# Patient Record
Sex: MALE | Race: WHITE | Employment: FULL TIME | URBAN - METROPOLITAN AREA
[De-identification: names, ages, dates, MRNs, and addresses within clinical notes are randomized per-mention and may not be internally consistent; named-entity substitution may affect disease eponyms.]

---

## 2021-10-18 ENCOUNTER — APPOINTMENT (OUTPATIENT)
Dept: GENERAL RADIOLOGY | Age: 51
DRG: 368 | End: 2021-10-18
Attending: EMERGENCY MEDICINE
Payer: COMMERCIAL

## 2021-10-18 ENCOUNTER — HOSPITAL ENCOUNTER (INPATIENT)
Age: 51
LOS: 31 days | Discharge: NURSING, REHAB OR LONG-TERM CARE | DRG: 368 | End: 2021-11-18
Attending: EMERGENCY MEDICINE | Admitting: HEALTH CARE PROVIDER
Payer: COMMERCIAL

## 2021-10-18 DIAGNOSIS — R53.81 DEBILITY: ICD-10-CM

## 2021-10-18 DIAGNOSIS — F41.8 ANXIETY ABOUT HEALTH: ICD-10-CM

## 2021-10-18 DIAGNOSIS — Z51.5 PALLIATIVE CARE BY SPECIALIST: ICD-10-CM

## 2021-10-18 DIAGNOSIS — A41.9 SEPTIC SHOCK (HCC): Primary | ICD-10-CM

## 2021-10-18 DIAGNOSIS — R65.21 SEPTIC SHOCK (HCC): Primary | ICD-10-CM

## 2021-10-18 DIAGNOSIS — K92.2 GASTROINTESTINAL HEMORRHAGE, UNSPECIFIED GASTROINTESTINAL HEMORRHAGE TYPE: ICD-10-CM

## 2021-10-18 DIAGNOSIS — R18.8 ABDOMINAL WALL FLUID COLLECTIONS: ICD-10-CM

## 2021-10-18 LAB
ALBUMIN SERPL-MCNC: 1.9 G/DL (ref 3.5–5)
ALBUMIN/GLOB SERPL: 0.3 {RATIO} (ref 1.1–2.2)
ALP SERPL-CCNC: 175 U/L (ref 45–117)
ALT SERPL-CCNC: 29 U/L (ref 12–78)
ANION GAP SERPL CALC-SCNC: 12 MMOL/L (ref 5–15)
ARTERIAL PATENCY WRIST A: POSITIVE
AST SERPL-CCNC: 33 U/L (ref 15–37)
ATRIAL RATE: 125 BPM
BASE EXCESS BLD CALC-SCNC: 0.4 MMOL/L
BASOPHILS # BLD: 0 K/UL (ref 0–0.1)
BASOPHILS NFR BLD: 0 % (ref 0–1)
BDY SITE: ABNORMAL
BILIRUB SERPL-MCNC: 0.8 MG/DL (ref 0.2–1)
BUN SERPL-MCNC: 45 MG/DL (ref 6–20)
BUN/CREAT SERPL: 16 (ref 12–20)
CALCIUM SERPL-MCNC: 9.5 MG/DL (ref 8.5–10.1)
CALCULATED P AXIS, ECG09: 44 DEGREES
CALCULATED R AXIS, ECG10: 6 DEGREES
CALCULATED T AXIS, ECG11: 41 DEGREES
CHLORIDE SERPL-SCNC: 96 MMOL/L (ref 97–108)
CO2 SERPL-SCNC: 26 MMOL/L (ref 21–32)
COMMENT, HOLDF: NORMAL
COMMENT, HOLDF: NORMAL
COVID-19 RAPID TEST, COVR: NOT DETECTED
CREAT SERPL-MCNC: 2.87 MG/DL (ref 0.7–1.3)
DIAGNOSIS, 93000: NORMAL
DIFFERENTIAL METHOD BLD: ABNORMAL
EOSINOPHIL # BLD: 0 K/UL (ref 0–0.4)
EOSINOPHIL NFR BLD: 0 % (ref 0–7)
ERYTHROCYTE [DISTWIDTH] IN BLOOD BY AUTOMATED COUNT: 16.1 % (ref 11.5–14.5)
ERYTHROCYTE [DISTWIDTH] IN BLOOD BY AUTOMATED COUNT: 17.2 % (ref 11.5–14.5)
GAS FLOW.O2 O2 DELIVERY SYS: ABNORMAL L/MIN
GAS FLOW.O2 SETTING OXYMISER: 20 BPM
GLOBULIN SER CALC-MCNC: 5.6 G/DL (ref 2–4)
GLUCOSE SERPL-MCNC: 161 MG/DL (ref 65–100)
HCO3 BLD-SCNC: 22.8 MMOL/L (ref 22–26)
HCT VFR BLD AUTO: 26.4 % (ref 36.6–50.3)
HCT VFR BLD AUTO: 31.7 % (ref 36.6–50.3)
HGB BLD-MCNC: 8.3 G/DL (ref 12.1–17)
HGB BLD-MCNC: 9.6 G/DL (ref 12.1–17)
IMM GRANULOCYTES # BLD AUTO: 0 K/UL
IMM GRANULOCYTES NFR BLD AUTO: 0 %
INR PPP: 1.2 (ref 0.9–1.1)
INR PPP: 5.2 (ref 0.9–1.1)
LACTATE SERPL-SCNC: 2.4 MMOL/L (ref 0.4–2)
LACTATE SERPL-SCNC: 4 MMOL/L (ref 0.4–2)
LIPASE SERPL-CCNC: 153 U/L (ref 73–393)
LYMPHOCYTES # BLD: 1.6 K/UL (ref 0.8–3.5)
LYMPHOCYTES NFR BLD: 8 % (ref 12–49)
MAGNESIUM SERPL-MCNC: 2.5 MG/DL (ref 1.6–2.4)
MCH RBC QN AUTO: 29.4 PG (ref 26–34)
MCH RBC QN AUTO: 29.7 PG (ref 26–34)
MCHC RBC AUTO-ENTMCNC: 30.3 G/DL (ref 30–36.5)
MCHC RBC AUTO-ENTMCNC: 31.4 G/DL (ref 30–36.5)
MCV RBC AUTO: 94.6 FL (ref 80–99)
MCV RBC AUTO: 96.9 FL (ref 80–99)
METAMYELOCYTES NFR BLD MANUAL: 3 %
MONOCYTES # BLD: 1.4 K/UL (ref 0–1)
MONOCYTES NFR BLD: 7 % (ref 5–13)
NEUTS BAND NFR BLD MANUAL: 1 % (ref 0–6)
NEUTS SEG # BLD: 16.9 K/UL (ref 1.8–8)
NEUTS SEG NFR BLD: 81 % (ref 32–75)
NRBC # BLD: 0.23 K/UL (ref 0–0.01)
NRBC # BLD: 0.46 K/UL (ref 0–0.01)
NRBC BLD-RTO: 1.6 PER 100 WBC
NRBC BLD-RTO: 2.2 PER 100 WBC
O2/TOTAL GAS SETTING VFR VENT: 60 %
P-R INTERVAL, ECG05: 146 MS
PCO2 BLD: 28.6 MMHG (ref 35–45)
PEEP RESPIRATORY: 8 CMH2O
PH BLD: 7.51 [PH] (ref 7.35–7.45)
PLATELET # BLD AUTO: 371 K/UL (ref 150–400)
PLATELET # BLD AUTO: 623 K/UL (ref 150–400)
PLATELET COMMENTS,PCOM: ABNORMAL
PMV BLD AUTO: 9.7 FL (ref 8.9–12.9)
PMV BLD AUTO: 9.9 FL (ref 8.9–12.9)
PO2 BLD: 211 MMHG (ref 80–100)
POTASSIUM SERPL-SCNC: 3.9 MMOL/L (ref 3.5–5.1)
PROT SERPL-MCNC: 7.5 G/DL (ref 6.4–8.2)
PROTHROMBIN TIME: 12 SEC (ref 9–11.1)
PROTHROMBIN TIME: 50.8 SEC (ref 9–11.1)
Q-T INTERVAL, ECG07: 308 MS
QRS DURATION, ECG06: 74 MS
QTC CALCULATION (BEZET), ECG08: 444 MS
RBC # BLD AUTO: 2.79 M/UL (ref 4.1–5.7)
RBC # BLD AUTO: 3.27 M/UL (ref 4.1–5.7)
RBC MORPH BLD: ABNORMAL
RBC MORPH BLD: ABNORMAL
SAMPLES BEING HELD,HOLD: NORMAL
SAMPLES BEING HELD,HOLD: NORMAL
SAO2 % BLD: 99.8 % (ref 92–97)
SODIUM SERPL-SCNC: 134 MMOL/L (ref 136–145)
SOURCE, COVRS: NORMAL
SPECIMEN TYPE: ABNORMAL
VENTILATION MODE VENT: ABNORMAL
VENTRICULAR RATE, ECG03: 125 BPM
WBC # BLD AUTO: 14.1 K/UL (ref 4.1–11.1)
WBC # BLD AUTO: 20.6 K/UL (ref 4.1–11.1)
WBC MORPH BLD: ABNORMAL

## 2021-10-18 PROCEDURE — 65610000006 HC RM INTENSIVE CARE

## 2021-10-18 PROCEDURE — 74011000636 HC RX REV CODE- 636: Performed by: EMERGENCY MEDICINE

## 2021-10-18 PROCEDURE — 83690 ASSAY OF LIPASE: CPT

## 2021-10-18 PROCEDURE — 83605 ASSAY OF LACTIC ACID: CPT

## 2021-10-18 PROCEDURE — C9113 INJ PANTOPRAZOLE SODIUM, VIA: HCPCS | Performed by: HEALTH CARE PROVIDER

## 2021-10-18 PROCEDURE — 87040 BLOOD CULTURE FOR BACTERIA: CPT

## 2021-10-18 PROCEDURE — 74011250636 HC RX REV CODE- 250/636: Performed by: HEALTH CARE PROVIDER

## 2021-10-18 PROCEDURE — 80053 COMPREHEN METABOLIC PANEL: CPT

## 2021-10-18 PROCEDURE — 74011250636 HC RX REV CODE- 250/636: Performed by: EMERGENCY MEDICINE

## 2021-10-18 PROCEDURE — 87635 SARS-COV-2 COVID-19 AMP PRB: CPT

## 2021-10-18 PROCEDURE — 5A1955Z RESPIRATORY VENTILATION, GREATER THAN 96 CONSECUTIVE HOURS: ICD-10-PCS | Performed by: HEALTH CARE PROVIDER

## 2021-10-18 PROCEDURE — 85025 COMPLETE CBC W/AUTO DIFF WBC: CPT

## 2021-10-18 PROCEDURE — C9113 INJ PANTOPRAZOLE SODIUM, VIA: HCPCS | Performed by: EMERGENCY MEDICINE

## 2021-10-18 PROCEDURE — 36430 TRANSFUSION BLD/BLD COMPNT: CPT

## 2021-10-18 PROCEDURE — 82803 BLOOD GASES ANY COMBINATION: CPT

## 2021-10-18 PROCEDURE — 30233N1 TRANSFUSION OF NONAUTOLOGOUS RED BLOOD CELLS INTO PERIPHERAL VEIN, PERCUTANEOUS APPROACH: ICD-10-PCS | Performed by: HEALTH CARE PROVIDER

## 2021-10-18 PROCEDURE — 96374 THER/PROPH/DIAG INJ IV PUSH: CPT

## 2021-10-18 PROCEDURE — 0BH17EZ INSERTION OF ENDOTRACHEAL AIRWAY INTO TRACHEA, VIA NATURAL OR ARTIFICIAL OPENING: ICD-10-PCS | Performed by: HEALTH CARE PROVIDER

## 2021-10-18 PROCEDURE — 77010033711 HC HIGH FLOW OXYGEN

## 2021-10-18 PROCEDURE — 74011000258 HC RX REV CODE- 258: Performed by: EMERGENCY MEDICINE

## 2021-10-18 PROCEDURE — 30233L1 TRANSFUSION OF NONAUTOLOGOUS FRESH PLASMA INTO PERIPHERAL VEIN, PERCUTANEOUS APPROACH: ICD-10-PCS | Performed by: HEALTH CARE PROVIDER

## 2021-10-18 PROCEDURE — 83735 ASSAY OF MAGNESIUM: CPT

## 2021-10-18 PROCEDURE — 51702 INSERT TEMP BLADDER CATH: CPT

## 2021-10-18 PROCEDURE — 86901 BLOOD TYPING SEROLOGIC RH(D): CPT

## 2021-10-18 PROCEDURE — 74011000250 HC RX REV CODE- 250: Performed by: HEALTH CARE PROVIDER

## 2021-10-18 PROCEDURE — 36600 WITHDRAWAL OF ARTERIAL BLOOD: CPT

## 2021-10-18 PROCEDURE — 85027 COMPLETE CBC AUTOMATED: CPT

## 2021-10-18 PROCEDURE — 71045 X-RAY EXAM CHEST 1 VIEW: CPT

## 2021-10-18 PROCEDURE — 36415 COLL VENOUS BLD VENIPUNCTURE: CPT

## 2021-10-18 PROCEDURE — 99285 EMERGENCY DEPT VISIT HI MDM: CPT

## 2021-10-18 PROCEDURE — 93005 ELECTROCARDIOGRAM TRACING: CPT

## 2021-10-18 PROCEDURE — 74011000250 HC RX REV CODE- 250: Performed by: EMERGENCY MEDICINE

## 2021-10-18 PROCEDURE — 30233K1 TRANSFUSION OF NONAUTOLOGOUS FROZEN PLASMA INTO PERIPHERAL VEIN, PERCUTANEOUS APPROACH: ICD-10-PCS | Performed by: HEALTH CARE PROVIDER

## 2021-10-18 PROCEDURE — 86920 COMPATIBILITY TEST SPIN: CPT

## 2021-10-18 PROCEDURE — 86923 COMPATIBILITY TEST ELECTRIC: CPT

## 2021-10-18 PROCEDURE — P9059 PLASMA, FRZ BETWEEN 8-24HOUR: HCPCS

## 2021-10-18 PROCEDURE — 94002 VENT MGMT INPAT INIT DAY: CPT

## 2021-10-18 PROCEDURE — 85610 PROTHROMBIN TIME: CPT

## 2021-10-18 PROCEDURE — P9016 RBC LEUKOCYTES REDUCED: HCPCS

## 2021-10-18 RX ORDER — ACETAMINOPHEN 160 MG/5ML
650 SOLUTION ORAL
COMMUNITY

## 2021-10-18 RX ORDER — HYDROMORPHONE HYDROCHLORIDE 1 MG/ML
0.5 INJECTION, SOLUTION INTRAMUSCULAR; INTRAVENOUS; SUBCUTANEOUS
COMMUNITY
End: 2021-11-18

## 2021-10-18 RX ORDER — MELATONIN
1000 DAILY
COMMUNITY

## 2021-10-18 RX ORDER — SODIUM CHLORIDE 9 MG/ML
75 INJECTION, SOLUTION INTRAVENOUS CONTINUOUS
Status: DISCONTINUED | OUTPATIENT
Start: 2021-10-18 | End: 2021-10-21

## 2021-10-18 RX ORDER — LINEZOLID 2 MG/ML
600 INJECTION, SOLUTION INTRAVENOUS EVERY 12 HOURS
Status: DISCONTINUED | OUTPATIENT
Start: 2021-10-18 | End: 2021-10-21

## 2021-10-18 RX ORDER — LORAZEPAM 2 MG/ML
1 INJECTION INTRAMUSCULAR
COMMUNITY
End: 2021-11-18

## 2021-10-18 RX ORDER — ONDANSETRON 2 MG/ML
4 INJECTION INTRAMUSCULAR; INTRAVENOUS
Status: DISCONTINUED | OUTPATIENT
Start: 2021-10-18 | End: 2021-11-18 | Stop reason: HOSPADM

## 2021-10-18 RX ORDER — SENNA LEAF EXTRACT 176MG/5ML
528 SYRUP ORAL
COMMUNITY
End: 2021-11-18

## 2021-10-18 RX ORDER — ONDANSETRON 4 MG/1
4 TABLET, ORALLY DISINTEGRATING ORAL
Status: DISCONTINUED | OUTPATIENT
Start: 2021-10-18 | End: 2021-11-18 | Stop reason: HOSPADM

## 2021-10-18 RX ORDER — ALBUTEROL SULFATE 0.83 MG/ML
2.5 SOLUTION RESPIRATORY (INHALATION)
Status: DISCONTINUED | OUTPATIENT
Start: 2021-10-18 | End: 2021-11-18 | Stop reason: HOSPADM

## 2021-10-18 RX ORDER — NALOXONE HYDROCHLORIDE 1 MG/ML
0.4 INJECTION INTRAMUSCULAR; INTRAVENOUS; SUBCUTANEOUS
COMMUNITY
End: 2021-11-18

## 2021-10-18 RX ORDER — ALBUTEROL SULFATE 0.83 MG/ML
2.5 SOLUTION RESPIRATORY (INHALATION)
COMMUNITY

## 2021-10-18 RX ORDER — VANCOMYCIN 2 GRAM/500 ML IN 0.9 % SODIUM CHLORIDE INTRAVENOUS
2000 ONCE
Status: DISCONTINUED | OUTPATIENT
Start: 2021-10-18 | End: 2021-10-18

## 2021-10-18 RX ORDER — LORAZEPAM 2 MG/ML
0.5 INJECTION INTRAMUSCULAR EVERY 12 HOURS
COMMUNITY
End: 2021-11-18

## 2021-10-18 RX ORDER — POLYETHYLENE GLYCOL 3350 17 G/17G
17 POWDER, FOR SOLUTION ORAL DAILY PRN
Status: DISCONTINUED | OUTPATIENT
Start: 2021-10-18 | End: 2021-11-18 | Stop reason: HOSPADM

## 2021-10-18 RX ORDER — LANOLIN ALCOHOL/MO/W.PET/CERES
9 CREAM (GRAM) TOPICAL
COMMUNITY

## 2021-10-18 RX ORDER — SODIUM CHLORIDE 0.9 % (FLUSH) 0.9 %
5-40 SYRINGE (ML) INJECTION EVERY 8 HOURS
Status: DISCONTINUED | OUTPATIENT
Start: 2021-10-18 | End: 2021-11-18 | Stop reason: HOSPADM

## 2021-10-18 RX ORDER — OLANZAPINE 10 MG/1
10 TABLET ORAL 2 TIMES DAILY
COMMUNITY
End: 2021-11-18

## 2021-10-18 RX ORDER — NOREPINEPHRINE BITARTRATE/D5W 8 MG/250ML
.5-2 PLASTIC BAG, INJECTION (ML) INTRAVENOUS
Status: DISCONTINUED | OUTPATIENT
Start: 2021-10-18 | End: 2021-10-19

## 2021-10-18 RX ORDER — LEVOFLOXACIN 5 MG/ML
750 INJECTION, SOLUTION INTRAVENOUS
Status: COMPLETED | OUTPATIENT
Start: 2021-10-18 | End: 2021-10-18

## 2021-10-18 RX ORDER — IPRATROPIUM BROMIDE AND ALBUTEROL SULFATE 2.5; .5 MG/3ML; MG/3ML
3 SOLUTION RESPIRATORY (INHALATION)
Status: DISCONTINUED | OUTPATIENT
Start: 2021-10-19 | End: 2021-10-25

## 2021-10-18 RX ORDER — SODIUM CHLORIDE 0.9 % (FLUSH) 0.9 %
5-40 SYRINGE (ML) INJECTION AS NEEDED
Status: DISCONTINUED | OUTPATIENT
Start: 2021-10-18 | End: 2021-11-18 | Stop reason: HOSPADM

## 2021-10-18 RX ORDER — SODIUM CHLORIDE 0.9 % (FLUSH) 0.9 %
5-10 SYRINGE (ML) INJECTION AS NEEDED
Status: DISCONTINUED | OUTPATIENT
Start: 2021-10-18 | End: 2021-11-18 | Stop reason: HOSPADM

## 2021-10-18 RX ORDER — OXYCODONE HYDROCHLORIDE 5 MG/1
5 CAPSULE ORAL
Status: ON HOLD | COMMUNITY
End: 2021-11-15 | Stop reason: SDUPTHER

## 2021-10-18 RX ORDER — SODIUM CHLORIDE 9 MG/ML
250 INJECTION, SOLUTION INTRAVENOUS AS NEEDED
Status: DISCONTINUED | OUTPATIENT
Start: 2021-10-18 | End: 2021-10-23

## 2021-10-18 RX ORDER — CHLORHEXIDINE GLUCONATE 1.2 MG/ML
15 RINSE ORAL
COMMUNITY
End: 2021-11-18

## 2021-10-18 RX ORDER — METOPROLOL TARTRATE 25 MG/1
12.5 TABLET, FILM COATED ORAL 2 TIMES DAILY
COMMUNITY
End: 2021-11-18

## 2021-10-18 RX ORDER — ACETAMINOPHEN 650 MG/1
650 SUPPOSITORY RECTAL
Status: DISCONTINUED | OUTPATIENT
Start: 2021-10-18 | End: 2021-11-18 | Stop reason: HOSPADM

## 2021-10-18 RX ORDER — FENTANYL 25 UG/1
1 PATCH TRANSDERMAL
Status: ON HOLD | COMMUNITY
End: 2021-11-17 | Stop reason: SDUPTHER

## 2021-10-18 RX ORDER — IPRATROPIUM BROMIDE AND ALBUTEROL SULFATE 2.5; .5 MG/3ML; MG/3ML
3 SOLUTION RESPIRATORY (INHALATION) 2 TIMES DAILY
COMMUNITY

## 2021-10-18 RX ORDER — SEVELAMER CARBONATE 800 MG/1
800 TABLET, FILM COATED ORAL
COMMUNITY

## 2021-10-18 RX ORDER — NYSTATIN 100000 [USP'U]/G
POWDER TOPICAL 2 TIMES DAILY
COMMUNITY
End: 2021-11-18

## 2021-10-18 RX ORDER — ACETAMINOPHEN 325 MG/1
650 TABLET ORAL
Status: DISCONTINUED | OUTPATIENT
Start: 2021-10-18 | End: 2021-11-18 | Stop reason: HOSPADM

## 2021-10-18 RX ORDER — LINEZOLID 2 MG/ML
600 INJECTION, SOLUTION INTRAVENOUS
Status: COMPLETED | OUTPATIENT
Start: 2021-10-18 | End: 2021-10-18

## 2021-10-18 RX ADMIN — SODIUM CHLORIDE 80 MG: 9 INJECTION INTRAMUSCULAR; INTRAVENOUS; SUBCUTANEOUS at 13:58

## 2021-10-18 RX ADMIN — Medication 10 ML: at 22:00

## 2021-10-18 RX ADMIN — PROTHROMBIN, COAGULATION FACTOR VII HUMAN, COAGULATION FACTOR IX HUMAN, COAGULATION FACTOR X HUMAN, PROTEIN C, PROTEIN S HUMAN, AND WATER 3171 INT'L UNITS: KIT at 13:19

## 2021-10-18 RX ADMIN — LINEZOLID 600 MG: 600 INJECTION, SOLUTION INTRAVENOUS at 14:38

## 2021-10-18 RX ADMIN — SODIUM CHLORIDE 125 ML/HR: 900 INJECTION, SOLUTION INTRAVENOUS at 17:04

## 2021-10-18 RX ADMIN — PIPERACILLIN AND TAZOBACTAM 3.38 G: 3; .375 INJECTION, POWDER, LYOPHILIZED, FOR SOLUTION INTRAVENOUS at 14:12

## 2021-10-18 RX ADMIN — NOREPINEPHRINE BITARTRATE 2 MCG/MIN: 1 SOLUTION INTRAVENOUS at 15:28

## 2021-10-18 RX ADMIN — NOREPINEPHRINE BITARTRATE 3 MCG/MIN: 1 SOLUTION INTRAVENOUS at 15:55

## 2021-10-18 RX ADMIN — PHYTONADIONE 10 MG: 10 INJECTION, EMULSION INTRAMUSCULAR; INTRAVENOUS; SUBCUTANEOUS at 13:38

## 2021-10-18 RX ADMIN — SODIUM CHLORIDE 80 MG: 9 INJECTION INTRAMUSCULAR; INTRAVENOUS; SUBCUTANEOUS at 22:34

## 2021-10-18 RX ADMIN — SODIUM CHLORIDE 1000 ML: 900 INJECTION, SOLUTION INTRAVENOUS at 14:29

## 2021-10-18 RX ADMIN — SODIUM CHLORIDE 1000 ML: 9 INJECTION, SOLUTION INTRAVENOUS at 12:53

## 2021-10-18 RX ADMIN — NOREPINEPHRINE BITARTRATE 3 MCG/MIN: 1 SOLUTION INTRAVENOUS at 14:34

## 2021-10-18 RX ADMIN — SODIUM CHLORIDE 1000 ML: 9 INJECTION, SOLUTION INTRAVENOUS at 14:20

## 2021-10-18 RX ADMIN — LEVOFLOXACIN 750 MG: 5 INJECTION, SOLUTION INTRAVENOUS at 14:28

## 2021-10-18 RX ADMIN — Medication 10 ML: at 14:27

## 2021-10-18 RX ADMIN — NOREPINEPHRINE BITARTRATE 4 MCG/MIN: 1 SOLUTION INTRAVENOUS at 12:35

## 2021-10-18 RX ADMIN — SODIUM CHLORIDE 1000 ML: 900 INJECTION, SOLUTION INTRAVENOUS at 15:29

## 2021-10-18 NOTE — PROGRESS NOTES
Admission Medication Reconciliation:      PTA med list compiled from [address] [facility],[address] transfer documents which were located in patient's room ED 24- documents were then given to unit secretary to be scanned into eMR. Notes:  1. 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 Acute DVT of axillary vein of right upper extremity   Was on warfarin (dose unknown) and heparin gtt but not currently in ED    3. SERGEY- on HD    4. Tube feeding:   Nephronex    Medication changes (never reviewed): Added all agents    Thank you for allowing me to participate in the care of your patient. Kristal Romeo PharmD, RN #[phone] [facility] benefit data reflects medications filled and processed through the patient's insurance, however   this data does NOT capture whether the medication was picked up or is currently being taken by the patient. Allergies:  Patient has no known allergies. Significant PMH/Disease States: No past medical history on file. Chief Complaint for this Admission:    Chief Complaint   Patient presents with    Hypotension    Blood in Vomit     Prior to Admission Medications:   Prior to Admission Medications   Prescriptions Last Dose Informant Taking? HYDROmorphone (DILAUDID) 1 mg/mL injection   Yes   Si.5 mg by IntraVENous route every six (6) hours as needed. LORazepam (ATIVAN) 2 mg/mL injection   Yes   Si.5 mg by IntraVENous route every twelve (12) hours. LORazepam (Ativan) 2 mg/mL injection   Yes   Si mg by IntraVENous route every twelve (12) hours as needed. OLANZapine (ZyPREXA) 10 mg tablet   Yes   Sig: Take 10 mg by mouth two (2) times a day. OTHER   Yes   Sig: 3 mL DIALYSIS PRN.  Sodium citrate 3 mL (for HD cath) 2 each PRN HD   acetaminophen (TYLENOL) 160 mg/5 mL (5 mL) solution   Yes   Sig: Take 650 mg by mouth every six (6) hours as needed for Fever. albuterol (PROVENTIL VENTOLIN) 2.5 mg /3 mL (0.083 %) nebu   Yes   Si.5 mg by Nebulization route every eight (8) hours as needed for Wheezing. albuterol-ipratropium (DUO-NEB) 2.5 mg-0.5 mg/3 ml nebu   Yes   Sig: 3 mL by Nebulization route two (2) times a day. chlorhexidine (PERIDEX) 0.12 % solution   Yes   Sig: 15 mL by Swish and Spit route every twelve (12) hours as needed. cholecalciferol (VITAMIN D3) (1000 Units /25 mcg) tablet   Yes   Sig: Take 1,000 Units by mouth daily. fentaNYL (Duragesic) 25 mcg/hr PATCH   Yes   Si Patch by TransDERmal route every seventy-two (72) hours. insulin regular (NOVOLIN R, HUMULIN R) 100 unit/mL injection   Yes   Si Units by SubCUTAneous route Before breakfast, lunch, dinner and at bedtime. melatonin 3 mg tablet   Yes   Sig: Take 9 mg by mouth nightly. metoprolol tartrate (LOPRESSOR) 25 mg tablet   Yes   Sig: Take 12.5 mg by mouth two (2) times a day.   naloxone (NARCAN) 1 mg/mL injection   Yes   Si.4 mg by SubCUTAneous route once as needed. nystatin (MYCOSTATIN) powder   Yes   Sig: Apply  to affected area two (2) times a day. oxyCODONE (OXYIR) 5 mg capsule   Yes   Sig: Take 5 mg by mouth every four (4) hours as needed for Pain.   pantoprazole 4 mg/mL in 0.9% sodium chloride injection   Yes   Si mg by IntraVENous route every twelve (12) hours. piperacillin-tazobactam 2.25 g IVPB   Yes   Si.25 g by IntraVENous route every six (6) hours. pramoxine-calamine (Calamine Medicated) 1-8 % lotion   Yes   Sig: Apply  to affected area three (3) times daily. senna leaf extract (Senna) 176 mg/5 mL syrp syrup   Yes   Sig: Take 528 mg by mouth two (2) times daily as needed. sevelamer carbonate (RENVELA) 800 mg tab tab   Yes   Sig: Take 800 mg by mouth three (3) times daily (with meals).       Facility-Administered Medications: None     Please contact the main inpatient pharmacy with any questions or concerns at [phone] and we will direct you to the clinical pharmacist covering this patient's care while in-house.    SONG Justin

## 2021-10-18 NOTE — H&P
GENERAL GENERIC H&P/CONSULT    Subjective:    Patient is a 54 y/o M that had a prolonged hospitaliztion with COVID in Aug 2021 at an OSH. He is trached and PEG already and resides at [facility]. Patient started having coffee ground emesis today. He was tachycardic and hypotensive with these episodes and was sent to the ED for evaluation. To note, the patient is also IHD dependent. No past medical history on file. No past surgical history on file. Prior to Admission medications    Medication Sig Start Date End Date Taking? Authorizing Provider   oxyCODONE (OXYIR) 5 mg capsule Take 5 mg by mouth every four (4) hours as needed for Pain. Yes Provider, Historical   HYDROmorphone (DILAUDID) 1 mg/mL injection 0.5 mg by IntraVENous route every six (6) hours as needed. Yes Provider, Historical   LORazepam (ATIVAN) 2 mg/mL injection 0.5 mg by IntraVENous route every twelve (12) hours. Yes Provider, Historical   albuterol-ipratropium (DUO-NEB) 2.5 mg-0.5 mg/3 ml nebu 3 mL by Nebulization route two (2) times a day. Yes Provider, Historical   insulin regular (NOVOLIN R, HUMULIN R) 100 unit/mL injection 1 Units by SubCUTAneous route Before breakfast, lunch, dinner and at bedtime. Yes Provider, Historical   fentaNYL (Duragesic) 25 mcg/hr PATCH 1 Patch by TransDERmal route every seventy-two (72) hours. Yes Provider, Historical   nystatin (MYCOSTATIN) powder Apply  to affected area two (2) times a day. Yes Provider, Historical   piperacillin-tazobactam 2.25 g IVPB 2.25 g by IntraVENous route every six (6) hours. Yes Provider, Historical   acetaminophen (TYLENOL) 160 mg/5 mL (5 mL) solution Take 650 mg by mouth every six (6) hours as needed for Fever. Yes Provider, Historical   albuterol (PROVENTIL VENTOLIN) 2.5 mg /3 mL (0.083 %) nebu 2.5 mg by Nebulization route every eight (8) hours as needed for Wheezing.    Yes Provider, Historical   LORazepam (Ativan) 2 mg/mL injection 1 mg by IntraVENous route every twelve (12) hours as needed. Yes Provider, Historical   melatonin 3 mg tablet Take 9 mg by mouth nightly. Yes Provider, Historical   pantoprazole 4 mg/mL in 0.9% sodium chloride injection 40 mg by IntraVENous route every twelve (12) hours. Yes Provider, Historical   cholecalciferol (VITAMIN D3) (1000 Units /25 mcg) tablet Take 1,000 Units by mouth daily. Yes Provider, Historical   sevelamer carbonate (RENVELA) 800 mg tab tab Take 800 mg by mouth three (3) times daily (with meals). Yes Provider, Historical   chlorhexidine (PERIDEX) 0.12 % solution 15 mL by Swish and Spit route every twelve (12) hours as needed. Yes Provider, Historical   senna leaf extract (Senna) 176 mg/5 mL syrp syrup Take 528 mg by mouth two (2) times daily as needed. Yes Provider, Historical   OLANZapine (ZyPREXA) 10 mg tablet Take 10 mg by mouth two (2) times a day. Yes Provider, Historical   metoprolol tartrate (LOPRESSOR) 25 mg tablet Take 12.5 mg by mouth two (2) times a day. Yes Provider, Historical   pramoxine-calamine (Calamine Medicated) 1-8 % lotion Apply  to affected area three (3) times daily. Yes Provider, Historical   OTHER 3 mL DIALYSIS PRN. Sodium citrate 3 mL (for HD cath) 2 each PRN HD   Yes Provider, Historical   naloxone (NARCAN) 1 mg/mL injection 0.4 mg by SubCUTAneous route once as needed. Yes Provider, Historical     No Known Allergies   Social History     Tobacco Use    Smoking status: Not on file   Substance Use Topics    Alcohol use: Not on file      No family history on file. Review of Systems   Unable to perform ROS: Acuity of condition       Objective:    No intake/output data recorded. No intake/output data recorded.   Patient Vitals for the past 8 hrs:   BP Temp Pulse Resp SpO2 Weight   10/18/21 1306 -- -- (!) 121 (!) 36 97 % --   10/18/21 1302 (!) 95/58 98.1 °F (36.7 °C) (!) 122 29 97 % --   10/18/21 1247 92/81 -- (!) 119 29 100 % --   10/18/21 1226 (!) 89/71 (!) 96.5 °F (35.8 °C) (!) 125 (!) 34 97 % --   10/18/21 1220 (!) 79/68 -- (!) 124 (!) 37 100 % --   10/18/21 1214 -- -- (!) 129 29 100 % --   10/18/21 1202 (!) 86/40 -- (!) 129 (!) 31 100 % 136.6 kg (301 lb 2.4 oz)     Physical Exam  Constitutional:       Appearance: He is obese. HENT:      Head: Normocephalic and atraumatic. Mouth/Throat:      Comments: Trach in place  Cardiovascular:      Rate and Rhythm: Regular rhythm. Tachycardia present. Pulses: Normal pulses. Heart sounds: No murmur heard. No friction rub. No gallop. Pulmonary:      Breath sounds: Normal breath sounds. No stridor. No wheezing, rhonchi or rales. Abdominal:      General: Bowel sounds are normal.      Palpations: Abdomen is soft. Tenderness: There is abdominal tenderness. There is no guarding or rebound. Comments: Blood in NGT   Skin:     Coloration: Skin is pale. Neurological:      Mental Status: He is alert. Labs:    Recent Results (from the past 24 hour(s))   CBC WITH AUTOMATED DIFF    Collection Time: 10/18/21 12:06 PM   Result Value Ref Range    WBC 20.6 (H) 4.1 - 11.1 K/uL    RBC 3.27 (L) 4.10 - 5.70 M/uL    HGB 9.6 (L) 12.1 - 17.0 g/dL    HCT 31.7 (L) 36.6 - 50.3 %    MCV 96.9 80.0 - 99.0 FL    MCH 29.4 26.0 - 34.0 PG    MCHC 30.3 30.0 - 36.5 g/dL    RDW 16.1 (H) 11.5 - 14.5 %    PLATELET 349 (H) 831 - 400 K/uL    MPV 9.7 8.9 - 12.9 FL    NRBC 2.2 (H) 0  WBC    ABSOLUTE NRBC 0.46 (H) 0.00 - 0.01 K/uL    NEUTROPHILS 81 (H) 32 - 75 %    BAND NEUTROPHILS 1 0 - 6 %    LYMPHOCYTES 8 (L) 12 - 49 %    MONOCYTES 7 5 - 13 %    EOSINOPHILS 0 0 - 7 %    BASOPHILS 0 0 - 1 %    METAMYELOCYTES 3 (H) 0 %    IMMATURE GRANULOCYTES 0 %    ABS. NEUTROPHILS 16.9 (H) 1.8 - 8.0 K/UL    ABS. LYMPHOCYTES 1.6 0.8 - 3.5 K/UL    ABS. MONOCYTES 1.4 (H) 0.0 - 1.0 K/UL    ABS. EOSINOPHILS 0.0 0.0 - 0.4 K/UL    ABS. BASOPHILS 0.0 0.0 - 0.1 K/UL    ABS. IMM.  GRANS. 0.0 K/UL    DF MANUAL      PLATELET COMMENTS Large Platelets      RBC COMMENTS ANISOCYTOSIS  PRESENT        RBC COMMENTS POLYCHROMASIA  PRESENT        WBC COMMENTS ATYPICAL LYMPHOCYTES PRESENT     TYPE & SCREEN    Collection Time: 10/18/21 12:06 PM   Result Value Ref Range    Crossmatch Expiration 10/21/2021,2359     ABO/Rh(D) A POSITIVE     Antibody screen PENDING     Unit number [id number]     Blood component type RC LR,2     Unit division 00     Status of unit ISSUED     Crossmatch result Emergency Release     Unit number [id number]     Blood component type RC LR,2     Unit division 00     Status of unit ISSUED     Crossmatch result Emergency Release    PROTHROMBIN TIME + INR    Collection Time: 10/18/21 12:06 PM   Result Value Ref Range    INR 5.2 (HH) 0.9 - 1.1      Prothrombin time 50.8 (H) 9.0 - 25.0 sec   METABOLIC PANEL, COMPREHENSIVE    Collection Time: 10/18/21 12:06 PM   Result Value Ref Range    Sodium 134 (L) 136 - 145 mmol/L    Potassium 3.9 3.5 - 5.1 mmol/L    Chloride 96 (L) 97 - 108 mmol/L    CO2 26 21 - 32 mmol/L    Anion gap 12 5 - 15 mmol/L    Glucose 161 (H) 65 - 100 mg/dL    BUN 45 (H) 6 - 20 MG/DL    Creatinine 2.87 (H) 0.70 - 1.30 MG/DL    BUN/Creatinine ratio 16 12 - 20      GFR est AA 29 (L) >60 ml/min/1.73m2    GFR est non-AA 24 (L) >60 ml/min/1.73m2    Calcium 9.5 8.5 - 10.1 MG/DL    Bilirubin, total 0.8 0.2 - 1.0 MG/DL    ALT (SGPT) 29 12 - 78 U/L    AST (SGOT) 33 15 - 37 U/L    Alk.  phosphatase 175 (H) 45 - 117 U/L    Protein, total 7.5 6.4 - 8.2 g/dL    Albumin 1.9 (L) 3.5 - 5.0 g/dL    Globulin 5.6 (H) 2.0 - 4.0 g/dL    A-G Ratio 0.3 (L) 1.1 - 2.2     LIPASE    Collection Time: 10/18/21 12:06 PM   Result Value Ref Range    Lipase 153 73 - 393 U/L   MAGNESIUM    Collection Time: 10/18/21 12:06 PM   Result Value Ref Range    Magnesium 2.5 (H) 1.6 - 2.4 mg/dL   SAMPLES BEING HELD    Collection Time: 10/18/21 12:06 PM   Result Value Ref Range    SAMPLES BEING HELD 1RED     COMMENT        Add-on orders for these samples will be processed based on acceptable specimen integrity and analyte stability, which may vary by analyte. LACTIC ACID    Collection Time: 10/18/21 12:07 PM   Result Value Ref Range    Lactic acid 4.0 (HH) 0.4 - 2.0 MMOL/L   EKG, 12 LEAD, INITIAL    Collection Time: 10/18/21 12:13 PM   Result Value Ref Range    Ventricular Rate 125 BPM    Atrial Rate 125 BPM    P-R Interval 146 ms    QRS Duration 74 ms    Q-T Interval 308 ms    QTC Calculation (Bezet) 444 ms    Calculated P Axis 44 degrees    Calculated R Axis 6 degrees    Calculated T Axis 41 degrees    Diagnosis       Sinus tachycardia  Nonspecific ST abnormality  No previous ECGs available  Confirmed by Madonna Upton M.D., Harmony Mayorga ([id number]) on 10/18/2021 12:59:45 PM     POC G3 - PUL    Collection Time: 10/18/21 12:54 PM   Result Value Ref Range    FIO2 (POC) 60 %    pH (POC) 7.51 (H) 7.35 - 7.45      pCO2 (POC) 28.6 (L) 35.0 - 45.0 MMHG    pO2 (POC) 211 (H) 80 - 100 MMHG    HCO3 (POC) 22.8 22 - 26 MMOL/L    sO2 (POC) 99.8 (H) 92 - 97 %    Base excess (POC) 0.4 mmol/L    Site LEFT RADIAL      Device: ADULT VENT      Mode ASSIST CONTROL      Set Rate 20 bpm    PEEP/CPAP (POC) 8 cmH2O    Allens test (POC) Positive      Specimen type (POC) ARTERIAL         ECG:    Chest X-Ray: pending    Assessment:  Active Problems:    GIB (gastrointestinal bleeding) (10/18/2021)    2. Hemorrhagic shock  3. Elevated INR  4. Leukocytosis  5. CRF  6. Ventilatory dependant    Plan:  1. 4 units pRBCs and FFP  2. K-centra  3. STAT GI consult  4. Admit to ICU  5. Nephrology consult  6. Broad spectrum abx until cultures return  7.  Protonix 80 mg BID    CC time: 30 mins    Signed:  Milton Alvarado DO 10/18/2021

## 2021-10-18 NOTE — ED PROVIDER NOTES
41-year-old male presents with a chief complaint of hematemesis. Patient reportedly suffered from Covid pneumonia recently and ended up with a trach and PEG. He was transferred from an [facility] hospital to [address] [place] [address] for vent management and rehabilitation. Patient also suffered from a DVT in his upper extremity and has been on Coumadin. Today the patient experienced several episodes of coffee-ground emesis. His INR was noted to be elevated at 4.5 and his Coumadin has been held since Friday. He was hypotensive after dialysis this morning with blood pressures of 85 systolic. He had 1 L fluid removed according to the [facility]. He did have an NG tube placed prior to arrival.           No past medical history on file. No past surgical history on file. No family history on file. Social History     Socioeconomic History    Marital status: Not on file     Spouse name: Not on file    Number of children: Not on file    Years of education: Not on file    Highest education level: Not on file   Occupational History    Not on file   Tobacco Use    Smoking status: Not on file   Substance and Sexual Activity    Alcohol use: Not on file    Drug use: Not on file    Sexual activity: Not on file   Other Topics Concern    Not on file   Social History Narrative    Not on file     Social Determinants of Health     Financial Resource Strain:     Difficulty of Paying Living Expenses:    Food Insecurity:     Worried About Running Out of Food in the Last Year:     [address] in the Last Year:    Transportation Needs:     Lack of Transportation (Medical):      Lack of Transportation (Non-Medical):    Physical Activity:     Days of Exercise per Week:     Minutes of Exercise per Session:    Stress:     Feeling of Stress :    Social Connections:     Frequency of Communication with Friends and Family:     Frequency of Social Gatherings with Friends and Family:     Attends [employer] Services:     Active Member of Clubs or Organizations:     Attends Club or Organization Meetings:     Marital Status:    Intimate Partner Violence:     Fear of Current or Ex-Partner:     Emotionally Abused:     Physically Abused:     Sexually Abused: ALLERGIES: Patient has no allergy information on record. Review of Systems   Unable to perform ROS: Intubated   Tracheostomy    There were no vitals filed for this visit. Physical Exam  Vitals and nursing note reviewed. Constitutional:       General: He is in acute distress. Appearance: He is toxic-appearing and diaphoretic. HENT:      Head: Normocephalic. Mouth/Throat:      Mouth: Mucous membranes are moist.   Eyes:      Extraocular Movements: Extraocular movements intact. Cardiovascular:      Rate and Rhythm: Tachycardia present. Pulses: Normal pulses. Heart sounds: Normal heart sounds. Pulmonary:      Breath sounds: Rhonchi present. Comments: Trached and vented  Abdominal:      General: Bowel sounds are normal. There is distension. Palpations: Abdomen is soft. Tenderness: There is no abdominal tenderness. Musculoskeletal:      Cervical back: Normal range of motion. Right lower leg: Edema present. Left lower leg: Edema present. Skin:     Capillary Refill: Capillary refill takes less than 2 seconds. Coloration: Skin is pale. Neurological:      General: No focal deficit present. Mental Status: He is alert. MDM  Number of Diagnoses or Management Options  Gastrointestinal hemorrhage, unspecified gastrointestinal hemorrhage type  Septic shock [facility])  Diagnosis management comments: 76-year-old male presents with concern for GI bleed. He recently experienced Covid pneumonia and has a trach and PEG. Upon arrival he is noted to be hypotensive with pressures in the 70s. He was given IV fluids and transfused uncrossed blood with concern for hemorrhagic shock.   He was also covered with IV antibiotics as there is concern for sepsis as well. He was given Kcentra and vitamin K. GI was consulted for possible emergent endoscopy. Patient will be admitted to ICU for further management. EKG shows sinus tachycardia at a rate of 125, normal intervals, normal axis, no ischemic changes.     IMPRESSION:  Septic shock ([address]  (primary encounter diagnosis)  Gastrointestinal hemorrhage, unspecified gastrointestinal hemorrhage type    - Broad Spectrum Antibiotics ordered: See orders  - Repeat lactic acid ordered for time see orders  - Re-assessment performed at time 1100 and clinical condition worsening.    - Hypotension or Lactic Acidosis present (SBP<90, MAP<65, Lactate >4): YES IVF:  30cc/kg actual Body Weight  - Persistent Hypotension despite IVF resuscitation: YES  Vasopressors: Norepinephrine    Plan:  Admit to ICU    Total critical care time spent exclusive of procedures: 78 minutes    Inez Joel MD         Amount and/or Complexity of Data Reviewed  Clinical lab tests: ordered and reviewed  Tests in the radiology section of CPT®: ordered and reviewed           Procedures

## 2021-10-18 NOTE — ED NOTES
Verbal shift change report given to Samuel Brower RN (oncoming nurse) by Roseann Bah RN (offgoing nurse). Report included the following information SBAR and Kardex.

## 2021-10-18 NOTE — PROGRESS NOTES
Renal Monitoring of pip-tazo  Indication:  sepsis of unknown etiology  Current regimen:  pip-tazo 3.375g q6h    Recent Labs     10/18/21  1206   WBC 20.6*   CREA 2.87*   BUN 45*     Est CrCl: HD (PTA) ml/min; UO: -- ml/kg/hr  Temp (24hrs), Av.3 °F (36.3 °C), Min:96.5 °F (35.8 °C), Max:98.1 °F (36.7 °C)      Plan: Change to pip-tazo 3.375g q12h for HD dosing.

## 2021-10-18 NOTE — CONSULTS
Palliative medicine~    Consult received, pt admitted today, to touch base w/ family as medical work up takes place.

## 2021-10-18 NOTE — CONSULTS
[address] [facility] Manjula Crystal NOTE  [facility] office  [phone] NP in-hospital cell phone M-F until 4:30  After 5pm or on weekends, please call  for physician on call        NAME:  Gabriella Sanches   :   1970   MRN:   [id number]       Referring Physician: Patricia Duval Date: 10/18/2021 1:37 PM    Chief Complaint: GI bleed     History of Present Illness:  Patient is a 53 y.o. who is seen in consultation at the request of Dr. Jason Rene for GI bleed. Medical history as listed below includes COVID in August necessitating trach/PEG and dialysis. He presented for coffee ground emesis and hypotension, noted to have INR 5.2 and leukocytosis. Discussed with RN had ~ 1L of bloody NG output, NG was dislodged. He has a PEG oozing blood. He reports some non-specific abdominal pain. I have reviewed the emergency room note, hospital admission note, notes by all other clinicians who have seen the patient during this hospitalization to date. I have reviewed the problem list and the reason for this hospitalization. I have reviewed the allergies and the medications the patient was taking at home prior to this hospitalization. PMH:  No past medical history on file. PSH:  No past surgical history on file. Allergies:  No Known Allergies    Home Medications:  Prior to Admission Medications   Prescriptions Last Dose Informant Patient Reported? Taking? HYDROmorphone (DILAUDID) 1 mg/mL injection   Yes Yes   Si.5 mg by IntraVENous route every six (6) hours as needed. LORazepam (ATIVAN) 2 mg/mL injection   Yes Yes   Si.5 mg by IntraVENous route every twelve (12) hours. LORazepam (Ativan) 2 mg/mL injection   Yes Yes   Si mg by IntraVENous route every twelve (12) hours as needed. OLANZapine (ZyPREXA) 10 mg tablet   Yes Yes   Sig: Take 10 mg by mouth two (2) times a day.    OTHER   Yes Yes   Sig: 3 mL DIALYSIS PRN. Sodium citrate 3 mL (for HD cath) 2 each PRN HD   acetaminophen (TYLENOL) 160 mg/5 mL (5 mL) solution   Yes Yes   Sig: Take 650 mg by mouth every six (6) hours as needed for Fever. albuterol (PROVENTIL VENTOLIN) 2.5 mg /3 mL (0.083 %) nebu   Yes Yes   Si.5 mg by Nebulization route every eight (8) hours as needed for Wheezing. albuterol-ipratropium (DUO-NEB) 2.5 mg-0.5 mg/3 ml nebu   Yes Yes   Sig: 3 mL by Nebulization route two (2) times a day. chlorhexidine (PERIDEX) 0.12 % solution   Yes Yes   Sig: 15 mL by Swish and Spit route every twelve (12) hours as needed. cholecalciferol (VITAMIN D3) (1000 Units /25 mcg) tablet   Yes Yes   Sig: Take 1,000 Units by mouth daily. fentaNYL (Duragesic) 25 mcg/hr PATCH   Yes Yes   Si Patch by TransDERmal route every seventy-two (72) hours. insulin regular (NOVOLIN R, HUMULIN R) 100 unit/mL injection   Yes Yes   Si Units by SubCUTAneous route Before breakfast, lunch, dinner and at bedtime. melatonin 3 mg tablet   Yes Yes   Sig: Take 9 mg by mouth nightly. metoprolol tartrate (LOPRESSOR) 25 mg tablet   Yes Yes   Sig: Take 12.5 mg by mouth two (2) times a day.   naloxone (NARCAN) 1 mg/mL injection   Yes Yes   Si.4 mg by SubCUTAneous route once as needed. nystatin (MYCOSTATIN) powder   Yes Yes   Sig: Apply  to affected area two (2) times a day. oxyCODONE (OXYIR) 5 mg capsule   Yes Yes   Sig: Take 5 mg by mouth every four (4) hours as needed for Pain.   pantoprazole 4 mg/mL in 0.9% sodium chloride injection   Yes Yes   Si mg by IntraVENous route every twelve (12) hours. piperacillin-tazobactam 2.25 g IVPB   Yes Yes   Si.25 g by IntraVENous route every six (6) hours. pramoxine-calamine (Calamine Medicated) 1-8 % lotion   Yes Yes   Sig: Apply  to affected area three (3) times daily. senna leaf extract (Senna) 176 mg/5 mL syrp syrup   Yes Yes   Sig: Take 528 mg by mouth two (2) times daily as needed.    sevelamer carbonate (RENVELA) 800 mg tab tab   Yes Yes   Sig: Take 800 mg by mouth three (3) times daily (with meals).       Facility-Administered Medications: None       Hospital Medications:  Current Facility-Administered Medications   Medication Dose Route Frequency    sodium chloride (NS) flush 5-10 mL  5-10 mL IntraVENous PRN    0.9% sodium chloride infusion 250 mL  250 mL IntraVENous PRN    phytonadione (vitamin K1) (AQUA-MEPHYTON) 10 mg in 0.9% sodium chloride 50 mL IVPB  10 mg IntraVENous ONCE    piperacillin-tazobactam (ZOSYN) 3.375 g in 0.9% sodium chloride (MBP/ADV) 100 mL MBP  3.375 g IntraVENous NOW    levoFLOXacin (LEVAQUIN) 750 mg in D5W IVPB  750 mg IntraVENous NOW    pantoprazole (PROTONIX) 80 mg in 0.9% sodium chloride 20 mL injection  80 mg IntraVENous ONCE    linezolid in dextrose 5% (ZYVOX) IVPB premix in D5W 600 mg  600 mg IntraVENous NOW    sodium chloride 0.9 % bolus infusion 1,000 mL  1,000 mL IntraVENous ONCE    Followed by    sodium chloride 0.9 % bolus infusion 1,000 mL  1,000 mL IntraVENous ONCE    Followed by    sodium chloride 0.9 % bolus infusion 1,000 mL  1,000 mL IntraVENous ONCE    NOREPINephrine (LEVOPHED) 8 mg in 5% dextrose 250mL (32 mcg/mL) infusion  0.5-20 mcg/min IntraVENous TITRATE    0.9% sodium chloride infusion 250 mL  250 mL IntraVENous PRN    sodium chloride (NS) flush 5-40 mL  5-40 mL IntraVENous Q8H    sodium chloride (NS) flush 5-40 mL  5-40 mL IntraVENous PRN    acetaminophen (TYLENOL) tablet 650 mg  650 mg Oral Q6H PRN    Or    acetaminophen (TYLENOL) suppository 650 mg  650 mg Rectal Q6H PRN    polyethylene glycol (MIRALAX) packet 17 g  17 g Oral DAILY PRN    ondansetron (ZOFRAN ODT) tablet 4 mg  4 mg Oral Q8H PRN    Or    ondansetron (ZOFRAN) injection 4 mg  4 mg IntraVENous Q6H PRN    0.9% sodium chloride infusion  125 mL/hr IntraVENous CONTINUOUS    pantoprazole (PROTONIX) 80 mg in 0.9% sodium chloride 20 mL injection  80 mg IntraVENous Q12H    piperacillin-tazobactam (ZOSYN) 3.375 g in 0.9% sodium chloride (MBP/ADV) 100 mL MBP  3.375 g IntraVENous Q6H    linezolid in dextrose 5% (ZYVOX) IVPB premix in D5W 600 mg  600 mg IntraVENous Q12H     Current Outpatient Medications   Medication Sig    oxyCODONE (OXYIR) 5 mg capsule Take 5 mg by mouth every four (4) hours as needed for Pain.  HYDROmorphone (DILAUDID) 1 mg/mL injection 0.5 mg by IntraVENous route every six (6) hours as needed.  LORazepam (ATIVAN) 2 mg/mL injection 0.5 mg by IntraVENous route every twelve (12) hours.  albuterol-ipratropium (DUO-NEB) 2.5 mg-0.5 mg/3 ml nebu 3 mL by Nebulization route two (2) times a day.  insulin regular (NOVOLIN R, HUMULIN R) 100 unit/mL injection 1 Units by SubCUTAneous route Before breakfast, lunch, dinner and at bedtime.  fentaNYL (Duragesic) 25 mcg/hr PATCH 1 Patch by TransDERmal route every seventy-two (72) hours.  nystatin (MYCOSTATIN) powder Apply  to affected area two (2) times a day.  piperacillin-tazobactam 2.25 g IVPB 2.25 g by IntraVENous route every six (6) hours.  acetaminophen (TYLENOL) 160 mg/5 mL (5 mL) solution Take 650 mg by mouth every six (6) hours as needed for Fever.  albuterol (PROVENTIL VENTOLIN) 2.5 mg /3 mL (0.083 %) nebu 2.5 mg by Nebulization route every eight (8) hours as needed for Wheezing.  LORazepam (Ativan) 2 mg/mL injection 1 mg by IntraVENous route every twelve (12) hours as needed.  melatonin 3 mg tablet Take 9 mg by mouth nightly.  pantoprazole 4 mg/mL in 0.9% sodium chloride injection 40 mg by IntraVENous route every twelve (12) hours.  cholecalciferol (VITAMIN D3) (1000 Units /25 mcg) tablet Take 1,000 Units by mouth daily.  sevelamer carbonate (RENVELA) 800 mg tab tab Take 800 mg by mouth three (3) times daily (with meals).  chlorhexidine (PERIDEX) 0.12 % solution 15 mL by Swish and Spit route every twelve (12) hours as needed.     senna leaf extract (Senna) 176 mg/5 mL syrp syrup Take 528 mg by mouth two (2) times daily as needed.  OLANZapine (ZyPREXA) 10 mg tablet Take 10 mg by mouth two (2) times a day.  metoprolol tartrate (LOPRESSOR) 25 mg tablet Take 12.5 mg by mouth two (2) times a day.  pramoxine-calamine (Calamine Medicated) 1-8 % lotion Apply  to affected area three (3) times daily.  OTHER 3 mL DIALYSIS PRN. Sodium citrate 3 mL (for HD cath) 2 each PRN HD    naloxone (NARCAN) 1 mg/mL injection 0.4 mg by SubCUTAneous route once as needed. Social History:  Social History     Tobacco Use    Smoking status: Not on file   Substance Use Topics    Alcohol use: Not on file       Family History:  No family history on file. Review of Systems:  Constitutional: negative fever, negative chills, negative weight loss  Eyes:   negative visual changes  ENT:   negative sore throat, tongue or lip swelling  Respiratory:  negative cough, negative dyspnea  Cards:  negative for chest pain, palpitations, lower extremity edema  GI:   See HPI  :  negative for frequency, dysuria  Integument:  negative for rash and pruritus  Heme:  negative for easy bruising and gum/nose bleeding  Musculoskeletal:negative for myalgias, back pain and muscle weakness  Neuro:  negative for headaches, dizziness, vertigo  Psych: negative for feelings of anxiety, depression     Objective:     Patient Vitals for the past 8 hrs:   BP Temp Pulse Resp SpO2 Weight   10/18/21 1325 120/79 -- (!) 113 25 (!) 84 % --   10/18/21 1306 -- -- (!) 121 (!) 36 97 % --   10/18/21 1302 (!) 95/58 98.1 °F (36.7 °C) (!) 122 29 97 % --   10/18/21 1247 92/81 -- (!) 119 29 100 % --   10/18/21 1226 (!) 89/71 (!) 96.5 °F (35.8 °C) (!) 125 (!) 34 97 % --   10/18/21 1220 (!) 79/68 -- (!) 124 (!) 37 100 % --   10/18/21 1214 -- -- (!) 129 29 100 % --   10/18/21 1202 (!) 86/40 -- (!) 129 (!) 31 100 % 136.6 kg (301 lb 2.4 oz)     10/18 0701 - 10/18 1900  In: 72 [I.V.:72]  Out: -   No intake/output data recorded.     EXAM:     CONST: Pleasant male lying in bed, no acute distress   NEURO:  Drowsy and awakens, mouths words but difficult to understand   HEENT: EOMI, no scleral icterus   LUNGS: vent   CARD:  Mild tachycardia   ABD:  soft, obese, mild generalized tenderness, no rebound, no masses, non distended; PEG with bloody output and oozing around site   EXT:  warm   PSYCH: not anxious     Data Review     Recent Labs     10/18/21  1206   WBC 20.6*   HGB 9.6*   HCT 31.7*   *     Recent Labs     10/18/21  1206   *   K 3.9   CL 96*   CO2 26   BUN 45*   CREA 2.87*   *   CA 9.5     Recent Labs     10/18/21  1206   *   TP 7.5   ALB 1.9*   GLOB 5.6*   LPSE 153     Recent Labs     10/18/21  1206   INR 5.2*   PTP 50.8*          Assessment:     · GI bleed: hematemesis in the setting of supratheraputic INR status post PRBC's, FFP and K-centra; hgb 9.6   · Leukocytosis  · Elevated lactate   · Recent COVID pneumonia trach/PEG  · ESRD on HD  · DVT on Coumadin (last dose Friday) INR 5.2     Patient Active Problem List   Diagnosis Code    GIB (gastrointestinal bleeding) K92.2     Plan:       · Agree with BID PPI  · Trend CBC and INR, transfuse as necessary  · Medically optimize, if stabilized plan for EGD tomorrow, please call if bleeding worsens  · Patient discussed with Dr. Maria Isabel Hayden  · Thank you for allowing me to participate in care of [address] [place] [address],[address]     Signed By: Jack Murphy NP     10/18/2021  1:37 PM       This patient was seen and examined by me in a face-to-face visit today. I reviewed the medical record including lab work, imaging and other provider notes. I confirmed the history as described above. I spoke to the patient. I reviewed the medical record including lab work, imaging and other provider notes. I confirmed the history as described above. The patient was incapable of giving a meaningful history. I discussed this case in detail with Darrick SAINZ.  I formulated an  assessment of this patient and developed a treatment plan.      I agree with the above consultation note. I agree with the history, exam and assessment and plan as outlined in the note. I would like to add the following:     Abd: normoactive BS, obese, nd, no rebound/guarding. Blood coming from around the PEG insertion site in the setting of supratherapeutic INR. Hgb 9.6 on arrival. ESRD on HD. Elevated WBC-concern for sepsis as cause of hypotension. PRBC, K-centra as above. PPI BID. Broad spectrum Abx. Medical optimization as above for planned EGD tomorrow to evaluate for ulcers, erosions, and AVMs. Please call if clinical deterioration from the GI perspective which may warrant sooner endoscopy. Prognosis guarded.     Dr. Nancy Rogel

## 2021-10-18 NOTE — ED NOTES
1:16 PM  20G IV catheter placed in R deep brachial vein using ultrasound guidance in real time. Site cleaned prior with chloraprep. Obtained on first attempt.

## 2021-10-19 LAB
ANION GAP SERPL CALC-SCNC: 16 MMOL/L (ref 5–15)
BLD PROD TYP BPU: NORMAL
BLD PROD TYP BPU: NORMAL
BPU ID: NORMAL
BPU ID: NORMAL
BUN SERPL-MCNC: 55 MG/DL (ref 6–20)
BUN/CREAT SERPL: 18 (ref 12–20)
CALCIUM SERPL-MCNC: 9.2 MG/DL (ref 8.5–10.1)
CHLORIDE SERPL-SCNC: 104 MMOL/L (ref 97–108)
CO2 SERPL-SCNC: 17 MMOL/L (ref 21–32)
CREAT SERPL-MCNC: 3.06 MG/DL (ref 0.7–1.3)
ERYTHROCYTE [DISTWIDTH] IN BLOOD BY AUTOMATED COUNT: 17.4 % (ref 11.5–14.5)
GLUCOSE SERPL-MCNC: 111 MG/DL (ref 65–100)
HCT VFR BLD AUTO: 28.3 % (ref 36.6–50.3)
HGB BLD-MCNC: 9.1 G/DL (ref 12.1–17)
INR PPP: 1.1 (ref 0.9–1.1)
LACTATE SERPL-SCNC: 1.3 MMOL/L (ref 0.4–2)
MAGNESIUM SERPL-MCNC: 2.2 MG/DL (ref 1.6–2.4)
MCH RBC QN AUTO: 29.9 PG (ref 26–34)
MCHC RBC AUTO-ENTMCNC: 32.2 G/DL (ref 30–36.5)
MCV RBC AUTO: 93.1 FL (ref 80–99)
NRBC # BLD: 0.09 K/UL (ref 0–0.01)
NRBC BLD-RTO: 0.7 PER 100 WBC
PHOSPHATE SERPL-MCNC: 4.1 MG/DL (ref 2.6–4.7)
PLATELET # BLD AUTO: 356 K/UL (ref 150–400)
PMV BLD AUTO: 9.4 FL (ref 8.9–12.9)
POTASSIUM SERPL-SCNC: 3.2 MMOL/L (ref 3.5–5.1)
PROTHROMBIN TIME: 11.6 SEC (ref 9–11.1)
RBC # BLD AUTO: 3.04 M/UL (ref 4.1–5.7)
SODIUM SERPL-SCNC: 137 MMOL/L (ref 136–145)
STATUS OF UNIT,%ST: NORMAL
STATUS OF UNIT,%ST: NORMAL
UNIT DIVISION, %UDIV: 0
UNIT DIVISION, %UDIV: 0
WBC # BLD AUTO: 12.7 K/UL (ref 4.1–11.1)

## 2021-10-19 PROCEDURE — 65610000006 HC RM INTENSIVE CARE

## 2021-10-19 PROCEDURE — 74011000258 HC RX REV CODE- 258: Performed by: NURSE PRACTITIONER

## 2021-10-19 PROCEDURE — 74011000250 HC RX REV CODE- 250: Performed by: NURSE PRACTITIONER

## 2021-10-19 PROCEDURE — 88305 TISSUE EXAM BY PATHOLOGIST: CPT

## 2021-10-19 PROCEDURE — 36415 COLL VENOUS BLD VENIPUNCTURE: CPT

## 2021-10-19 PROCEDURE — 74011250637 HC RX REV CODE- 250/637: Performed by: HEALTH CARE PROVIDER

## 2021-10-19 PROCEDURE — 0DB68ZX EXCISION OF STOMACH, VIA NATURAL OR ARTIFICIAL OPENING ENDOSCOPIC, DIAGNOSTIC: ICD-10-PCS | Performed by: INTERNAL MEDICINE

## 2021-10-19 PROCEDURE — 87205 SMEAR GRAM STAIN: CPT

## 2021-10-19 PROCEDURE — 80048 BASIC METABOLIC PNL TOTAL CA: CPT

## 2021-10-19 PROCEDURE — 85027 COMPLETE CBC AUTOMATED: CPT

## 2021-10-19 PROCEDURE — 74011250636 HC RX REV CODE- 250/636: Performed by: HEALTH CARE PROVIDER

## 2021-10-19 PROCEDURE — 83605 ASSAY OF LACTIC ACID: CPT

## 2021-10-19 PROCEDURE — 84100 ASSAY OF PHOSPHORUS: CPT

## 2021-10-19 PROCEDURE — 88312 SPECIAL STAINS GROUP 1: CPT

## 2021-10-19 PROCEDURE — 74011250636 HC RX REV CODE- 250/636: Performed by: NURSE PRACTITIONER

## 2021-10-19 PROCEDURE — 94640 AIRWAY INHALATION TREATMENT: CPT

## 2021-10-19 PROCEDURE — 74011250636 HC RX REV CODE- 250/636: Performed by: INTERNAL MEDICINE

## 2021-10-19 PROCEDURE — C9113 INJ PANTOPRAZOLE SODIUM, VIA: HCPCS | Performed by: NURSE PRACTITIONER

## 2021-10-19 PROCEDURE — 87070 CULTURE OTHR SPECIMN AEROBIC: CPT

## 2021-10-19 PROCEDURE — 0DP68UZ REMOVAL OF FEEDING DEVICE FROM STOMACH, VIA NATURAL OR ARTIFICIAL OPENING ENDOSCOPIC: ICD-10-PCS | Performed by: INTERNAL MEDICINE

## 2021-10-19 PROCEDURE — C9113 INJ PANTOPRAZOLE SODIUM, VIA: HCPCS | Performed by: HEALTH CARE PROVIDER

## 2021-10-19 PROCEDURE — 88342 IMHCHEM/IMCYTCHM 1ST ANTB: CPT

## 2021-10-19 PROCEDURE — 87106 FUNGI IDENTIFICATION YEAST: CPT

## 2021-10-19 PROCEDURE — 85610 PROTHROMBIN TIME: CPT

## 2021-10-19 PROCEDURE — 2709999900 HC NON-CHARGEABLE SUPPLY: Performed by: INTERNAL MEDICINE

## 2021-10-19 PROCEDURE — 88313 SPECIAL STAINS GROUP 2: CPT

## 2021-10-19 PROCEDURE — 74011000258 HC RX REV CODE- 258: Performed by: HEALTH CARE PROVIDER

## 2021-10-19 PROCEDURE — 74011000250 HC RX REV CODE- 250: Performed by: HEALTH CARE PROVIDER

## 2021-10-19 PROCEDURE — 0DB58ZX EXCISION OF ESOPHAGUS, VIA NATURAL OR ARTIFICIAL OPENING ENDOSCOPIC, DIAGNOSTIC: ICD-10-PCS | Performed by: INTERNAL MEDICINE

## 2021-10-19 PROCEDURE — 77030021593 HC FCPS BIOP ENDOSC BSC -A: Performed by: INTERNAL MEDICINE

## 2021-10-19 PROCEDURE — 76040000019: Performed by: INTERNAL MEDICINE

## 2021-10-19 PROCEDURE — 83735 ASSAY OF MAGNESIUM: CPT

## 2021-10-19 PROCEDURE — 94003 VENT MGMT INPAT SUBQ DAY: CPT

## 2021-10-19 RX ORDER — SODIUM CHLORIDE 0.9 % (FLUSH) 0.9 %
5-40 SYRINGE (ML) INJECTION EVERY 8 HOURS
Status: DISCONTINUED | OUTPATIENT
Start: 2021-10-19 | End: 2021-11-18 | Stop reason: HOSPADM

## 2021-10-19 RX ORDER — NOREPINEPHRINE BITARTRATE/D5W 8 MG/250ML
.5-3 PLASTIC BAG, INJECTION (ML) INTRAVENOUS
Status: DISCONTINUED | OUTPATIENT
Start: 2021-10-19 | End: 2021-10-19

## 2021-10-19 RX ORDER — EPINEPHRINE 0.1 MG/ML
1 INJECTION INTRACARDIAC; INTRAVENOUS
Status: DISCONTINUED | OUTPATIENT
Start: 2021-10-19 | End: 2021-10-19 | Stop reason: HOSPADM

## 2021-10-19 RX ORDER — LIDOCAINE HYDROCHLORIDE 10 MG/ML
10 INJECTION, SOLUTION EPIDURAL; INFILTRATION; INTRACAUDAL; PERINEURAL ONCE
Status: DISPENSED | OUTPATIENT
Start: 2021-10-19 | End: 2021-10-20

## 2021-10-19 RX ORDER — FLUMAZENIL 0.1 MG/ML
0.2 INJECTION INTRAVENOUS
Status: DISCONTINUED | OUTPATIENT
Start: 2021-10-19 | End: 2021-10-19 | Stop reason: HOSPADM

## 2021-10-19 RX ORDER — DIPHENHYDRAMINE HYDROCHLORIDE 50 MG/ML
50 INJECTION, SOLUTION INTRAMUSCULAR; INTRAVENOUS ONCE
Status: COMPLETED | OUTPATIENT
Start: 2021-10-19 | End: 2021-10-19

## 2021-10-19 RX ORDER — NOREPINEPHRINE BITARTRATE/D5W 8 MG/250ML
.5-2 PLASTIC BAG, INJECTION (ML) INTRAVENOUS
Status: DISPENSED | OUTPATIENT
Start: 2021-10-19 | End: 2021-10-20

## 2021-10-19 RX ORDER — SODIUM CHLORIDE 9 MG/ML
50 INJECTION, SOLUTION INTRAVENOUS CONTINUOUS
Status: DISPENSED | OUTPATIENT
Start: 2021-10-19 | End: 2021-10-19

## 2021-10-19 RX ORDER — MIDAZOLAM HYDROCHLORIDE 1 MG/ML
.25-1 INJECTION, SOLUTION INTRAMUSCULAR; INTRAVENOUS
Status: DISCONTINUED | OUTPATIENT
Start: 2021-10-19 | End: 2021-10-19 | Stop reason: HOSPADM

## 2021-10-19 RX ORDER — FENTANYL CITRATE 50 UG/ML
50 INJECTION, SOLUTION INTRAMUSCULAR; INTRAVENOUS
Status: DISCONTINUED | OUTPATIENT
Start: 2021-10-19 | End: 2021-10-25 | Stop reason: SDUPTHER

## 2021-10-19 RX ORDER — DEXTROMETHORPHAN/PSEUDOEPHED 2.5-7.5/.8
1.2 DROPS ORAL
Status: DISCONTINUED | OUTPATIENT
Start: 2021-10-19 | End: 2021-10-19 | Stop reason: HOSPADM

## 2021-10-19 RX ORDER — POTASSIUM CHLORIDE 29.8 MG/ML
20 INJECTION INTRAVENOUS ONCE
Status: DISCONTINUED | OUTPATIENT
Start: 2021-10-19 | End: 2021-10-19

## 2021-10-19 RX ORDER — POTASSIUM CHLORIDE 7.45 MG/ML
10 INJECTION INTRAVENOUS
Status: COMPLETED | OUTPATIENT
Start: 2021-10-19 | End: 2021-10-19

## 2021-10-19 RX ORDER — NOREPINEPHRINE BITARTRATE/D5W 8 MG/250ML
.5-16 PLASTIC BAG, INJECTION (ML) INTRAVENOUS
Status: DISCONTINUED | OUTPATIENT
Start: 2021-10-19 | End: 2021-10-19

## 2021-10-19 RX ORDER — CHLORHEXIDINE GLUCONATE 0.12 MG/ML
15 RINSE ORAL EVERY 12 HOURS
Status: DISCONTINUED | OUTPATIENT
Start: 2021-10-19 | End: 2021-11-18 | Stop reason: HOSPADM

## 2021-10-19 RX ORDER — SODIUM CHLORIDE 0.9 % (FLUSH) 0.9 %
5-40 SYRINGE (ML) INJECTION AS NEEDED
Status: DISCONTINUED | OUTPATIENT
Start: 2021-10-19 | End: 2021-11-18 | Stop reason: HOSPADM

## 2021-10-19 RX ORDER — ATROPINE SULFATE 0.1 MG/ML
0.5 INJECTION INTRAVENOUS
Status: DISCONTINUED | OUTPATIENT
Start: 2021-10-19 | End: 2021-10-19 | Stop reason: HOSPADM

## 2021-10-19 RX ORDER — BALSAM PERU/CASTOR OIL
OINTMENT (GRAM) TOPICAL 2 TIMES DAILY
Status: DISCONTINUED | OUTPATIENT
Start: 2021-10-19 | End: 2021-11-18 | Stop reason: HOSPADM

## 2021-10-19 RX ORDER — PROPOFOL 10 MG/ML
0-50 VIAL (ML) INTRAVENOUS
Status: DISCONTINUED | OUTPATIENT
Start: 2021-10-19 | End: 2021-10-21

## 2021-10-19 RX ORDER — FENTANYL CITRATE 50 UG/ML
25-200 INJECTION, SOLUTION INTRAMUSCULAR; INTRAVENOUS
Status: DISCONTINUED | OUTPATIENT
Start: 2021-10-19 | End: 2021-10-19 | Stop reason: HOSPADM

## 2021-10-19 RX ORDER — NALOXONE HYDROCHLORIDE 0.4 MG/ML
0.4 INJECTION, SOLUTION INTRAMUSCULAR; INTRAVENOUS; SUBCUTANEOUS
Status: DISCONTINUED | OUTPATIENT
Start: 2021-10-19 | End: 2021-10-19 | Stop reason: HOSPADM

## 2021-10-19 RX ADMIN — Medication 10 ML: at 06:00

## 2021-10-19 RX ADMIN — SODIUM CHLORIDE 80 MG: 9 INJECTION INTRAMUSCULAR; INTRAVENOUS; SUBCUTANEOUS at 09:39

## 2021-10-19 RX ADMIN — Medication 10 ML: at 18:29

## 2021-10-19 RX ADMIN — PROPOFOL 30 MCG/KG/MIN: 10 INJECTION, EMULSION INTRAVENOUS at 13:48

## 2021-10-19 RX ADMIN — CHLORHEXIDINE GLUCONATE 15 ML: 0.12 RINSE ORAL at 20:16

## 2021-10-19 RX ADMIN — IPRATROPIUM BROMIDE AND ALBUTEROL SULFATE 3 ML: .5; 3 SOLUTION RESPIRATORY (INHALATION) at 08:15

## 2021-10-19 RX ADMIN — PIPERACILLIN AND TAZOBACTAM 3.38 G: 3; .375 INJECTION, POWDER, LYOPHILIZED, FOR SOLUTION INTRAVENOUS at 04:16

## 2021-10-19 RX ADMIN — SODIUM CHLORIDE 125 ML/HR: 900 INJECTION, SOLUTION INTRAVENOUS at 10:39

## 2021-10-19 RX ADMIN — POTASSIUM CHLORIDE 10 MEQ: 7.46 INJECTION, SOLUTION INTRAVENOUS at 12:02

## 2021-10-19 RX ADMIN — SODIUM CHLORIDE 50 ML/HR: 9 INJECTION, SOLUTION INTRAVENOUS at 13:46

## 2021-10-19 RX ADMIN — CHLORHEXIDINE GLUCONATE 15 ML: 0.12 RINSE ORAL at 11:54

## 2021-10-19 RX ADMIN — SODIUM CHLORIDE 125 ML/HR: 900 INJECTION, SOLUTION INTRAVENOUS at 04:00

## 2021-10-19 RX ADMIN — Medication: at 18:27

## 2021-10-19 RX ADMIN — IPRATROPIUM BROMIDE AND ALBUTEROL SULFATE 3 ML: .5; 3 SOLUTION RESPIRATORY (INHALATION) at 21:11

## 2021-10-19 RX ADMIN — SODIUM CHLORIDE 40 MG: 9 INJECTION INTRAMUSCULAR; INTRAVENOUS; SUBCUTANEOUS at 20:09

## 2021-10-19 RX ADMIN — LINEZOLID 600 MG: 600 INJECTION, SOLUTION INTRAVENOUS at 13:00

## 2021-10-19 RX ADMIN — POTASSIUM CHLORIDE 10 MEQ: 7.46 INJECTION, SOLUTION INTRAVENOUS at 13:50

## 2021-10-19 RX ADMIN — PIPERACILLIN AND TAZOBACTAM 3.38 G: 3; .375 INJECTION, POWDER, LYOPHILIZED, FOR SOLUTION INTRAVENOUS at 17:04

## 2021-10-19 RX ADMIN — DIPHENHYDRAMINE HYDROCHLORIDE 25 MG: 50 INJECTION, SOLUTION INTRAMUSCULAR; INTRAVENOUS at 14:29

## 2021-10-19 RX ADMIN — FENTANYL CITRATE 50 MCG: 50 INJECTION INTRAMUSCULAR; INTRAVENOUS at 04:16

## 2021-10-19 RX ADMIN — LINEZOLID 600 MG: 600 INJECTION, SOLUTION INTRAVENOUS at 00:56

## 2021-10-19 RX ADMIN — NOREPINEPHRINE BITARTRATE 5 MCG/MIN: 1 SOLUTION INTRAVENOUS at 11:55

## 2021-10-19 NOTE — WOUND CARE
WOCN Note:     New consult for sacrum and lower back. Chart shows:  Admitted for GIB post Covid-19+ in August  History of HD, trach, peg    Assessment:   Communicative by mouthing words, intubated via trach. Requires full assists in repositioning; RN at bedside to help turn left & right for incont care of large liquid stool. Surface: yandy ISAEL mattress  Diet: tube feeds    1. POA left medial heel deep tissue injury  2.5 x 4.5 x 0 cm  100% non-blanching burgundy/purple under thick heel skin  No fluctuance  Offloaded with pillows     2. POA widespread MASD to bilateral buttocks with dry desquamation and lacy skin border  Erosion to left medial buttock  2 x 3 x 0.3 cm  Irregular nydia base  Tx: barrier cream in use - with widespread MASD and ongoing liquid stools, this is the best choice (no dressing would stay in place)    PI Prevention:  Venelex to heels twice daily  Turn/reposition approximately every 2 hours  Offload heels with heels hanging off end of pillow at all times while in bed. Z-guard cream to buttocks and sacrum daily and as needed with incontinence care  Air mattress: Use only flat sheet and one incontinence pad. Please call [employer] @ [phone] for bed to be picked up at discharge. Discussed with RN.     Transition of Care: Plan to follow weekly and as needed while admitted to hospital.     Stephen Baez, [email or web address], RN, [facility]  Certified Wound, Ostomy, Continence Nurse  office [phone]  Available via [facility]

## 2021-10-19 NOTE — PERIOP NOTES
1350: .TRANSFER - IN REPORT:    Verbal report received from [address]) on [facility]  being received from ICU 4(unit) for ordered procedure      Report consisted of patients Situation, Background, Assessment and   Recommendations(SBAR). Information from the following report(s) SBAR, Kardex and MAR was reviewed with the receiving nurse. Opportunity for questions and clarification was provided. Assessment completed upon patients arrival to unit and care assumed. 1400: Pt's wife, Maude Mcneil, provides telephone corporate consent for procedure    [phone]: Giselle Dewitt, RN present at bedside to assist with sedation management during procedure. 1440:  EG tube removed via abd insertion site by Dr Bhupinder Vick. No bumper noted. Wound area covered with 4 x 4 and paper tape. Abd soft. 1440: TRANSFER - OUT REPORT:    Verbal report given to Salas(name) on [facility]  being transferred to ICU(unit) for routine post - op       Report consisted of patients Situation, Background, Assessment and   Recommendations(SBAR). Information from the following report(s) Procedure Summary was reviewed with the receiving nurse. Lines:   Peripheral IV 80/93/35 Right Basilic (Active)   Site Assessment Clean, dry, & intact 10/18/21 2200   Phlebitis Assessment 0 10/18/21 2200   Infiltration Assessment 0 10/18/21 2200   Dressing Status Clean, dry, & intact 10/18/21 2200   Dressing Type Transparent 10/18/21 2200   Hub Color/Line Status Pink 10/18/21 2200   Action Taken Catheter retaped 10/18/21 1313   Alcohol Cap Used No 10/18/21 1313        Opportunity for questions and clarification was provided.

## 2021-10-19 NOTE — PROGRESS NOTES
0800-Bedside and Verbal shift change report given to Leora Rajan (oncoming nurse) by Emilie Myers, ELIAS (offgoing nurse). Report included the following information ED Summary, Recent Results and Cardiac Rhythm SR/ST.    1421-MD at bedside Endoscopy started. Pt needed increase propophol requirements. MD verbal for higher dose. Biopsies taken. PEG removed. 1439- Endoscopy ended. Md reccomends DH placed for feeding. 1600- pts PEG site draining copious amounts of purulent serosanguinous drainage. NP notified. 2000- Bedside and Verbal shift change report given to Janet Crow (oncoming nurse) by Otto Boss, ELIAS (offgoing nurse).  Report included the following information SBAR, Kardex, Procedure Summary, Intake/Output, MAR and Cardiac Rhythm SR.

## 2021-10-19 NOTE — PROGRESS NOTES
Spiritual Care Assessment/Progress Note  [facility]      NAME: Gabriella Sanches      MRN: [id number]  AGE: 53 y.o. SEX: male  Gnosticist Affiliation: No preference   Language: English     10/19/2021     Total Time (in minutes): 15     Spiritual Assessment begun in [facility] 7S1 INTENSIVE CARE through conversation with:         []Patient        [] Family    [] Friend(s)        Reason for Consult: Initial/Spiritual assessment, patient floor     Spiritual beliefs: (Please include comment if needed)     [] Identifies with a hayley tradition:         [] Supported by a hayley community:            [] Claims no spiritual orientation:           [] Seeking spiritual identity:                [] Adheres to an individual form of spirituality:           [x] Not able to assess:                           Identified resources for coping:      [] Prayer                               [] Music                  [] Guided Imagery     [] Family/friends                 [] Pet visits     [] Devotional reading                         [x] Unknown     [] Other:                                              Interventions offered during this visit: (See comments for more details)    Patient Interventions: Initial visit           Plan of Care:     [] Support spiritual and/or cultural needs    [] Support AMD and/or advance care planning process      [] Support grieving process   [] Coordinate Rites and/or Rituals    [] Coordination with community clergy   [] No spiritual needs identified at this time   [] Detailed Plan of Care below (See Comments)  [] Make referral to Music Therapy  [] Make referral to Pet Therapy     [] Make referral to Addiction services  [] Make referral to [facility]  [] Make referral to Spiritual Care Partner  [] No future visits requested        [x] Follow up upon further referrals     Attempted visit for palliative initial spiritual assessment. Unable to visit patient at this time. Pt was sleeping and did not awake.  No family present. Pt's chart was consulted.   Chaplain Sierra MDiv, MS, [facility]

## 2021-10-19 NOTE — PROGRESS NOTES
Progress Note    HPI:    Mr. Jonathan Saucedo is a 54 y/o M that had a prolonged hospitaliztion for COVID-19 in Aug 2021 at an OSH. He is trached and PEG already and resides at [facility]. Per  records, his PEG tube has been dislodged. He has a history of a RUE DVT and was placed on heparin, bridged to coumadin with his last dose Friday. Patient started having coffee ground emesis 10/18/2021. He was tachycardic and hypotensive with these episodes and was sent to the ED for evaluation. To note, the patient is also HD dependent and became hypotensive 10/18/2021 during his session. Past Medical History:   Diagnosis Date   [facility] patient [facility])     started around the end of september 2021    Polycystic kidney disease     S/P percutaneous endoscopic gastrostomy (PEG) tube placement ([facility] [address]       No past surgical history on file. Prior to Admission medications    Medication Sig Start Date End Date Taking? Authorizing Provider   oxyCODONE (OXYIR) 5 mg capsule Take 5 mg by mouth every four (4) hours as needed for Pain. Yes Provider, Historical   HYDROmorphone (DILAUDID) 1 mg/mL injection 0.5 mg by IntraVENous route every six (6) hours as needed. Yes Provider, Historical   LORazepam (ATIVAN) 2 mg/mL injection 0.5 mg by IntraVENous route every twelve (12) hours. Yes Provider, Historical   albuterol-ipratropium (DUO-NEB) 2.5 mg-0.5 mg/3 ml nebu 3 mL by Nebulization route two (2) times a day. Yes Provider, Historical   insulin regular (NOVOLIN R, HUMULIN R) 100 unit/mL injection 1 Units by SubCUTAneous route Before breakfast, lunch, dinner and at bedtime. Yes Provider, Historical   fentaNYL (Duragesic) 25 mcg/hr PATCH 1 Patch by TransDERmal route every seventy-two (72) hours. Yes Provider, Historical   nystatin (MYCOSTATIN) powder Apply  to affected area two (2) times a day. Yes Provider, Historical   piperacillin-tazobactam 2.25 g IVPB 2.25 g by IntraVENous route every six (6) hours.    Yes Provider, Historical   acetaminophen (TYLENOL) 160 mg/5 mL (5 mL) solution Take 650 mg by mouth every six (6) hours as needed for Fever. Yes Provider, Historical   albuterol (PROVENTIL VENTOLIN) 2.5 mg /3 mL (0.083 %) nebu 2.5 mg by Nebulization route every eight (8) hours as needed for Wheezing. Yes Provider, Historical   LORazepam (Ativan) 2 mg/mL injection 1 mg by IntraVENous route every twelve (12) hours as needed. Yes Provider, Historical   melatonin 3 mg tablet Take 9 mg by mouth nightly. Yes Provider, Historical   pantoprazole 4 mg/mL in 0.9% sodium chloride injection 40 mg by IntraVENous route every twelve (12) hours. Yes Provider, Historical   cholecalciferol (VITAMIN D3) (1000 Units /25 mcg) tablet Take 1,000 Units by mouth daily. Yes Provider, Historical   sevelamer carbonate (RENVELA) 800 mg tab tab Take 800 mg by mouth three (3) times daily (with meals). Yes Provider, Historical   chlorhexidine (PERIDEX) 0.12 % solution 15 mL by Swish and Spit route every twelve (12) hours as needed. Yes Provider, Historical   senna leaf extract (Senna) 176 mg/5 mL syrp syrup Take 528 mg by mouth two (2) times daily as needed. Yes Provider, Historical   OLANZapine (ZyPREXA) 10 mg tablet Take 10 mg by mouth two (2) times a day. Yes Provider, Historical   metoprolol tartrate (LOPRESSOR) 25 mg tablet Take 12.5 mg by mouth two (2) times a day. Yes Provider, Historical   pramoxine-calamine (Calamine Medicated) 1-8 % lotion Apply  to affected area three (3) times daily. Yes Provider, Historical   OTHER 3 mL DIALYSIS PRN. Sodium citrate 3 mL (for HD cath) 2 each PRN HD   Yes Provider, Historical   naloxone (NARCAN) 1 mg/mL injection 0.4 mg by SubCUTAneous route once as needed. Yes Provider, Historical     No Known Allergies   Social History     Tobacco Use    Smoking status: Not on file   Substance Use Topics    Alcohol use: Not on file      No family history on file.    Review of Systems   Unable to perform ROS: Acuity of condition       Objective:    No intake/output data recorded. 10/17 1901 - 10/19 0700  In: 7312.1 [I.V.:6959.6]  Out: 350   Patient Vitals for the past 8 hrs:   BP Temp Pulse Resp SpO2 Weight   10/19/21 0815 -- -- -- -- 97 % --   10/19/21 0800 -- 98.1 °F (36.7 °C) -- -- -- --   10/19/21 0739 -- -- 95 (!) 35 92 % --   10/19/21 0720 -- -- -- -- -- 137.2 kg (302 lb 7.5 oz)   10/19/21 0700 (!) 86/60 -- 90 27 92 % --   10/19/21 0645 114/80 -- 98 25 92 % --   10/19/21 0630 -- -- 99 20 99 % --   10/19/21 0615 106/67 -- 94 (!) 46 93 % --   10/19/21 0600 102/64 -- 94 (!) 32 94 % --   10/19/21 0545 112/68 -- 94 25 96 % --   10/19/21 0530 114/69 -- 93 27 95 % --   10/19/21 0515 105/64 -- 90 27 96 % --   10/19/21 0500 105/62 -- 92 (!) 31 96 % --   10/19/21 0445 104/64 -- 92 (!) 31 96 % --   10/19/21 0441 -- -- 94 29 96 % --   10/19/21 0430 102/65 -- 94 (!) 31 93 % --   10/19/21 0415 108/71 -- 94 (!) 36 93 % --   10/19/21 0400 110/74 99.5 °F (37.5 °C) 94 29 96 % --   10/19/21 0345 109/80 -- 97 17 97 % --   10/19/21 0330 112/72 -- 93 28 97 % --   10/19/21 0315 114/68 -- 92 (!) 34 97 % --   10/19/21 0300 114/68 -- 97 (!) 34 94 % --   10/19/21 0245 119/67 -- 97 (!) 31 96 % --   10/19/21 0230 115/74 -- 96 28 99 % --   10/19/21 0215 106/65 -- 96 29 96 % --   10/19/21 0200 110/72 -- 98 (!) 35 96 % --   10/19/21 0145 106/68 -- 97 29 98 % --   10/19/21 0130 109/68 -- 99 28 95 % --   10/19/21 0115 107/74 -- 99 29 97 % --   10/19/21 0102 -- -- 100 (!) 37 98 % --   10/19/21 0100 107/77 -- 100 30 98 % --   10/19/21 0045 109/81 -- (!) 101 30 99 % --     Physical Exam  Constitutional:       Appearance: He is obese. HENT:      Head: Normocephalic and atraumatic. Mouth/Throat:      Comments: Trach in place  Cardiovascular:      Rate and Rhythm: Regular rhythm. Tachycardia present. Pulses: Normal pulses. Heart sounds: No murmur heard. No friction rub. No gallop.     Pulmonary:      Breath sounds: Normal breath sounds. No stridor. No wheezing, rhonchi or rales. Abdominal:      General: Bowel sounds are normal.      Palpations: Abdomen is soft. Tenderness: There is abdominal tenderness. There is no guarding or rebound. Comments: Blood in NGT, PEG is dislodged   Skin:     Coloration: Skin is pale. Neurological:      Mental Status: He is alert. Labs:    Recent Results (from the past 24 hour(s))   CBC WITH AUTOMATED DIFF    Collection Time: 10/18/21 12:06 PM   Result Value Ref Range    WBC 20.6 (H) 4.1 - 11.1 K/uL    RBC 3.27 (L) 4.10 - 5.70 M/uL    HGB 9.6 (L) 12.1 - 17.0 g/dL    HCT 31.7 (L) 36.6 - 50.3 %    MCV 96.9 80.0 - 99.0 FL    MCH 29.4 26.0 - 34.0 PG    MCHC 30.3 30.0 - 36.5 g/dL    RDW 16.1 (H) 11.5 - 14.5 %    PLATELET 786 (H) 089 - 400 K/uL    MPV 9.7 8.9 - 12.9 FL    NRBC 2.2 (H) 0  WBC    ABSOLUTE NRBC 0.46 (H) 0.00 - 0.01 K/uL    NEUTROPHILS 81 (H) 32 - 75 %    BAND NEUTROPHILS 1 0 - 6 %    LYMPHOCYTES 8 (L) 12 - 49 %    MONOCYTES 7 5 - 13 %    EOSINOPHILS 0 0 - 7 %    BASOPHILS 0 0 - 1 %    METAMYELOCYTES 3 (H) 0 %    IMMATURE GRANULOCYTES 0 %    ABS. NEUTROPHILS 16.9 (H) 1.8 - 8.0 K/UL    ABS. LYMPHOCYTES 1.6 0.8 - 3.5 K/UL    ABS. MONOCYTES 1.4 (H) 0.0 - 1.0 K/UL    ABS. EOSINOPHILS 0.0 0.0 - 0.4 K/UL    ABS. BASOPHILS 0.0 0.0 - 0.1 K/UL    ABS. IMM.  GRANS. 0.0 K/UL    DF MANUAL      PLATELET COMMENTS Large Platelets      RBC COMMENTS ANISOCYTOSIS  PRESENT        RBC COMMENTS POLYCHROMASIA  PRESENT        WBC COMMENTS ATYPICAL LYMPHOCYTES PRESENT     PROTHROMBIN TIME + INR    Collection Time: 10/18/21 12:06 PM   Result Value Ref Range    INR 5.2 (HH) 0.9 - 1.1      Prothrombin time 50.8 (H) 9.0 - 91.6 sec   METABOLIC PANEL, COMPREHENSIVE    Collection Time: 10/18/21 12:06 PM   Result Value Ref Range    Sodium 134 (L) 136 - 145 mmol/L    Potassium 3.9 3.5 - 5.1 mmol/L    Chloride 96 (L) 97 - 108 mmol/L    CO2 26 21 - 32 mmol/L    Anion gap 12 5 - 15 mmol/L    Glucose 161 (H) 65 - 100 mg/dL    BUN 45 (H) 6 - 20 MG/DL    Creatinine 2.87 (H) 0.70 - 1.30 MG/DL    BUN/Creatinine ratio 16 12 - 20      GFR est AA 29 (L) >60 ml/min/1.73m2    GFR est non-AA 24 (L) >60 ml/min/1.73m2    Calcium 9.5 8.5 - 10.1 MG/DL    Bilirubin, total 0.8 0.2 - 1.0 MG/DL    ALT (SGPT) 29 12 - 78 U/L    AST (SGOT) 33 15 - 37 U/L    Alk. phosphatase 175 (H) 45 - 117 U/L    Protein, total 7.5 6.4 - 8.2 g/dL    Albumin 1.9 (L) 3.5 - 5.0 g/dL    Globulin 5.6 (H) 2.0 - 4.0 g/dL    A-G Ratio 0.3 (L) 1.1 - 2.2     LIPASE    Collection Time: 10/18/21 12:06 PM   Result Value Ref Range    Lipase 153 73 - 393 U/L   MAGNESIUM    Collection Time: 10/18/21 12:06 PM   Result Value Ref Range    Magnesium 2.5 (H) 1.6 - 2.4 mg/dL   SAMPLES BEING HELD    Collection Time: 10/18/21 12:06 PM   Result Value Ref Range    SAMPLES BEING HELD 1RED     COMMENT        Add-on orders for these samples will be processed based on acceptable specimen integrity and analyte stability, which may vary by analyte.    LACTIC ACID    Collection Time: 10/18/21 12:07 PM   Result Value Ref Range    Lactic acid 4.0 (HH) 0.4 - 2.0 MMOL/L   EKG, 12 LEAD, INITIAL    Collection Time: 10/18/21 12:13 PM   Result Value Ref Range    Ventricular Rate 125 BPM    Atrial Rate 125 BPM    P-R Interval 146 ms    QRS Duration 74 ms    Q-T Interval 308 ms    QTC Calculation (Bezet) 444 ms    Calculated P Axis 44 degrees    Calculated R Axis 6 degrees    Calculated T Axis 41 degrees    Diagnosis       Sinus tachycardia  Nonspecific ST abnormality  No previous ECGs available  Confirmed by Stormy Mcdonnell M.D., [facility] ([id number]) on 10/18/2021 12:59:45 PM     CULTURE, BLOOD    Collection Time: 10/18/21 12:20 PM    Specimen: Blood   Result Value Ref Range    Special Requests: NO SPECIAL REQUESTS      Culture result: NO GROWTH AFTER 19 HOURS     TYPE & SCREEN    Collection Time: 10/18/21 12:23 PM   Result Value Ref Range    Crossmatch Expiration 10/21/2021,2359     ABO/Rh(D) A POSITIVE Antibody screen NEG     Unit number [id number]     Blood component type RC LR,2     Unit division 00     Status of unit TRANSFUSED     Crossmatch result Compatible     Unit number [id number]     Blood component type RC LR,2     Unit division 00     Status of unit TRANSFUSED     Crossmatch result Compatible     Unit number [id number]     Blood component type RC LR     Unit division 00     Status of unit ALLOCATED     Crossmatch result Compatible     Unit number [id number]     Blood component type RC LR     Unit division 00     Status of unit ALLOCATED     Crossmatch result Compatible    POC G3 - PUL    Collection Time: 10/18/21 12:54 PM   Result Value Ref Range    FIO2 (POC) 60 %    pH (POC) 7.51 (H) 7.35 - 7.45      pCO2 (POC) 28.6 (L) 35.0 - 45.0 MMHG    pO2 (POC) 211 (H) 80 - 100 MMHG    HCO3 (POC) 22.8 22 - 26 MMOL/L    sO2 (POC) 99.8 (H) 92 - 97 %    Base excess (POC) 0.4 mmol/L    Site LEFT RADIAL      Device: ADULT VENT      Mode ASSIST CONTROL      Set Rate 20 bpm    PEEP/CPAP (POC) 8 cmH2O    Allens test (POC) Positive      Specimen type (POC) ARTERIAL     PLASMA, ALLOCATE    Collection Time: 10/18/21  1:00 PM   Result Value Ref Range    Unit number [id number]     Blood component type FP 24h,Thaw     Unit division 00     Status of unit ALLOCATED     Unit number [id number]     Blood component type FP 24h,Thaw1     Unit division 00     Status of unit TRANSFUSED    LACTIC ACID    Collection Time: 10/18/21  5:08 PM   Result Value Ref Range    Lactic acid 2.4 (HH) 0.4 - 2.0 MMOL/L   COVID-19 RAPID TEST    Collection Time: 10/18/21  5:09 PM   Result Value Ref Range    Specimen source Nasopharyngeal      COVID-19 rapid test Not detected NOTD     CBC W/O DIFF    Collection Time: 10/18/21  8:19 PM   Result Value Ref Range    WBC 14.1 (H) 4.1 - 11.1 K/uL    RBC 2.79 (L) 4.10 - 5.70 M/uL    HGB 8.3 (L) 12.1 - 17.0 g/dL    HCT 26.4 (L) 36.6 - 50.3 %    MCV 94.6 80.0 - 99.0 FL    MCH 29.7 26.0 - 34.0 PG MCHC 31.4 30.0 - 36.5 g/dL    RDW 17.2 (H) 11.5 - 14.5 %    PLATELET 074 880 - 754 K/uL    MPV 9.9 8.9 - 12.9 FL    NRBC 1.6 (H) 0  WBC    ABSOLUTE NRBC 0.23 (H) 0.00 - 0.01 K/uL   PROTHROMBIN TIME + INR    Collection Time: 10/18/21  8:19 PM   Result Value Ref Range    INR 1.2 (H) 0.9 - 1.1      Prothrombin time 12.0 (H) 9.0 - 11.1 sec   SAMPLES BEING HELD    Collection Time: 10/18/21  8:19 PM   Result Value Ref Range    SAMPLES BEING HELD 1red,1pst     COMMENT        Add-on orders for these samples will be processed based on acceptable specimen integrity and analyte stability, which may vary by analyte. CBC W/O DIFF    Collection Time: 10/19/21  6:34 AM   Result Value Ref Range    WBC 12.7 (H) 4.1 - 11.1 K/uL    RBC 3.04 (L) 4.10 - 5.70 M/uL    HGB 9.1 (L) 12.1 - 17.0 g/dL    HCT 28.3 (L) 36.6 - 50.3 %    MCV 93.1 80.0 - 99.0 FL    MCH 29.9 26.0 - 34.0 PG    MCHC 32.2 30.0 - 36.5 g/dL    RDW 17.4 (H) 11.5 - 14.5 %    PLATELET 308 810 - 719 K/uL    MPV 9.4 8.9 - 12.9 FL    NRBC 0.7 (H) 0  WBC    ABSOLUTE NRBC 0.09 (H) 0.00 - 0.01 K/uL   PROTHROMBIN TIME + INR    Collection Time: 10/19/21  6:34 AM   Result Value Ref Range    INR 1.1 0.9 - 1.1      Prothrombin time 11.6 (H) 9.0 - 11.1 sec       ECG:    Chest X-Ray:   CXR Results  (Last 48 hours)               10/18/21 1332  XR CHEST PORT Final result    Impression:  Tubes and lines in appropriate position. Diffuse bilateral increased   interstitial markings. Right midlung and right basilar mild patchy airspace   disease. Narrative:  INDICATION: Eliel Flaherty. Hypotension. Blood in vomit. Portable AP supine view of the chest.       There is no prior study for direct comparison. Trach tip is 4.7 superior to the pa. NG tube extends to stomach. Right   internal jugular central venous catheter extends to distal SVC.  There are   diffuse bilateral increased interstitial markings as well as right midlung and   right basilar mild patchy airspace disease. No pleural fluid is visualized. There is no pneumothorax. Assessment:  Active Problems:    GIB (gastrointestinal bleeding) (10/18/2021)    2. Hemorrhagic shock  3. Elevated INR  4. Leukocytosis  5. CRF  6. Ventilatory dependant    Plan:  1. 4 units pRBCs and FFP-    Hgb 9.1, platelets 045, trend Hgb  2. K-centra-    Prothrombin time 11.6, INR 1.1  3. STAT GI consult-   Plan for EGD 10/19  4. Remain in ICU-   Levophed for hypotension  5. Nephrology consult-   Continue HD   Anuria, yoon d/c  6. 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 Protonix, miralax, zofran        Signed:  Vahe Barbosa [facility] 10/19/2021     NP Katie Russell was present for the entirety of the care provided for Mr. Camila John.

## 2021-10-19 NOTE — PROGRESS NOTES
Speech pathology note  Orders received and appreciated. Note plan for EGD today, therefore SLP will hold for now and follow up later. Note patient has a trach and PEG s/p COVID in 08/2021. Per discussion with RN, patient may have been eating while on the ventilator at [address] [place] [address], however this is unclear. Left message with SLP at [address] [place] [address] for additional information. Patient currently on the ventilator, pressure control mode, PEEP 5, FiO2 30%, however RN reported patient tachypneic and note RR in low 30's when SLP talking to RN. May be appropriate for in-line PMV when RR is lower. Of note, in-line PMV parameters are as follows:    -Can tolerate cuff deflation on the ventilator  -FiO2 below 50%  -PEEP less than 10  -PIP less than 40  -RR of 18 or less    Addendum 1536: Discussed case with SLP at [address] [place] [address]. SLP had not evaluated patient as patient was busy with tests/procedures when orders came in, then patient was transferred to [facility]. Therefore, would not recommend PO intake until patient is medically appropriate for in-line PMV and FEES to fully assess swallow function. Thank you.     Thank you,  Meli Reddy., CCC-SLP

## 2021-10-19 NOTE — PROCEDURES
Faviola Colin De  [address] M.D.  Tadeo Devoid, [address] [facility]  [phone]               Esophagogastroduodenoscopy (EGD) Procedure Note    NAME: Stephy Johnson  :  1970  MRN:  [id number]    Indications:  Hematemesis     : Trish Luther MD    Referring Provider:  Gladys, MD Princess    Staff: Endoscopy Technician-1: Frances Quintero IV  Endoscopy RN-1: Irvin Bowens RN    Prosthetic devices, grafts, tissues, transplant, or devices implanted: none    Medicine:  Pt already on Propofol gtt, Benadryl 25 mg IV      Procedure Details:  After informed consent was obtained with all risks and benefits of the procedure explained and preprocedure exam completed, the patient was placed in the left lateral decubitus position. Universal protocol for patient identification was performed and documented in the nursing notes. Throughout the procedure, the patient's blood pressure was monitored at least every five minutes; pulse, and oxygen saturations were monitored continuously. All vital signs were documented in the nursing notes. The endoscope was inserted into the mouth and advanced under direct vision to second portion of the duodenum. A careful inspection was made as the gastroscope was withdrawn, including a retroflexed view of the proximal stomach; findings and interventions are described below. Findings:   Esophagus: LA Grade D reflux esophagitis up to the mid-esophagus s/p biopsies  Stomach: few sessile polyps with greatest diameter of 5 mm in the fundus and body s/p biopsies, rest of the examined stomach was normal. Scar from previous G tube seen. No G tube in the stomach.   Duodenum: mild patchy erythema in the distal bulb, rest of the examined duodenum was normal    Interventions:    biopsy of esophagus and stomach    Specimens:   ID Type Source Tests Collected by Time Destination   1 : gastric polyps bx Preservative Gastric  Sole Corrales MD 10/19/2021 1434 Pathology   2 : esophagus bx Preservative   Elenita Monk MD 10/19/2021 1437 Pathology        EBL: None          Complications:     No immediate complications        Impression:  -See post-procedure diagnoses. See above. Replacement G tube not in the stomach-balloon deflated and G tube removed. Recommendations:  -Dobhoff tube  -Serial CBCs, transfuse as needed  -PPI 40 mg IV BID  -Discussed with wife    Signed by:  Kaela Wade MD         10/19/2021 2:50 PM

## 2021-10-19 NOTE — PROGRESS NOTES
PEG site open with purulent drainage. Culture collected and sent to lab. Dr. Dolly Abel paged for clarification on care of site, packing vs open to drain.        Selvin Lozano [facility]-BC     Critical Care Medicine  [facility]

## 2021-10-19 NOTE — CONSULTS
Consult received.   Will see shortly  Patient is f/w by our practice at [facility]

## 2021-10-19 NOTE — PROGRESS NOTES
[address]  174 [facility], [address]       GI PROGRESS NOTE  Ronald Martin, [facility] office  [phone] NP in-hospital cell phone M-F until 4:30  After 5pm or on weekends, please call  for physician on call      NAME: Stefano Barrett   :  1970   MRN:  [id number]       Subjective:   Patient says he's feeling rough, some abdominal pain, doesn't offer other specific complaints. Discussed with RN, no further signs of GI blood loss. Objective:     VITALS:   Last 24hrs VS reviewed since prior progress note. Most recent are:  Visit Vitals  BP (!) 86/60   Pulse 95   Temp 98.1 °F (36.7 °C)   Resp (!) 35   Wt 137.2 kg (302 lb 7.5 oz)   SpO2 97%       PHYSICAL EXAM:  General: Cooperative, no acute distress    Neurologic:  Alert   HEENT: EOMI, no scleral icterus   Lungs:  vent  Heart:  Regular rate  Abdomen: Soft, non-distended, obese, +tenderness, PEG    Extremities: warm  Psych:   Not anxious or agitated. Lab Data Reviewed:     Recent Results (from the past 24 hour(s))   CBC WITH AUTOMATED DIFF    Collection Time: 10/18/21 12:06 PM   Result Value Ref Range    WBC 20.6 (H) 4.1 - 11.1 K/uL    RBC 3.27 (L) 4.10 - 5.70 M/uL    HGB 9.6 (L) 12.1 - 17.0 g/dL    HCT 31.7 (L) 36.6 - 50.3 %    MCV 96.9 80.0 - 99.0 FL    MCH 29.4 26.0 - 34.0 PG    MCHC 30.3 30.0 - 36.5 g/dL    RDW 16.1 (H) 11.5 - 14.5 %    PLATELET 577 (H) 961 - 400 K/uL    MPV 9.7 8.9 - 12.9 FL    NRBC 2.2 (H) 0  WBC    ABSOLUTE NRBC 0.46 (H) 0.00 - 0.01 K/uL    NEUTROPHILS 81 (H) 32 - 75 %    BAND NEUTROPHILS 1 0 - 6 %    LYMPHOCYTES 8 (L) 12 - 49 %    MONOCYTES 7 5 - 13 %    EOSINOPHILS 0 0 - 7 %    BASOPHILS 0 0 - 1 %    METAMYELOCYTES 3 (H) 0 %    IMMATURE GRANULOCYTES 0 %    ABS. NEUTROPHILS 16.9 (H) 1.8 - 8.0 K/UL    ABS. LYMPHOCYTES 1.6 0.8 - 3.5 K/UL    ABS. MONOCYTES 1.4 (H) 0.0 - 1.0 K/UL    ABS. EOSINOPHILS 0.0 0.0 - 0.4 K/UL    ABS. BASOPHILS 0.0 0.0 - 0.1 K/UL    ABS. IMM.  GRANS. 0.0 K/UL    DF MANUAL      PLATELET COMMENTS Large Platelets      RBC COMMENTS ANISOCYTOSIS  PRESENT        RBC COMMENTS POLYCHROMASIA  PRESENT        WBC COMMENTS ATYPICAL LYMPHOCYTES PRESENT     PROTHROMBIN TIME + INR    Collection Time: 10/18/21 12:06 PM   Result Value Ref Range    INR 5.2 (HH) 0.9 - 1.1      Prothrombin time 50.8 (H) 9.0 - 63.9 sec   METABOLIC PANEL, COMPREHENSIVE    Collection Time: 10/18/21 12:06 PM   Result Value Ref Range    Sodium 134 (L) 136 - 145 mmol/L    Potassium 3.9 3.5 - 5.1 mmol/L    Chloride 96 (L) 97 - 108 mmol/L    CO2 26 21 - 32 mmol/L    Anion gap 12 5 - 15 mmol/L    Glucose 161 (H) 65 - 100 mg/dL    BUN 45 (H) 6 - 20 MG/DL    Creatinine 2.87 (H) 0.70 - 1.30 MG/DL    BUN/Creatinine ratio 16 12 - 20      GFR est AA 29 (L) >60 ml/min/1.73m2    GFR est non-AA 24 (L) >60 ml/min/1.73m2    Calcium 9.5 8.5 - 10.1 MG/DL    Bilirubin, total 0.8 0.2 - 1.0 MG/DL    ALT (SGPT) 29 12 - 78 U/L    AST (SGOT) 33 15 - 37 U/L    Alk. phosphatase 175 (H) 45 - 117 U/L    Protein, total 7.5 6.4 - 8.2 g/dL    Albumin 1.9 (L) 3.5 - 5.0 g/dL    Globulin 5.6 (H) 2.0 - 4.0 g/dL    A-G Ratio 0.3 (L) 1.1 - 2.2     LIPASE    Collection Time: 10/18/21 12:06 PM   Result Value Ref Range    Lipase 153 73 - 393 U/L   MAGNESIUM    Collection Time: 10/18/21 12:06 PM   Result Value Ref Range    Magnesium 2.5 (H) 1.6 - 2.4 mg/dL   SAMPLES BEING HELD    Collection Time: 10/18/21 12:06 PM   Result Value Ref Range    SAMPLES BEING HELD 1RED     COMMENT        Add-on orders for these samples will be processed based on acceptable specimen integrity and analyte stability, which may vary by analyte.    LACTIC ACID    Collection Time: 10/18/21 12:07 PM   Result Value Ref Range    Lactic acid 4.0 (HH) 0.4 - 2.0 MMOL/L   EKG, 12 LEAD, INITIAL    Collection Time: 10/18/21 12:13 PM   Result Value Ref Range    Ventricular Rate 125 BPM    Atrial Rate 125 BPM    P-R Interval 146 ms    QRS Duration 74 ms    Q-T Interval 308 ms    QTC Calculation (Bezet) 444 ms    Calculated P Axis 44 degrees    Calculated R Axis 6 degrees    Calculated T Axis 41 degrees    Diagnosis       Sinus tachycardia  Nonspecific ST abnormality  No previous ECGs available  Confirmed by Deedee Chowdary M.D., Joselin Sesay ([id number]) on 10/18/2021 12:59:45 PM     CULTURE, BLOOD    Collection Time: 10/18/21 12:20 PM    Specimen: Blood   Result Value Ref Range    Special Requests: NO SPECIAL REQUESTS      Culture result: NO GROWTH AFTER 19 HOURS     TYPE & SCREEN    Collection Time: 10/18/21 12:23 PM   Result Value Ref Range    Crossmatch Expiration 10/21/2021,2359     ABO/Rh(D) A POSITIVE     Antibody screen NEG     Unit number [id number]     Blood component type RC LR,2     Unit division 00     Status of unit TRANSFUSED     Crossmatch result Compatible     Unit number [id number]     Blood component type RC LR,2     Unit division 00     Status of unit TRANSFUSED     Crossmatch result Compatible     Unit number [id number]     Blood component type RC LR     Unit division 00     Status of unit ALLOCATED     Crossmatch result Compatible     Unit number [id number]     Blood component type RC LR     Unit division 00     Status of unit ALLOCATED     Crossmatch result Compatible    POC G3 - PUL    Collection Time: 10/18/21 12:54 PM   Result Value Ref Range    FIO2 (POC) 60 %    pH (POC) 7.51 (H) 7.35 - 7.45      pCO2 (POC) 28.6 (L) 35.0 - 45.0 MMHG    pO2 (POC) 211 (H) 80 - 100 MMHG    HCO3 (POC) 22.8 22 - 26 MMOL/L    sO2 (POC) 99.8 (H) 92 - 97 %    Base excess (POC) 0.4 mmol/L    Site LEFT RADIAL      Device: ADULT VENT      Mode ASSIST CONTROL      Set Rate 20 bpm    PEEP/CPAP (POC) 8 cmH2O    Allens test (POC) Positive      Specimen type (POC) ARTERIAL     PLASMA, ALLOCATE    Collection Time: 10/18/21  1:00 PM   Result Value Ref Range    Unit number [id number]     Blood component type FP 24h,Thaw     Unit division 00     Status of unit ALLOCATED     Unit number [id number] Blood component type FP 24h,Thaw1     Unit division 00     Status of unit TRANSFUSED    LACTIC ACID    Collection Time: 10/18/21  5:08 PM   Result Value Ref Range    Lactic acid 2.4 (HH) 0.4 - 2.0 MMOL/L   COVID-19 RAPID TEST    Collection Time: 10/18/21  5:09 PM   Result Value Ref Range    Specimen source Nasopharyngeal      COVID-19 rapid test Not detected NOTD     CBC W/O DIFF    Collection Time: 10/18/21  8:19 PM   Result Value Ref Range    WBC 14.1 (H) 4.1 - 11.1 K/uL    RBC 2.79 (L) 4.10 - 5.70 M/uL    HGB 8.3 (L) 12.1 - 17.0 g/dL    HCT 26.4 (L) 36.6 - 50.3 %    MCV 94.6 80.0 - 99.0 FL    MCH 29.7 26.0 - 34.0 PG    MCHC 31.4 30.0 - 36.5 g/dL    RDW 17.2 (H) 11.5 - 14.5 %    PLATELET 071 038 - 545 K/uL    MPV 9.9 8.9 - 12.9 FL    NRBC 1.6 (H) 0  WBC    ABSOLUTE NRBC 0.23 (H) 0.00 - 0.01 K/uL   PROTHROMBIN TIME + INR    Collection Time: 10/18/21  8:19 PM   Result Value Ref Range    INR 1.2 (H) 0.9 - 1.1      Prothrombin time 12.0 (H) 9.0 - 11.1 sec   SAMPLES BEING HELD    Collection Time: 10/18/21  8:19 PM   Result Value Ref Range    SAMPLES BEING HELD 1red,1pst     COMMENT        Add-on orders for these samples will be processed based on acceptable specimen integrity and analyte stability, which may vary by analyte.    CBC W/O DIFF    Collection Time: 10/19/21  6:34 AM   Result Value Ref Range    WBC 12.7 (H) 4.1 - 11.1 K/uL    RBC 3.04 (L) 4.10 - 5.70 M/uL    HGB 9.1 (L) 12.1 - 17.0 g/dL    HCT 28.3 (L) 36.6 - 50.3 %    MCV 93.1 80.0 - 99.0 FL    MCH 29.9 26.0 - 34.0 PG    MCHC 32.2 30.0 - 36.5 g/dL    RDW 17.4 (H) 11.5 - 14.5 %    PLATELET 735 530 - 520 K/uL    MPV 9.4 8.9 - 12.9 FL    NRBC 0.7 (H) 0  WBC    ABSOLUTE NRBC 0.09 (H) 0.00 - 0.01 K/uL   PROTHROMBIN TIME + INR    Collection Time: 10/19/21  6:34 AM   Result Value Ref Range    INR 1.1 0.9 - 1.1      Prothrombin time 11.6 (H) 9.0 - 11.1 sec            Assessment:     · GI bleed: hematemesis in the setting of supratheraputic INR status post PRBC's, FFP and K-centra; hgb 9.1 status post 2 units pRBC's   · PEG malfunction at least since Saturday per wife  · Sepsis: Leukocytosis  · Elevated lactate   · Recent COVID pneumonia trach/PEG  · ESRD on HD  · DVT on Coumadin (last dose Friday) INR 5.2-->1.1     Patient Active Problem List   Diagnosis Code    GIB (gastrointestinal bleeding) K92.2     Plan:     · BID PPI  · Monitor CBC  · Plan for EGD this afternoon, discussed with intensivest will put on propofol for procedure, discussed risks with wife on phone and she is agreeable for procedure - consent on chart     Signed By: Sharlyne Duverney, NP     10/19/2021  9:06 AM

## 2021-10-19 NOTE — CONSULTS
[address]    Name:  Katherin White  MR#:  [id number]  :  1970  ACCOUNT #:  [de-identified]  DATE OF SERVICE:  10/19/2021      IN-HOSPITAL NEPHROLOGY CONSULT    REFERRING PHYSICIAN:  Dr. Jay Hudson. REASON FOR CONSULTATION:  Provision of renal replacement therapy. HISTORY OF PRESENT ILLNESS:  The patient is a 52-year-old white man who had a complicated acute QQNNU-48 infection earlier this year. He ultimately required placement of a tracheostomy and PEG tube. The patient also developed acute kidney injury and was started on renal replacement therapy. The patient was initially seen by our service at the [facility], where he was sent for weaning off the vent dependency. Yesterday during dialysis (at the end of the treatment), the patient became hypotensive and he started bleeding from the PEG site. A decision was made for him to be transferred under acute care at [facility].  Of note, is the fact that he completed the full dialysis treatment last night. The nature of his kidney injury is acute, and there was a hope for some renal function recovery. The patient remains oligoanuric. Today, he was seen in the ICU. He is on pressor support and he is about to have upper endoscopy performed to check the position of his PEG tube and to rule out any internal gastrointestinal bleeding. The patient is awake but he is not able to provide any verbal history. PAST MEDICAL HISTORY:  Acute COVID pneumonia, acute kidney injury, respiratory failure, hypertension, anemia of chronic kidney disease. PAST SURGICAL HISTORY:  Placement of a PEG and trach. ALLERGIES:  NO KNOWN MEDICAL ALLERGIES. MEDICATIONS:  Upon transfer, include oxycodone, Dilaudid, Ativan, albuterol, insulin, Duragesic patch, nystatin, piperacillin with tazobactam, Tylenol, albuterol, melatonin, pantoprazole, vitamin D3, senna, Zyprexa, metoprolol.     SOCIAL HISTORY:  Unable to obtain. FAMILY HISTORY:  Unable to obtain. REVIEW OF SYSTEMS:  Unable to obtain due to the patient's trach status and inability to communicate verbally. PHYSICAL EXAMINATION:  GENERAL:  Middle-aged white man who is obese, awake, but not attempting to communicate. VITAL SIGNS:  Blood pressure is 100/70, heart rate is 80, respirations 16, temperature is 98. 6. HEENT:  Head is normocephalic, atraumatic. Eyes with anicteric sclerae. NECK:  With tracheostomy in place. LUNGS:  With normal breath sounds with no wheezes, rales or rhonchi. HEART:  With S1 and S2 with regular rate and rhythm. No friction rub or gallop. ABDOMEN:  Very distended, obese with PEG tube in place. The patient grimaces when abdomen is palpated. Bowel sounds are very faint. EXTREMITIES:  With edema. Skin is pale with areas of bruising. NEUROLOGIC:  The patient is calm. He does not attempt to move spontaneously. LABORATORY DATA:  Sodium is 139, potassium is 3.2, CO2 is 17, BUN is 55, creatinine is 3.06. White blood count is 12.7, hemoglobin is 9.1. IMPRESSION:  1. Acute kidney injury, dialysis dependent. The patient remains oligoanuric. He was dialyzed with his full treatment yesterday. Electrolytes are with hypokalemia which is expected after dialysis. 2.  Anemia of chronic disease. Hemoglobin is stable above 9.  3.  Respiratory failure from complicated COVID pneumonia. The patient remains on vent. 4.  Suspicion of gastrointestinal bleed. Gastrointestinal team is managing. RECOMMENDATIONS:  1.  No need of dialysis today. 2  We will attempt intermittent hemodialysis tomorrow. The Acute [facility] team was notified. If the patient remains on pressors or hypotensive, he will be transitioned to CRT. 3.  Will follow up with GI team.  4.  Daily electrolytes will be ordered. 5.  Assess iron profile and replace if necessary. The patient is at risk for deterioration.   Our service will follow up with you very closely. Thank you very much for the opportunity to be part of this patient's care.       Aleksandr Diaz MD      [id number]  D:  10/19/2021 11:53  T:  10/19/2021 12:28  JOB #:  [id number]  CC:  Lane Strong MD

## 2021-10-19 NOTE — ED NOTES
TRANSFER - OUT REPORT:    Verbal report given to ICU RN(name) on Gabriella Sanches  being transferred to ICU(unit) for routine progression of care       Report consisted of patients Situation, Background, Assessment and   Recommendations(SBAR). Information from the following report(s) SBAR, ED Summary, [facility] and Recent Results was reviewed with the receiving nurse. Lines:   Peripheral IV 14/33/61 Right Basilic (Active)   Site Assessment Clean, dry, & intact 10/18/21 1313   Phlebitis Assessment 0 10/18/21 1313   Infiltration Assessment 0 10/18/21 1313   Dressing Status Clean, dry, & intact 10/18/21 1313   Dressing Type Transparent 10/18/21 1313   Hub Color/Line Status Pink 10/18/21 1313   Action Taken Catheter retaped 10/18/21 1313   Alcohol Cap Used No 10/18/21 1313        Opportunity for questions and clarification was provided.       Patient transported with:   Monitor  Registered Nurse

## 2021-10-20 ENCOUNTER — APPOINTMENT (OUTPATIENT)
Dept: CT IMAGING | Age: 51
DRG: 368 | End: 2021-10-20
Attending: NURSE PRACTITIONER
Payer: COMMERCIAL

## 2021-10-20 ENCOUNTER — APPOINTMENT (OUTPATIENT)
Dept: GENERAL RADIOLOGY | Age: 51
DRG: 368 | End: 2021-10-20
Attending: NURSE PRACTITIONER
Payer: COMMERCIAL

## 2021-10-20 LAB
ABO + RH BLD: NORMAL
ANION GAP SERPL CALC-SCNC: 14 MMOL/L (ref 5–15)
ARTERIAL PATENCY WRIST A: POSITIVE
BACTERIA SPEC CULT: NORMAL
BACTERIA SPEC CULT: NORMAL
BASE DEFICIT BLD-SCNC: 4.9 MMOL/L
BDY SITE: ABNORMAL
BLD PROD TYP BPU: NORMAL
BLOOD GROUP ANTIBODIES SERPL: NORMAL
BPU ID: NORMAL
BUN SERPL-MCNC: 65 MG/DL (ref 6–20)
BUN/CREAT SERPL: 18 (ref 12–20)
CALCIUM SERPL-MCNC: 9.2 MG/DL (ref 8.5–10.1)
CHLORIDE SERPL-SCNC: 105 MMOL/L (ref 97–108)
CO2 SERPL-SCNC: 15 MMOL/L (ref 21–32)
CREAT SERPL-MCNC: 3.6 MG/DL (ref 0.7–1.3)
CROSSMATCH RESULT,%XM: NORMAL
ERYTHROCYTE [DISTWIDTH] IN BLOOD BY AUTOMATED COUNT: 17.2 % (ref 11.5–14.5)
GAS FLOW.O2 O2 DELIVERY SYS: ABNORMAL L/MIN
GAS FLOW.O2 SETTING OXYMISER: 20 BPM
GLUCOSE SERPL-MCNC: 102 MG/DL (ref 65–100)
HBV SURFACE AB SER QL: REACTIVE
HBV SURFACE AB SER-ACNC: 37.87 MIU/ML
HBV SURFACE AG SER QL: 0.16 INDEX
HBV SURFACE AG SER QL: NEGATIVE
HCO3 BLD-SCNC: 17.5 MMOL/L (ref 22–26)
HCT VFR BLD AUTO: 26.1 % (ref 36.6–50.3)
HGB BLD-MCNC: 8.1 G/DL (ref 12.1–17)
INSPIRATION.DURATION SETTING TIME VENT: 0.75 SEC
IRON SATN MFR SERPL: 31 % (ref 20–50)
IRON SERPL-MCNC: 38 UG/DL (ref 35–150)
LACTATE SERPL-SCNC: 1.2 MMOL/L (ref 0.4–2)
MAGNESIUM SERPL-MCNC: 2.4 MG/DL (ref 1.6–2.4)
MCH RBC QN AUTO: 29.5 PG (ref 26–34)
MCHC RBC AUTO-ENTMCNC: 31 G/DL (ref 30–36.5)
MCV RBC AUTO: 94.9 FL (ref 80–99)
NRBC # BLD: 0.04 K/UL (ref 0–0.01)
NRBC BLD-RTO: 0.4 PER 100 WBC
O2/TOTAL GAS SETTING VFR VENT: 30 %
PCO2 BLD: 22.6 MMHG (ref 35–45)
PEEP RESPIRATORY: 5 CMH2O
PH BLD: 7.5 [PH] (ref 7.35–7.45)
PHOSPHATE SERPL-MCNC: 3.9 MG/DL (ref 2.6–4.7)
PIP ISTAT,IPIP: 18
PLATELET # BLD AUTO: 352 K/UL (ref 150–400)
PMV BLD AUTO: 9.8 FL (ref 8.9–12.9)
PO2 BLD: 96 MMHG (ref 80–100)
POTASSIUM SERPL-SCNC: 3 MMOL/L (ref 3.5–5.1)
RBC # BLD AUTO: 2.75 M/UL (ref 4.1–5.7)
SAO2 % BLD: 98.3 % (ref 92–97)
SERVICE CMNT-IMP: NORMAL
SODIUM SERPL-SCNC: 134 MMOL/L (ref 136–145)
SPECIMEN EXP DATE BLD: NORMAL
SPECIMEN TYPE: ABNORMAL
STATUS OF UNIT,%ST: NORMAL
TIBC SERPL-MCNC: 122 UG/DL (ref 250–450)
UNIT DIVISION, %UDIV: 0
VENTILATION MODE VENT: ABNORMAL
WBC # BLD AUTO: 8.9 K/UL (ref 4.1–11.1)

## 2021-10-20 PROCEDURE — 74011250636 HC RX REV CODE- 250/636: Performed by: NURSE PRACTITIONER

## 2021-10-20 PROCEDURE — 74011000250 HC RX REV CODE- 250: Performed by: NURSE PRACTITIONER

## 2021-10-20 PROCEDURE — 36600 WITHDRAWAL OF ARTERIAL BLOOD: CPT

## 2021-10-20 PROCEDURE — 82803 BLOOD GASES ANY COMBINATION: CPT

## 2021-10-20 PROCEDURE — 74018 RADEX ABDOMEN 1 VIEW: CPT

## 2021-10-20 PROCEDURE — 71260 CT THORAX DX C+: CPT

## 2021-10-20 PROCEDURE — 94003 VENT MGMT INPAT SUBQ DAY: CPT

## 2021-10-20 PROCEDURE — 84100 ASSAY OF PHOSPHORUS: CPT

## 2021-10-20 PROCEDURE — 74011000258 HC RX REV CODE- 258: Performed by: HEALTH CARE PROVIDER

## 2021-10-20 PROCEDURE — 74011250636 HC RX REV CODE- 250/636: Performed by: HEALTH CARE PROVIDER

## 2021-10-20 PROCEDURE — 65610000006 HC RM INTENSIVE CARE

## 2021-10-20 PROCEDURE — 85027 COMPLETE CBC AUTOMATED: CPT

## 2021-10-20 PROCEDURE — 83735 ASSAY OF MAGNESIUM: CPT

## 2021-10-20 PROCEDURE — 74177 CT ABD & PELVIS W/CONTRAST: CPT

## 2021-10-20 PROCEDURE — 83540 ASSAY OF IRON: CPT

## 2021-10-20 PROCEDURE — 80048 BASIC METABOLIC PNL TOTAL CA: CPT

## 2021-10-20 PROCEDURE — 90935 HEMODIALYSIS ONE EVALUATION: CPT

## 2021-10-20 PROCEDURE — 36415 COLL VENOUS BLD VENIPUNCTURE: CPT

## 2021-10-20 PROCEDURE — 99223 1ST HOSP IP/OBS HIGH 75: CPT | Performed by: INTERNAL MEDICINE

## 2021-10-20 PROCEDURE — 74011000636 HC RX REV CODE- 636: Performed by: RADIOLOGY

## 2021-10-20 PROCEDURE — 86706 HEP B SURFACE ANTIBODY: CPT

## 2021-10-20 PROCEDURE — C9113 INJ PANTOPRAZOLE SODIUM, VIA: HCPCS | Performed by: NURSE PRACTITIONER

## 2021-10-20 PROCEDURE — 94640 AIRWAY INHALATION TREATMENT: CPT

## 2021-10-20 PROCEDURE — 87340 HEPATITIS B SURFACE AG IA: CPT

## 2021-10-20 PROCEDURE — P9045 ALBUMIN (HUMAN), 5%, 250 ML: HCPCS | Performed by: NURSE PRACTITIONER

## 2021-10-20 PROCEDURE — 83605 ASSAY OF LACTIC ACID: CPT

## 2021-10-20 RX ORDER — ALBUMIN HUMAN 50 G/1000ML
25 SOLUTION INTRAVENOUS ONCE
Status: COMPLETED | OUTPATIENT
Start: 2021-10-20 | End: 2021-10-21

## 2021-10-20 RX ORDER — POTASSIUM CHLORIDE 14.9 MG/ML
10 INJECTION INTRAVENOUS
Status: COMPLETED | OUTPATIENT
Start: 2021-10-20 | End: 2021-10-21

## 2021-10-20 RX ORDER — SODIUM BICARBONATE 1 MEQ/ML
50 SYRINGE (ML) INTRAVENOUS ONCE
Status: COMPLETED | OUTPATIENT
Start: 2021-10-20 | End: 2021-10-20

## 2021-10-20 RX ADMIN — SODIUM CHLORIDE 40 MG: 9 INJECTION INTRAMUSCULAR; INTRAVENOUS; SUBCUTANEOUS at 09:17

## 2021-10-20 RX ADMIN — Medication: at 09:17

## 2021-10-20 RX ADMIN — IPRATROPIUM BROMIDE AND ALBUTEROL SULFATE 3 ML: .5; 3 SOLUTION RESPIRATORY (INHALATION) at 09:11

## 2021-10-20 RX ADMIN — LINEZOLID 600 MG: 600 INJECTION, SOLUTION INTRAVENOUS at 01:30

## 2021-10-20 RX ADMIN — POTASSIUM CHLORIDE 10 MEQ: 14.9 INJECTION, SOLUTION INTRAVENOUS at 08:13

## 2021-10-20 RX ADMIN — Medication 10 ML: at 21:31

## 2021-10-20 RX ADMIN — FENTANYL CITRATE 50 MCG: 50 INJECTION INTRAMUSCULAR; INTRAVENOUS at 02:47

## 2021-10-20 RX ADMIN — ONDANSETRON 4 MG: 2 INJECTION INTRAMUSCULAR; INTRAVENOUS at 23:16

## 2021-10-20 RX ADMIN — IPRATROPIUM BROMIDE AND ALBUTEROL SULFATE 3 ML: .5; 3 SOLUTION RESPIRATORY (INHALATION) at 19:34

## 2021-10-20 RX ADMIN — ALBUMIN (HUMAN) 25 G: 12.5 INJECTION, SOLUTION INTRAVENOUS at 23:16

## 2021-10-20 RX ADMIN — PIPERACILLIN AND TAZOBACTAM 3.38 G: 3; .375 INJECTION, POWDER, LYOPHILIZED, FOR SOLUTION INTRAVENOUS at 05:45

## 2021-10-20 RX ADMIN — Medication: at 18:15

## 2021-10-20 RX ADMIN — POTASSIUM CHLORIDE 10 MEQ: 14.9 INJECTION, SOLUTION INTRAVENOUS at 16:10

## 2021-10-20 RX ADMIN — POTASSIUM CHLORIDE 10 MEQ: 14.9 INJECTION, SOLUTION INTRAVENOUS at 06:46

## 2021-10-20 RX ADMIN — POTASSIUM CHLORIDE 10 MEQ: 14.9 INJECTION, SOLUTION INTRAVENOUS at 14:24

## 2021-10-20 RX ADMIN — Medication 10 ML: at 06:00

## 2021-10-20 RX ADMIN — Medication 10 ML: at 14:25

## 2021-10-20 RX ADMIN — CHLORHEXIDINE GLUCONATE 15 ML: 0.12 RINSE ORAL at 09:17

## 2021-10-20 RX ADMIN — SODIUM CHLORIDE 40 MG: 9 INJECTION INTRAMUSCULAR; INTRAVENOUS; SUBCUTANEOUS at 21:31

## 2021-10-20 RX ADMIN — PIPERACILLIN AND TAZOBACTAM 3.38 G: 3; .375 INJECTION, POWDER, LYOPHILIZED, FOR SOLUTION INTRAVENOUS at 16:10

## 2021-10-20 RX ADMIN — IOPAMIDOL 100 ML: 755 INJECTION, SOLUTION INTRAVENOUS at 16:00

## 2021-10-20 RX ADMIN — SODIUM BICARBONATE 50 MEQ: 84 INJECTION, SOLUTION INTRAVENOUS at 06:46

## 2021-10-20 RX ADMIN — POTASSIUM CHLORIDE 10 MEQ: 14.9 INJECTION, SOLUTION INTRAVENOUS at 12:33

## 2021-10-20 RX ADMIN — LINEZOLID 600 MG: 600 INJECTION, SOLUTION INTRAVENOUS at 12:33

## 2021-10-20 NOTE — CONSULTS
Palliative Medicine Consult  [place]: [phone]    Patient Name: Michael Armendariz  YOB: 1970    Date of Initial Consult: 10/20/2021  Reason for Consult: care decisions  Requesting Provider: Dr. Herman Caodaism   Primary Care Physician: Princess Graham MD     SUMMARY:   Michael Armendariz is a 53 y.o. with a past history of morbid obesity, adult polycystic kidney disease, HTN, Gout,  COVID19 pneumonia in Aug 2021/ prolonged hospital with trach/ PEG/ HD (since Sept 2021)  to [facility] 10/14/21, who was admitted on 10/18/2021 from [facility] with a diagnosis of hypotension on HD and bleeding from PEG site. . Current medical issues leading to Palliative Medicine involvement include: purulent drainage at prior PEG site, sepsis on pressors (now off),  Hematemesis due to severe esophagitis, possible ileus    Patient lives in [address]. Prior to [facility], patient worked 20 years at [employer] in [facility], [address] [place] [address] [phone]. They have been together since 6th grade. Two adult children - son, dtr. (his son has 4 children)   Patient loves to hunt, fish, has a pontoon boat. Favorite band is \"Five finger death punch\"       PALLIATIVE DIAGNOSES:   1. Chronic respiratory failure due to COVID19  2. Debility due to prolonged COVID  3. Acute on chronic renal failure, on HD  4. PEG site infection  5. Hematemesis, severe esophagitis  6. Feeding difficulties, hypoalbuminemia  7. Anemia of chronic disease  8. Anxiety about health  9. Palliative Medicine encounter        PLAN:   1. Discussed with ICU team, case management  2. Patient denies distressing symptoms at time of my visit, although some pain in esophagus area, and some general anxiety about his situation. 1. Let him know about our team, and that I'd give his wife a call. 3. Call placed to patient's spouse, [address] [place] [address]  1. Hear of this difficult journey since his COVID illness.   2. He has been clear about wanting to continue to fight.(she was able to talk with some about this at time of transfer from [facility] to [facility])  She has a good understanding about code status. 3. She demonstrates good insight into his high risk for not making it, having ongoing complications, eventually dying from this. 4. She is worried about him getting discouraged by this setback, he had been positive about transfer to rehab to start gaining strength. 5. She plans on visiting sometime this week, during the day (I offered [address], she doesn't need this, can't stay overnight)  1. Recommend she bring posters/ photos to hang where he can see them. 2. We talk about his favorite music, will pass on to nursing staff. Consult to music therapy. 6. Our contact info given, explained role of palliative medicine. Goals are clear for now for aggressive care, full code. She knows that if there are more bumps in the road or he never makes progress, may have to readdress care goals/ code status. GOALS OF CARE / TREATMENT PREFERENCES:     GOALS OF CARE:  Patient/Health Care Proxy Stated Goals: Rehabilitation    TREATMENT PREFERENCES:   Code Status: Full Code    Patient's personal goals include: unable to state    Important upcoming milestones or family events: The patient identifies the following as important for living well:       Advance Care Planning:  [] The [facility] Interdisciplinary Team has updated the ACP Navigator with [address] and Patient Capacity      Primary Decision Maker: Blanka Davis - Spouse - [phone]  No flowsheet data found. Medical Interventions: Full interventions       Other:    As far as possible, the palliative care team has discussed with patient / health care proxy about goals of care / treatment preferences for patient.      HISTORY:     History obtained from: chart    CHIEF COMPLAINT: hypotension    HPI/SUBJECTIVE:    The patient is:   [] Verbal and participatory  [] Non-participatory due to:     53year old male with acute COVID19 in August 2021. [facility] hospital  Prolonged stay with trach, PEG and eventual transfer to [facility] last Thursday. Only had one day of PT there before became ill with PEG infection. Clinical Pain Assessment (nonverbal scale for severity on nonverbal patients):   Clinical Pain Assessment  Severity: 1     Activity (Movement): Lying quietly, normal position    Duration: for how long has pt been experiencing pain (e.g., 2 days, 1 month, years)  Frequency: how often pain is an issue (e.g., several times per day, once every few days, constant)     FUNCTIONAL ASSESSMENT:     Palliative Performance Scale (PPS):  PPS: 20       PSYCHOSOCIAL/SPIRITUAL SCREENING:     Palliative IDT has assessed this patient for cultural preferences / practices and a referral made as appropriate to needs (Cultural Services, Patient Advocacy, Ethics, etc.)    Any spiritual / Samaritan concerns:  [] Yes /  [x] No    Caregiver Burnout:  [] Yes /  [x] No /  [] No Caregiver Present      Anticipatory grief assessment:   [x] Normal  / [] Maladaptive       ESAS Anxiety: Anxiety: 3    ESAS Depression:          REVIEW OF SYSTEMS:     Positive and pertinent negative findings in ROS are noted above in HPI. The following systems were [x] reviewed / [] unable to be reviewed as noted in HPI  Other findings are noted below. Systems: constitutional, ears/nose/mouth/throat, respiratory, gastrointestinal, genitourinary, musculoskeletal, integumentary, neurologic, psychiatric, endocrine. Positive findings noted below. Modified ESAS Completed by: provider   Fatigue: 5       Pain: 1   Anxiety: 3       Dyspnea: 5           Stool Occurrence(s): 1        PHYSICAL EXAM:     From RN flowsheet:  Wt Readings from Last 3 Encounters:   10/19/21 137.2 kg (302 lb 7.5 oz)     Blood pressure 96/61, pulse 99, temperature 98.5 °F (36.9 °C), resp. rate 26, height 5' 11.5\" (1.816 m), weight 137.2 kg (302 lb 7.5 oz), SpO2 96 %.     Pain Scale 1: Numeric (0 - 10)  Pain Intensity 1: 0                 Last bowel movement, if known:     Constitutional: awake, NAD   On vent/ trach in place  On HD at time of my visit  Calm affect  Mouths words, engaging in conversation     HISTORY:     Active Problems:    GIB (gastrointestinal bleeding) (10/18/2021)      Past Medical History:   Diagnosis Date    COVID     Dialysis patient [facility])     started around the end of september 2021    Polycystic kidney disease     S/P percutaneous endoscopic gastrostomy (PEG) tube placement ([address]       No past surgical history on file. No family history on file. History reviewed, no pertinent family history.   Social History     Tobacco Use    Smoking status: Not on file   Substance Use Topics    Alcohol use: Not on file     No Known Allergies   Current Facility-Administered Medications   Medication Dose Route Frequency    potassium chloride 10 mEq in 50 ml IVPB  10 mEq IntraVENous Q1H    fentaNYL citrate (PF) injection 50 mcg  50 mcg IntraVENous Q4H PRN    sodium chloride (NS) flush 5-40 mL  5-40 mL IntraVENous Q8H    sodium chloride (NS) flush 5-40 mL  5-40 mL IntraVENous PRN    propofol (DIPRIVAN) 10 mg/mL infusion  0-50 mcg/kg/min IntraVENous TITRATE    pantoprazole (PROTONIX) 40 mg in 0.9% sodium chloride 10 mL injection  40 mg IntraVENous Q12H    chlorhexidine (ORAL CARE KIT) 0.12 % mouthwash 15 mL  15 mL Oral Q12H    balsam peru-castor oiL (VENELEX) ointment   Topical BID    sodium chloride (NS) flush 5-10 mL  5-10 mL IntraVENous PRN    0.9% sodium chloride infusion 250 mL  250 mL IntraVENous PRN    0.9% sodium chloride infusion 250 mL  250 mL IntraVENous PRN    sodium chloride (NS) flush 5-40 mL  5-40 mL IntraVENous Q8H    sodium chloride (NS) flush 5-40 mL  5-40 mL IntraVENous PRN    acetaminophen (TYLENOL) tablet 650 mg  650 mg Oral Q6H PRN    Or    acetaminophen (TYLENOL) suppository 650 mg  650 mg Rectal Q6H PRN    polyethylene glycol (MIRALAX) packet 17 g  17 g Oral DAILY PRN    ondansetron (ZOFRAN ODT) tablet 4 mg  4 mg Oral Q8H PRN    Or    ondansetron (ZOFRAN) injection 4 mg  4 mg IntraVENous Q6H PRN    0.9% sodium chloride infusion  75 mL/hr IntraVENous CONTINUOUS    linezolid in dextrose 5% (ZYVOX) IVPB premix in D5W 600 mg  600 mg IntraVENous Q12H    piperacillin-tazobactam (ZOSYN) 3.375 g in 0.9% sodium chloride (MBP/ADV) 100 mL MBP  3.375 g IntraVENous Q12H    albuterol (PROVENTIL VENTOLIN) nebulizer solution 2.5 mg  2.5 mg Nebulization Q4H PRN    albuterol-ipratropium (DUO-NEB) 2.5 MG-0.5 MG/3 ML  3 mL Nebulization BID RT          LAB AND IMAGING FINDINGS:     Lab Results   Component Value Date/Time    WBC 8.9 10/20/2021 04:24 AM    HGB 8.1 (L) 10/20/2021 04:24 AM    PLATELET 407 41/85/4906 04:24 AM     Lab Results   Component Value Date/Time    Sodium 134 (L) 10/20/2021 04:24 AM    Potassium 3.0 (L) 10/20/2021 04:24 AM    Chloride 105 10/20/2021 04:24 AM    CO2 15 (LL) 10/20/2021 04:24 AM    BUN 65 (H) 10/20/2021 04:24 AM    Creatinine 3.60 (H) 10/20/2021 04:24 AM    Calcium 9.2 10/20/2021 04:24 AM    Magnesium 2.4 10/20/2021 04:24 AM    Phosphorus 3.9 10/20/2021 04:24 AM      Lab Results   Component Value Date/Time    Alk.  phosphatase 175 (H) 10/18/2021 12:06 PM    Protein, total 7.5 10/18/2021 12:06 PM    Albumin 1.9 (L) 10/18/2021 12:06 PM    Globulin 5.6 (H) 10/18/2021 12:06 PM     Lab Results   Component Value Date/Time    INR 1.1 10/19/2021 06:34 AM    Prothrombin time 11.6 (H) 10/19/2021 06:34 AM      Lab Results   Component Value Date/Time    Iron 38 10/20/2021 08:58 AM    TIBC 122 (L) 10/20/2021 08:58 AM    Iron % saturation 31 10/20/2021 08:58 AM      No results found for: PH, PCO2, PO2  No components found for: GLPOC   No results found for: CPK, CKMB             Total time: 70min  Counseling / coordination time, spent as noted above: 45  > 50% counseling / coordination?: yes    Prolonged service was provided for  []30 min   []75 min in face to face time in the presence of the patient, spent as noted above. Time Start:   Time End:   Note: this can only be billed with 88379 (initial) or 65919 (follow up). If multiple start / stop times, list each separately.

## 2021-10-20 NOTE — PROCEDURES
Hemodialysis / [phone]    Vitals Pre Post Assessment Pre Post   BP BP: 96/63 (10/20/21 0845) 99/69 LOC A&OX3-4 A&OX3-4   HR Pulse (Heart Rate): 90 (10/20/21 0845) 105 Lungs diminished diminished   Resp Resp Rate: 19 (10/20/21 0845) 29 Cardiac NSR NSR   Temp Temp: 98.5 °F (36.9 °C) (10/20/21 0845) 98.3 Skin Warm and dry Warm and dry   Weight    Edema none none   Tele status On monitor On monitor in ICU Pain Pain Intensity 1: 0 (pt nods head no to pain) (10/20/21 0400)      Orders   Duration: Start: 0845 End: 1230 Total: 3.5 hrs   Dialyzer: Dialyzer/Set Up Inspection: Judy Garay (10/20/21 0845)   K Bath: Dialysate K (mEq/L): 3.5 (10/20/21 0845)   Ca Bath: Dialysate CA (mEq/L): 2.5 (10/20/21 0845)   Na: Dialysate NA (mEq/L): 140 (10/20/21 0845)   Bicarb: Dialysate HCO3 (mEq/L): 35 (10/20/21 0845)   Target Fluid Removal: Goal/Amount of Fluid to Remove (mL): 2500 mL (10/20/21 0845)     Access   Type & Location: [facility]. Each catheter limb disinfected per p&p, caps removed, hubs disinfected per p&p. Each dialysis catheter limb disinfected per p&p, blood returned per p&p, each dialysis hub disinfected per p&p, post dialysis catheter dwell instilled per order, and caps applied.    Comments:                                        Labs   HBsAg (Antigen) / date:  negative 10/20/21                                             HBsAb (Antibody) / date: 10/20/21 immune   Source: Epic   Obtained/Reviewed  Critical Results Called HGB   Date Value Ref Range Status   10/20/2021 8.1 (L) 12.1 - 17.0 g/dL Final     Potassium   Date Value Ref Range Status   10/20/2021 3.0 (L) 3.5 - 5.1 mmol/L Final     Calcium   Date Value Ref Range Status   10/20/2021 9.2 8.5 - 10.1 MG/DL Final     BUN   Date Value Ref Range Status   10/20/2021 65 (H) 6 - 20 MG/DL Final     Creatinine   Date Value Ref Range Status   10/20/2021 3.60 (H) 0.70 - 1.30 MG/DL Final        Meds Given   Name Dose Route   none                 Adequacy / Fluid    Total Liters Process: 67.1   Net Fluid Removed: 500 ml      Comments   Time Out Done:   (Time) Yes, 0845   Admitting Diagnosis:    Consent obtained/signed:  yes   Machine / Devene Jm # Machine Number: [id number] (10/20/21 0845)   Primary Nurse Rpt Pre: Artis Aviles RN   Primary Nurse Rpt Post: Artis Aviles RN   Pt Education: procedural   Care Plan: Routine HD with volume removal as trudy   Pts outpatient clinic: [facility]     Tx Summary   Comments: Patients BP was running low. It came up some after start of tx so goal raised [id number] ml but later it dropped into the 80s. Also whole system clotted off after 2 hours so new system set up and started. No other problems. Report to Artis Aviles RN.

## 2021-10-20 NOTE — PROGRESS NOTES
Palliative Medicine      Code Status: Full Code    Advance Care Planning:  No AMD on file, patient' spouse is LNOK    Patient / Family Encounter Documentation    Participants (names): Steve Coombs, Dr. Tram Khan     Narrative: The SW called and spoke with the patient's wife Steve Coombs, along with Dr. Tram Khan, in order to introduce the role of Palliative Medicine as an added layer of support. Liz Valenzuela provided additional history into the patient's clinical course- he was originally hospitalized in August at hospital in [place], and discharged to [address] [place] [address] prior to getting re-admitted at [facility] shared that there were difficult conversations at [place], she has a good understanding about Code Status and endorses this was also discussed at [facility]. She had the opportunity to talk to her  previously about Code Status, and he verbalized he wanted to remain a Full Code. Liz Valenzuela is aware that she may have to make difficult decisions in the future depending on how the patient progresses, she did share that the patient's sister was critically ill and hospitalized for almost 7 months, and this gave the family perspective on what he would like his care to look like. Liz Valenzuela is worried that this admission will be discouraging to the patient, he was looking forward to having progress at [facility]. Liz Valenzuela hopes to be able to visit later this week- SW discussed [facility] as an option if needed, family appreciative of information. Goal is to continue full restorative measures. Liz Valenzuela is aware that if there are additional complications, she may be faced with difficult choices in the future, but goals are clear currently for full restorative measures. Psychosocial Issues Identified/ Resilience Factors: The patient lives at home with his wife and 27-year-old daughter. The patient has two children, a 27-year-old son and 27-year-old daughter, and four grandchildren.  The patient and his wife work at [employer] in [place], [place]. The patient goes by [place] and enjoys hunting and fishing. His favorite band is \"5 finger death punch\" and enjoys [employer]. The patient and his wife have been dating since they were in the 6th grade,  for 30 years. The patient's wife has insight into the patient's condition, realistic in understanding that there are many unknowns, and although we hope for recovery and improvement, she understands that the patient can decline. Goals of Care / Plan: FULL Code, full restorative measures. Thank you for including Palliative Medicine in the care of Gabriella Sanches.        Deidre Coronado LCSW

## 2021-10-20 NOTE — PROGRESS NOTES
Physician Progress Note      Osiris Sanchez  CSN #:                  [id number]  :                       1970  ADMIT DATE:       10/18/2021 11:51 AM  DISCH DATE:  RESPONDING  PROVIDER #:        Meg Morris DO          QUERY TEXT:    Dear Attending,    Pt admitted with GIB with hemorrhagic shock. Noted documentation of sepsis by GI consultant and Palliative Care. in progress and consult notes. Pt noted to have WBC of 20.6, temp of 96.5, HR of 129, RR 29-37, BP of 79/68 and lactic acid of 4.0 at time of admission. If possible, please document in progress notes and discharge summary the source of sepsis:    The medical record reflects the following:    Risk Factors: Chronic illness, recent COVID PNA    Clinical Indicators:  -- At time of admission: temp = 96.5; HR = 129; RR = 29-37; WBC = 20.6; lactic = 4.0, 2.4; BP = 79/68, 81/64  -- GI consultant noted sepsis  -- Sputum culture = few yeast  -- Blood culture = no growth 2 days  -- Rapid COVID test = not detected  -- CXR 10/18 = Diffuse bilateral increased interstitial markings. Right midlung and right basilar mild patchy airspace disease. Treatment: IV Abx, NS bolus x 4 liters, blood & sputum cultures, imaging study, COVID testing    Thank-you,  Fay Toledo RN, [facility]  Clinical   [phone]  Options provided:  -- Sepsis, present on admission, due to, Please document source. -- Sepsis was ruled out  -- Other - I will add my own diagnosis  -- Disagree - Not applicable / Not valid  -- Disagree - Clinically unable to determine / Unknown  -- Refer to Clinical Documentation Reviewer    PROVIDER RESPONSE TEXT:    After further study, sepsis was ruled out for this patient.     Query created by: Rosemarie Overton on 10/20/2021 4:05 PM      Electronically signed by:  Meg Morris DO 10/20/2021 4:40 PM

## 2021-10-20 NOTE — PROGRESS NOTES
[facility]    ICU TEAM Progress Note    Name: Waqar Benz   : 1970   MRN: [id number]   Date: 10/20/2021      Assessment:   Reason for ICU Admission: MV and hypotension     Brief HPI:    -  Pt is a 54 yo M with PMH ESRD on HD, takes coumadin secondary to hx of DVT RUE with a recent prolonged hospitalization secondary to COVID PNA in August of this year. He ultimately required trach and peg and was in rehab at [facility]. Pt unable to provide HPI secondary to current cognition, therefore information is obtained via chart and provider. Per [facility] records, pt had a dislodged peg tube. He started having coffee ground emesis 10/18/21 with elevated HR and hypotension. He was sent to the ER for further workup. He was found to be hypotensive and ICU was consulted for further management. POD:  1 Day Post-Op    S/P:   Procedure(s):  ESOPHAGOGASTRODUODENOSCOPY (EGD) at Bedside  ESOPHAGOGASTRODUODENAL (EGD) BIOPSY    Plan:     1. GIB  - GI consulted, appreciate input  - PPI BID  - SP EGD      2. Hemorrhagic shock  - Sp PRBCs, FFP and k centra   - Serial H and H   - Monitor closely for s/s of bleeding  - Volume resuscitate     3. Supratherapeutic INR  - Sp FFP and k centra  - Hold coumadin for now as active bleeding  - Serial INR     4. Acute on chronic hypoxic respiratory failure   - Continue MV  - SBT daily   - Pulm hygiene   - HOB >30   - Aspiration precautions     5. ESRD on HD  - Cont HD per renal    6. Acute blood loss anemia   - Transfuse as needed to keep hgb >7  - Monitor closely for ss bleedin g    7. PEG tube dislodged   - EGD per GI  - PEG removed, not in stomach  - CT abd an dpelvis ordered today to ro possible abscess, pending   - Wound consulted for old peg site   - NPO for now, bowel rest per GI     8.  Leukocytosis   - continue broad spectrum abx   - On zosyn and zyvox   - ID consult   - Cultures pending       Subjective:   Overnight Events:   10/20/2021        Objective:     Visit Vitals  BP 96/63   Pulse 90   Temp 98.5 °F (36.9 °C)   Resp 19   Ht 5' 11.5\" (1.816 m) Comment: From [facility]   Wt 137.2 kg (302 lb 7.5 oz)   SpO2 97%   BMI 41.60 kg/m²      O2 Device: Ventilator, Tracheostomy Temp (24hrs), Av.8 °F (37.1 °C), Min:98.5 °F (36.9 °C), Max:99.3 °F (37.4 °C)           Hemodynamics:   PAP:   CO:     Wedge:   CI:     CVP:    SVR:       PVR:       Ventilator Settings:  Mode Rate Tidal Volume Pressure FiO2 PEEP   Assist control, Pressure control        30 % 5 cm H20     Peak airway pressure: 24 cm H2O    Minute ventilation: 14.8 l/min        Intake/Output:     Intake/Output Summary (Last 24 hours) at 10/20/2021 0905  Last data filed at 10/20/2021 0400  Gross per 24 hour   Intake 2424.22 ml   Output --   Net 2424.22 ml       Physical Exam:  General:  Trach and on vent    Eyes:  Sclera anicteric. Pupils equal, round, reactive to light. Mouth/Throat: Orotracheal tube in place. Neck: Supple. Lungs:   Clear to auscultation bilaterally, good effort. Cardiovascular:  Regular rate and rhythm, no murmur, click, rub, or gallop. Abdomen: Old peg removed, scar and dressing intact    Extremities: No cyanosis or edema. Skin: No acute rash or lesions. Lymph Nodes: Cervical and supraclavicular normal.   Musculoskeletal:  No swelling or deformity. Lines/Devices:  Intact, no erythema, drainage, or tenderness. Labs & Data: Reviewed    Medications: Reviewed    Chest X-Ray:    TTE:    Multidisciplinary Rounds Completed: Yes    ABCDEF Bundle/Checklist Completed: Yes    SPECIAL EQUIPMENT: None    DISPOSITION: Stay in ICU    CRITICAL CARE CONSULTANT NOTE  I had a face to face encounter with the patient, reviewed and interpreted patient data including clinical events, labs, images, vital signs, I/O's, and examined patient.   I have discussed the case and the plan and management of the patient's care with the consulting services, the bedside nurses and the respiratory therapist. NOTE OF PERSONAL INVOLVEMENT IN CARE   This patient has a high probability of imminent, clinically significant deterioration, which requires the highest level of preparedness to intervene urgently. I participated in the decision-making and personally managed or directed the management of the following life and organ supporting interventions that required my frequent assessment to treat or prevent imminent deterioration. I personally spent 45 minutes of critical care time. This is time spent at this critically ill patient's bedside actively involved in patient care as well as the coordination of care and discussions with the patient's family. This does not include any procedural time which has been billed separately.     Jerry Lynch NP    [facility]  10/20/2021

## 2021-10-20 NOTE — PROGRESS NOTES
Physician Progress Note      Grey Proctor  [facility] #:                  [id number]  :                       1970  ADMIT DATE:       10/18/2021 11:51 AM  DISCH DATE:  RESPONDING  PROVIDER #:        Hardy Collins DO          QUERY TEXT:    Dear Attending,    Pt admitted with GIB per H&P. Noted documentation of hematemesis due to severe esophagitis and hematemesis in the setting of supratheraputic INR by GI consultant. If possible, please document in progress notes and discharge summary:    The medical record reflects the following:    Risk Factors: Reflux, chronic illness, Coumadin use    Clinical Indicators:  -- GI progress note on 10/20 notes Hematemesis due to severe esophagitis  -- GI progress note on 10/19 notes GI bleed:?hematemesis in the setting of supratheraputic INR  -- INR = 5.2 on admission  -- Hgb = 9.6, 8.3, 9.1, 8.1 during admission  -- EGD results = LA Grade D reflux esophagitis up to the mid-esophagus    Treatment: GI consult, EGD with biopsies, serial lab monitoring, blood transfusion x 3 units, coumadin held since 10/15    Subha Waterman RN, [address] [place] [address]   [phone]  Options provided:  -- Hematemesis due to severe esophagitis confirmed present on admission  -- Bleeding associated with coumadin present on admission  -- Hematemesis due to severe esophagitis and associated with coumadin use present on admission  -- Other - I will add my own diagnosis  -- Disagree - Not applicable / Not valid  -- Disagree - Clinically unable to determine / Unknown  -- Refer to Clinical Documentation Reviewer    PROVIDER RESPONSE TEXT:    This patient has bleeding associated with coumadin present on admission.     Query created by: Denia Hendricks on 10/20/2021 3:40 PM      Electronically signed by:  Hardy Collins DO 10/20/2021 3:46 PM

## 2021-10-20 NOTE — PROGRESS NOTES
745-Bedside and Verbal shift change report given to Samina Hong (oncoming nurse) by [facility], CCU RN (offgoing nurse). Report included the following information SBAR, Kardex, ED Summary, Procedure Summary, Intake/Output, MAR, Accordion, Recent Results, Med Rec Status, Cardiac Rhythm SR, Alarm Parameters , Pre Procedure Checklist, Procedure Verification, Quality Measures and Dual Neuro Assessment. 0800- AM assessment, pt on dilaysis, VSS, afebrile, no c/o pain. RN changed abdominal dressing from old peg tube site, large bloody clot with yellow drainage. 1000- Called CT, will plan to bring pt after HD treatment is completed. 1230- Pt unable to tolerate oral contrast, informed Sarina Ra NP and CT.     1530- Pt taken to CT    1600- No changes on reassessment. 1930-Bedside and Verbal shift change report given to Maik Sarah (oncoming nurse) by Samina Hong (offgoing nurse). Report included the following information SBAR, Kardex, ED Summary, Procedure Summary, Intake/Output, MAR, Accordion, Recent Results, Med Rec Status, Cardiac Rhythm SR/ST, Alarm Parameters , Pre Procedure Checklist, Procedure Verification, Quality Measures and Dual Neuro Assessment.

## 2021-10-20 NOTE — PROGRESS NOTES
1930: Bedside shift report received from [facility]. 2210: Levophed gtt stopped. .  0240: Pt tachypnic and complaining of pain/discomfort. PRN fentanyl given. 0300: Dobhoff placed. Stat KUB ordered to verify placement. 0400: am labs drawn. 0530: co2 - 15. EMELI Palm notified. Orders received for ABG. 0645: k - 3.0. 2 runs of 10 MEQ ordered. 1amp of bicarb ordered per Maryuri Patel NP.  0730: Bedside and Verbal shift change report given to RN (oncoming nurse) by Irene Collins (offgoing nurse). Report included the following information SBAR, Kardex, Intake/Output, MAR, Recent Results and Cardiac Rhythm nsr.

## 2021-10-20 NOTE — PROGRESS NOTES
Faviola Hernandez [facility] [address] Maik Calderon M.D.  [phone]               GASTROENTEROLOGY PROGRESS NOTE        NAME: Holly Sanchez   :  1970   MRN:  [id number]       Subjective:   Purulent drainage from previous PEG site. Off pressors. Objective:   VITALS:   Last 24hrs VS reviewed. Most recent are:  Visit Vitals  /60   Pulse 92   Temp 98.8 °F (37.1 °C)   Resp 24   Ht 5' 11.5\" (1.816 m)   Wt 137.2 kg (302 lb 7.5 oz)   SpO2 97%   BMI 41.60 kg/m²       Intake/Output Summary (Last 24 hours) at 10/20/2021 0805  Last data filed at 10/20/2021 0400  Gross per 24 hour   Intake 2424.22 ml   Output --   Net 2424.22 ml       PHYSICAL EXAM:  General: Alert, in no acute distress    HEENT: Anicteric conjunctiva. Lungs:            CTA Bilaterally  Heart:  Normal S1, S2    Abdomen: Soft, Non distended, tender in the epigastric area. Hypoactive bowel sounds, no rebound/guarding  MSK:   Normal muscle tone  Skin:   Warm to touch  Psych:   Not anxious nor agitated. Lab Data Reviewed:   Recent Labs     10/20/21  0424 10/19/21  0634   WBC 8.9 12.7*   HGB 8.1* 9.1*   HCT 26.1* 28.3*    356     Recent Labs     10/20/21  0424 10/19/21  0634   * 137   K 3.0* 3.2*    104   CO2 15* 17*   BUN 65* 55*   CREA 3.60* 3.06*   * 111*   CA 9.2 9.2   MG 2.4 2.2   PHOS 3.9 4.1     Recent Labs     10/18/21  1206   *   TP 7.5   ALB 1.9*   GLOB 5.6*   LPSE 153     Recent Labs     10/19/21  0634 10/18/21  2019   INR 1.1 1.2*   PTP 11.6* 12.0*      No results for input(s): FE, TIBC, PSAT, FERR in the last 72 hours. No results for input(s): CPK, CKMB in the last 72 hours. No lab exists for component: TOPONINI    See Electronic Medical Record for all procedure/radiology reports and details which were not copied into this note but were reviewed prior to the creation of the Plan. Assessment:   · Hematemesis due to severe esophagitis  · G tube dislodged and not in the stomach.  G tube removed. Concern for possible abscess. · Possible ileus  · Sepsis. WBC normal. Off pressors. Patient Active Problem List   Diagnosis Code    GIB (gastrointestinal bleeding) K92.2       Plan:   · PPI BID  · Abx  · Hold tube feeds through Dobhoff  · CT scan of the abdomen for further evaluation. Signed by:  Mandi Duffy MD         10/20/2021  8:05 AM

## 2021-10-20 NOTE — PROGRESS NOTES
Reviewed chart for transitions of care, and discussed in rounds. Care manager spoke with patient's wife Mabel Gandhi [phone] via phone to introduce self and explain role. Baseline:   ADLs/IDALS: Independent   Previous Home Health:none  Previous SNF/IPR:none  ER Contact: wife Mabel Gandhi [phone]    Patient lives in a single level house with 2 steps to enter. Patient is independent with ADLs/IDALs   Patient had started wearing oxygen at home after last hospitalization, wife does not remember the company who services it. Patient has no previous [place] needs. . Patient's preferred pharmacy is [facility]  [facility]. Patient will need ambulance transport at discharge. Reason for Admission:                       RUR Score:  18%                   Plan for utilizing home health: Will likely need rehab       PCP: First and Last name:  OtherPrincess MD     Name of Practice:    Are you a current patient: Yes/No:    Approximate date of last visit:    Can you participate in a virtual visit with your PCP:                     Current Advanced Directive/Advance Care Plan: Full Code      Healthcare Decision Maker:   Click here to complete [employer] including selection of the Healthcare Decision Maker Relationship (ie \"Primary\")           Patient presented to ED from [facility] with coffee ground emesis. Per notes patient's peg tube has purulent drainage. Care manger spoke with patient's wife Manas Son to introduce self and explain role. Patient had been independent prior to previous hospitalization in August. She would like to see of there is a facility closer to their home in [place] that patient can be discharged to. Care manager will continue to follow for transitions of care. Rock Gaines RN,Care Manager.    Care Management Interventions  MyChart Signup: No  Discharge Durable Medical Equipment: No  Physical Therapy Consult: No  Occupational Therapy Consult: No  Speech Therapy Consult: Yes  Support Systems:  (wife Lisseth Padron [phone])  Confirm Follow Up Transport:  (Will need ambulance transport)

## 2021-10-20 NOTE — PROGRESS NOTES
SLP Contact Note    Noted concerns for ileus, severe esophagitis, and pt continues with tachypnea. Therefore, will hold SLP for now.       Thank you,  HAN Dover.Ed, [address] [facility]  Speech-Language Pathologist

## 2021-10-20 NOTE — PROGRESS NOTES
Name: Shante Coy MRN: [id number]   : 1970 Hospital: [address]   Date: 10/20/2021        IMPRESSION:   · SERGEY, HD dependent. Patient is seen during HD Tx in the presence of acute Radha Clifford. Patient's BP tends to be on a low side, but it remained stable at the current level. · Hypotension - on min pressors if needed. · Hypervolemia with predominantly central congestion. · Anemia of multifactorial origin  · Respiratory failure due to a complicated Covid pneumonia, on vent via trache  · Hypokalemia pre HD      PLAN:   · Complete a full HD Tx today, adjust the HD Rx with high K in the dialysate. · Patient will have a contrast CT scan later. Will dialyze again in AM.  · Anemia management- start Retacrit. Check the iron profile. Subjective/Interval History:   I have reviewed the flowsheet and previous days notes. ROS:Review of systems not obtained due to patient factors. Objective:   Vital Signs:    Visit Vitals  BP 97/61   Pulse (!) 103   Temp 98.5 °F (36.9 °C)   Resp (!) 33   Ht 5' 11.5\" (1.816 m) Comment: From [facility]   Wt 137.2 kg (302 lb 7.5 oz)   SpO2 97%   BMI 41.60 kg/m²       O2 Device: Ventilator       Temp (24hrs), Av.8 °F (37.1 °C), Min:98.5 °F (36.9 °C), Max:99.3 °F (37.4 °C)       Intake/Output:   Last shift:      No intake/output data recorded. Last 3 shifts: 10/18 1901 - 10/20 0700  In: 5198.7 [I.V.:4846.2]  Out: -     Intake/Output Summary (Last 24 hours) at 10/20/2021 1144  Last data filed at 10/20/2021 0400  Gross per 24 hour   Intake 2155.42 ml   Output --   Net 2155.42 ml        Physical Exam:  General:    Awake, Not communicating verbally. HD Tx is in progress. Head:   Normocephalic, without obvious abnormality, atraumatic. Eyes:   Conjunctivae/corneas clear. Nose:  Nares normal. No drainage or sinus tenderness. Throat:    Lips, mucosa, and tongue normal.    Neck:  Trache in place  Lungs:   Clear to auscultation bilaterally.   No Wheezing or Rhonchi. No rales. Chest wall:  No tenderness or deformity. No Accessory muscle use. Heart:   Regular rate and rhythm,  no murmur, rub or gallop. Abdomen:   Soft. Distended. Bowel sounds normal. PEG tube in place. Extremities: Extremities normal, atraumatic, No cyanosis. No edema. No clubbing  Skin:     Texture, turgor normal. No rashes or lesions. Not Jaundiced  Psych:  Calm. Neurologic: Non focal.      DATA:  Labs:  Recent Labs     10/20/21  0424 10/19/21  0634 10/18/21  1206   * 137 134*   K 3.0* 3.2* 3.9    104 96*   CO2 15* 17* 26   BUN 65* 55* 45*   CREA 3.60* 3.06* 2.87*   CA 9.2 9.2 9.5   ALB  --   --  1.9*   PHOS 3.9 4.1  --    MG 2.4 2.2 2.5*     Recent Labs     10/20/21  0424 10/19/21  0634 10/18/21  2019   WBC 8.9 12.7* 14.1*   HGB 8.1* 9.1* 8.3*   HCT 26.1* 28.3* 26.4*    356 371     No results for input(s): DOLORES, KU, CLU, CREAU in the last 72 hours.     No lab exists for component: PROU    Total time spent with patient:  35 minutes    [] Critical Care Provided    Care Plan discussed with:   Dahlia Herrera MD
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
## 2021-10-21 ENCOUNTER — APPOINTMENT (OUTPATIENT)
Dept: GENERAL RADIOLOGY | Age: 51
DRG: 368 | End: 2021-10-21
Attending: NURSE PRACTITIONER
Payer: COMMERCIAL

## 2021-10-21 PROBLEM — E43 SEVERE PROTEIN-CALORIE MALNUTRITION (HCC): Status: ACTIVE | Noted: 2021-10-21

## 2021-10-21 LAB
ANION GAP SERPL CALC-SCNC: 11 MMOL/L (ref 5–15)
ARTERIAL PATENCY WRIST A: POSITIVE
BACTERIA SPEC CULT: ABNORMAL
BACTERIA SPEC CULT: ABNORMAL
BASE EXCESS BLD CALC-SCNC: 0.9 MMOL/L
BDY SITE: ABNORMAL
BUN SERPL-MCNC: 35 MG/DL (ref 6–20)
BUN/CREAT SERPL: 15 (ref 12–20)
CALCIUM SERPL-MCNC: 8.5 MG/DL (ref 8.5–10.1)
CHLORIDE SERPL-SCNC: 105 MMOL/L (ref 97–108)
CO2 SERPL-SCNC: 22 MMOL/L (ref 21–32)
CREAT SERPL-MCNC: 2.39 MG/DL (ref 0.7–1.3)
ERYTHROCYTE [DISTWIDTH] IN BLOOD BY AUTOMATED COUNT: 17.8 % (ref 11.5–14.5)
GAS FLOW.O2 O2 DELIVERY SYS: ABNORMAL L/MIN
GAS FLOW.O2 SETTING OXYMISER: 20 BPM
GLUCOSE SERPL-MCNC: 98 MG/DL (ref 65–100)
GRAM STN SPEC: ABNORMAL
GRAM STN SPEC: ABNORMAL
HCO3 BLD-SCNC: 23.7 MMOL/L (ref 22–26)
HCT VFR BLD AUTO: 25.7 % (ref 36.6–50.3)
HGB BLD-MCNC: 8.2 G/DL (ref 12.1–17)
INSPIRATION.DURATION SETTING TIME VENT: 0.75 SEC
MAGNESIUM SERPL-MCNC: 2 MG/DL (ref 1.6–2.4)
MCH RBC QN AUTO: 29.9 PG (ref 26–34)
MCHC RBC AUTO-ENTMCNC: 31.9 G/DL (ref 30–36.5)
MCV RBC AUTO: 93.8 FL (ref 80–99)
NRBC # BLD: 0.05 K/UL (ref 0–0.01)
NRBC BLD-RTO: 0.6 PER 100 WBC
O2/TOTAL GAS SETTING VFR VENT: 30 %
PCO2 BLD: 29.3 MMHG (ref 35–45)
PEEP RESPIRATORY: 5 CMH2O
PH BLD: 7.52 [PH] (ref 7.35–7.45)
PHOSPHATE SERPL-MCNC: 2.7 MG/DL (ref 2.6–4.7)
PIP ISTAT,IPIP: 18
PLATELET # BLD AUTO: 319 K/UL (ref 150–400)
PMV BLD AUTO: 9.9 FL (ref 8.9–12.9)
PO2 BLD: 94 MMHG (ref 80–100)
POTASSIUM SERPL-SCNC: 3.3 MMOL/L (ref 3.5–5.1)
RBC # BLD AUTO: 2.74 M/UL (ref 4.1–5.7)
SAO2 % BLD: 98.2 % (ref 92–97)
SERVICE CMNT-IMP: ABNORMAL
SODIUM SERPL-SCNC: 138 MMOL/L (ref 136–145)
SPECIMEN TYPE: ABNORMAL
VENTILATION MODE VENT: ABNORMAL
WBC # BLD AUTO: 8.6 K/UL (ref 4.1–11.1)

## 2021-10-21 PROCEDURE — 36415 COLL VENOUS BLD VENIPUNCTURE: CPT

## 2021-10-21 PROCEDURE — 85027 COMPLETE CBC AUTOMATED: CPT

## 2021-10-21 PROCEDURE — 36600 WITHDRAWAL OF ARTERIAL BLOOD: CPT

## 2021-10-21 PROCEDURE — 74011250636 HC RX REV CODE- 250/636: Performed by: NURSE PRACTITIONER

## 2021-10-21 PROCEDURE — 65610000006 HC RM INTENSIVE CARE

## 2021-10-21 PROCEDURE — 74011250636 HC RX REV CODE- 250/636: Performed by: HEALTH CARE PROVIDER

## 2021-10-21 PROCEDURE — 80048 BASIC METABOLIC PNL TOTAL CA: CPT

## 2021-10-21 PROCEDURE — 94003 VENT MGMT INPAT SUBQ DAY: CPT

## 2021-10-21 PROCEDURE — 74011000258 HC RX REV CODE- 258: Performed by: HEALTH CARE PROVIDER

## 2021-10-21 PROCEDURE — 82803 BLOOD GASES ANY COMBINATION: CPT

## 2021-10-21 PROCEDURE — 94640 AIRWAY INHALATION TREATMENT: CPT

## 2021-10-21 PROCEDURE — C9113 INJ PANTOPRAZOLE SODIUM, VIA: HCPCS | Performed by: NURSE PRACTITIONER

## 2021-10-21 PROCEDURE — 84100 ASSAY OF PHOSPHORUS: CPT

## 2021-10-21 PROCEDURE — 74011000250 HC RX REV CODE- 250: Performed by: NURSE PRACTITIONER

## 2021-10-21 PROCEDURE — 74018 RADEX ABDOMEN 1 VIEW: CPT

## 2021-10-21 PROCEDURE — 83735 ASSAY OF MAGNESIUM: CPT

## 2021-10-21 RX ORDER — FLUCONAZOLE 2 MG/ML
200 INJECTION, SOLUTION INTRAVENOUS EVERY 24 HOURS
Status: DISCONTINUED | OUTPATIENT
Start: 2021-10-21 | End: 2021-11-05

## 2021-10-21 RX ORDER — POTASSIUM CHLORIDE 14.9 MG/ML
10 INJECTION INTRAVENOUS
Status: COMPLETED | OUTPATIENT
Start: 2021-10-21 | End: 2021-10-21

## 2021-10-21 RX ADMIN — FENTANYL CITRATE 50 MCG: 50 INJECTION INTRAMUSCULAR; INTRAVENOUS at 00:25

## 2021-10-21 RX ADMIN — PIPERACILLIN AND TAZOBACTAM 3.38 G: 3; .375 INJECTION, POWDER, LYOPHILIZED, FOR SOLUTION INTRAVENOUS at 04:31

## 2021-10-21 RX ADMIN — Medication 10 ML: at 21:35

## 2021-10-21 RX ADMIN — Medication: at 09:00

## 2021-10-21 RX ADMIN — SODIUM CHLORIDE 40 MG: 9 INJECTION INTRAMUSCULAR; INTRAVENOUS; SUBCUTANEOUS at 21:11

## 2021-10-21 RX ADMIN — Medication: at 19:03

## 2021-10-21 RX ADMIN — Medication 10 ML: at 13:33

## 2021-10-21 RX ADMIN — POTASSIUM CHLORIDE 10 MEQ: 14.9 INJECTION, SOLUTION INTRAVENOUS at 11:37

## 2021-10-21 RX ADMIN — IPRATROPIUM BROMIDE AND ALBUTEROL SULFATE 3 ML: .5; 3 SOLUTION RESPIRATORY (INHALATION) at 07:53

## 2021-10-21 RX ADMIN — Medication 10 ML: at 05:20

## 2021-10-21 RX ADMIN — FLUCONAZOLE IN SODIUM CHLORIDE 200 MG: 2 INJECTION, SOLUTION INTRAVENOUS at 11:09

## 2021-10-21 RX ADMIN — PIPERACILLIN AND TAZOBACTAM 3.38 G: 3; .375 INJECTION, POWDER, LYOPHILIZED, FOR SOLUTION INTRAVENOUS at 16:14

## 2021-10-21 RX ADMIN — CHLORHEXIDINE GLUCONATE 15 ML: 0.12 RINSE ORAL at 21:35

## 2021-10-21 RX ADMIN — CHLORHEXIDINE GLUCONATE 15 ML: 0.12 RINSE ORAL at 09:00

## 2021-10-21 RX ADMIN — IPRATROPIUM BROMIDE AND ALBUTEROL SULFATE 3 ML: .5; 3 SOLUTION RESPIRATORY (INHALATION) at 19:45

## 2021-10-21 RX ADMIN — SODIUM CHLORIDE 40 MG: 9 INJECTION INTRAMUSCULAR; INTRAVENOUS; SUBCUTANEOUS at 08:22

## 2021-10-21 RX ADMIN — Medication 10 ML: at 05:21

## 2021-10-21 RX ADMIN — POTASSIUM CHLORIDE 10 MEQ: 14.9 INJECTION, SOLUTION INTRAVENOUS at 13:31

## 2021-10-21 RX ADMIN — LINEZOLID 600 MG: 600 INJECTION, SOLUTION INTRAVENOUS at 00:26

## 2021-10-21 RX ADMIN — POTASSIUM CHLORIDE 10 MEQ: 14.9 INJECTION, SOLUTION INTRAVENOUS at 12:45

## 2021-10-21 NOTE — PROGRESS NOTES
Music Therapy Assessment  [address] [facility] [id number]     1970  53 y.o.  male    Patient Telephone Number: [phone] (home)   Religion Affiliation: No preference   Language: English   Patient Active Problem List    Diagnosis Date Noted    Severe protein-calorie malnutrition ([address] 10/21/2021    GIB (gastrointestinal bleeding) 10/18/2021        Date: 10/21/2021            Total Time (in minutes): 36          [facility] 7 INTENSIVE CARE    Mental Status:   [x] Alert [  ] Gunjan Boozer [  ]  Confused  [  ] Minimally responsive  [  ] Sleeping    Communication Status: [x] Impaired Speech-due to trach [  ] Nonverbal     Physical Status:   [x] Oxygen in use-trach  [  ] Hard of Hearing [  ] Vision Impaired  [  ] Ambulatory  [  ] Ambulatory with assistance [  ] Non-ambulatory     Music Preferences, Background: [address], especially [employer] is the pt's favorite band. He expressed enjoying their albums Got Your 6 and F8. He also affirmed liking the bands Green [employer] and [address].    Clinical Problem addressed: Decrease feelings of stress, emotional support. Goal(s) met in session:  Physical/Pain management (Scale of 1-10):    Pre-session rating: Pt denied pain. Post-session rating: Pt didn't report on pain.   [  ] Increased relaxation   [  ] Affected breathing patterns  [  ] Decreased muscle tension   [  ] Decreased agitation  [  ] Affected heart rate    [  ] Increased alertness     Emotional/Psychological:  [  ] Increased self-expression   [  ] Decreased aggressive behavior   [  ] Decreased feelings of stress  [  ] Discussed healthy coping skills     [  ] Improved mood    [  ] Decreased withdrawn behavior     Social:  [  ] Decreased feelings of isolation/loneliness [x] Positive social interaction   [  ] Provided support and/or comfort for family/friends    Spiritual:  [  ] Spiritual support    [  ] Expressed peace  [  ] Expressed hayley    [  ] Discussed beliefs    Techniques Utilized (Check all that apply):   [  ] Procedural support MT [  ] Music for relaxation [x] Patient preferred music  [  ] Sabrina analysis  [  ] Song choice  [  ] Music for validation  [  ] Entrainment  [  ] Movement to music [  ] Guided visualization  [  ] Kristy Perez  [  ] Patient instrument playing [  ] Vera Score writing  [  ] Donnalee Goldie along   [  ] [place] Pacini  [  ] Sensory stimulation  [x] Active Listening  [  ] Music for spiritual support [  ] Making of CDs as gifts    Session Observations:  Referral from Missael Castillo, Palliative . Patient (pt) was alert in bed. After asking the pt how he was feeling, this music therapist (MT) confirmed the music preference of the pt's that the referring  shared with MT. Pt discussed his favorite albums by this band with the MT and asked if the MT could play their music through the laptop on wheels in the pt's room. MT set this up for the pt. MT briefly explained role and pt expressed an interest in having live music during another visit. MT expressed understanding. Pt requested help with moistening his mouth with a sponge that was in a cup of water next to his bed. MT first checked with the pt's RN that the pt could have this. Once confirmed, MT assisted pt with this. Pt requested his RN's assistance with something else and MT informed pt's RN of this request. Pt thanked MT for the visit.     LOPEZ Billings (Music Therapist-Board Certified)  Spiritual Care Department  Referral-based service

## 2021-10-21 NOTE — PROGRESS NOTES
Primary Nurse Jemima Martínez and Earle Bianchi RN performed a dual skin assessment on this patient Impairment noted- see wound doc flow sheet  Keenan score is 13

## 2021-10-21 NOTE — PROGRESS NOTES
Name: Mary Ivy MRN: [id number]   : 1970 Hospital: [facility] [address]   Date: 10/21/2021        IMPRESSION:   · SERGEY, HD dependent. Patient was dialyzed yesterday without events. Hypotension - on min pressors if needed. · Hypervolemia with predominantly central congestion. · Anemia of multifactorial origin  · Respiratory failure due to a complicated Covid pneumonia, on vent via trache  · Hypokalemia pre HD      PLAN:   · HD again in AM  · Vent managing as per intensivist team  · Anemia management- start Retacrit. Check the iron profile. Subjective/Interval History:   I have reviewed the flowsheet and previous days notes. ROS:Review of systems not obtained due to patient factors. Objective:   Vital Signs:    Visit Vitals  /69   Pulse 92   Temp 98.5 °F (36.9 °C)   Resp 19   Ht 5' 11.5\" (1.816 m)   Wt 140.3 kg (309 lb 4.9 oz)   SpO2 99%   BMI 42.54 kg/m²       O2 Device: Tracheostomy       Temp (24hrs), Av.6 °F (37 °C), Min:98.5 °F (36.9 °C), Max:98.9 °F (37.2 °C)       Intake/Output:   Last shift:      10/21 0701 - 10/21 1900  In: 200 [I.V.:200]  Out: -   Last 3 shifts: 10/19 1901 - 10/21 0700  In: 2825.1 [I.V.:2825.1]  Out: 500     Intake/Output Summary (Last 24 hours) at 10/21/2021 1342  Last data filed at 10/21/2021 1245  Gross per 24 hour   Intake 1696.25 ml   Output --   Net 1696.25 ml        Physical Exam:  General:    Awake, Not communicating verbally. Head:   Normocephalic, without obvious abnormality, atraumatic. Eyes:   Conjunctivae/corneas clear. Nose:  Nares normal. No drainage or sinus tenderness. Throat:    Lips, mucosa, and tongue normal.    Neck:  Trache in place  Lungs:   Clear to auscultation bilaterally. No Wheezing or Rhonchi. No rales. Chest wall:  No tenderness or deformity. No Accessory muscle use. Heart:   Regular rate and rhythm,  no murmur, rub or gallop. Abdomen:   Soft. Distended.   Bowel sounds normal. PEG tube in place.  Extremities: Extremities normal, atraumatic, No cyanosis. No edema. No clubbing  Skin:     Texture, turgor normal. No rashes or lesions. Not Jaundiced  Psych:  Calm. Neurologic: Non focal.      DATA:  Labs:  Recent Labs     10/21/21  0533 10/20/21  0424 10/19/21  0634    134* 137   K 3.3* 3.0* 3.2*    105 104   CO2 22 15* 17*   BUN 35* 65* 55*   CREA 2.39* 3.60* 3.06*   CA 8.5 9.2 9.2   PHOS 2.7 3.9 4.1   MG 2.0 2.4 2.2     Recent Labs     10/21/21  0533 10/20/21  0424 10/19/21  0634   WBC 8.6 8.9 12.7*   HGB 8.2* 8.1* 9.1*   HCT 25.7* 26.1* 28.3*    352 356     No results for input(s): DOLORES, KU, CLU, CREAU in the last 72 hours.     No lab exists for component: PROU    Total time spent with patient:  35 minutes    [] Critical Care Provided    Care Plan discussed with:   Gina Wahl MD

## 2021-10-21 NOTE — PROGRESS NOTES
SLP Contact Note    Noted pt RR is out of parameters (24) for in-line PMV. Not appropriate for swallowing. Will hold for now.       Thank you,  SARAH ChavezEd, [id number] [facility]  Speech-Language Pathologist

## 2021-10-21 NOTE — PROGRESS NOTES
[facility]    ICU TEAM Progress Note    Name: Shannon Olivas   : 1970   MRN: [id number]   Date: 10/21/2021      Assessment:   Reason for ICU Admission: MV and hypotension     Brief HPI:    -  Pt is a 54 yo M with PMH ESRD on HD, takes coumadin secondary to hx of DVT RUE with a recent prolonged hospitalization secondary to COVID PNA in August of this year. He ultimately required trach and peg and was in rehab at [address] [facility]. Pt unable to provide HPI secondary to current cognition, therefore information is obtained via chart and provider. Per [facility] records, pt had a dislodged peg tube. He started having coffee ground emesis 10/18/21 with elevated HR and hypotension. He was sent to the ER for further workup. He was found to be hypotensive and ICU was consulted for further management. S/P:   Procedure(s):  ESOPHAGOGASTRODUODENOSCOPY (EGD) at Bedside  ESOPHAGOGASTRODUODENAL (EGD) BIOPSY    Plan:     1. GIB  - GI consulted, appreciate input  - PPI BID  - SP EGD      2. Hemorrhagic shock  - Sp PRBCs, FFP and k centra   - Serial H and H   - Monitor closely for s/s of bleeding  - Volume resuscitate   - Retacrit started per nephrology     3. Supratherapeutic INR  - Sp FFP and k centra  - Hold coumadin for now as active bleeding  - Serial INR     4. Acute on chronic hypoxic respiratory failure with tracheostomy   - Continue MV  - SBT daily   - Pulm hygiene   - HOB >30   - Aspiration precautions     5. ESRD on HD  - Cont HD per renal    6. Acute blood loss anemia   - Transfuse as needed to keep hgb >7  - Monitor closely for ss bleedin g    7. PEG tube dislodged   - EGD per GI  - PEG removed, not in stomach  - CT abd and pelvis ordered, fluid collection noted  - Await GI recs, will need new PEG placement at some point   - Wound consulted for old peg site   - NPO for now, bowel rest per GI     8.  Leukocytosis   - continue broad spectrum abx   - On zosyn and zyvox   - Zyvox dcd today   - Add diflucan for yeast out of wound   - ID consult   - Cultures pending       Subjective:   Overnight Events:   10/21/2021        Objective:     Visit Vitals  BP 99/72 (BP 1 Location: Left arm, BP Patient Position: At rest)   Pulse 90   Temp 98.5 °F (36.9 °C)   Resp 26   Ht 5' 11.5\" (1.816 m) Comment: From 2020 [facility]   Wt 140.3 kg (309 lb 4.9 oz)   SpO2 100%   BMI 42.54 kg/m²      O2 Device: Tracheostomy Temp (24hrs), Av.6 °F (37 °C), Min:98.3 °F (36.8 °C), Max:98.9 °F (37.2 °C)           Hemodynamics:   PAP:   CO:     Wedge:   CI:     CVP:    SVR:       PVR:       Ventilator Settings:  Mode Rate Tidal Volume Pressure FiO2 PEEP   (P) Assist control        30 % (P) 5 cm H20     Peak airway pressure: (P) 24 cm H2O    Minute ventilation: (P) 11.8 l/min        Intake/Output:     Intake/Output Summary (Last 24 hours) at 10/21/2021 0910  Last data filed at 10/21/2021 0400  Gross per 24 hour   Intake 1846.25 ml   Output 500 ml   Net 1346.25 ml       Physical Exam:  General:  Trach and on vent    Eyes:  Sclera anicteric. Pupils equal, round, reactive to light. Mouth/Throat: Orotracheal tube in place. Neck: Supple. Lungs:   Clear to auscultation bilaterally, good effort. Cardiovascular:  Regular rate and rhythm, no murmur, click, rub, or gallop. Abdomen: Old peg removed, scar and dressing intact    Extremities: No cyanosis or edema. Skin: No acute rash or lesions. Lymph Nodes: Cervical and supraclavicular normal.   Musculoskeletal:  No swelling or deformity. Lines/Devices:  Intact, no erythema, drainage, or tenderness. Labs & Data: Reviewed    Medications: Reviewed    Chest X-Ray:    TTE:    Multidisciplinary Rounds Completed:   Yes    ABCDEF Bundle/Checklist Completed: Yes    SPECIAL EQUIPMENT: None    DISPOSITION: Stay in ICU    CRITICAL CARE CONSULTANT NOTE  I had a face to face encounter with the patient, reviewed and interpreted patient data including clinical events, labs, images, vital signs, I/O's, and examined patient. I have discussed the case and the plan and management of the patient's care with the consulting services, the bedside nurses and the respiratory therapist.      NOTE OF PERSONAL INVOLVEMENT IN CARE   This patient has a high probability of imminent, clinically significant deterioration, which requires the highest level of preparedness to intervene urgently. I participated in the decision-making and personally managed or directed the management of the following life and organ supporting interventions that required my frequent assessment to treat or prevent imminent deterioration. I personally spent 45 minutes of critical care time. This is time spent at this critically ill patient's bedside actively involved in patient care as well as the coordination of care and discussions with the patient's family. This does not include any procedural time which has been billed separately.     Jerry Lynch NP    [facility] Critical Care  10/21/2021

## 2021-10-21 NOTE — PROGRESS NOTES
Sherly [address]  [address] [facility], [address]       GI PROGRESS NOTE  Mando Varma, [facility] office  [phone] NP in-hospital cell phone M-F until 4:30  After 5pm or on weekends, please call  for physician on call      NAME: Zulma Thornton   :  1970   MRN:  [id number]       Subjective:    Patient only complains of dry mouth. Discussed with RN, no signs of GI blood loss, VSS, off pressors. Objective:     VITALS:   Last 24hrs VS reviewed since prior progress note. Most recent are:  Visit Vitals  /69   Pulse 92   Temp 98.5 °F (36.9 °C)   Resp 19   Ht 5' 11.5\" (1.816 m)   Wt 140.3 kg (309 lb 4.9 oz)   SpO2 99%   BMI 42.54 kg/m²       PHYSICAL EXAM:  General: Cooperative, no acute distress    Neurologic:  Alert   HEENT: EOMI, no scleral icterus   Lungs:  vent  Heart:  Regular rate  Abdomen: Soft, non-distended, obese, no tenderness    Extremities: warm  Psych:   Not anxious or agitated.     Lab Data Reviewed:     Recent Results (from the past 24 hour(s))   POC G3 - PUL    Collection Time: 10/21/21  3:42 AM   Result Value Ref Range    FIO2 (POC) 30 %    pH (POC) 7.52 (H) 7.35 - 7.45      pCO2 (POC) 29.3 (L) 35.0 - 45.0 MMHG    pO2 (POC) 94 80 - 100 MMHG    HCO3 (POC) 23.7 22 - 26 MMOL/L    sO2 (POC) 98.2 (H) 92 - 97 %    Base excess (POC) 0.9 mmol/L    Site LEFT RADIAL      Device: ADULT VENT      Mode PRESSURE CONTROL      Set Rate 20 bpm    PEEP/CPAP (POC) 5 cmH2O    PIP (POC) 18      Allens test (POC) Positive      Inspiratory Time 0.75 sec    Specimen type (POC) ARTERIAL     MAGNESIUM    Collection Time: 10/21/21  5:33 AM   Result Value Ref Range    Magnesium 2.0 1.6 - 2.4 mg/dL   PHOSPHORUS    Collection Time: 10/21/21  5:33 AM   Result Value Ref Range    Phosphorus 2.7 2.6 - 4.7 MG/DL   METABOLIC PANEL, BASIC    Collection Time: 10/21/21  5:33 AM   Result Value Ref Range    Sodium 138 136 - 145 mmol/L    Potassium 3.3 (L) 3.5 - 5.1 mmol/L    Chloride 105 97 - 108 mmol/L    CO2 22 21 - 32 mmol/L    Anion gap 11 5 - 15 mmol/L    Glucose 98 65 - 100 mg/dL    BUN 35 (H) 6 - 20 MG/DL    Creatinine 2.39 (H) 0.70 - 1.30 MG/DL    BUN/Creatinine ratio 15 12 - 20      GFR est AA 35 (L) >60 ml/min/1.73m2    GFR est non-AA 29 (L) >60 ml/min/1.73m2    Calcium 8.5 8.5 - 10.1 MG/DL   CBC W/O DIFF    Collection Time: 10/21/21  5:33 AM   Result Value Ref Range    WBC 8.6 4.1 - 11.1 K/uL    RBC 2.74 (L) 4.10 - 5.70 M/uL    HGB 8.2 (L) 12.1 - 17.0 g/dL    HCT 25.7 (L) 36.6 - 50.3 %    MCV 93.8 80.0 - 99.0 FL    MCH 29.9 26.0 - 34.0 PG    MCHC 31.9 30.0 - 36.5 g/dL    RDW 17.8 (H) 11.5 - 14.5 %    PLATELET 539 637 - 119 K/uL    MPV 9.9 8.9 - 12.9 FL    NRBC 0.6 (H) 0  WBC    ABSOLUTE NRBC 0.05 (H) 0.00 - 0.01 K/uL       IMPRESSION  Transesophageal tube extends to stomach.     IMPRESSION  1. CHEST: Severe parenchymal lung disease, likely more chronic than acute. Tracheostomy device, NG tube. Main pulmonary outflow tract enlarged, correlate  for pulmonary arterial hypertension. 2. ABDOMEN: Left anterior abdominal and pelvic wall hematoma or gas containing  fluid collection 41 x 5.7 x 4.6 cm. . Correlate for recent instrumentation versus  infection. Numerous hepatic cysts. CT stigmata of end-stage renal disease. Mild  gaseous distention of colon. Other incidental findings. 3. PELVIS: Small free fluid.      Assessment:     · GI bleed: hematemesis in the setting of supratheraputic INR status post PRBC's, FFP and K-centra; hgb 8.2 status post 2 units pRBC's; EGD 10/19/21: grade D reflux esophagitis, gastric polyps, no G tube in stomach, mild patchy erythema duodenal bulb   · PEG malfunction at least since Saturday per wife - dislodged  · Sepsis: Leukocytosis  · Elevated lactate   · Recent COVID pneumonia trach/PEG  · ESRD on HD  · DVT on Coumadin (last dose Friday) INR 5.2-->1.1     Patient Active Problem List   Diagnosis Code    GIB (gastrointestinal bleeding) K92.2     Plan: · BID PPI  · On antibiotics   · Monitor CBC  · Dobhoff  · Message sent to intensivest NP if they would put IR consult to see about fluid collection drain     Signed By: Jack Murphy NP     10/21/2021  9:06 AM       GI attending note:    Chart reviewed. Agree with note of YADIRA. Defer to CCM regarding if IR consult for drain of abdominal collection. Would not replace PEG until resolution of intraabdominal infection. Will sign off. Please call back when that is the case. Continue Abx and PPI.     Dr. Maria Isabel Hayden

## 2021-10-21 NOTE — PROGRESS NOTES
Comprehensive Nutrition Assessment    Type and Reason for Visit: Initial, Consult    Nutrition Recommendations/Plan:      Initiate nutrition support in next 24-48 hr    Nutrition Assessment:    Pt transferred from [facility] d/t [facility]. PMHx: Morbid obesity, Adult polycystic kidney disease, HTN, Gout. Recent prolonged hospitalization d/t COVID 19 PNA; s/p trach and PEG placement in addition to being dialysis dependent 2/2 SERGEY. GIB resolved. EGD 10/19 indicated reflux esophagitis; G-tube was moved (not in stomach). DHT placed 10/20-feeding not yet started. Results of CT scan noted 10/20-may need to have fluid collection drained. Mr Chavez's tube feeding was discontinued about 5-6 days ago (stopped at [facility]). If unable to resume feeding via [address] soon may need to consider parenteral nutrition support. Pt meets criteria for severe malnutrition (see below). Once ready for enteral feeding recommend the following: Nepro @ 55 ml/hr with 2 packets Prosource bid and 50 ml water flush q 4 hr. This will provide 1210 ml, 2385 calories, 156 gm protein and 1400 ml free water (tube feeding/flush) per day to meet % estimated protein and energy needs, respectively. Potassium replaced today; other lytes WNL. Malnutrition Assessment:  Malnutrition Status:  Severe malnutrition    Context:  Acute illness     Findings of the 6 clinical characteristics of malnutrition:   Energy Intake:  7 - 50% or less of est energy requirements for 5 or more days (NPO x 4 days)  Weight Loss:  Unable to assess     Body Fat Loss:  No significant body fat loss,     Muscle Mass Loss:  1 - Mild muscle mass loss, Temples (temporalis)  Fluid Accumulation:  7 - Moderate to severe, Extremities   Strength:  Not performed     Nutritionally Significant Medications:   Protonix, KCL, NS @ 75 ml/hr    Estimated Daily Nutrient Needs:  Energy (kcal): 9961-2720 (15-18 kcal/kg);  Weight Used for Energy Requirements: Admission (137 kg)  Protein (g): 176 (2g/kg IBW); Weight Used for Protein Requirements: Ideal  Fluid (ml/day):  ; Method Used for Fluid Requirements: 1 ml/kcal    Nutrition Related Findings:       BM: 10/21  Edema: 1+ LUE, 3+ RUE, BLE  Wounds:  None       Current Nutrition Therapies:   Diet: NPO  Additional Caloric Sources:  None     Anthropometric Measures:  · Height:  5' 11.5\" (181.6 cm)  · Current Body Wt:  140.3 kg (309 lb 4.9 oz)   · Admission Body Wt:  301 lb 2.4 oz    · Ideal Body Wt:  175:  176.7 %    · BMI Categories:  Obese class 3 (BMI 40.0 or greater)     Wt Readings from Last 10 Encounters:   10/20/21 140.3 kg (309 lb 4.9 oz)       Nutrition Diagnosis:   · Inadequate protein-energy intake related to altered GI structure as evidenced by  (EN discontinued d/t dislodgement of PEG tube.)    Nutrition Interventions:   Food and/or Nutrient Delivery: Start tube feeding, Start parenteral nutrition  Nutrition Education and Counseling: No recommendations at this time  Coordination of Nutrition Care: Continue to monitor while inpatient, Interdisciplinary rounds    Goals:  Initiate nutrition support in next 24-48 hr.       Nutrition Monitoring and Evaluation:   Behavioral-Environmental Outcomes: None identified  Food/Nutrient Intake Outcomes: Enteral nutrition intake/tolerance, Parenteral nutrition intake/tolerance  Physical Signs/Symptoms Outcomes: Biochemical data, Weight, GI status, Fluid status or edema    Discharge Planning:     Too soon to determine     Vidal Weir RD CNSC  Contact: Gayle Norris

## 2021-10-22 ENCOUNTER — HOSPITAL ENCOUNTER (INPATIENT)
Dept: CT IMAGING | Age: 51
DRG: 368 | End: 2021-10-22
Attending: NURSE PRACTITIONER
Payer: COMMERCIAL

## 2021-10-22 LAB
ANION GAP SERPL CALC-SCNC: 13 MMOL/L (ref 5–15)
ARTERIAL PATENCY WRIST A: POSITIVE
BACTERIA SPEC CULT: ABNORMAL
BACTERIA SPEC CULT: ABNORMAL
BASE DEFICIT BLD-SCNC: 2.2 MMOL/L
BDY SITE: ABNORMAL
BUN SERPL-MCNC: 47 MG/DL (ref 6–20)
BUN/CREAT SERPL: 15 (ref 12–20)
CALCIUM SERPL-MCNC: 8.8 MG/DL (ref 8.5–10.1)
CHLORIDE SERPL-SCNC: 104 MMOL/L (ref 97–108)
CO2 SERPL-SCNC: 21 MMOL/L (ref 21–32)
CREAT SERPL-MCNC: 3.23 MG/DL (ref 0.7–1.3)
ERYTHROCYTE [DISTWIDTH] IN BLOOD BY AUTOMATED COUNT: 18.1 % (ref 11.5–14.5)
GAS FLOW.O2 O2 DELIVERY SYS: ABNORMAL L/MIN
GAS FLOW.O2 SETTING OXYMISER: 22 BPM
GLUCOSE SERPL-MCNC: 79 MG/DL (ref 65–100)
GRAM STN SPEC: ABNORMAL
GRAM STN SPEC: ABNORMAL
HCO3 BLD-SCNC: 20.9 MMOL/L (ref 22–26)
HCT VFR BLD AUTO: 28.9 % (ref 36.6–50.3)
HGB BLD-MCNC: 8.9 G/DL (ref 12.1–17)
INSPIRATION.DURATION SETTING TIME VENT: 0.75 SEC
MCH RBC QN AUTO: 29.7 PG (ref 26–34)
MCHC RBC AUTO-ENTMCNC: 30.8 G/DL (ref 30–36.5)
MCV RBC AUTO: 96.3 FL (ref 80–99)
NRBC # BLD: 0.03 K/UL (ref 0–0.01)
NRBC BLD-RTO: 0.3 PER 100 WBC
O2/TOTAL GAS SETTING VFR VENT: 30 %
PCO2 BLD: 29 MMHG (ref 35–45)
PEEP RESPIRATORY: 5 CMH2O
PH BLD: 7.47 [PH] (ref 7.35–7.45)
PIP ISTAT,IPIP: 18
PLATELET # BLD AUTO: 333 K/UL (ref 150–400)
PMV BLD AUTO: 9.9 FL (ref 8.9–12.9)
PO2 BLD: 101 MMHG (ref 80–100)
POTASSIUM SERPL-SCNC: 3.5 MMOL/L (ref 3.5–5.1)
RBC # BLD AUTO: 3 M/UL (ref 4.1–5.7)
SAO2 % BLD: 98.3 % (ref 92–97)
SERVICE CMNT-IMP: ABNORMAL
SODIUM SERPL-SCNC: 138 MMOL/L (ref 136–145)
SPECIMEN TYPE: ABNORMAL
VENTILATION MODE VENT: ABNORMAL
WBC # BLD AUTO: 10.6 K/UL (ref 4.1–11.1)

## 2021-10-22 PROCEDURE — 74011250636 HC RX REV CODE- 250/636: Performed by: NURSE PRACTITIONER

## 2021-10-22 PROCEDURE — 82803 BLOOD GASES ANY COMBINATION: CPT

## 2021-10-22 PROCEDURE — 65610000006 HC RM INTENSIVE CARE

## 2021-10-22 PROCEDURE — 5A1D70Z PERFORMANCE OF URINARY FILTRATION, INTERMITTENT, LESS THAN 6 HOURS PER DAY: ICD-10-PCS | Performed by: INTERNAL MEDICINE

## 2021-10-22 PROCEDURE — 97110 THERAPEUTIC EXERCISES: CPT

## 2021-10-22 PROCEDURE — 97161 PT EVAL LOW COMPLEX 20 MIN: CPT

## 2021-10-22 PROCEDURE — 74011000250 HC RX REV CODE- 250: Performed by: NURSE PRACTITIONER

## 2021-10-22 PROCEDURE — 97167 OT EVAL HIGH COMPLEX 60 MIN: CPT

## 2021-10-22 PROCEDURE — 94003 VENT MGMT INPAT SUBQ DAY: CPT

## 2021-10-22 PROCEDURE — 85027 COMPLETE CBC AUTOMATED: CPT

## 2021-10-22 PROCEDURE — 36600 WITHDRAWAL OF ARTERIAL BLOOD: CPT

## 2021-10-22 PROCEDURE — 80048 BASIC METABOLIC PNL TOTAL CA: CPT

## 2021-10-22 PROCEDURE — 74011000258 HC RX REV CODE- 258: Performed by: HEALTH CARE PROVIDER

## 2021-10-22 PROCEDURE — 94640 AIRWAY INHALATION TREATMENT: CPT

## 2021-10-22 PROCEDURE — 90935 HEMODIALYSIS ONE EVALUATION: CPT

## 2021-10-22 PROCEDURE — 74011250636 HC RX REV CODE- 250/636: Performed by: HEALTH CARE PROVIDER

## 2021-10-22 PROCEDURE — 36415 COLL VENOUS BLD VENIPUNCTURE: CPT

## 2021-10-22 PROCEDURE — C9113 INJ PANTOPRAZOLE SODIUM, VIA: HCPCS | Performed by: NURSE PRACTITIONER

## 2021-10-22 RX ADMIN — Medication 10 ML: at 23:40

## 2021-10-22 RX ADMIN — Medication 10 ML: at 06:19

## 2021-10-22 RX ADMIN — FLUCONAZOLE IN SODIUM CHLORIDE 200 MG: 2 INJECTION, SOLUTION INTRAVENOUS at 09:55

## 2021-10-22 RX ADMIN — IPRATROPIUM BROMIDE AND ALBUTEROL SULFATE 3 ML: .5; 3 SOLUTION RESPIRATORY (INHALATION) at 07:06

## 2021-10-22 RX ADMIN — SODIUM CHLORIDE 40 MG: 9 INJECTION INTRAMUSCULAR; INTRAVENOUS; SUBCUTANEOUS at 23:39

## 2021-10-22 RX ADMIN — PIPERACILLIN AND TAZOBACTAM 3.38 G: 3; .375 INJECTION, POWDER, LYOPHILIZED, FOR SOLUTION INTRAVENOUS at 18:07

## 2021-10-22 RX ADMIN — FENTANYL CITRATE 50 MCG: 50 INJECTION INTRAMUSCULAR; INTRAVENOUS at 00:16

## 2021-10-22 RX ADMIN — CHLORHEXIDINE GLUCONATE 15 ML: 0.12 RINSE ORAL at 08:35

## 2021-10-22 RX ADMIN — PIPERACILLIN AND TAZOBACTAM 3.38 G: 3; .375 INJECTION, POWDER, LYOPHILIZED, FOR SOLUTION INTRAVENOUS at 06:18

## 2021-10-22 RX ADMIN — Medication: at 18:30

## 2021-10-22 RX ADMIN — IPRATROPIUM BROMIDE AND ALBUTEROL SULFATE 3 ML: .5; 3 SOLUTION RESPIRATORY (INHALATION) at 20:08

## 2021-10-22 RX ADMIN — SODIUM CHLORIDE 40 MG: 9 INJECTION INTRAMUSCULAR; INTRAVENOUS; SUBCUTANEOUS at 08:34

## 2021-10-22 RX ADMIN — CHLORHEXIDINE GLUCONATE 15 ML: 0.12 RINSE ORAL at 23:39

## 2021-10-22 RX ADMIN — Medication: at 08:35

## 2021-10-22 RX ADMIN — FENTANYL CITRATE 50 MCG: 50 INJECTION INTRAMUSCULAR; INTRAVENOUS at 23:51

## 2021-10-22 RX ADMIN — FENTANYL CITRATE 50 MCG: 50 INJECTION INTRAMUSCULAR; INTRAVENOUS at 15:09

## 2021-10-22 NOTE — PROGRESS NOTES
Problem: Mobility Impaired (Adult and Pediatric)  Goal: *Acute Goals and Plan of Care (Insert Text)  Description: FUNCTIONAL STATUS PRIOR TO ADMISSION: Patient was independent and active without use of DME prior to August 2021. Hospitalized in [address] from August to mid October with COVID 19, now with trach and on HD. Was admitted to [facility] for four days prior to admission and stated he had not begun therapy there. HOME SUPPORT PRIOR TO ADMISSION: The patient lived with his wife of 27 years. Lives in [address] Admitted from [facility]. Physical Therapy Goals  Initiated 10/22/2021  1. Patient will move from supine to sit and sit to supine, scoot up and down, and roll side to side in bed with maximal assistance x2 within 7 day(s). 2.  Patient will tolerate HOB elevated at 50 degrees 10 min BID within 7 days. 3.  Patient will be independent in supine HEP for LEs within 7 days. 4.  Patient will tolerate PRAFO boot (alternating feet every 2 hours) within 7 days. Outcome: Progressing Towards Goal   PHYSICAL THERAPY EVALUATION  Patient: Bjorn Mora (51 y.o. male)  Date: 10/22/2021  Primary Diagnosis: GIB (gastrointestinal bleeding) [K92.2]  Procedure(s) (LRB):  ESOPHAGOGASTRODUODENOSCOPY (EGD) at Bedside (N/A)  ESOPHAGOGASTRODUODENAL (EGD) BIOPSY (N/A) 3 Days Post-Op   Precautions:   Fall, Skin, Contact      ASSESSMENT  Based on the objective data described below, the patient presents with grossly decreased AROM, strength, and activity tolerance following admission for GIB after PEG dislodgement at [facility]. Patient is post COVID and was at [facility] for vent weaning after 2.5 month stay at OSH in [address]. Reports he had not received therapy at [facility] and can't remember if he participated in therapy at OSH. Today, he can initiate a heel slide bilaterally although stronger on his R LE and has strong quad set bilaterally. Does not have active DF and lacks PROM to neutral on L ankle with DF.  PRAFO boot issued to patient and RN aware. Educated on supine HEP to complete on his own. Attempted bed in partial chair position but patient only tolerated HOB elevated to 47 degrees x10 sec before reporting back and abdominal pain and requested to be laid back back. Also appears to have a bit of anxiety that may limit his progress somewhat. Current Level of Function Impacting Discharge (mobility/balance): total A    Functional Outcome Measure: The patient scored Total Score: 0/28 on the Tinetti outcome measure which is indicative of high fall risk. Other factors to consider for discharge: was at [address] [place] [address] for vent weaning, from [place]     Patient will benefit from skilled therapy intervention to address the above noted impairments. PLAN :  Recommendations and Planned Interventions: bed mobility training, therapeutic exercises, neuromuscular re-education, edema management/control, patient and family training/education, and therapeutic activities      Frequency/Duration: Patient will be followed by physical therapy:  3 times a week to address goals. Recommendation for discharge: (in order for the patient to meet his/her long term goals)  To be determined: turn to LTAC pending vent weaning success    This discharge recommendation:  Has not yet been discussed the attending provider and/or case management    IF patient discharges home will need the following DME: to be determined (TBD)         SUBJECTIVE:   Patient stated Lay me back down.     OBJECTIVE DATA SUMMARY:   HISTORY:    Past Medical History:   Diagnosis Date    COVID     Dialysis patient [facility])     started around the end of september 2021    Polycystic kidney disease     S/P percutaneous endoscopic gastrostomy (PEG) tube placement ([facility] [address]    No past surgical history on file.     Personal factors and/or comorbidities impacting plan of care: PMH    Home Situation  Home Environment:  (admitted from [address] [place] [address])  Support Systems: Spouse/Significant Other    EXAMINATION/PRESENTATION/DECISION MAKING:   Critical Behavior:  Neurologic State: Eyes open spontaneously  Orientation Level: Oriented to person, Oriented to place, Oriented to situation  Cognition: Follows commands  Range Of Motion:  AROM: Grossly decreased, non-functional (more AROM R LE>L LE)  Strength:    Strength: Grossly decreased, non-functional (stronger R LE>L LE)  Tone & Sensation:   Tone: Abnormal  Sensation: Impaired (numbness/tingling L LE)  Coordination:  Coordination: Grossly decreased, non-functional  Vision:   Acuity: Within Defined Limits  Functional Mobility:  Bed Mobility:  Supine to Sit: Total assistance  Balance:   Sitting: Impaired; With support    Therapeutic Exercises:   Quad sets x3, heel slides x2, passive DF stretch bilaterally 2x30    Functional Measure:  Tinetti test:    Sitting Balance: 0  Arises: 0  Attempts to Rise: 0  Immediate Standing Balance: 0  Standing Balance: 0  Nudged: 0  Eyes Closed: 0  Turn 360 Degrees - Continuous/Discontinuous: 0  Turn 360 Degrees - Steady/Unsteady: 0  Sitting Down: 0  Balance Score: 0 Balance total score  Indication of Gait: 0  R Step Length/Height: 0  L Step Length/Height: 0  R Foot Clearance: 0  L Foot Clearance: 0  Step Symmetry: 0  Step Continuity: 0  Path: 0  Trunk: 0  Walking Time: 0  Gait Score: 0 Gait total score  Total Score: 0/28 Overall total score         Tinetti Tool Score Risk of Falls  <19 = High Fall Risk  19-24 = Moderate Fall Risk  25-28 = Low Fall Risk  Tinetti ME. Performance-Oriented Assessment of Mobility Problems in Elderly Patients. [address]; [id number].  (Scoring Description: PT Bulletin Feb. 10, 1993)    Older adults: Rafael Correa et al, 2009; n = 1000 [facility] elderly evaluated with ABC, DOUGLAS, ADL, and IADL)  · Mean DOUGALS score for males aged 69-68 years = 26.21(3.40)  · Mean DOUGLAS score for females age 69-68 years = 25.16(4.30)  · Mean DOUGLAS score for males over 80 years = 23.29(6.02)  · Mean DOUGLAS score for females over 80 years = 17.20(8.32)        Physical Therapy Evaluation Charge Determination   History Examination Presentation Decision-Making   HIGH Complexity :3+ comorbidities / personal factors will impact the outcome/ POC  HIGH Complexity : 4+ Standardized tests and measures addressing body structure, function, activity limitation and / or participation in recreation  LOW Complexity : Stable, uncomplicated  Other outcome measures Tinetti 0/28  HIGH       Based on the above components, the patient evaluation is determined to be of the following complexity level: HIGH     Pain Rating:  R LE pain and pain dorsum of L foot with DF stretch    Activity Tolerance:   Poor and increased RR with HOB at 47 degrees      After treatment patient left in no apparent distress:   Supine in bed, Heels elevated for pressure relief, Call bell within reach, and Side rails x 3    COMMUNICATION/EDUCATION:   The patients plan of care was discussed with: Occupational therapist and Registered nurse. Fall prevention education was provided and the patient/caregiver indicated understanding., Patient/family have participated as able in goal setting and plan of care. , and Patient/family agree to work toward stated goals and plan of care.     Thank you for this referral.  Danica Khan, PT, DPT   Time Calculation: 21 mins

## 2021-10-22 NOTE — PROGRESS NOTES
Nutrition Note    Chart reviewed for brief follow-up; discussed with NP. Waiting for GI recommendations regarding fluid collection. Patient has been without enteral feeding since PEG became dislodged at [facility] about a week ago. If unable to resume EN support via [address] suggest starting TPN:     Day 1: 8.5% AA D15 @ 42 ml/hr     Day 2: 8.5% AA D15 @ 63 ml/hr     Day 3: 8.5% AA D15 @ 75 ml/hr with 250 ml 20% lipid daily. Add B1 to the TPN. Goal TPN will provide a total of 1800 ml, 153 gm protein and 2030 calories per day to meet % estimated protein and energy needs, respectively. Once ready for EN suggest the following goal: Nepro @ 55 ml/hr with 2 packets Prosource bid and 50 ml water flush q 4 hr    RD to follow.     Estimated Nutrition Needs:   Energy: 9250-4979 (15-18 kcal/kg)  Wt used: Admission (137 kg)  Protein: 176 (2g/kg IBW)  Wt used: Ideal   Fluid:  1 ml/kcal      Electronically signed by Alice Sin RD on 10/22/2021 at 3:50 PM  Contact: [employer]

## 2021-10-22 NOTE — PROGRESS NOTES
Name: Shante Coy MRN: [id number]   : 1970 Hospital: [address]   Date: 10/22/2021        IMPRESSION:   · SERGEY, HD dependent. Patient was dialyzed yesterday without events. Hypotension - on min pressors if needed. · Hypervolemia with predominantly central congestion. · Anemia of multifactorial origin  · Respiratory failure due to a complicated Covid pneumonia, on vent via trache  · Hypokalemia pre HD      PLAN:   · HD again today. Edith November is notified. · Vent managing as per intensivist team  · Anemia management- start Retacrit. Check the iron profile. Subjective/Interval History:   I have reviewed the flowsheet and previous days notes. ROS:Review of systems not obtained due to patient factors. Objective:   Vital Signs:    Visit Vitals  BP (!) 129/92   Pulse (!) 104   Temp 99.6 °F (37.6 °C)   Resp 26   Ht 5' 11.5\" (1.816 m)   Wt 140.3 kg (309 lb 4.9 oz)   SpO2 100%   BMI 42.54 kg/m²       O2 Device: Tracheostomy       Temp (24hrs), Av.9 °F (37.2 °C), Min:98.4 °F (36.9 °C), Max:99.6 °F (37.6 °C)       Intake/Output:   Last shift:      No intake/output data recorded. Last 3 shifts: 10/20 1901 - 10/22 0700  In: 1550.9 [I.V.:1550.9]  Out: -     Intake/Output Summary (Last 24 hours) at 10/22/2021 1316  Last data filed at 10/22/2021 0400  Gross per 24 hour   Intake 154.66 ml   Output --   Net 154.66 ml        Physical Exam:  General:    Awake, Not communicating verbally. Head:   Normocephalic, without obvious abnormality, atraumatic. Eyes:   Conjunctivae/corneas clear. Nose:  Nares normal. No drainage or sinus tenderness. Throat:    Lips, mucosa, and tongue normal.    Neck:  Trache in place  Lungs:   Clear to auscultation bilaterally. No Wheezing or Rhonchi. No rales. Chest wall:  No tenderness or deformity. No Accessory muscle use. Heart:   Regular rate and rhythm,  no murmur, rub or gallop. Abdomen:   Soft. Distended.   Bowel sounds normal. PEG tube in place.  Extremities: Extremities normal, atraumatic, No cyanosis. No edema. No clubbing  Skin:     Texture, turgor normal. No rashes or lesions. Not Jaundiced  Psych:  Calm. Neurologic: Non focal.      DATA:  Labs:  Recent Labs     10/22/21  0734 10/21/21  0533 10/20/21  0424    138 134*   K 3.5 3.3* 3.0*    105 105   CO2 21 22 15*   BUN 47* 35* 65*   CREA 3.23* 2.39* 3.60*   CA 8.8 8.5 9.2   PHOS  --  2.7 3.9   MG  --  2.0 2.4     Recent Labs     10/22/21  0734 10/21/21  0533 10/20/21  0424   WBC 10.6 8.6 8.9   HGB 8.9* 8.2* 8.1*   HCT 28.9* 25.7* 26.1*    319 352     No results for input(s): DOLORES, KU, CLU, CREAU in the last 72 hours.     No lab exists for component: PROU    Total time spent with patient:  35 minutes    [] Critical Care Provided    Care Plan discussed with:   Steve Claros MD

## 2021-10-22 NOTE — PROGRESS NOTES
Problem: Self Care Deficits Care Plan (Adult)  Goal: *Acute Goals and Plan of Care (Insert Text)  Description:   FUNCTIONAL STATUS PRIOR TO ADMISSION: at baseline, pt was independent, living with wife, working at [employer] Pt was admitted to [facility] in August and discharged to [address] [place] [address] 10/14. Pt had not yet started therapy     HOME SUPPORT: only at [address] [place] [address] for 4 days prior to this admission    Occupational Therapy Goals  Initiated 10/22/2021  1. Patient will perform AAROM R UE using L UE as motor assist with minimal assistance/contact guard assist within 7 day(s). 2.  Patient will perform grooming in supported sitting using RUE as motor assist with minimal assistance/contact guard assist within 7 day(s). 3.  Patient will perform upper body dressing with moderate assistance  within 7 day(s). 4.  Patient will perform rolling in bed for bed pan placement with moderate A x 2 within 7 days. 5.  Patient will perform supported ADL task in modified bed to chair position x 5 minutes within 7 days  6  Outcome: Not Met       OCCUPATIONAL THERAPY EVALUATION  Patient: Alexa Perez (82 y.o. male)  Date: 10/22/2021  Primary Diagnosis: GIB (gastrointestinal bleeding) [K92.2]  Procedure(s) (LRB):  ESOPHAGOGASTRODUODENOSCOPY (EGD) at Bedside (N/A)  ESOPHAGOGASTRODUODENAL (EGD) BIOPSY (N/A) 3 Days Post-Op   Precautions:   Fall, Skin, Contact    ASSESSMENT  Based on the objective data described below, the patient presents with decreased pulmonary endurance, trach/vent dependence, back pain, R UE edema, and grossly decreased strength in R UE and B LEs. Pt is currently dependent for self care tasks at bed level. Pt alert and followed one step commands this date. Currently, R UE edematous from biceps to digits, and c/o pain with movement. With his L UE, pt able to flex shoulder to 90* but unable to maintain against gravity. He independently uses blue oral swab from cup on table tray with L hand.  Attempted to raise HOB in prep for upright activity with pt only tolerating 47* for less than 10 seconds before requesting flattening of HOB. Pt c/o back pain and RR as high as 41. Pt remains highly motivated to work with therapy and is severely deconditioned. He will continue to benefit from acute OT and PT during admission with discharge to [facility] facility. Current Level of Function Impacting Discharge (ADLs/self-care): mod I with blue swabs with L hand to total A, max to total A x 2 rolling    Functional Outcome Measure: The patient scored Total: 0/100 on the Barthel Index outcome measure which is indicative of being dependent in basic self-care. Other factors to consider for discharge: from [facility]     Patient will benefit from skilled therapy intervention to address the above noted impairments. PLAN :  Recommendations and Planned Interventions: self care training, functional mobility training, therapeutic exercise, balance training, endurance activities, and patient education    Frequency/Duration: Patient will be followed by occupational therapy 3 times a week to address goals. Recommendation for discharge: (in order for the patient to meet his/her long term goals)  LTAC    This discharge recommendation:  Has not yet been discussed the attending provider and/or case management    IF patient discharges home will need the following DME: TBD       SUBJECTIVE:   Patient mouthing \"back.  for pain when HOB raised 47 degrees    OBJECTIVE DATA SUMMARY:   HISTORY:   Past Medical History:   Diagnosis Date    COVID     Dialysis patient [facility])     started around the end of september 2021    Polycystic kidney disease     S/P percutaneous endoscopic gastrostomy (PEG) tube placement ([facility] [address]    No past surgical history on file.     Expanded or extensive additional review of patient history:     Home Situation  Home Environment:  (admitted from [facility])  Support Systems: Spouse/Significant Other    Hand dominance: Right    EXAMINATION OF PERFORMANCE DEFICITS:  Cognitive/Behavioral Status:  Neurologic State: Eyes open spontaneously  Orientation Level: Oriented to person;Oriented to place;Oriented to situation  Cognition: Follows commands             Skin: intact    Edema: R UE, BLEs    Hearing:       Vision/Perceptual:                           Acuity: Within Defined Limits         Range of Motion:    AROM: Grossly decreased, non-functional (more AROM R LE>L LE)                         Strength:    Strength: Grossly decreased, non-functional (stronger R LE>L LE)                Coordination:  Coordination: Grossly decreased, non-functional  Fine Motor Skills-Upper: Left Intact; Right Impaired    Gross Motor Skills-Upper: Right Impaired;Left Impaired    Tone & Sensation:    Tone: Abnormal  Sensation: Impaired (numbness/tingling L LE)                      Balance:  Sitting: Impaired; With support    Functional Mobility and Transfers for ADLs:  Bed Mobility:  Supine to Sit: Total assistance    Transfers:       ADL Assessment:  Feeding: Supervision (using blue oral swabs)    Oral Facial Hygiene/Grooming: Minimum assistance (using L UE)    Bathing: Total assistance    Upper Body Dressing: Maximum assistance    Lower Body Dressing: Total assistance    Toileting: Total assistance                ADL Intervention and task modifications:                                          Therapeutic Exercise:  Educated pt on importance of elevating RUE for edema control, actively moving R UE as able. Functional Measure:    Barthel Index:  Bathin  Bladder: 0  Bowels: 0  Groomin  Dressin  Feedin  Mobility: 0  Stairs: 0  Toilet Use: 0  Transfer (Bed to Chair and Back): 0  Total: 0/100      The Barthel ADL Index: Guidelines  1. The index should be used as a record of what a patient does, not as a record of what a patient could do.   2. The main aim is to establish degree of independence from any help, physical or verbal, however minor and for whatever reason. 3. The need for supervision renders the patient not independent. 4. A patient's performance should be established using the best available evidence. Asking the patient, friends/relatives and nurses are the usual sources, but direct observation and common sense are also important. However direct testing is not needed. 5. Usually the patient's performance over the preceding 24-48 hours is important, but occasionally longer periods will be relevant. 6. Middle categories imply that the patient supplies over 50 per cent of the effort. 7. Use of aids to be independent is allowed. Score Interpretation (from [address] [facility])    Independent   60-79 Minimally independent   40-59 Partially dependent   20-39 Very dependent   <20 Totally dependent     -Chirag Castro., Barthel, DNeryW. (1965). Functional evaluation: the Barthel Index. [address] [facility] [employer] ([address] (1997). The Barthel activities of daily living index: self-reporting versus actual performance in the old (> or = 75 years). Journal [address] 45(7), 14 [facility], JGILBERTOF, Luz Marina Perez, Barbara Arenas. (1999). Measuring the change in disability after inpatient rehabilitation; comparison of the responsiveness of the Barthel Index and Functional [place] Measure. Journal of Neurology, Neurosurgery, and Psychiatry, 66(4), 850-156. Stephanie Palmer, N.J.A, ALCIDES Ortiz, & Cecy Palacios, M.A. (2004) Assessment of post-stroke quality of life in cost-effectiveness studies: The usefulness of the Barthel Index and the EuroQoL-5D.  Quality of Life Research, 15, 153-37     Occupational Therapy Evaluation Charge Determination   History Examination Decision-Making   HIGH Complexity : Extensive review of history including physical, cognitive and psychosocial history  HIGH Complexity : 5 or more performance deficits relating to physical, cognitive , or psychosocial skils that result in activity limitations and / or participation restrictions HIGH Complexity : Patient presents with comorbidities that affect occupational performance. Signifigant modification of tasks or assistance (eg, physical or verbal) with assessment (s) is necessary to enable patient to complete evaluation       Based on the above components, the patient evaluation is determined to be of the following complexity level: HIGH   Pain Rating:  Back pain, R UE pain with movement    Activity Tolerance:   Fair    After treatment patient left in no apparent distress:    Supine in bed, Heels elevated for pressure relief, Call bell within reach, and Side rails x 3    COMMUNICATION/EDUCATION:   The patients plan of care was discussed with: Physical therapist and Registered nurse. Patient/family have participated as able in goal setting and plan of care. This patients plan of care is appropriate for delegation to [facility].     Thank you for this referral.  Dorothy Montemayor OT  Time Calculation: 21 mins

## 2021-10-22 NOTE — PROGRESS NOTES
[facility]    ICU TEAM Progress Note    Name: Christy Marcial   : 1970   MRN: [id number]   Date: 10/22/2021      Assessment:   Reason for ICU Admission: MV and hypotension     Brief HPI:    -  Pt is a 52 yo M with PMH ESRD on HD, takes coumadin secondary to hx of DVT RUE with a recent prolonged hospitalization secondary to COVID PNA in August of this year. He ultimately required trach and peg and was in rehab at [facility]. Pt unable to provide HPI secondary to current cognition, therefore information is obtained via chart and provider. Per [facility] records, pt had a dislodged peg tube. He started having coffee ground emesis 10/18/21 with elevated HR and hypotension. He was sent to the ER for further workup. He was found to be hypotensive and ICU was consulted for further management. S/P:   Procedure(s):  ESOPHAGOGASTRODUODENOSCOPY (EGD) at Bedside  ESOPHAGOGASTRODUODENAL (EGD) BIOPSY    Plan:     1. GIB  - GI consulted, appreciate input  - PPI BID  - SP EGD      2. Hemorrhagic shock  - Sp PRBCs, FFP and k centra   - Serial H and H   - Monitor closely for s/s of bleeding  - Volume resuscitated  - Retacrit started per nephrology     3. Supratherapeutic INR  - Sp FFP and k centra  - Hold coumadin for now as active bleeding  - Serial INR, last INR 1.1 (10/19)    4. Acute on chronic hypoxic respiratory failure with tracheostomy   - Continue MV  - SBT daily   - Pulm hygiene   - HOB >30   - Aspiration precautions   - Vent weaned, RR now 16 from 22 ABG appreciated   - Speech consulted for inline, appreciate input    5. ESRD on HD  - Cont HD per renal    6. Acute blood loss anemia   - Transfuse as needed to keep hgb >7  - Monitor closely for ss bleeding    7.  PEG tube dislodged   - EGD per GI  - PEG removed, not in stomach  - CT abd and pelvis ordered, fluid collection noted  - Await GI recs, will need new PEG placement at some point   - Wound consulted for old peg site   - NPO for now, bowel rest per GI -CT abd, pelvis appreciated, await GI recs on fluid collection     8. Leukocytosis   - continue broad spectrum abx   - On zosyn  - Continue diflucan for yeast out of wound   - ID consult   - Cultures pending       Subjective:   Overnight Events:   10/22/2021        Objective:     Visit Vitals  BP (!) 87/61   Pulse 99   Temp 98.4 °F (36.9 °C)   Resp 22   Ht 5' 11.5\" (1.816 m)   Wt 140.3 kg (309 lb 4.9 oz)   SpO2 100%   BMI 42.54 kg/m²      O2 Device: Tracheostomy Temp (24hrs), Av.8 °F (37.1 °C), Min:98.4 °F (36.9 °C), Max:99.1 °F (37.3 °C)           Hemodynamics:   PAP:   CO:     Wedge:   CI:     CVP:    SVR:       PVR:       Ventilator Settings:  Mode Rate Tidal Volume Pressure FiO2 PEEP   Assist control, Pressure control        30 % 5 cm H20     Peak airway pressure: 24 cm H2O    Minute ventilation: 20.2 l/min        Intake/Output:     Intake/Output Summary (Last 24 hours) at 10/22/2021 0958  Last data filed at 10/22/2021 0400  Gross per 24 hour   Intake 354.66 ml   Output --   Net 354.66 ml       Physical Exam:  General:  Trach and on vent    Eyes:  Sclera anicteric. Pupils equal, round, reactive to light. Mouth/Throat: Orotracheal tube in place. Neck: Supple. Lungs:   Clear to auscultation bilaterally, good effort. Cardiovascular:  Regular rate and rhythm, no murmur, click, rub, or gallop. Abdomen: Old peg removed, scar and dressing intact    Extremities: No cyanosis or edema. Skin: No acute rash or lesions. Lymph Nodes: Cervical and supraclavicular normal.   Musculoskeletal:  No swelling or deformity. Lines/Devices:  Intact, no erythema, drainage, or tenderness. Labs & Data: Reviewed    Medications: Reviewed    Chest X-Ray:    TTE:    Multidisciplinary Rounds Completed:   Yes    ABCDEF Bundle/Checklist Completed: Yes    SPECIAL EQUIPMENT: None    DISPOSITION: Stay in ICU    CRITICAL CARE CONSULTANT NOTE  I had a face to face encounter with the patient, reviewed and interpreted patient data including clinical events, labs, images, vital signs, I/O's, and examined patient. I have discussed the case and the plan and management of the patient's care with the consulting services, the bedside nurses and the respiratory therapist.      NOTE OF PERSONAL INVOLVEMENT IN CARE   This patient has a high probability of imminent, clinically significant deterioration, which requires the highest level of preparedness to intervene urgently. I participated in the decision-making and personally managed or directed the management of the following life and organ supporting interventions that required my frequent assessment to treat or prevent imminent deterioration. I personally spent 45 minutes of critical care time. This is time spent at this critically ill patient's bedside actively involved in patient care as well as the coordination of care and discussions with the patient's family. This does not include any procedural time which has been billed separately.     Torito Gamboa NP    [facility] Critical Care  10/22/2021

## 2021-10-22 NOTE — PROGRESS NOTES
Per angio, mass is a hematoma and not fluid collection. Per Dr. Emmy Lopez, they do not drain hematomas. Left message with answering service for Dr. Cyril Argueta NP with GI signed off on patient.  She stated there is nothing further for them to do

## 2021-10-22 NOTE — PROCEDURES
Hemodialysis / [phone]    Vitals Pre Post Assessment Pre Post   BP BP: (!) 145/83 (10/22/21 1600) 96/69 LOC Alert,vent, nods appropriately same   HR Pulse (Heart Rate): 97 (10/22/21 1600) 114 Lungs clear clear   Resp Resp Rate: 30 (10/22/21 1600) 20 Cardiac Sinus tachy Sinus tachy   Temp Temp: 99.2 °F (37.3 °C) (10/22/21 1600) 98.2 Skin Warm,dry Warm,dry   Weight   - 1.1 kg Edema Pitting LL Pitting LL   Tele status   Pain Pain Intensity 1: 4 (10/22/21 1600) 0     Orders   Duration: Start: 1600 End: 1900 Total: 3 hrs   Dialyzer: Dialyzer/Set Up Inspection: Revaclear (10/22/21 1600)   K Bath: Dialysate K (mEq/L): 3.5 (10/22/21 1600)   Ca Bath: Dialysate CA (mEq/L): 2.5 (10/22/21 1600)   Na: Dialysate NA (mEq/L): 140 (10/22/21 1600)   Bicarb: Dialysate HCO3 (mEq/L): 35 (10/22/21 1600)   Target Fluid Removal: Goal/Amount of Fluid to Remove (mL): 2000 mL (10/22/21 1600)     Access   Type & Location: R SCC   Comments:                      Site no s/s of infection. Dressing intact, dry and dated. Good pressure and flows.                                     Labs   HBsAg (Antigen) / date:  neg 10/20/21                                             HBsAb (Antibody) / date: Immune 10/20/21   Source: EPIC   Obtained/Reviewed  Critical Results Called HGB   Date Value Ref Range Status   10/22/2021 8.9 (L) 12.1 - 17.0 g/dL Final     Potassium   Date Value Ref Range Status   10/22/2021 3.5 3.5 - 5.1 mmol/L Final     Calcium   Date Value Ref Range Status   10/22/2021 8.8 8.5 - 10.1 MG/DL Final     BUN   Date Value Ref Range Status   10/22/2021 47 (H) 6 - 20 MG/DL Final     Creatinine   Date Value Ref Range Status   10/22/2021 3.23 (H) 0.70 - 1.30 MG/DL Final     Comment:     INVESTIGATED PER DELTA CHECK PROTOCOL        Meds Given   Name Dose Route   none                 Adequacy / Fluid    Total Liters Process: 61.3   Net Fluid Removed: 1.1 kg      Comments   Time Out Done:   (Time) 1550   Admitting Diagnosis: Gi bleed   Consent obtained/signed: Informed Consent Verified: Yes (10/22/21 1600)   Machine / RO # Machine Number: b 31/ br 31 (10/22/21 1600)   Primary Nurse Rpt Pre: Morena Zeng   Primary Nurse Rpt Post: Morena Zeng   Pt Education: Access care   Care Plan: Continue hd per md order   Pts outpatient clinic:      Tx Summary   Comments:                 Pt ran 3 hrs of the 3.5 ordered. Visible clotting in venous chamber. Unable to return blood and tx terminated after 3 hrs.

## 2021-10-22 NOTE — PROGRESS NOTES
10/22/21 1523   Weaning Parameters   Spontaneous Breathing Trial Complete No (Comments)   Resp Rate Observed 29   Ve 20.4      RSBI 41   SBT Results  Returned pt to previous vent settings at this time due to initiation of dialysis. RN at bedside. Will continue to monitor patient. Will continue to monitor patient.

## 2021-10-22 NOTE — PROGRESS NOTES
Transition of Care Plan   RUR- Low     DISPOSITION: LTAC    F/U with PCP/Specialist     Transport: [facility] with vent  Patient admitted with GI bleed from [facility].   Patient remains vented via trach. Care manager spoke with wife regarding transitions of care. Wife would like him closer to home in [place]. CM spoke with Marcelino Portillo [phone] in admissions at [facility] in [facility]. And emailed her clinical information at [email or web address] per wife's request. Will await determination.    Alysha Rosario RN,Care Management

## 2021-10-22 NOTE — PROGRESS NOTES
SLP Contact Note    Noted pt vent parameters remain inappropriate for in-line (particularly RR). Will hold for now. Suspect pt will be able to utilize an in-line PMV as vent is weaned.       Thank you,  Noemi Raymundo M.Ed, Curtis Infante  Speech-Language Pathologist

## 2021-10-23 ENCOUNTER — APPOINTMENT (OUTPATIENT)
Dept: ULTRASOUND IMAGING | Age: 51
DRG: 368 | End: 2021-10-23
Attending: SURGERY
Payer: COMMERCIAL

## 2021-10-23 ENCOUNTER — APPOINTMENT (OUTPATIENT)
Dept: GENERAL RADIOLOGY | Age: 51
DRG: 368 | End: 2021-10-23
Payer: COMMERCIAL

## 2021-10-23 LAB
ANION GAP SERPL CALC-SCNC: 13 MMOL/L (ref 5–15)
BACTERIA SPEC CULT: NORMAL
BASOPHILS # BLD: 0.5 K/UL (ref 0–0.1)
BASOPHILS NFR BLD: 3 % (ref 0–1)
BUN SERPL-MCNC: 30 MG/DL (ref 6–20)
BUN/CREAT SERPL: 13 (ref 12–20)
CALCIUM SERPL-MCNC: 8.6 MG/DL (ref 8.5–10.1)
CHLORIDE SERPL-SCNC: 104 MMOL/L (ref 97–108)
CO2 SERPL-SCNC: 20 MMOL/L (ref 21–32)
CREAT SERPL-MCNC: 2.37 MG/DL (ref 0.7–1.3)
DIFFERENTIAL METHOD BLD: ABNORMAL
EOSINOPHIL # BLD: 0.2 K/UL (ref 0–0.4)
EOSINOPHIL NFR BLD: 1 % (ref 0–7)
ERYTHROCYTE [DISTWIDTH] IN BLOOD BY AUTOMATED COUNT: 18 % (ref 11.5–14.5)
GLUCOSE SERPL-MCNC: 83 MG/DL (ref 65–100)
HCT VFR BLD AUTO: 31 % (ref 36.6–50.3)
HGB BLD-MCNC: 9.5 G/DL (ref 12.1–17)
IMM GRANULOCYTES # BLD AUTO: 0 K/UL
IMM GRANULOCYTES NFR BLD AUTO: 0 %
LYMPHOCYTES # BLD: 2.7 K/UL (ref 0.8–3.5)
LYMPHOCYTES NFR BLD: 16 % (ref 12–49)
MCH RBC QN AUTO: 29.6 PG (ref 26–34)
MCHC RBC AUTO-ENTMCNC: 30.6 G/DL (ref 30–36.5)
MCV RBC AUTO: 96.6 FL (ref 80–99)
METAMYELOCYTES NFR BLD MANUAL: 1 %
MONOCYTES # BLD: 1.4 K/UL (ref 0–1)
MONOCYTES NFR BLD: 8 % (ref 5–13)
MYELOCYTES NFR BLD MANUAL: 1 %
NEUTS BAND NFR BLD MANUAL: 6 % (ref 0–6)
NEUTS SEG # BLD: 11.9 K/UL (ref 1.8–8)
NEUTS SEG NFR BLD: 64 % (ref 32–75)
NRBC # BLD: 0.04 K/UL (ref 0–0.01)
NRBC BLD-RTO: 0.2 PER 100 WBC
PLATELET # BLD AUTO: 365 K/UL (ref 150–400)
PLATELET COMMENTS,PCOM: ABNORMAL
PMV BLD AUTO: 9.7 FL (ref 8.9–12.9)
POTASSIUM SERPL-SCNC: 3.3 MMOL/L (ref 3.5–5.1)
RBC # BLD AUTO: 3.21 M/UL (ref 4.1–5.7)
RBC MORPH BLD: ABNORMAL
RBC MORPH BLD: ABNORMAL
SERVICE CMNT-IMP: NORMAL
SODIUM SERPL-SCNC: 137 MMOL/L (ref 136–145)
WBC # BLD AUTO: 17 K/UL (ref 4.1–11.1)

## 2021-10-23 PROCEDURE — 74011000250 HC RX REV CODE- 250: Performed by: NURSE PRACTITIONER

## 2021-10-23 PROCEDURE — 0W9F3ZZ DRAINAGE OF ABDOMINAL WALL, PERCUTANEOUS APPROACH: ICD-10-PCS | Performed by: STUDENT IN AN ORGANIZED HEALTH CARE EDUCATION/TRAINING PROGRAM

## 2021-10-23 PROCEDURE — 94003 VENT MGMT INPAT SUBQ DAY: CPT

## 2021-10-23 PROCEDURE — 74011250636 HC RX REV CODE- 250/636: Performed by: NURSE PRACTITIONER

## 2021-10-23 PROCEDURE — 36415 COLL VENOUS BLD VENIPUNCTURE: CPT

## 2021-10-23 PROCEDURE — 74011250636 HC RX REV CODE- 250/636: Performed by: HEALTH CARE PROVIDER

## 2021-10-23 PROCEDURE — C1729 CATH, DRAINAGE: HCPCS

## 2021-10-23 PROCEDURE — 77030004950 HC CATH ENTRL NG COVD -A

## 2021-10-23 PROCEDURE — 65610000006 HC RM INTENSIVE CARE

## 2021-10-23 PROCEDURE — 10030 IMG GID FLU COLL DRG SFT TIS: CPT

## 2021-10-23 PROCEDURE — 74018 RADEX ABDOMEN 1 VIEW: CPT

## 2021-10-23 PROCEDURE — 99253 IP/OBS CNSLTJ NEW/EST LOW 45: CPT | Performed by: SURGERY

## 2021-10-23 PROCEDURE — 80048 BASIC METABOLIC PNL TOTAL CA: CPT

## 2021-10-23 PROCEDURE — 87070 CULTURE OTHR SPECIMN AEROBIC: CPT

## 2021-10-23 PROCEDURE — 74011000258 HC RX REV CODE- 258: Performed by: HEALTH CARE PROVIDER

## 2021-10-23 PROCEDURE — 77030010546 HC BG URIN DRNG URES -B

## 2021-10-23 PROCEDURE — 2709999900 HC NON-CHARGEABLE SUPPLY

## 2021-10-23 PROCEDURE — 87040 BLOOD CULTURE FOR BACTERIA: CPT

## 2021-10-23 PROCEDURE — 77030003666 HC NDL SPINAL BD -A

## 2021-10-23 PROCEDURE — 77010033711 HC HIGH FLOW OXYGEN

## 2021-10-23 PROCEDURE — 85025 COMPLETE CBC W/AUTO DIFF WBC: CPT

## 2021-10-23 PROCEDURE — 77030014115

## 2021-10-23 PROCEDURE — C9113 INJ PANTOPRAZOLE SODIUM, VIA: HCPCS | Performed by: NURSE PRACTITIONER

## 2021-10-23 PROCEDURE — 94640 AIRWAY INHALATION TREATMENT: CPT

## 2021-10-23 RX ORDER — FENTANYL CITRATE 50 UG/ML
50 INJECTION, SOLUTION INTRAMUSCULAR; INTRAVENOUS ONCE
Status: COMPLETED | OUTPATIENT
Start: 2021-10-23 | End: 2021-10-23

## 2021-10-23 RX ORDER — LIDOCAINE HYDROCHLORIDE 20 MG/ML
0.3 INJECTION, SOLUTION INFILTRATION; PERINEURAL ONCE
Status: DISPENSED | OUTPATIENT
Start: 2021-10-23 | End: 2021-10-24

## 2021-10-23 RX ORDER — MIDAZOLAM HYDROCHLORIDE 1 MG/ML
2 INJECTION, SOLUTION INTRAMUSCULAR; INTRAVENOUS ONCE
Status: COMPLETED | OUTPATIENT
Start: 2021-10-23 | End: 2021-10-23

## 2021-10-23 RX ADMIN — FENTANYL CITRATE 50 MCG: 50 INJECTION INTRAMUSCULAR; INTRAVENOUS at 04:47

## 2021-10-23 RX ADMIN — PIPERACILLIN AND TAZOBACTAM 3.38 G: 3; .375 INJECTION, POWDER, LYOPHILIZED, FOR SOLUTION INTRAVENOUS at 04:06

## 2021-10-23 RX ADMIN — MIDAZOLAM HYDROCHLORIDE 2 MG: 1 INJECTION, SOLUTION INTRAMUSCULAR; INTRAVENOUS at 13:48

## 2021-10-23 RX ADMIN — IPRATROPIUM BROMIDE AND ALBUTEROL SULFATE 3 ML: .5; 3 SOLUTION RESPIRATORY (INHALATION) at 07:06

## 2021-10-23 RX ADMIN — Medication 10 ML: at 05:11

## 2021-10-23 RX ADMIN — Medication: at 18:31

## 2021-10-23 RX ADMIN — SODIUM CHLORIDE 40 MG: 9 INJECTION INTRAMUSCULAR; INTRAVENOUS; SUBCUTANEOUS at 08:33

## 2021-10-23 RX ADMIN — IPRATROPIUM BROMIDE AND ALBUTEROL SULFATE 3 ML: .5; 3 SOLUTION RESPIRATORY (INHALATION) at 20:48

## 2021-10-23 RX ADMIN — Medication 10 ML: at 22:33

## 2021-10-23 RX ADMIN — FENTANYL CITRATE 50 MCG: 50 INJECTION INTRAMUSCULAR; INTRAVENOUS at 12:04

## 2021-10-23 RX ADMIN — Medication: at 08:33

## 2021-10-23 RX ADMIN — CHLORHEXIDINE GLUCONATE 15 ML: 0.12 RINSE ORAL at 08:33

## 2021-10-23 RX ADMIN — PIPERACILLIN AND TAZOBACTAM 3.38 G: 3; .375 INJECTION, POWDER, LYOPHILIZED, FOR SOLUTION INTRAVENOUS at 16:30

## 2021-10-23 RX ADMIN — SODIUM CHLORIDE 40 MG: 9 INJECTION INTRAMUSCULAR; INTRAVENOUS; SUBCUTANEOUS at 22:32

## 2021-10-23 RX ADMIN — CHLORHEXIDINE GLUCONATE 15 ML: 0.12 RINSE ORAL at 22:32

## 2021-10-23 RX ADMIN — Medication 10 ML: at 13:49

## 2021-10-23 RX ADMIN — FLUCONAZOLE IN SODIUM CHLORIDE 200 MG: 2 INJECTION, SOLUTION INTRAVENOUS at 10:20

## 2021-10-23 RX ADMIN — Medication 10 ML: at 05:12

## 2021-10-23 RX ADMIN — FENTANYL CITRATE 50 MCG: 50 INJECTION INTRAMUSCULAR; INTRAVENOUS at 14:36

## 2021-10-23 NOTE — PROGRESS NOTES
10/23/21 1126   Oxygen Therapy   O2 Sat (%) 96 %   Pulse via Oximetry 102 beats per minute   O2 Device Tracheal collar   O2 Flow Rate (L/min) 10 l/min   FIO2 (%) 40 %   Pre-Treatment   Breathing Pattern Regular   Respirations 23       Patient placed on Trach collar per NP order

## 2021-10-23 NOTE — CONSULTS
Surgery Consult    Subjective:      Michael Armendariz is a 53 y.o. male who we are being asked to see for an abdominal wall fluid collection. The patient was shortly admitted from [facility] approximately 5 days ago with hypotension and GI bleed. The patient recently had a prolonged hospitalization secondary to Covid pneumonia. He is now on hemodialysis and takes Coumadin secondary to a right upper extremity DVT. There is also some question of whether or not he had a dislodged PEG tube. He underwent an EGD on the 20th that showed no PEG with in the stomach. At that time 3 days ago he had a CT scan that showed air and fluid within his rectus sheath. Apparently attempts were made to have IR drain this but it was not done. Patient denies any abdominal pain. Patient Active Problem List    Diagnosis Date Noted    Severe protein-calorie malnutrition ([address] 10/21/2021    GIB (gastrointestinal bleeding) 10/18/2021     Past Medical History:   Diagnosis Date    COVID     Dialysis patient [facility])     started around the end of september 2021    Polycystic kidney disease     S/P percutaneous endoscopic gastrostomy (PEG) tube placement [address] [address]       No past surgical history on file. Social History     Tobacco Use    Smoking status: Not on file   Substance Use Topics    Alcohol use: Not on file      No family history on file.    Current Facility-Administered Medications   Medication Dose Route Frequency    lidocaine (XYLOCAINE) 20 mg/mL (2 %) injection 6 mg  0.3 mL IntraDERMal ONCE    fluconazole (DIFLUCAN) 200mg/100 mL IVPB (premix)  200 mg IntraVENous Q24H    fentaNYL citrate (PF) injection 50 mcg  50 mcg IntraVENous Q4H PRN    sodium chloride (NS) flush 5-40 mL  5-40 mL IntraVENous Q8H    sodium chloride (NS) flush 5-40 mL  5-40 mL IntraVENous PRN    pantoprazole (PROTONIX) 40 mg in 0.9% sodium chloride 10 mL injection  40 mg IntraVENous Q12H    chlorhexidine (ORAL CARE KIT) 0.12 % mouthwash 15 mL  15 mL Oral Q12H    balsam peru-castor oiL (VENELEX) ointment   Topical BID    sodium chloride (NS) flush 5-10 mL  5-10 mL IntraVENous PRN    sodium chloride (NS) flush 5-40 mL  5-40 mL IntraVENous Q8H    sodium chloride (NS) flush 5-40 mL  5-40 mL IntraVENous PRN    acetaminophen (TYLENOL) tablet 650 mg  650 mg Oral Q6H PRN    Or    acetaminophen (TYLENOL) suppository 650 mg  650 mg Rectal Q6H PRN    polyethylene glycol (MIRALAX) packet 17 g  17 g Oral DAILY PRN    ondansetron (ZOFRAN ODT) tablet 4 mg  4 mg Oral Q8H PRN    Or    ondansetron (ZOFRAN) injection 4 mg  4 mg IntraVENous Q6H PRN    piperacillin-tazobactam (ZOSYN) 3.375 g in 0.9% sodium chloride (MBP/ADV) 100 mL MBP  3.375 g IntraVENous Q12H    albuterol (PROVENTIL VENTOLIN) nebulizer solution 2.5 mg  2.5 mg Nebulization Q4H PRN    albuterol-ipratropium (DUO-NEB) 2.5 MG-0.5 MG/3 ML  3 mL Nebulization BID RT      No Known Allergies    Review of Systems:     Review of systems not obtained due to patient factors. Objective:        Visit Vitals  /82   Pulse (!) 106   Temp 98.5 °F (36.9 °C)   Resp 26   Ht 5' 11.5\" (1.816 m)   Wt 310 lb 6.5 oz (140.8 kg)   SpO2 99%   BMI 42.69 kg/m²       Physical Exam:  GENERAL: alert, cooperative, no distress, appears stated age, EYE: negative, THROAT & NECK: Trach in place, LUNG: clear to auscultation bilaterally, HEART: regular rate and rhythm, ABDOMEN: Soft nontender morbidly obese ecchymosis along the abdominal wall pigtail drain in place with bloody purulent fluid, EXTREMITIES:  extremities normal, atraumatic, no cyanosis or edema, edema 2+, SKIN: Normal., NEUROLOGIC: negative, PSYCH: non focal    Imaging:  images and reports reviewed  CT- 10/20/2021 -   CHEST: Severe parenchymal lung disease, likely more chronic than acute. Tracheostomy device, NG tube. Main pulmonary outflow tract enlarged, correlate  for pulmonary arterial hypertension.   2. ABDOMEN: Left anterior abdominal and pelvic wall hematoma or gas containing  fluid collection 41 x 5.7 x 4.6 cm. . Correlate for recent instrumentation versus  infection. Numerous hepatic cysts. CT stigmata of end-stage renal disease. Mild  gaseous distention of colon. Other incidental findings. 3. PELVIS: Small free fluid. Lab/Data Review: All lab results for the last 24 hours reviewed. Recent Results (from the past 24 hour(s))   METABOLIC PANEL, BASIC    Collection Time: 10/23/21  4:13 AM   Result Value Ref Range    Sodium 137 136 - 145 mmol/L    Potassium 3.3 (L) 3.5 - 5.1 mmol/L    Chloride 104 97 - 108 mmol/L    CO2 20 (L) 21 - 32 mmol/L    Anion gap 13 5 - 15 mmol/L    Glucose 83 65 - 100 mg/dL    BUN 30 (H) 6 - 20 MG/DL    Creatinine 2.37 (H) 0.70 - 1.30 MG/DL    BUN/Creatinine ratio 13 12 - 20      GFR est AA 36 (L) >60 ml/min/1.73m2    GFR est non-AA 29 (L) >60 ml/min/1.73m2    Calcium 8.6 8.5 - 10.1 MG/DL   CBC WITH AUTOMATED DIFF    Collection Time: 10/23/21  4:13 AM   Result Value Ref Range    WBC 17.0 (H) 4.1 - 11.1 K/uL    RBC 3.21 (L) 4.10 - 5.70 M/uL    HGB 9.5 (L) 12.1 - 17.0 g/dL    HCT 31.0 (L) 36.6 - 50.3 %    MCV 96.6 80.0 - 99.0 FL    MCH 29.6 26.0 - 34.0 PG    MCHC 30.6 30.0 - 36.5 g/dL    RDW 18.0 (H) 11.5 - 14.5 %    PLATELET 648 132 - 296 K/uL    MPV 9.7 8.9 - 12.9 FL    NRBC 0.2 (H) 0  WBC    ABSOLUTE NRBC 0.04 (H) 0.00 - 0.01 K/uL    NEUTROPHILS 64 32 - 75 %    BAND NEUTROPHILS 6 0 - 6 %    LYMPHOCYTES 16 12 - 49 %    MONOCYTES 8 5 - 13 %    EOSINOPHILS 1 0 - 7 %    BASOPHILS 3 (H) 0 - 1 %    METAMYELOCYTES 1 (H) 0 %    MYELOCYTES 1 (H) 0 %    IMMATURE GRANULOCYTES 0 %    ABS. NEUTROPHILS 11.9 (H) 1.8 - 8.0 K/UL    ABS. LYMPHOCYTES 2.7 0.8 - 3.5 K/UL    ABS. MONOCYTES 1.4 (H) 0.0 - 1.0 K/UL    ABS. EOSINOPHILS 0.2 0.0 - 0.4 K/UL    ABS. BASOPHILS 0.5 (H) 0.0 - 0.1 K/UL    ABS. IMM. GRANS. 0.0 K/UL    DF MANUAL      PLATELET COMMENTS CLUMPED PLATELETS      RBC COMMENTS ANISOCYTOSIS  1+        RBC COMMENTS MACROCYTOSIS  1+         WBC rising. From 8     Assessment:Plan    43-year-old male with air and gas fluid collection with in the left rectus sheath. I had multiple conversations today with the ICU NP regarding this patient. When she initially called me earlier today I  then discussed this with interventional radiology who was willing to do an aspiration. Per IR note the got about 300 cc of thick fluid and placed a 10 [place] Lani drain. This has been sent for culture. We will wait to see what that shows. Discussed with IR and they recommend repeating CT scan in approximately 3 days. If there is still fluid collection at that time would recommend that IR try it again perhaps at this time and CT if the patient is stable enough. He may require multiple drainage procedures or upsizing of current drains. Operative intervention would be very difficult for this patient the area within his rectus sheath is quite extensive. Additionally the patient is morbidly obese. I am concerned that filleting open his rectus sheath to get this abscess out would likely cause a difficult to heal wound as well as a very large hernia.   Continue antibiotics  We will follow with you

## 2021-10-23 NOTE — PROGRESS NOTES
[facility]    ICU TEAM Progress Note    Name: Vesna Mcclain   : 1970   MRN: [id number]   Date: 10/23/2021      Assessment:   Reason for ICU Admission: MV and hypotension     Brief HPI:    -  Pt is a 54 yo M with PMH ESRD on HD, takes coumadin secondary to hx of DVT RUE with a recent prolonged hospitalization secondary to COVID PNA in August of this year. He ultimately required trach and peg and was in rehab at [facility]. Pt unable to provide HPI secondary to current cognition, therefore information is obtained via chart and provider. Per [facility] records, pt had a dislodged peg tube. He started having coffee ground emesis 10/18/21 with elevated HR and hypotension. He was sent to the ER for further workup. He was found to be hypotensive and ICU was consulted for further management. S/P:   Procedure(s):  ESOPHAGOGASTRODUODENOSCOPY (EGD) at Bedside  ESOPHAGOGASTRODUODENAL (EGD) BIOPSY    Plan:     1. GIB  - GI consulted, appreciate input  - PPI BID  - SP EGD      2. Hemorrhagic shock  - Sp PRBCs, FFP and k centra   - Serial H and H   - Monitor closely for s/s of bleeding  - Volume resuscitated  - Retacrit started per nephrology   - CT revealed large fluid filled mass, Hematoma? General surgery consulted    3. Supratherapeutic INR  - Sp FFP and k centra  - Hold coumadin for now as active bleeding  - Serial INR, last INR 1.1 (10/19)    4. Acute on chronic hypoxic respiratory failure with tracheostomy   - Continue MV  - SBT daily   - Pulm hygiene   - HOB >30   - Aspiration precautions   - Vent weaned, RR now 16 from 22 ABG appreciated   - Speech consulted for inline, appreciate input  - Tcollar trials during the day as tolerated    5. ESRD on HD  - Cont HD per renal    6. Acute blood loss anemia   - Transfuse as needed to keep hgb >7  - Monitor closely for ss bleeding    7.  PEG tube dislodged   - EGD per GI  - PEG removed, not in stomach  - CT abd and pelvis ordered, fluid collection noted, hematoma versus infection   - Await GI recs, will need new PEG placement at some point   - Wound consulted for old peg site   - NPO for now, bowel rest per GI   -CT abd, pelvis appreciated, await GI recs on fluid collection   - General surgery consulted for fluid collection    8. Leukocytosis   - continue broad spectrum abx   - On zosyn  - Continue diflucan for yeast out of wound   - ID consult   - Cultures pending       Subjective:   Overnight Events:   10/23/2021        Objective:     Visit Vitals  /82   Pulse (!) 106   Temp 97.9 °F (36.6 °C)   Resp 26   Ht 5' 11.5\" (1.816 m)   Wt 140.8 kg (310 lb 6.5 oz)   SpO2 99%   BMI 42.69 kg/m²    O2 Flow Rate (L/min): 10 l/min O2 Device: Tracheal collar Temp (24hrs), Av.5 °F (36.9 °C), Min:97.8 °F (36.6 °C), Max:100 °F (37.8 °C)           Hemodynamics:   PAP:   CO:     Wedge:   CI:     CVP:    SVR:       PVR:       Ventilator Settings:  Mode Rate Tidal Volume Pressure FiO2 PEEP   Spontaneous      5 cm H2O 40 % 5 cm H20     Peak airway pressure: 25 cm H2O    Minute ventilation: 18.5 l/min        Intake/Output:     Intake/Output Summary (Last 24 hours) at 10/23/2021 1845  Last data filed at 10/23/2021 1700  Gross per 24 hour   Intake 425 ml   Output 1500 ml   Net -1075 ml       Physical Exam:  General:  Trach and on vent    Eyes:  Sclera anicteric. Pupils equal, round, reactive to light. Mouth/Throat: Orotracheal tube in place. Neck: Supple. Lungs:   Clear to auscultation bilaterally, good effort. Cardiovascular:  Regular rate and rhythm, no murmur, click, rub, or gallop. Abdomen: Old peg removed, scar and dressing intact    Extremities: No cyanosis or edema. Skin: No acute rash or lesions. Lymph Nodes: Cervical and supraclavicular normal.   Musculoskeletal:  No swelling or deformity. Lines/Devices:  Intact, no erythema, drainage, or tenderness.              Labs & Data: Reviewed    Medications: Reviewed    Chest X-Ray:    TTE:    Multidisciplinary Rounds Completed: Yes    ABCDEF Bundle/Checklist Completed: Yes    SPECIAL EQUIPMENT: None    DISPOSITION: Stay in ICU    CRITICAL CARE CONSULTANT NOTE  I had a face to face encounter with the patient, reviewed and interpreted patient data including clinical events, labs, images, vital signs, I/O's, and examined patient. I have discussed the case and the plan and management of the patient's care with the consulting services, the bedside nurses and the respiratory therapist.      NOTE OF PERSONAL INVOLVEMENT IN CARE   This patient has a high probability of imminent, clinically significant deterioration, which requires the highest level of preparedness to intervene urgently. I participated in the decision-making and personally managed or directed the management of the following life and organ supporting interventions that required my frequent assessment to treat or prevent imminent deterioration. I personally spent 45 minutes of critical care time. This is time spent at this critically ill patient's bedside actively involved in patient care as well as the coordination of care and discussions with the patient's family. This does not include any procedural time which has been billed separately.     Read EMELI Gamble    [facility] Critical Care  10/23/2021

## 2021-10-23 NOTE — PROGRESS NOTES
Name: Bjorn Mora MRN: [id number]   : 1970 Hospital: [address]   Date: 10/23/2021        IMPRESSION:   · SERGEY, HD dependent. Patient was dialyzed Friday without events. · Hypotension - on min pressors if needed. · Hypervolemia with predominantly central congestion. · Anemia of multifactorial origin  · Respiratory failure due to a complicated Covid pneumonia, on vent via trache  · Hypokalemia pre HD      PLAN:   · HD again Monday. Jim Severance is notified. · Resp management as per intensivist team  · Anemia management- start Retacrit. Check the iron profile. Subjective/Interval History:   I have reviewed the flowsheet and previous days notes. ROS: Awake and interactive, breathing ok via trach. Had drainage of left abf wall fluid collection:  IMPRESSION        1. Successful ultrasound guided placement of 10 [place] percutaneous drain into a  left abdominal wall fluid collection. 2. Needle sampling of the more inferior collection of the left abdominal wall  yielded only a small amount of clot, presumed noninfected hematoma. Objective:   Vital Signs:    Visit Vitals  BP 99/74   Pulse (!) 115   Temp 97.9 °F (36.6 °C)   Resp (!) 31   Ht 5' 11.5\" (1.816 m)   Wt 140.8 kg (310 lb 6.5 oz)   SpO2 99%   BMI 42.69 kg/m²       O2 Device: Tracheal collar   O2 Flow Rate (L/min): 10 l/min   Temp (24hrs), Av.5 °F (36.9 °C), Min:97.8 °F (36.6 °C), Max:100 °F (37.8 °C)       Intake/Output:   Last shift:      10/23 0701 - 10/23 1900  In: 25 [I.V.:25]  Out: 400 [Drains:400]  Last 3 shifts: 10/21 1901 - 10/23 0700  In: 404.7 [I.V.:404.7]  Out: 1100     Intake/Output Summary (Last 24 hours) at 10/23/2021 1825  Last data filed at 10/23/2021 1700  Gross per 24 hour   Intake 425 ml   Output 1500 ml   Net -1075 ml        Physical Exam:  General:    Awake, Not communicating verbally but nods approptiately. Head:   Normocephalic, without obvious abnormality, atraumatic. Shady Ra     Neck:  Trach in place  Lungs: Clear to auscultation bilaterally. No Wheezing or Rhonchi. No rales. Chest wall:  No tenderness or deformity. No Accessory muscle use. Heart:   Regular rate and rhythm,  no murmur, rub or gallop. Abdomen:   Soft. Distended. Bowel sounds normal. PEG tube in place. Left lateral abd wall drain  Extremities: Extremities normal, atraumatic, No cyanosis. + right arm edematous, has pre-tibial edema  Skin:     Texture, turgor normal. No rashes or lesions. Not Jaundiced  Psych:  Calm. Neurologic: Non focal.      DATA:  Labs:  Recent Labs     10/23/21  0413 10/22/21  0734 10/21/21  0533    138 138   K 3.3* 3.5 3.3*    104 105   CO2 20* 21 22   BUN 30* 47* 35*   CREA 2.37* 3.23* 2.39*   CA 8.6 8.8 8.5   PHOS  --   --  2.7   MG  --   --  2.0     Recent Labs     10/23/21  0413 10/22/21  0734 10/21/21  0533   WBC 17.0* 10.6 8.6   HGB 9.5* 8.9* 8.2*   HCT 31.0* 28.9* 25.7*    333 319     No results for input(s): DOLORES, KU, CLU, CREAU in the last 72 hours.     No lab exists for component: PROU    Total time spent with patient:  35 minutes    [] Critical Care Provided    Care Plan discussed with:   Nursing, Medical Team    Enzo Mcdonnell MD

## 2021-10-23 NOTE — PROGRESS NOTES
0800: Bedside shift change report given to Mariano Arnold RN (oncoming nurse) by Linda Yan RN (offgoing nurse). Report included the following information SBAR, Intake/Output, Recent Results, Cardiac Rhythm ST and Alarm Parameters . 1115: Verbal shift change report given to Myrna Singh RN (oncoming nurse) by Mariano Arnold RN (offgoing nurse). Report included the following information SBAR, Intake/Output, Cardiac Rhythm ST and Alarm Parameters .

## 2021-10-23 NOTE — PROGRESS NOTES
1130 Bedside shift change report given to Betty Pimentel RN  (oncoming nurse) by Radha Grijalva RN  (offgoing nurse). Report included the following information SBAR, Intake/Output, MAR, Recent Results and Cardiac Rhythm ST.     1130: K 3.3 Russ Young, EMELI informed. 1141: Consult placed general surgery placed, spoke in person with Dr. Nasim Mccormack     : Dr. Ebony Myers at the bedside for ultrasound guided percutaneous aspiration of abdominal fluid. Patient given 2 mg of versed via IV prior the procedure. 1436: 50 mcg of fentanyl given for abdominal pain s/p drain placement. Primary Nurse Temo Bauer RN and Pipo Mcghee RN , RN performed a dual skin assessment on this patient Impairment noted- see wound doc flow sheet  Keenan score is 9    - Gluteal cleft moisture brake down, open spot 1 cm x 1 cm   - Left medial buttocks skin erosion,  - Sacrum non blnachable  - R  medial back skin tear  - Left medial heel DTI  - Left upper abdomen, wound s/p PEG removal, with brown purulent discharge. - New Left side abdomen drain placed 10/23/21  - R arm swelling   - Neck, skin brake down under trach device  Bedside shift change report given to [address] [place] [address]  (oncoming nurse) by Betty Pimentel RN  (offgoing nurse). Report included the following information SBAR, Intake/Output, MAR, Cardiac Rhythm ST and Dual Neuro Assessment.

## 2021-10-24 ENCOUNTER — APPOINTMENT (OUTPATIENT)
Dept: GENERAL RADIOLOGY | Age: 51
DRG: 368 | End: 2021-10-24
Attending: NURSE PRACTITIONER
Payer: COMMERCIAL

## 2021-10-24 LAB
ANION GAP SERPL CALC-SCNC: 12 MMOL/L (ref 5–15)
BUN SERPL-MCNC: 39 MG/DL (ref 6–20)
BUN/CREAT SERPL: 12 (ref 12–20)
CALCIUM SERPL-MCNC: 8.5 MG/DL (ref 8.5–10.1)
CHLORIDE SERPL-SCNC: 105 MMOL/L (ref 97–108)
CO2 SERPL-SCNC: 21 MMOL/L (ref 21–32)
CREAT SERPL-MCNC: 3.24 MG/DL (ref 0.7–1.3)
ERYTHROCYTE [DISTWIDTH] IN BLOOD BY AUTOMATED COUNT: 18 % (ref 11.5–14.5)
GLUCOSE SERPL-MCNC: 100 MG/DL (ref 65–100)
HCT VFR BLD AUTO: 29.3 % (ref 36.6–50.3)
HGB BLD-MCNC: 9.1 G/DL (ref 12.1–17)
MCH RBC QN AUTO: 29.8 PG (ref 26–34)
MCHC RBC AUTO-ENTMCNC: 31.1 G/DL (ref 30–36.5)
MCV RBC AUTO: 96.1 FL (ref 80–99)
NRBC # BLD: 0 K/UL (ref 0–0.01)
NRBC BLD-RTO: 0 PER 100 WBC
PLATELET # BLD AUTO: 348 K/UL (ref 150–400)
PMV BLD AUTO: 9.9 FL (ref 8.9–12.9)
POTASSIUM SERPL-SCNC: 3.4 MMOL/L (ref 3.5–5.1)
RBC # BLD AUTO: 3.05 M/UL (ref 4.1–5.7)
SODIUM SERPL-SCNC: 138 MMOL/L (ref 136–145)
WBC # BLD AUTO: 16.8 K/UL (ref 4.1–11.1)

## 2021-10-24 PROCEDURE — 74011000258 HC RX REV CODE- 258: Performed by: HEALTH CARE PROVIDER

## 2021-10-24 PROCEDURE — 94003 VENT MGMT INPAT SUBQ DAY: CPT

## 2021-10-24 PROCEDURE — 74018 RADEX ABDOMEN 1 VIEW: CPT

## 2021-10-24 PROCEDURE — 80048 BASIC METABOLIC PNL TOTAL CA: CPT

## 2021-10-24 PROCEDURE — 65610000006 HC RM INTENSIVE CARE

## 2021-10-24 PROCEDURE — 77010033711 HC HIGH FLOW OXYGEN

## 2021-10-24 PROCEDURE — 74011250636 HC RX REV CODE- 250/636: Performed by: NURSE PRACTITIONER

## 2021-10-24 PROCEDURE — 74011250636 HC RX REV CODE- 250/636: Performed by: HEALTH CARE PROVIDER

## 2021-10-24 PROCEDURE — C9113 INJ PANTOPRAZOLE SODIUM, VIA: HCPCS | Performed by: NURSE PRACTITIONER

## 2021-10-24 PROCEDURE — 85027 COMPLETE CBC AUTOMATED: CPT

## 2021-10-24 PROCEDURE — 99231 SBSQ HOSP IP/OBS SF/LOW 25: CPT | Performed by: SURGERY

## 2021-10-24 PROCEDURE — 74011000250 HC RX REV CODE- 250: Performed by: NURSE PRACTITIONER

## 2021-10-24 PROCEDURE — 74011250637 HC RX REV CODE- 250/637: Performed by: HEALTH CARE PROVIDER

## 2021-10-24 PROCEDURE — 36415 COLL VENOUS BLD VENIPUNCTURE: CPT

## 2021-10-24 PROCEDURE — 74011250636 HC RX REV CODE- 250/636: Performed by: INTERNAL MEDICINE

## 2021-10-24 PROCEDURE — 77030018798 HC PMP KT ENTRL FED COVD -A

## 2021-10-24 PROCEDURE — P9047 ALBUMIN (HUMAN), 25%, 50ML: HCPCS | Performed by: INTERNAL MEDICINE

## 2021-10-24 PROCEDURE — 94640 AIRWAY INHALATION TREATMENT: CPT

## 2021-10-24 RX ORDER — ALBUMIN HUMAN 250 G/1000ML
25 SOLUTION INTRAVENOUS
Status: DISCONTINUED | OUTPATIENT
Start: 2021-10-25 | End: 2021-10-29

## 2021-10-24 RX ORDER — ALBUMIN HUMAN 250 G/1000ML
25 SOLUTION INTRAVENOUS EVERY 12 HOURS
Status: COMPLETED | OUTPATIENT
Start: 2021-10-24 | End: 2021-10-25

## 2021-10-24 RX ORDER — POTASSIUM CHLORIDE 14.9 MG/ML
10 INJECTION INTRAVENOUS
Status: COMPLETED | OUTPATIENT
Start: 2021-10-24 | End: 2021-10-24

## 2021-10-24 RX ADMIN — FENTANYL CITRATE 50 MCG: 50 INJECTION INTRAMUSCULAR; INTRAVENOUS at 00:48

## 2021-10-24 RX ADMIN — Medication: at 19:31

## 2021-10-24 RX ADMIN — Medication 10 ML: at 22:00

## 2021-10-24 RX ADMIN — FENTANYL CITRATE 50 MCG: 50 INJECTION INTRAMUSCULAR; INTRAVENOUS at 12:36

## 2021-10-24 RX ADMIN — Medication: at 09:11

## 2021-10-24 RX ADMIN — Medication 10 ML: at 05:04

## 2021-10-24 RX ADMIN — POTASSIUM CHLORIDE 10 MEQ: 14.9 INJECTION, SOLUTION INTRAVENOUS at 10:25

## 2021-10-24 RX ADMIN — PIPERACILLIN AND TAZOBACTAM 3.38 G: 3; .375 INJECTION, POWDER, LYOPHILIZED, FOR SOLUTION INTRAVENOUS at 16:02

## 2021-10-24 RX ADMIN — SODIUM CHLORIDE 40 MG: 9 INJECTION INTRAMUSCULAR; INTRAVENOUS; SUBCUTANEOUS at 22:29

## 2021-10-24 RX ADMIN — SODIUM CHLORIDE 40 MG: 9 INJECTION INTRAMUSCULAR; INTRAVENOUS; SUBCUTANEOUS at 09:12

## 2021-10-24 RX ADMIN — CHLORHEXIDINE GLUCONATE 15 ML: 0.12 RINSE ORAL at 22:29

## 2021-10-24 RX ADMIN — IPRATROPIUM BROMIDE AND ALBUTEROL SULFATE 3 ML: .5; 3 SOLUTION RESPIRATORY (INHALATION) at 07:33

## 2021-10-24 RX ADMIN — IPRATROPIUM BROMIDE AND ALBUTEROL SULFATE 3 ML: .5; 3 SOLUTION RESPIRATORY (INHALATION) at 20:39

## 2021-10-24 RX ADMIN — FENTANYL CITRATE 50 MCG: 50 INJECTION INTRAMUSCULAR; INTRAVENOUS at 09:13

## 2021-10-24 RX ADMIN — POTASSIUM CHLORIDE 10 MEQ: 14.9 INJECTION, SOLUTION INTRAVENOUS at 09:12

## 2021-10-24 RX ADMIN — PIPERACILLIN AND TAZOBACTAM 3.38 G: 3; .375 INJECTION, POWDER, LYOPHILIZED, FOR SOLUTION INTRAVENOUS at 05:05

## 2021-10-24 RX ADMIN — Medication 10 ML: at 13:50

## 2021-10-24 RX ADMIN — Medication 10 ML: at 13:51

## 2021-10-24 RX ADMIN — ALBUMIN (HUMAN) 25 G: 0.25 INJECTION, SOLUTION INTRAVENOUS at 13:50

## 2021-10-24 RX ADMIN — FENTANYL CITRATE 50 MCG: 50 INJECTION INTRAMUSCULAR; INTRAVENOUS at 22:34

## 2021-10-24 RX ADMIN — CHLORHEXIDINE GLUCONATE 15 ML: 0.12 RINSE ORAL at 09:12

## 2021-10-24 RX ADMIN — FLUCONAZOLE IN SODIUM CHLORIDE 200 MG: 2 INJECTION, SOLUTION INTRAVENOUS at 09:12

## 2021-10-24 NOTE — PROGRESS NOTES
10/24/21 1121   Oxygen Therapy   O2 Device Tracheal collar   O2 Flow Rate (L/min) 10 l/min   FIO2 (%) 35 %   Pre-Treatment   Breathing Pattern Regular   Respirations 23

## 2021-10-24 NOTE — PROGRESS NOTES
[facility]    ICU TEAM Progress Note    Name: Emelia Smith   : 1970   MRN: [id number]   Date: 10/24/2021      Assessment:   Reason for ICU Admission: MV and hypotension     Brief HPI:    -  \"Pt is a 54 yo M with PMH ESRD on HD, takes coumadin secondary to hx of DVT RUE with a recent prolonged hospitalization secondary to COVID PNA in August of this year. He ultimately required trach and peg and was in rehab at [facility]. Pt unable to provide HPI secondary to current cognition, therefore information is obtained via chart and provider. [facility] had a dislodged peg tube. He started having coffee ground emesis 10/18/21 with elevated HR and hypotension. He was sent to the ER for further workup. He was found to be hypotensive and ICU was consulted for further management. \"      S/P:   Procedure(s):  ESOPHAGOGASTRODUODENOSCOPY (EGD) at Bedside  ESOPHAGOGASTRODUODENAL (EGD) BIOPSY    Plan:     1. GIB  - GI consulted, appreciate input  - PPI BID  - SP EGD    - Hgb stable, 9.1 this am     2. Hemorrhagic shock  - Sp PRBCs, FFP and k centra   - Serial H and H, stable   - Monitor closely for s/s of bleeding  - Volume resuscitated  - Retacrit started per nephrology   - CT revealed large fluid filled mass, Hematoma? General surgery consulted  - Fluid mass drained by IR yesterday, plans for drain to remain X3 days then fu CT scan to eval need for surgical intervention. Will monitor drainage and amount while drain in place     3. Supratherapeutic INR  - Sp FFP and k centra  - Hold coumadin for now as active bleeding  - Serial INR, last INR 1.1 (10/19)    4. Acute on chronic hypoxic respiratory failure with tracheostomy   - Continue MV  - SBT daily   - Pulm hygiene   - HOB >30   - Aspiration precautions   - Vent weaned, RR now 16 from 22 ABG appreciated   - Speech consulted for inline, appreciate input  - Tcollar trials during the day as tolerated  - Will try 24 TCT  - Rate last night     5.  ESRD on HD  - Cont HD per renal    6. Acute blood loss anemia   - Transfuse as needed to keep hgb >7  - Monitor closely for ss bleeding  - See above     7. PEG tube dislodged   - EGD per GI  - PEG removed, not in stomach  - CT abd and pelvis ordered, fluid collection noted, hematoma versus infection   - Await GI recs, will need new PEG placement at some point, will have to wait until fluid collection resolved   - Wound consulted for old peg site   - NPO for now, bowel rest per GI   -CT abd, pelvis appreciated, await GI recs on fluid collection   - General surgery consulted for fluid collection    8. Leukocytosis   - Improved this am 16, down from 17  - continue broad spectrum abx   - On zosyn  - Continue diflucan for yeast out of wound   - ID consult   - Cultures pending       Subjective:   Overnight Events:   10/24/2021        Objective:     Visit Vitals  /79   Pulse (!) 114   Temp 99.3 °F (37.4 °C)   Resp 28   Ht 5' 11.5\" (1.816 m)   Wt 140.5 kg (309 lb 11.9 oz)   SpO2 99%   BMI 42.60 kg/m²    O2 Flow Rate (L/min): 10 l/min O2 Device: Tracheostomy, Ventilator Temp (24hrs), Av.4 °F (36.9 °C), Min:97.8 °F (36.6 °C), Max:99.3 °F (37.4 °C)           Hemodynamics:   PAP:   CO:     Wedge:   CI:     CVP:    SVR:       PVR:       Ventilator Settings:  Mode Rate Tidal Volume Pressure FiO2 PEEP   Spontaneous      8 cm H2O 30 % 5 cm H20     Peak airway pressure: 14 cm H2O    Minute ventilation: 15.4 l/min        Intake/Output:     Intake/Output Summary (Last 24 hours) at 10/24/2021 0829  Last data filed at 10/24/2021 0716  Gross per 24 hour   Intake 510 ml   Output 450 ml   Net 60 ml       Physical Exam:  General:  Trach and on vent    Eyes:  Sclera anicteric. Pupils equal, round, reactive to light. Mouth/Throat: Orotracheal tube in place. Neck: Supple. Lungs:   Clear to auscultation bilaterally, good effort. Cardiovascular:  Regular rate and rhythm, no murmur, click, rub, or gallop. Abdomen:    Old peg removed, scar and dressing intact    Extremities: No cyanosis or edema. Skin: No acute rash or lesions. Lymph Nodes: Cervical and supraclavicular normal.   Musculoskeletal:  No swelling or deformity. Lines/Devices:  Intact, no erythema, drainage, or tenderness. Labs & Data: Reviewed    Medications: Reviewed    Chest X-Ray:    TTE:    Multidisciplinary Rounds Completed: Yes    ABCDEF Bundle/Checklist Completed: Yes    SPECIAL EQUIPMENT: None    DISPOSITION: Stay in ICU    CRITICAL CARE CONSULTANT NOTE  I had a face to face encounter with the patient, reviewed and interpreted patient data including clinical events, labs, images, vital signs, I/O's, and examined patient. I have discussed the case and the plan and management of the patient's care with the consulting services, the bedside nurses and the respiratory therapist.      NOTE OF PERSONAL INVOLVEMENT IN CARE   This patient has a high probability of imminent, clinically significant deterioration, which requires the highest level of preparedness to intervene urgently. I participated in the decision-making and personally managed or directed the management of the following life and organ supporting interventions that required my frequent assessment to treat or prevent imminent deterioration. I personally spent 45 minutes of critical care time. This is time spent at this critically ill patient's bedside actively involved in patient care as well as the coordination of care and discussions with the patient's family. This does not include any procedural time which has been billed separately.     Yong Guevara NP    [facility] Critical Care  10/24/2021

## 2021-10-24 NOTE — PROGRESS NOTES
10/24/21 0105   Vent Settings   FIO2 (%) 30 %   Back-Up Rate 16   Pressure Support (cm H2O) 8 cm H2O   PEEP/VENT (cm H2O) 5 cm H20   Insp Rise Time % 50 %   Flow Trigger 3.0   Expiratory Sensitivity 25 %   Ventilator Measurements   Vt Spont (ml) 477 ml   Ve Observed (l/min) 15.8 l/min   PIP Observed (cm H2O) 15 cm H2O   MAP (cm H2O) 8.1   I:E Ratio Actual 1:1.3   Vent Method/Mode   Ventilation Method Conventional   Ventilator Mode Pressure support;Spontaneous  (back on Vent per Dr Gorge Lou request to rest)   $$ Ventilator Subsequent Subsequent Vent Day     Pt placed back on vent to rest for a few hours per Dr Gorge Lou verbal request since this is the patients first Trach collar trial.  Pt tolerated trial well. RT offered words of encouragement to pt. Pt has anxiety & was teary last night regarding his situation. Pt returned to vent in PSV mode.   RN aware

## 2021-10-24 NOTE — PROGRESS NOTES
10/24/21 1121   Oxygen Therapy   O2 Sat (%) 97 %   Pulse via Oximetry 122 beats per minute   O2 Device Tracheal collar   O2 Flow Rate (L/min) 10 l/min   FIO2 (%) 35 %   Pre-Treatment   Breathing Pattern Regular   Respirations 23     Patient placed on Trach collar

## 2021-10-24 NOTE — PROGRESS NOTES
2000: received report from Dolly Wills, [facility]. Discussed with EMELI Malave about the possibility of initiating tube feeds through Dobhoff. Orders placed. 2100:  Divya Iniguez found in bed with patient. 2200: new Dobhoff placed, KUB ordered. 0000: Dobhoff in correct position per KUB results.  Per EMELI Malave the patient will be placed back on vent overnight to promote rest and then transitioned back to trach collar in AM.

## 2021-10-24 NOTE — PROGRESS NOTES
[facility] [facility]    Name: Shante Coy MRN: [id number]   : 1970 Hospital: [address]   Date: 10/24/2021        IMPRESSION:   · SERGEY, HD dependent. Patient was dialyzed Friday without events. · Hypotension persists - on min pressors if needed. · Hypervolemia with predominantly central congestion. · Anemia of multifactorial origin  · Respiratory failure due to a complicated Covid pneumonia, off vent has trach collar  · Hypokalemia pre HD      PLAN:   · HD again Monday. [facility] is notified. , will change dialysate K to 4  · Can give supplemental K via Dobhoff if needed  · IV albumin x 2 doses may help with BP support  · Resp management as per intensivist team  · Anemia management- start Retacrit. Iron sat 31%, will see Epo resistance during acute inflammatory state, may take some time to respond to EPO, transfuse if Hgb < 7. Subjective/Interval History:   I have reviewed the flowsheet and previous days notes. ROS: Awake and interactive, breathing ok via trach. Now with Dobhoff, wants something cold to drink, dry mouth. Had drainage of left abd wall fluid collection:    IMPRESSION        1. Successful ultrasound guided placement of 10 [place] percutaneous drain into a  left abdominal wall fluid collection. 2. Needle sampling of the more inferior collection of the left abdominal wall  yielded only a small amount of clot, presumed noninfected hematoma.     Objective:   Vital Signs:    Visit Vitals  BP 93/67 (BP 1 Location: Left lower arm, BP Patient Position: At rest)   Pulse (!) 122   Temp 98.8 °F (37.1 °C)   Resp 26   Ht 5' 11.5\" (1.816 m)   Wt 140.5 kg (309 lb 11.9 oz)   SpO2 97%   BMI 42.60 kg/m²       O2 Device: Tracheostomy, Ventilator   O2 Flow Rate (L/min): 10 l/min   Temp (24hrs), Av.6 °F (37 °C), Min:97.9 °F (36.6 °C), Max:99.3 °F (37.4 °C)       Intake/Output:   Last shift:      10/24 0701 - 10/24 1900  In: 0   Out: 50 [Drains:50]  Last 3 shifts: 10/22 1901 - 10/24 0700  In: 118 [I.V.:650]  Out: 400 [Drains:400]    Intake/Output Summary (Last 24 hours) at 10/24/2021 1242  Last data filed at 10/24/2021 0800  Gross per 24 hour   Intake 510 ml   Output 450 ml   Net 60 ml        Physical Exam:  General:    Awake, Not communicating verbally but nods approptiately. Head:   Normocephalic, without obvious abnormality, atraumatic. Rawleigh Billing Neck:  Trach in place  Lungs:   Clear to auscultation bilaterally. No Wheezing or Rhonchi. No rales. Chest wall:  No tenderness or deformity. No Accessory muscle use. Heart:   Regular rate and rhythm,  no murmur, rub or gallop. Abdomen:   Soft. Distended. Bowel sounds normal.  Left lateral abd wall drain  Extremities: Extremities normal, atraumatic, No cyanosis. + right arm edematous, has pre-tibial edema  Skin:     Texture, turgor normal. No rashes or lesions. Not Jaundiced  Psych:  Calm. Neurologic: Non focal.      DATA:  Labs:  Recent Labs     10/24/21  0507 10/23/21  0413 10/22/21  0734    137 138   K 3.4* 3.3* 3.5    104 104   CO2 21 20* 21   BUN 39* 30* 47*   CREA 3.24* 2.37* 3.23*   CA 8.5 8.6 8.8     Recent Labs     10/24/21  0511 10/23/21  0413 10/22/21  0734   WBC 16.8* 17.0* 10.6   HGB 9.1* 9.5* 8.9*   HCT 29.3* 31.0* 28.9*    365 333     No results for input(s): DOLORES, KU, CLU, CREAU in the last 72 hours.     No lab exists for component: PROU    Total time spent with patient:  35 minutes    [] Critical Care Provided    Care Plan discussed with:   Nursing, Medical Team    Yesy Cruz MD

## 2021-10-24 NOTE — PROGRESS NOTES
Bedside shift change report given to Lopez Dubon, ELIAS  (oncoming nurse) by Rose Ramos RN  (offgoing nurse). Report included the following information SBAR, Intake/Output, MAR, Cardiac Rhythm ST and Dual Neuro Assessment. 8267: Came to patient room, FEEDING tube out. New tube inserted, KUB ordered. 1251: Tube Feeding re-started at 20 cc/hr. Bedside shift change report given to Janice Holman  (oncoming nurse) by Lopez Dubon RN  (offgoing nurse).  Report included the following information SBAR, Intake/Output, MAR and Cardiac Rhythm ST.

## 2021-10-24 NOTE — PROGRESS NOTES
Progress Note    Patient: Kristian Kenney MRN: [id number]  SSN: [id number]    YOB: 1970  Age: 53 y.o. Sex: male      Admit Date: 10/18/2021    5 Days Post-Op    Procedure:  Procedure(s):  ESOPHAGOGASTRODUODENOSCOPY (EGD) at Bedside  ESOPHAGOGASTRODUODENAL (EGD) BIOPSY    Subjective:     Patient without new complaints. Nursing has noted some drainage from PEG tube site. This began prior to Dobbhoff feedings. Objective:     Visit Vitals  /84   Pulse (!) 120   Temp 98.1 °F (36.7 °C)   Resp 24   Ht 5' 11.5\" (1.816 m)   Wt 309 lb 11.9 oz (140.5 kg)   SpO2 99%   BMI 42.60 kg/m²       Temp (24hrs), Av.7 °F (37.1 °C), Min:97.9 °F (36.6 °C), Max:99.3 °F (37.4 °C)      Physical Exam:    General alert no acute distress  Abdomen soft nontender PEG tube site has some purulent drainage does not appear to be tube feeds. Drain is purulent and bloody. Approximately 530 out so far. Data Review: images and reports reviewed    Lab Review: All lab results for the last 24 hours reviewed. Assessment:     Hospital Problems  Never Reviewed        Codes Class Noted POA    Severe protein-calorie malnutrition ([facility] ICD-10-CM: E43  ICD-9-CM: 262  10/21/2021 Unknown        GIB (gastrointestinal bleeding) ICD-10-CM: K92.2  ICD-9-CM: 578.9  10/18/2021 Unknown              Plan/Recommendations/Medical Decision Making:   Continue IR drain for now. Would plan for repeat CT scan probably on Tuesday. If there is undrained collection once again IR is the best option to get this adequately drained. Okay to use Dobbhoff.   Antibiotics

## 2021-10-25 PROBLEM — R18.8 ABDOMINAL WALL FLUID COLLECTIONS: Status: ACTIVE | Noted: 2021-10-25

## 2021-10-25 LAB
ANION GAP SERPL CALC-SCNC: 13 MMOL/L (ref 5–15)
BACTERIA SPEC CULT: ABNORMAL
BACTERIA SPEC CULT: ABNORMAL
BUN SERPL-MCNC: 50 MG/DL (ref 6–20)
BUN/CREAT SERPL: 14 (ref 12–20)
CALCIUM SERPL-MCNC: 8.5 MG/DL (ref 8.5–10.1)
CHLORIDE SERPL-SCNC: 105 MMOL/L (ref 97–108)
CO2 SERPL-SCNC: 22 MMOL/L (ref 21–32)
CREAT SERPL-MCNC: 3.68 MG/DL (ref 0.7–1.3)
ERYTHROCYTE [DISTWIDTH] IN BLOOD BY AUTOMATED COUNT: 18 % (ref 11.5–14.5)
GLUCOSE SERPL-MCNC: 113 MG/DL (ref 65–100)
GRAM STN SPEC: ABNORMAL
HCT VFR BLD AUTO: 25.6 % (ref 36.6–50.3)
HGB BLD-MCNC: 7.8 G/DL (ref 12.1–17)
MCH RBC QN AUTO: 29.8 PG (ref 26–34)
MCHC RBC AUTO-ENTMCNC: 30.5 G/DL (ref 30–36.5)
MCV RBC AUTO: 97.7 FL (ref 80–99)
NRBC # BLD: 0.02 K/UL (ref 0–0.01)
NRBC BLD-RTO: 0.2 PER 100 WBC
PLATELET # BLD AUTO: 298 K/UL (ref 150–400)
PMV BLD AUTO: 9.6 FL (ref 8.9–12.9)
POTASSIUM SERPL-SCNC: 3.3 MMOL/L (ref 3.5–5.1)
RBC # BLD AUTO: 2.62 M/UL (ref 4.1–5.7)
SERVICE CMNT-IMP: ABNORMAL
SODIUM SERPL-SCNC: 140 MMOL/L (ref 136–145)
WBC # BLD AUTO: 11.5 K/UL (ref 4.1–11.1)

## 2021-10-25 PROCEDURE — 74011000250 HC RX REV CODE- 250: Performed by: NURSE PRACTITIONER

## 2021-10-25 PROCEDURE — 74011250636 HC RX REV CODE- 250/636: Performed by: HEALTH CARE PROVIDER

## 2021-10-25 PROCEDURE — 36415 COLL VENOUS BLD VENIPUNCTURE: CPT

## 2021-10-25 PROCEDURE — 74011250636 HC RX REV CODE- 250/636: Performed by: INTERNAL MEDICINE

## 2021-10-25 PROCEDURE — 92597 ORAL SPEECH DEVICE EVAL: CPT

## 2021-10-25 PROCEDURE — 74011250636 HC RX REV CODE- 250/636: Performed by: NURSE PRACTITIONER

## 2021-10-25 PROCEDURE — 90935 HEMODIALYSIS ONE EVALUATION: CPT

## 2021-10-25 PROCEDURE — 85027 COMPLETE CBC AUTOMATED: CPT

## 2021-10-25 PROCEDURE — 65610000006 HC RM INTENSIVE CARE

## 2021-10-25 PROCEDURE — P9047 ALBUMIN (HUMAN), 25%, 50ML: HCPCS | Performed by: INTERNAL MEDICINE

## 2021-10-25 PROCEDURE — 74011250637 HC RX REV CODE- 250/637: Performed by: NURSE PRACTITIONER

## 2021-10-25 PROCEDURE — 94640 AIRWAY INHALATION TREATMENT: CPT

## 2021-10-25 PROCEDURE — 94003 VENT MGMT INPAT SUBQ DAY: CPT

## 2021-10-25 PROCEDURE — 74011000258 HC RX REV CODE- 258: Performed by: HEALTH CARE PROVIDER

## 2021-10-25 PROCEDURE — C9113 INJ PANTOPRAZOLE SODIUM, VIA: HCPCS | Performed by: NURSE PRACTITIONER

## 2021-10-25 PROCEDURE — 74011250637 HC RX REV CODE- 250/637: Performed by: HEALTH CARE PROVIDER

## 2021-10-25 PROCEDURE — 99231 SBSQ HOSP IP/OBS SF/LOW 25: CPT | Performed by: PHYSICIAN ASSISTANT

## 2021-10-25 PROCEDURE — 77010033711 HC HIGH FLOW OXYGEN

## 2021-10-25 PROCEDURE — 80048 BASIC METABOLIC PNL TOTAL CA: CPT

## 2021-10-25 PROCEDURE — 92612 ENDOSCOPY SWALLOW (FEES) VID: CPT | Performed by: SPEECH-LANGUAGE PATHOLOGIST

## 2021-10-25 RX ORDER — IPRATROPIUM BROMIDE AND ALBUTEROL SULFATE 2.5; .5 MG/3ML; MG/3ML
3 SOLUTION RESPIRATORY (INHALATION) 2 TIMES DAILY
Status: DISCONTINUED | OUTPATIENT
Start: 2021-10-25 | End: 2021-10-25

## 2021-10-25 RX ORDER — LORAZEPAM 2 MG/ML
1 INJECTION INTRAMUSCULAR ONCE
Status: ACTIVE | OUTPATIENT
Start: 2021-10-25 | End: 2021-10-25

## 2021-10-25 RX ORDER — LANOLIN ALCOHOL/MO/W.PET/CERES
9 CREAM (GRAM) TOPICAL
Status: DISCONTINUED | OUTPATIENT
Start: 2021-10-25 | End: 2021-11-18 | Stop reason: HOSPADM

## 2021-10-25 RX ORDER — HEPARIN SODIUM 1000 [USP'U]/ML
1800 INJECTION, SOLUTION INTRAVENOUS; SUBCUTANEOUS ONCE
Status: COMPLETED | OUTPATIENT
Start: 2021-10-25 | End: 2021-10-25

## 2021-10-25 RX ORDER — IPRATROPIUM BROMIDE AND ALBUTEROL SULFATE 2.5; .5 MG/3ML; MG/3ML
3 SOLUTION RESPIRATORY (INHALATION) 2 TIMES DAILY
Status: DISCONTINUED | OUTPATIENT
Start: 2021-10-25 | End: 2021-11-06

## 2021-10-25 RX ORDER — LORAZEPAM 2 MG/ML
1 INJECTION INTRAMUSCULAR
Status: DISCONTINUED | OUTPATIENT
Start: 2021-10-25 | End: 2021-11-03

## 2021-10-25 RX ORDER — FENTANYL 25 UG/1
1 PATCH TRANSDERMAL
Status: DISCONTINUED | OUTPATIENT
Start: 2021-10-25 | End: 2021-11-18 | Stop reason: HOSPADM

## 2021-10-25 RX ORDER — NALOXONE HYDROCHLORIDE 1 MG/ML
0.4 INJECTION INTRAMUSCULAR; INTRAVENOUS; SUBCUTANEOUS
Status: DISPENSED | OUTPATIENT
Start: 2021-10-25 | End: 2021-10-26

## 2021-10-25 RX ORDER — LORAZEPAM 2 MG/ML
0.5 INJECTION INTRAMUSCULAR EVERY 12 HOURS
Status: DISCONTINUED | OUTPATIENT
Start: 2021-10-25 | End: 2021-10-30

## 2021-10-25 RX ORDER — HYDROMORPHONE HYDROCHLORIDE 1 MG/ML
0.5 INJECTION, SOLUTION INTRAMUSCULAR; INTRAVENOUS; SUBCUTANEOUS
Status: DISCONTINUED | OUTPATIENT
Start: 2021-10-25 | End: 2021-11-02

## 2021-10-25 RX ADMIN — LORAZEPAM 0.5 MG: 2 INJECTION INTRAMUSCULAR; INTRAVENOUS at 22:23

## 2021-10-25 RX ADMIN — Medication 10 ML: at 06:00

## 2021-10-25 RX ADMIN — Medication 10 ML: at 22:00

## 2021-10-25 RX ADMIN — SODIUM CHLORIDE 40 MG: 9 INJECTION INTRAMUSCULAR; INTRAVENOUS; SUBCUTANEOUS at 09:16

## 2021-10-25 RX ADMIN — LORAZEPAM 1 MG: 2 INJECTION INTRAMUSCULAR; INTRAVENOUS at 01:26

## 2021-10-25 RX ADMIN — POTASSIUM BICARBONATE 20 MEQ: 391 TABLET, EFFERVESCENT ORAL at 16:00

## 2021-10-25 RX ADMIN — Medication: at 17:59

## 2021-10-25 RX ADMIN — Medication 9 MG: at 01:37

## 2021-10-25 RX ADMIN — SODIUM CHLORIDE 40 MG: 9 INJECTION INTRAMUSCULAR; INTRAVENOUS; SUBCUTANEOUS at 22:23

## 2021-10-25 RX ADMIN — Medication 10 ML: at 13:44

## 2021-10-25 RX ADMIN — HEPARIN SODIUM 1800 UNITS: 1000 INJECTION INTRAVENOUS; SUBCUTANEOUS at 12:18

## 2021-10-25 RX ADMIN — PIPERACILLIN AND TAZOBACTAM 3.38 G: 3; .375 INJECTION, POWDER, LYOPHILIZED, FOR SOLUTION INTRAVENOUS at 05:01

## 2021-10-25 RX ADMIN — IPRATROPIUM BROMIDE AND ALBUTEROL SULFATE 3 ML: .5; 3 SOLUTION RESPIRATORY (INHALATION) at 19:16

## 2021-10-25 RX ADMIN — Medication 9 MG: at 22:23

## 2021-10-25 RX ADMIN — ACETAMINOPHEN 650 MG: 325 TABLET ORAL at 16:00

## 2021-10-25 RX ADMIN — FLUCONAZOLE IN SODIUM CHLORIDE 200 MG: 2 INJECTION, SOLUTION INTRAVENOUS at 11:03

## 2021-10-25 RX ADMIN — IPRATROPIUM BROMIDE AND ALBUTEROL SULFATE 3 ML: .5; 3 SOLUTION RESPIRATORY (INHALATION) at 07:29

## 2021-10-25 RX ADMIN — ALBUMIN (HUMAN) 25 G: 0.25 INJECTION, SOLUTION INTRAVENOUS at 09:15

## 2021-10-25 RX ADMIN — CHLORHEXIDINE GLUCONATE 15 ML: 0.12 RINSE ORAL at 21:00

## 2021-10-25 RX ADMIN — Medication: at 09:16

## 2021-10-25 RX ADMIN — CHLORHEXIDINE GLUCONATE 15 ML: 0.12 RINSE ORAL at 09:17

## 2021-10-25 RX ADMIN — ALBUMIN (HUMAN) 25 G: 0.25 INJECTION, SOLUTION INTRAVENOUS at 01:27

## 2021-10-25 RX ADMIN — PIPERACILLIN AND TAZOBACTAM 3.38 G: 3; .375 INJECTION, POWDER, LYOPHILIZED, FOR SOLUTION INTRAVENOUS at 16:00

## 2021-10-25 RX ADMIN — ACETAMINOPHEN 650 MG: 325 TABLET ORAL at 01:31

## 2021-10-25 RX ADMIN — Medication 10 ML: at 13:48

## 2021-10-25 RX ADMIN — LORAZEPAM 0.5 MG: 2 INJECTION INTRAMUSCULAR; INTRAVENOUS at 09:15

## 2021-10-25 RX ADMIN — HYDROMORPHONE HYDROCHLORIDE 0.5 MG: 1 INJECTION, SOLUTION INTRAMUSCULAR; INTRAVENOUS; SUBCUTANEOUS at 01:27

## 2021-10-25 RX ADMIN — HYDROMORPHONE HYDROCHLORIDE 0.5 MG: 1 INJECTION, SOLUTION INTRAMUSCULAR; INTRAVENOUS; SUBCUTANEOUS at 11:30

## 2021-10-25 NOTE — PROGRESS NOTES
10/24/21 2158   Vent Settings   FIO2 (%) 30 %   SpO2/FIO2 Ratio 316.67   Back-Up Rate 16   Pressure Support (cm H2O) 8 cm H2O   PEEP/VENT (cm H2O) 5 cm H20   Insp Rise Time % 50 %   Flow Trigger 3.0   Expiratory Sensitivity 25 %   Ventilator Measurements   Resp Rate Observed 24   Vt Spont (ml) 558 ml   Ve Observed (l/min) 17.3 l/min   PIP Observed (cm H2O) 14 cm H2O   MAP (cm H2O) 8.6   I:E Ratio Actual 1:1.3   Vent Method/Mode   Ventilation Method Conventional   Ventilator Mode Pressure support;Spontaneous   Ventilator Mode ID [id number]     Pt returned to vent after 9.5hrs Trach collar. Pt tolerated trial well but became tired, pt admitted he was tired. Pt returned to vent in PSV mode. Pt offered words of encouragement. Pt anxious tonight but seemed relieved to hear he is doing well with his collar trials.

## 2021-10-25 NOTE — PROCEDURES
[facility]                      [phone]  Vitals Pre Post Assessment Pre Post   BP BP: 100/68 (10/25/21 0927) 119/77 LOC Alert Alert   HR Pulse (Heart Rate): (!) 118 (10/25/21 0930) 120 Lungs On ventilator Clear   Resp Resp Rate: 28 (10/25/21 0930) 27 Cardiac NRRR NRRR   Temp Temp: 97.8 °F (36.6 °C) (10/25/21 0927) 98.7 Skin Warm, dry, intact War, dry   Weight 140.5 kg  Edema +ve BUA   +ve BLL +2  + 2   Pain Pain Intensity 1: 4 (10/25/21 0127) 0/10 Tele Status On cardiac monitor On cardiac monitor     Orders   Duration: Start: 0915 End: 2730 Total: 3.5 Hrs   Dialyzer: Dialyzer/Set Up Inspection: Revaclear (10/25/21 0927)   Ceola Ellie Bath: Dialysate K (mEq/L): 4 (10/25/21 0927)   Ca Bath: Dialysate CA (mEq/L): 2.5 (10/25/21 0927)   Na: Dialysate NA (mEq/L): 140 (10/25/21 0927)   Bicarb:  35   Target Fluid Removal: Goal/Amount of Fluid to Remove (mL): 2500 mL (10/25/21 0927)     Access   Type & Location: L-IJ tunneled catheter   Comments:    Dressing is intact and clean, arterial limb cap not clamped, visible air in the lumen seen. Disinfected as P&P. Each catheter limb disinfected per p&p, caps removed, hubs disinfected per p&p. Aspirated with 5 cc on both lumen, flushed with no issues.                                     Labs   HBsAg (Antigen) / date:   Negative ( 10/20/2021)                           HBsAb (Antibody) / date: Immune (10/20/2021)   Source: [facility]   Obtained/Reviewed  Critical Results Called HGB   Date Value Ref Range Status   10/25/2021 7.8 (L) 12.1 - 17.0 g/dL Final     Potassium   Date Value Ref Range Status   10/25/2021 3.3 (L) 3.5 - 5.1 mmol/L Final     Calcium   Date Value Ref Range Status   10/25/2021 8.5 8.5 - 10.1 MG/DL Final     BUN   Date Value Ref Range Status   10/25/2021 50 (H) 6 - 20 MG/DL Final     Creatinine   Date Value Ref Range Status   10/25/2021 3.68 (H) 0.70 - 1.30 MG/DL Final        Meds Given   Name Dose Route   Heparin 1800 units  1800 units Arterial Dwell  Venous Dwell               Adequacy / Fluid    Total Liters Process: 76 Liters   Net Fluid Removed: 2500 ml      Comments   Time Out Done: 0900   Admitting Diagnosis: SERGEY on HD, Hypotension   Consent obtained/signed: Informed Consent Verified: Yes (10/25/21 0927)   Machine / RO # Machine Number: [id number] (10/25/21 6852)   Primary Nurse Rpt Pre: Cyndi Billings R.N   Primary Nurse Rpt Post: Cyndi Billings R.N   Pt Education: mobilization of extremities as tolerated to avoid fluid accumulation. Care Plan: Continue treatment as ordered. Pts outpatient clinic: unknown     Tx Summary   Comments:    1128: Treatment started with slow flow. Seen and examined by Dr. Kristin Contreras. 1030: Vitally stable. 1220: Flushed with 100cc NS, small clot noted in the venous chamber. 1250: Patient stable and no complains, Treatment completed, all blood rinsed back, limbs disinfected as per P&P, secured with approved caps.   1255:Reports given to Cyndi Billings R.N.

## 2021-10-25 NOTE — PROGRESS NOTES
Problem: Voice Impaired (Adult)  Goal: *Acute Goals and Plan of Care (Insert Text)  Description: Speech Therapy Goals  Initiated 10/25/2021    1. Patient will tolerate PMV during all waking hours without adverse effects within 7 days. Outcome: Progressing Towards Goal     SPEECH LANGUAGE PATHOLOGY EVALUATION  Patient: Aleisha Hurley (64 y.o. male)  Date: 10/25/2021  Primary Diagnosis: GIB (gastrointestinal bleeding) [K92.2]  Procedure(s) (LRB):  ESOPHAGOGASTRODUODENOSCOPY (EGD) at Bedside (N/A)  ESOPHAGOGASTRODUODENAL (EGD) BIOPSY (N/A) 6 Days Post-Op   Precautions:   Fall, Skin, Contact    ASSESSMENT :  Based on the objective data described below, the patient presents with excellent tolerance of PMV with no changes in vital signs, evidence of back pressure/breath stacking indicative of Co2 retention, nor increase work of breathing. Excellent vocal quality and intensity. Recommend pt utilize PMV during all waking hours as tolerated. Please see separate note by Mary Loge addressing swallowing. Patient will benefit from skilled intervention to address the above impairments. Patients rehabilitation potential is considered to be Good     PLAN :  Speaking Valve Placement:  SLP / RT / RN and patient/family  Recommended Speaking Valve Wearing Schedule:  [x]  As tolerated  Frequency/Duration: Patient will be followed by speech-language pathology 3 times a week to address goals. Discharge Recommendations: Inpatient Rehab     SUBJECTIVE:   Patient stated, \"This is what I was waiting for! . OBJECTIVE:     Past Medical History:   Diagnosis Date    COVID     Dialysis patient [facility])     started around the end of september 2021    Polycystic kidney disease     S/P percutaneous endoscopic gastrostomy (PEG) tube placement ([facility] [address]    No past surgical history on file.   Prior Level of Function/Home Situation:   Home Situation  Home Environment:  (admitted from [address] [place] [address])  Support Systems: Spouse/Significant Other     Mental Status  Neurologic State: Alert  Orientation Level: Oriented X4  Cognition: Follows commands     Airway Clearance  Suction: Trach  Suction Device: Inline suction catheter  Sputum Method Obtained: Tracheal  Sputum Amount: Small  Sputum Color/Odor: White  Sputum Consistency: Thin        Oxygen Therapy  O2 Sat (%): 97 %  Pulse via Oximetry: 120 beats per minute  O2 Device: Tracheal collar  Skin Assessment: Skin breakdown present (see comment/note)  Skin Protection for O2 Device: Yes  Orientation: Anterior  Location: Neck  Interventions: Skin Barrier  O2 Temperature: 97.3 °F (36.3 °C)  FIO2 (%): 30 %  Airway Clearance  Suction: Trach  Suction Device: Inline suction catheter  Sputum Method Obtained: Tracheal  Sputum Amount: Small  Sputum Color/Odor: White  Sputum Consistency: Thin    NOMS:  The NOMS functional outcome measure was used to quantify this patient's level of voice  impairment. Based on the NOMS, the patient was determined to be at level 6 for voice function. NOMS Voice:  Level 1 (CN): Unable to use voice to communicate. Needs AAC. Level 2 (CM): Voice not functional most of time. Level 3 (CL): Voice functional but distracting & interferes with communication. Participation in activities is limited most of time. Level 4 (CK): Voice is functional and sometimes distracting. High vocal demand consistently impacted. Level 5 (CJ): Voice occasionally sounds normal with self monitoring; high vocal demand occasionally impacted. Level 6 (CI): Voice sounds normal across most situations. High vocal demand rarely impacted. Level 7 ([address] [place] [address]): Voice is normal and self monitoring is effective but only occasionally needed. JOAO. (2003). National Outcomes Measurement System (NOMS): Adult Speech-Language Pathology User's Guide.      Pain:  Pain Scale 1: Adult Nonverbal Pain Scale  Pain Intensity 1: 4       After treatment:   Call bell within reach and Nursing notified    COMMUNICATION/EDUCATION:     The patient's plan of care including recommendations, planned interventions, and recommended diet changes were discussed with: Registered nurse and NP . Patient/family have participated as able in goal setting and plan of care.     Thank you for this referral.  MALCOLM Akers  Time Calculation: 15 mins

## 2021-10-25 NOTE — PROGRESS NOTES
General Surgery Daily Progress Note    Admit Date: 10/18/2021  Post-Operative Day: 6 Days Post-Op from Procedure(s):  ESOPHAGOGASTRODUODENOSCOPY (EGD) at Bedside  ESOPHAGOGASTRODUODENAL (EGD) BIOPSY     Subjective:     Last 24 hrs: No new complaints. Nods appropriately, alert. Currently receiving bedside HD  Trach collar trial planned for later today  WBC down today    Objective:     Blood pressure 106/75, pulse (!) 119, temperature 97.8 °F (36.6 °C), temperature source Axillary, resp. rate 29, height 5' 11.5\" (1.816 m), weight 309 lb 11.9 oz (140.5 kg), SpO2 94 %. Temp (24hrs), Av.3 °F (36.8 °C), Min:97.4 °F (36.3 °C), Max:99.7 °F (37.6 °C)      _____________________  Physical Exam:     Alert and Oriented, +trach, in no acute distress. Cardiovascular: RRR, 2+ peripheral edema  Lungs: On vent, PEEP 5, FiO2 30%  Abdomen: obese, soft, + Dobhoff drain w thick, purulent tan fluid in appliance. 400ml/24 hrs output. Former PEG tube site dressed with similar prurulent tan drainage noted on dressing    Assessment:   Active Problems:    GIB (gastrointestinal bleeding) (10/18/2021)      Severe protein-calorie malnutrition ([address] (10/21/2021)            Plan:     Plan for repeat Abd CT tomorrow/Tues to assess residual fluid collection  Rest of care per ICU team    NOTE PER DR HAN MACARIO:  Pt examined, discussed and reviewed with NP, and questions answered with pt, and I agree/ concur with note NP     HPI:  Notes above reviewed, concur, status post percutaneous drainage of abdominal wall abscess status post PEG tube dislodgment and secondary abscess  Review of Systems   Unable to perform ROS: Intubated       Physical Exam  Vitals and nursing note reviewed.    Constitutional:       Comments: Patient on ventilator with tracheostomy, and with renal hemodialysis   Abdominal:      Comments: Left upper quadrant old cutaneous gastrostomy tube site with purulent drainage and percutaneous drain site left lower abdomen with purulent drainage to the suction catheter. Rest of the abdomen is soft and nontender   Musculoskeletal:      Comments: Lower extremity edema   Neurological:      Mental Status: He is alert. Psychiatric:         Mood and Affect: Mood normal.         Behavior: Behavior normal.         Active Problems:    GIB (gastrointestinal bleeding) (10/18/2021)      Severe protein-calorie malnutrition ([address] (10/21/2021)      Abdominal wall fluid collections (10/25/2021)         REC:   Follow-up CT tomorrow, continue antibiotics, see above plan and recommendations per nurse practitioner, I concur  Face-to-face time including review of any indicated imaging, discussion with patient, and other providers, exam and discussion with patient:    18        minutes    Data Review:    Recent Labs     10/25/21  0512 10/24/21  0511 10/23/21  0413   WBC 11.5* 16.8* 17.0*   HGB 7.8* 9.1* 9.5*   HCT 25.6* 29.3* 31.0*    348 365     Recent Labs     10/25/21  0509 10/24/21  0507 10/23/21  0413    138 137   K 3.3* 3.4* 3.3*    105 104   CO2 22 21 20*   * 100 83   BUN 50* 39* 30*   CREA 3.68* 3.24* 2.37*   CA 8.5 8.5 8.6     No results for input(s): AML, LPSE in the last 72 hours.         ______________________  Medications:    Current Facility-Administered Medications   Medication Dose Route Frequency    LORazepam (ATIVAN) injection 1 mg  1 mg IntraVENous ONCE    fentaNYL (DURAGESIC) 25 mcg/hr patch 1 Patch  1 Patch TransDERmal Q72H    HYDROmorphone (DILAUDID) injection 0.5 mg  0.5 mg IntraVENous Q6H PRN    LORazepam (ATIVAN) injection 0.5 mg  0.5 mg IntraVENous Q12H    LORazepam (ATIVAN) injection 1 mg  1 mg IntraVENous Q12H PRN    melatonin tablet 9 mg  9 mg Oral QHS    naloxone (NARCAN) injection 0.4 mg  0.4 mg SubCUTAneous ONCE PRN    albuterol-ipratropium (DUO-NEB) 2.5 MG-0.5 MG/3 ML  3 mL Nebulization BID    heparin (porcine) 1,000 unit/mL injection 1,800 Units  1,800 Units IntraCATHeter ONCE    heparin (porcine) 1,000 unit/mL injection 1,800 Units  1,800 Units IntraCATHeter ONCE    albumin human 25% (BUMINATE) solution 25 g  25 g IntraVENous DIALYSIS MON, WED & FRI    fluconazole (DIFLUCAN) 200mg/100 mL IVPB (premix)  200 mg IntraVENous Q24H    fentaNYL citrate (PF) injection 50 mcg  50 mcg IntraVENous Q4H PRN    sodium chloride (NS) flush 5-40 mL  5-40 mL IntraVENous Q8H    sodium chloride (NS) flush 5-40 mL  5-40 mL IntraVENous PRN    pantoprazole (PROTONIX) 40 mg in 0.9% sodium chloride 10 mL injection  40 mg IntraVENous Q12H    chlorhexidine (ORAL CARE KIT) 0.12 % mouthwash 15 mL  15 mL Oral Q12H    balsam peru-castor oiL (VENELEX) ointment   Topical BID    sodium chloride (NS) flush 5-10 mL  5-10 mL IntraVENous PRN    sodium chloride (NS) flush 5-40 mL  5-40 mL IntraVENous Q8H    sodium chloride (NS) flush 5-40 mL  5-40 mL IntraVENous PRN    acetaminophen (TYLENOL) tablet 650 mg  650 mg Oral Q6H PRN    Or    acetaminophen (TYLENOL) suppository 650 mg  650 mg Rectal Q6H PRN    polyethylene glycol (MIRALAX) packet 17 g  17 g Oral DAILY PRN    ondansetron (ZOFRAN ODT) tablet 4 mg  4 mg Oral Q8H PRN    Or    ondansetron (ZOFRAN) injection 4 mg  4 mg IntraVENous Q6H PRN    piperacillin-tazobactam (ZOSYN) 3.375 g in 0.9% sodium chloride (MBP/ADV) 100 mL MBP  3.375 g IntraVENous Q12H    albuterol (PROVENTIL VENTOLIN) nebulizer solution 2.5 mg  2.5 mg Nebulization Q4H PRN       OTONIEL Gomes  10/25/2021

## 2021-10-25 NOTE — PROGRESS NOTES
Transition of Care Plan   RUR-  Medium    DISPOSITION: LTAC   F/U with PCP/Specialist     Transport: [facility] with vent  Patient remains in the ICU. Per notes PEG tube site has some purulent, bloody drainage. Plan to repeat CT on Tuesday to see if there is a collection that can be drained. Referral made to [facility] in [facility]. Awaiting response.    Joslyn Porter RN,Care Management

## 2021-10-25 NOTE — WOUND CARE
WOCN Note:     Follow-up visit for heel, buttocks, and lower left back fold. Chart shows:  Admitted for GIB post Covid-19+ in August  History of HD, trach, peg     Assessment:   Communicative by mouthing words, intubated via trach. Requires full assists in repositioning; PCT at bedside to help turn from left to right  Surface: yandy ISAEL mattress  Diet: tube feeds     1. POA left medial heel deep tissue injury  2.5 x 4.5 x 0 cm  100% non-blanching faded burgundy  No fluctuance  Appears improved  Offloaded with pillows; venelex in use     2. POA widespread MASD to bilateral buttocks with dry desquamation and lacy skin border  Erosion to left medial buttock  2 x 3 x 0.3 cm  Irregular nydia base  Tx: venelex applied since less stool today    3. POA necrotic tissue in skin fold of left flank  0.6 x 4 x 0.1 cm  100% yellow  No exudate  Cleaned with saline and covered with foam until Santyl is up from pharmacy     Recommendations:  Venelex to heels and buttocks twice daily  Left flank: clean with saline, apply Santyl and cover with foam.  Change daily. Turn/reposition approximately every 2 hours  Offload heels with heels hanging off end of pillow at all times while in bed. Air mattress: Use only flat sheet and one incontinence pad.  Please call [facility] @ [phone] for bed to be picked up at discharge.      Transition of Care: Plan to follow weekly and as needed while admitted to hospital.     KRZYSZTOF LozadaN, RN, [employer]  Certified Wound, Ostomy, Continence Nurse  office [phone]  Available via [address] [place] [address]

## 2021-10-25 NOTE — PROGRESS NOTES
Occupational Therapy   10.25.2021    Chart reviewed in prep for OT treatment - patient receiving bedside HD. Will defer and f/u as able. Thank you. Steve Clark, MS, OTR/L

## 2021-10-25 NOTE — PROGRESS NOTES
SLP Contact Note    Noted pt will be doing trach collar trials today after dialysis. Per discussion with NP and RN, will plan to trial PMV and complete a Fiberoptic Endoscopic Evaluation of Swallow (FEES) at bedside to objectively assess safety of swallow. Recommendations to follow.       Thank you,  SARAH ScottEd, [address] [facility]  Speech-Language Pathologist

## 2021-10-25 NOTE — PROGRESS NOTES
0730: Bedside and Verbal shift change report given to Sierra Pasucal RN (oncoming nurse) by Ruth Blair RN (offgoing nurse). Report included the following information SBAR, Kardex, Intake/Output, MAR, Accordion, Recent Results, Med Rec Status, Cardiac Rhythm Sinus Tach, Alarm Parameters  and Dual Neuro Assessment. 1930: Bedside and Verbal shift change report given to Ruth Blair RN (oncoming nurse) by Sierra Pascual RN (offgoing nurse). Report included the following information SBAR, Kardex, Intake/Output, MAR, Accordion, Recent Results, Med Rec Status, Cardiac Rhythm Sinus Tach, Alarm Parameters  and Dual Neuro Assessment.

## 2021-10-25 NOTE — PROGRESS NOTES
[facility]    ICU TEAM Progress Note    Name: Nisreen Rios   : 1970   MRN: [id number]   Date: 10/25/2021      Assessment:   Reason for ICU Admission: MV and hypotension     Brief HPI:    -  \"Pt is a 52 yo M with PMH ESRD on HD, takes coumadin secondary to hx of DVT RUE with a recent prolonged hospitalization secondary to COVID PNA in August of this year. He ultimately required trach and peg and was in rehab at [facility]. Pt unable to provide HPI secondary to current cognition, therefore information is obtained via chart and provider. Per [facility] records, pt had a dislodged peg tube. He started having coffee ground emesis 10/18/21 with elevated HR and hypotension. He was sent to the ER for further workup. He was found to be hypotensive and ICU was consulted for further management. \"      10/25 Pt co some increased pain and discomfort overnight, was restrted on PTA fentanyl patch and prn ativan. S/P:   Procedure(s):  ESOPHAGOGASTRODUODENOSCOPY (EGD) at Bedside  ESOPHAGOGASTRODUODENAL (EGD) BIOPSY    Plan:     1. GIB  - GI consulted, appreciate input  - PPI BID  - SP EGD    - Hgb stable, 9.1 this am     2. Hemorrhagic shock  - Sp PRBCs, FFP and k centra   - Serial H and H, stable   - Monitor closely for s/s of bleeding  - Volume resuscitated  - Retacrit started per nephrology   - CT revealed large fluid filled mass, Hematoma? General surgery consulted  - Fluid mass drained by IR yesterday, plans for drain to remain X3 days then fu CT scan 10/26 to eval need for surgical intervention versus IR intervention. Will monitor drainage and amount while drain in place     3. Supratherapeutic INR  - Sp FFP and k centra  - Hold coumadin for now as active bleeding  - Serial INR, last INR 1.1 (10/19)    4.  Acute on chronic hypoxic respiratory failure with tracheostomy   - Continue MV  - SBT daily   - Pulm hygiene   - HOB >30   - Aspiration precautions   - Vent weaned, RR now 16 from 22 ABG appreciated   - Speech consulted for inline, appreciate input  - Tcollar trials during the day as tolerated  - Will try 24 TCT  - Rate last night     5. ESRD on HD  - Cont HD per renal    6. Acute blood loss anemia   - Transfuse as needed to keep hgb >7  - Monitor closely for ss bleeding  - See above     7. PEG tube dislodged   - EGD per GI  - PEG removed, not in stomach  - CT abd and pelvis ordered, fluid collection noted, hematoma versus infection   - Await GI recs, will need new PEG placement at some point, will have to wait until fluid collection resolved   - Wound consulted for old peg site   - NPO for now, bowel rest per GI   -CT abd, pelvis appreciated, await GI recs on fluid collection   - General surgery consulted for fluid collection    8. Leukocytosis   - Improved this am 16, down from 17  - continue broad spectrum abx   - On zosyn  - Continue diflucan for yeast out of wound   - ID consult   - Cultures pending       Subjective:   Overnight Events:   10/25/2021        Objective:     Visit Vitals  /69   Pulse (!) 112   Temp 97.4 °F (36.3 °C)   Resp 26   Ht 5' 11.5\" (1.816 m)   Wt 140.5 kg (309 lb 11.9 oz)   SpO2 99%   BMI 42.60 kg/m²    O2 Flow Rate (L/min): 10 l/min O2 Device: Ventilator Temp (24hrs), Av.6 °F (37 °C), Min:97.4 °F (36.3 °C), Max:99.7 °F (37.6 °C)           Hemodynamics:   PAP:   CO:     Wedge:   CI:     CVP:    SVR:       PVR:       Ventilator Settings:  Mode Rate Tidal Volume Pressure FiO2 PEEP   Pressure support      8 cm H2O 30 % 5 cm H20     Peak airway pressure: 14 cm H2O    Minute ventilation: 15 l/min        Intake/Output:     Intake/Output Summary (Last 24 hours) at 10/25/2021 0836  Last data filed at 10/25/2021 0700  Gross per 24 hour   Intake 1475 ml   Output 80 ml   Net 1395 ml       Physical Exam:  General:  Trach and on vent    Eyes:  Sclera anicteric. Pupils equal, round, reactive to light. Mouth/Throat: Orotracheal tube in place. Neck: Supple.    Lungs:   Clear to auscultation bilaterally, good effort. Cardiovascular:  Regular rate and rhythm, no murmur, click, rub, or gallop. Abdomen: Old peg removed, scar and dressing intact    Extremities: No cyanosis or edema. Skin: No acute rash or lesions. Lymph Nodes: Cervical and supraclavicular normal.   Musculoskeletal:  No swelling or deformity. Lines/Devices:  Intact, no erythema, drainage, or tenderness. Labs & Data: Reviewed    Medications: Reviewed    Chest X-Ray:    TTE:    Multidisciplinary Rounds Completed: Yes    ABCDEF Bundle/Checklist Completed: Yes    SPECIAL EQUIPMENT: None    DISPOSITION: Stay in ICU    CRITICAL CARE CONSULTANT NOTE  I had a face to face encounter with the patient, reviewed and interpreted patient data including clinical events, labs, images, vital signs, I/O's, and examined patient. I have discussed the case and the plan and management of the patient's care with the consulting services, the bedside nurses and the respiratory therapist.      NOTE OF PERSONAL INVOLVEMENT IN CARE   This patient has a high probability of imminent, clinically significant deterioration, which requires the highest level of preparedness to intervene urgently. I participated in the decision-making and personally managed or directed the management of the following life and organ supporting interventions that required my frequent assessment to treat or prevent imminent deterioration. I personally spent 35 minutes of critical care time. This is time spent at this critically ill patient's bedside actively involved in patient care as well as the coordination of care and discussions with the patient's family. This does not include any procedural time which has been billed separately.     Diana Eli NP    [facility]  10/25/2021

## 2021-10-25 NOTE — PROGRESS NOTES
Problem: Dysphagia (Adult)  Goal: *Acute Goals and Plan of Care (Insert Text)  Description: Speech therapy goals  Initiated 10/25/2021   1. Patient will tolerate soft and bite sized diet with thin liquids with PMV in place without s/s of aspiration within 7 days     Outcome: Progressing Towards Goal     Speech Language Pathology  Fiberoptic Endoscopic Evaluation of Swallowing-FEES  Patient: Charly Ordoñez [de-identified]53 y.o. male)  Date: 10/25/2021  Primary Diagnosis: GIB (gastrointestinal bleeding) [K92.2]  Procedure(s) (LRB):  ESOPHAGOGASTRODUODENOSCOPY (EGD) at Bedside (N/A)  ESOPHAGOGASTRODUODENAL (EGD) BIOPSY (N/A) 6 Days Post-Op   Precautions: aspiration  Fall, Skin, Contact    ASSESSMENT :  Based on the objective data described below, when the PMV is in place, the patient presents with mild pharyngeal dysphagia characterized by mildly delayed swallow initiation with penetration occurring before the swallow with thin liquids related to delay. Penetration cleared with the force of the swallow when PMV was in place. Mildly reduced epiglottic inversion and reduced laryngeal closure as evidenced by decreased white out phase of the swallow. Reduced pharyngeal squeeze resulted in mild vallecular and pyriform sinus residue with purees and solids that was reduced but not cleared with additional automatic swallow. Also with mild residue around the NG tube and on the epiglottis where it contacted the NG tube. Suspect that residue will improve once NG tube is able to be removed. There was trace penetration after the swallow from pooled secretions in the pyriforms that was mixed with green food dye, but was not specific to a consistency. This elicited a cough which cleared penetration with each occurrence. There was no aspiration, including with large successive straw sips of thin liquids.   Fatigue was noted with increasing RR as trials progressed and this can increase aspiration risks over the course of a meal. Will benefit from rest breaks, single bites/sips, assistance with feeding, and increased time for meals to reduce fatigue. Thin liquids were then trialed with PMV removed, and patient demonstrated penetration to the vocal cords that did not clear with the force of the swallow. There was a delayed cough in response to penetration. No aspiration, however, certainly at increased risk when PMV not in place or when patient is on the ventilator due to changes in swallowing physiology. Patient will benefit from skilled intervention to address the above impairments. Patient's rehabilitation potential is considered to be Good     PLAN :  Recommendations and Planned Interventions:  --Recommend soft and bite sized/thin liquid diet with PMV on for all PO intake. --recommend small bites/sips, smaller more frequent meals, rest breaks, and assistance with feeding to reduce fatigue with meals. Stop with visibly increased work of breathing or fatigue  --Would keep Dobbhoff tube for now given unknown ability to remain on the trach collar for all meals. Once patient is able to consistently tolerate meals with PMV in place, would consider removal of Dobbhoff at that time.   --NO PO when patient is on the vent or if PMV is not in place due to increased aspiration risks. Frequency/Duration: Patient will be followed by speech-language pathology 3 times a week to address goals. Discharge Recommendations: To Be Determined     SUBJECTIVE:   Patient alert, cooperative. Participated well in study. OBJECTIVE:     Past Medical History:   Diagnosis Date    COVID     Dialysis patient [facility])     started around the end of september 2021    Polycystic kidney disease     S/P percutaneous endoscopic gastrostomy (PEG) tube placement ([facility] [address]    No past surgical history on file.   Prior Level of Function/Home Situation:   Home Situation  Home Environment:  (admitted from [facility])  Support Systems: Spouse/Significant Other  Diet prior to admission: NPO with PEG   Current Diet:  NPO with Dobbhoff     Patient Position: upright in bed   Scope used: Ambu disposable scope  Respiratory status: stable on room air     Part I:   Anatomic-Physiologic Assessment:        Movement:  n/a  Base of Tongue Movement:  n/a  Secretions:  mild pooling of secretions in pyriforms  Appearance of Secretions:  thin, clear  Effectiveness of Swallow in Clearing Secretions:  occasional penetration of secretions that elicited immediate cough  VF Symmetry and Appearance: WFL   Phonation:  WFL   Other Structural Remarks:  cobblestone appearance of tongue base likely consistent with patient's reported history of reflux    Trial 1:   Consistency Presented: Thin liquid;Puree; Solid; Ice chips   How Presented: SLP-fed/presented;Spoon;Straw;Successive swallows       Bolus Acceptance: No impairment   Bolus Formation/Control: No impairment:     Propulsion: No impairment   Oral Residue: None   Initiation of Swallow: Triggered at pyriform sinus(es);Triggered at vallecula   Timing: No impairment   Penetration: Before swallow; After swallow; To laryngeal vestibule   Aspiration/Timing: No evidence of aspiration   Pharyngeal Clearance: Vallecular residue;Pyriform residue ; Less than 10%          Laryngeal Elevation: Inadequate epiglottic inversion; Incomplete laryngeal closure  Aspiration/Penetration Score: 3 (Penetration/Visible residue-Contrast remains above the folds/cords, but is not cleared)  Pharyngeal Symmetry: Symmetrical  Pharyngeal-Esophageal Segment: No impairment  Pharyngeal Dysfunction: Decreased strength;Decreased elevation/closure    Oral Phase Severity: No impairment  Pharyngeal Phase Severity: Mild  NOMS:   The NOMS functional outcome measure was used to quantify this patient's level of swallowing impairment.   Based on the NOMS, the patient was determined to be at level 5 for swallow function         NOMS Swallowing Levels:  Level 1 (CN): NPO  Level 2 (CM): NPO but takes consistency in therapy  Level 3 (CL): Takes less than 50% of nutrition p.o. and continues with nonoral feedings; and/or safe with mod cues; and/or max diet restriction  Level 4 (CK): Safe swallow but needs mod cues; and/or mod diet restriction; and/or still requires some nonoral feeding/supplements  Level 5 (CJ): Safe swallow with min diet restriction; and/or needs min cues  Level 6 (CI): Independent with p.o.; rare cues; usually self cues; may need to avoid some foods or needs extra time  Level 7 ([address] [place] [address]): Independent for all p.o.  JOAO. (2003). National Outcomes Measurement System (NOMS): Adult Speech-Language Pathology User's Guide. COMMUNICATION/EDUCATION:     The patient's plan of care including findings from FEES, recommendations, planned interventions, and recommended diet changes were discussed with: Registered nurse, RD    Patient/family have participated as able in goal setting and plan of care. Patient/family agree to work toward stated goals and plan of care. Thank you for this referral.  Carol Ann Francisco M.CD.  CCC-SLP   Time Calculation: 20 mins

## 2021-10-25 NOTE — PROGRESS NOTES
Physical Therapy: defer    Chart reviewed and attempted to see pt for PT treatment. Pt currently on bedside HD and is unavailable for therapy at this time. Will defer and continue to follow.       Gabrielle Spencer, PT, DPT

## 2021-10-25 NOTE — PROGRESS NOTES
[facility] [address]    Name: Kristian Kenney MRN: [id number]   : 1970 [facility]   Date: 10/25/2021        IMPRESSION:   SERGEY, HD dependent - HD - MWF   Hypotension persists - on min pressors if needed - BPs are better  Hypervolemia with predominantly central congestion. Anemia of multifactorial origin  Respiratory failure due to a complicated Covid pneumonia, off vent has trach collar  Hypokalemia pre HD      PLAN:   HD again today (10/25). The dialysate was changed to 4 K to address hypokalemia  Can give supplemental K via Dobhoff if needed  IV albumin x 2 doses prn for intra-dialytic hypotension. Resp management as per intensivist team  Anemia management- start Retacrit. Iron sat 31%, will see Epo resistance during acute inflammatory state, may take some time to respond to EPO, transfuse if Hgb < 7. Subjective/Interval History:     Janalyn Closs --> 10/24/21    I have reviewed the flowsheet and previous days notes. ROS: Awake and interactive, breathing ok via trach. Now with Dobhoff, wants something cold to drink, dry mouth. Had drainage of left abd wall fluid collection:    IMPRESSION        1. Successful ultrasound guided placement of 10 Estonian percutaneous drain into a  left abdominal wall fluid collection. 2. Needle sampling of the more inferior collection of the left abdominal wall  yielded only a small amount of clot, presumed noninfected hematoma. Glenora Nearing --> 10/25/21    BPs are better today. Remains on vent support. Getting ready to start HD when I entered the room.       Objective:   Vital Signs:    Visit Vitals  /69   Pulse (!) 112   Temp 97.4 °F (36.3 °C)   Resp 26   Ht 5' 11.5\" (1.816 m)   Wt 140.5 kg (309 lb 11.9 oz)   SpO2 99%   BMI 42.60 kg/m²       O2 Device: Ventilator   O2 Flow Rate (L/min): 10 l/min   Temp (24hrs), Av.5 °F (36.9 °C), Min:97.4 °F (36.3 °C), Max:99.7 °F (37.6 °C)       Intake/Output:   Last shift:      No intake/output data recorded. Last 3 shifts: 10/23 1901 - 10/25 0700  In: 1935 [I.V.:775]  Out: 130 [Drains:130]    Intake/Output Summary (Last 24 hours) at 10/25/2021 0914  Last data filed at 10/25/2021 0700  Gross per 24 hour   Intake 1475 ml   Output 80 ml   Net 1395 ml        Physical Exam:    Seen in the ICU - Bed 4. His Dialysis team was in attendance. General:    Awake and nodding  approptiately. Head:   Normocephalic. .    Neck:  Trach in place    Lungs:   Clear to auscultation, No Wheezing , Rhonchi or rales. Heart:   No S3 gallop , no pericardial rub. Abdomen:   No clear-cut distension    Extremities: Leg oedema + to ++    Neurologic: Interacting appropriately. DATA:  Labs:  Recent Labs     10/25/21  0509 10/24/21  0507 10/23/21  0413    138 137   K 3.3* 3.4* 3.3*    105 104   CO2 22 21 20*   BUN 50* 39* 30*   CREA 3.68* 3.24* 2.37*   CA 8.5 8.5 8.6     Recent Labs     10/25/21  0512 10/24/21  0511 10/23/21  0413   WBC 11.5* 16.8* 17.0*   HGB 7.8* 9.1* 9.5*   HCT 25.6* 29.3* 31.0*    348 365     No results for input(s): DOLORES, KU, CLU, CREAU in the last 72 hours. No lab exists for component: PROU    Total time spent with patient:            Care Plan discussed with:       Dialysis Nursing.     Clarissa Ingram MD

## 2021-10-25 NOTE — PROGRESS NOTES
SLP Contact Note    SLP PMV evaluation complete. Recommend PMV as tolerated during all waking hours. FEES also complete. Please see separate FEES note by MALCOLM Sanders for diet recommendations.       Thank you,  SARAH PhanEd, [address] [facility]  Speech-Language Pathologist

## 2021-10-26 ENCOUNTER — APPOINTMENT (OUTPATIENT)
Dept: CT IMAGING | Age: 51
DRG: 368 | End: 2021-10-26
Attending: PHYSICIAN ASSISTANT
Payer: COMMERCIAL

## 2021-10-26 LAB
ALBUMIN SERPL-MCNC: 2.5 G/DL (ref 3.5–5)
ALBUMIN/GLOB SERPL: 0.8 {RATIO} (ref 1.1–2.2)
ALP SERPL-CCNC: 117 U/L (ref 45–117)
ALT SERPL-CCNC: 32 U/L (ref 12–78)
ANION GAP SERPL CALC-SCNC: 8 MMOL/L (ref 5–15)
AST SERPL-CCNC: 27 U/L (ref 15–37)
BACTERIA SPEC CULT: ABNORMAL
BACTERIA SPEC CULT: ABNORMAL
BILIRUB DIRECT SERPL-MCNC: 0.2 MG/DL (ref 0–0.2)
BILIRUB SERPL-MCNC: 0.8 MG/DL (ref 0.2–1)
BUN SERPL-MCNC: 25 MG/DL (ref 6–20)
BUN/CREAT SERPL: 11 (ref 12–20)
CALCIUM SERPL-MCNC: 8.7 MG/DL (ref 8.5–10.1)
CHLORIDE SERPL-SCNC: 104 MMOL/L (ref 97–108)
CO2 SERPL-SCNC: 24 MMOL/L (ref 21–32)
CREAT SERPL-MCNC: 2.33 MG/DL (ref 0.7–1.3)
ERYTHROCYTE [DISTWIDTH] IN BLOOD BY AUTOMATED COUNT: 18.8 % (ref 11.5–14.5)
GLOBULIN SER CALC-MCNC: 3.3 G/DL (ref 2–4)
GLUCOSE SERPL-MCNC: 86 MG/DL (ref 65–100)
GRAM STN SPEC: ABNORMAL
GRAM STN SPEC: ABNORMAL
HCT VFR BLD AUTO: 30.6 % (ref 36.6–50.3)
HGB BLD-MCNC: 9.2 G/DL (ref 12.1–17)
MAGNESIUM SERPL-MCNC: 1.9 MG/DL (ref 1.6–2.4)
MCH RBC QN AUTO: 30.1 PG (ref 26–34)
MCHC RBC AUTO-ENTMCNC: 30.1 G/DL (ref 30–36.5)
MCV RBC AUTO: 100 FL (ref 80–99)
NRBC # BLD: 0.02 K/UL (ref 0–0.01)
NRBC BLD-RTO: 0.2 PER 100 WBC
PHOSPHATE SERPL-MCNC: 3.1 MG/DL (ref 2.6–4.7)
PLATELET # BLD AUTO: 340 K/UL (ref 150–400)
PMV BLD AUTO: 9.5 FL (ref 8.9–12.9)
POTASSIUM SERPL-SCNC: 4.4 MMOL/L (ref 3.5–5.1)
PROT SERPL-MCNC: 5.8 G/DL (ref 6.4–8.2)
RBC # BLD AUTO: 3.06 M/UL (ref 4.1–5.7)
SERVICE CMNT-IMP: ABNORMAL
SODIUM SERPL-SCNC: 136 MMOL/L (ref 136–145)
WBC # BLD AUTO: 11.8 K/UL (ref 4.1–11.1)

## 2021-10-26 PROCEDURE — 94640 AIRWAY INHALATION TREATMENT: CPT

## 2021-10-26 PROCEDURE — 92507 TX SP LANG VOICE COMM INDIV: CPT

## 2021-10-26 PROCEDURE — 74011250637 HC RX REV CODE- 250/637: Performed by: NURSE PRACTITIONER

## 2021-10-26 PROCEDURE — 74177 CT ABD & PELVIS W/CONTRAST: CPT

## 2021-10-26 PROCEDURE — 92526 ORAL FUNCTION THERAPY: CPT

## 2021-10-26 PROCEDURE — 74011000636 HC RX REV CODE- 636: Performed by: INTERNAL MEDICINE

## 2021-10-26 PROCEDURE — C9113 INJ PANTOPRAZOLE SODIUM, VIA: HCPCS | Performed by: NURSE PRACTITIONER

## 2021-10-26 PROCEDURE — 36415 COLL VENOUS BLD VENIPUNCTURE: CPT

## 2021-10-26 PROCEDURE — 74011250636 HC RX REV CODE- 250/636: Performed by: NURSE PRACTITIONER

## 2021-10-26 PROCEDURE — 74011000258 HC RX REV CODE- 258: Performed by: HEALTH CARE PROVIDER

## 2021-10-26 PROCEDURE — 65610000006 HC RM INTENSIVE CARE

## 2021-10-26 PROCEDURE — 74011000250 HC RX REV CODE- 250: Performed by: NURSE PRACTITIONER

## 2021-10-26 PROCEDURE — 74011250636 HC RX REV CODE- 250/636: Performed by: HEALTH CARE PROVIDER

## 2021-10-26 PROCEDURE — 99231 SBSQ HOSP IP/OBS SF/LOW 25: CPT | Performed by: SURGERY

## 2021-10-26 PROCEDURE — 84100 ASSAY OF PHOSPHORUS: CPT

## 2021-10-26 PROCEDURE — 83735 ASSAY OF MAGNESIUM: CPT

## 2021-10-26 PROCEDURE — 80048 BASIC METABOLIC PNL TOTAL CA: CPT

## 2021-10-26 PROCEDURE — 80076 HEPATIC FUNCTION PANEL: CPT

## 2021-10-26 PROCEDURE — 85027 COMPLETE CBC AUTOMATED: CPT

## 2021-10-26 PROCEDURE — 90935 HEMODIALYSIS ONE EVALUATION: CPT

## 2021-10-26 PROCEDURE — 74011000250 HC RX REV CODE- 250: Performed by: INTERNAL MEDICINE

## 2021-10-26 RX ADMIN — Medication: at 17:20

## 2021-10-26 RX ADMIN — HYDROMORPHONE HYDROCHLORIDE 0.5 MG: 1 INJECTION, SOLUTION INTRAMUSCULAR; INTRAVENOUS; SUBCUTANEOUS at 11:48

## 2021-10-26 RX ADMIN — IOPAMIDOL 100 ML: 755 INJECTION, SOLUTION INTRAVENOUS at 12:43

## 2021-10-26 RX ADMIN — COLLAGENASE SANTYL: 250 OINTMENT TOPICAL at 09:13

## 2021-10-26 RX ADMIN — Medication 9 MG: at 22:48

## 2021-10-26 RX ADMIN — Medication 10 ML: at 14:36

## 2021-10-26 RX ADMIN — Medication 10 ML: at 22:48

## 2021-10-26 RX ADMIN — IOHEXOL 25 ML: 240 INJECTION, SOLUTION INTRATHECAL; INTRAVASCULAR; INTRAVENOUS; ORAL at 10:50

## 2021-10-26 RX ADMIN — LORAZEPAM 0.5 MG: 2 INJECTION INTRAMUSCULAR; INTRAVENOUS at 22:47

## 2021-10-26 RX ADMIN — HYDROMORPHONE HYDROCHLORIDE 0.5 MG: 1 INJECTION, SOLUTION INTRAMUSCULAR; INTRAVENOUS; SUBCUTANEOUS at 01:28

## 2021-10-26 RX ADMIN — POTASSIUM BICARBONATE 20 MEQ: 391 TABLET, EFFERVESCENT ORAL at 09:13

## 2021-10-26 RX ADMIN — CHLORHEXIDINE GLUCONATE 15 ML: 0.12 RINSE ORAL at 09:14

## 2021-10-26 RX ADMIN — SODIUM CHLORIDE 40 MG: 9 INJECTION INTRAMUSCULAR; INTRAVENOUS; SUBCUTANEOUS at 22:46

## 2021-10-26 RX ADMIN — ONDANSETRON 4 MG: 2 INJECTION INTRAMUSCULAR; INTRAVENOUS at 11:05

## 2021-10-26 RX ADMIN — PIPERACILLIN AND TAZOBACTAM 3.38 G: 3; .375 INJECTION, POWDER, LYOPHILIZED, FOR SOLUTION INTRAVENOUS at 04:55

## 2021-10-26 RX ADMIN — FLUCONAZOLE IN SODIUM CHLORIDE 200 MG: 2 INJECTION, SOLUTION INTRAVENOUS at 10:54

## 2021-10-26 RX ADMIN — Medication 10 ML: at 06:00

## 2021-10-26 RX ADMIN — Medication 10 ML: at 22:49

## 2021-10-26 RX ADMIN — IPRATROPIUM BROMIDE AND ALBUTEROL SULFATE 3 ML: .5; 3 SOLUTION RESPIRATORY (INHALATION) at 07:16

## 2021-10-26 RX ADMIN — HEPARIN SODIUM 1800 UNITS: 1000 INJECTION INTRAVENOUS; SUBCUTANEOUS at 21:17

## 2021-10-26 RX ADMIN — SODIUM CHLORIDE 40 MG: 9 INJECTION INTRAMUSCULAR; INTRAVENOUS; SUBCUTANEOUS at 09:13

## 2021-10-26 RX ADMIN — IPRATROPIUM BROMIDE AND ALBUTEROL SULFATE 3 ML: .5; 3 SOLUTION RESPIRATORY (INHALATION) at 19:35

## 2021-10-26 RX ADMIN — Medication: at 09:13

## 2021-10-26 RX ADMIN — CHLORHEXIDINE GLUCONATE 15 ML: 0.12 RINSE ORAL at 21:08

## 2021-10-26 RX ADMIN — LORAZEPAM 0.5 MG: 2 INJECTION INTRAMUSCULAR; INTRAVENOUS at 09:13

## 2021-10-26 RX ADMIN — PIPERACILLIN AND TAZOBACTAM 3.38 G: 3; .375 INJECTION, POWDER, LYOPHILIZED, FOR SOLUTION INTRAVENOUS at 17:19

## 2021-10-26 NOTE — PROGRESS NOTES
[facility]    ICU TEAM Progress Note    Name: Semaj Trujillo   : 1970   MRN: [id number]   Date: 10/26/2021      Assessment:   Reason for ICU Admission: MV and hypotension     Brief HPI:    -  \"Pt is a 54 yo M with PMH ESRD on HD, takes coumadin secondary to hx of DVT RUE with a recent prolonged hospitalization secondary to COVID PNA in August of this year. He ultimately required trach and peg and was in rehab at [facility]. Pt unable to provide HPI secondary to current cognition, therefore information is obtained via chart and provider. Per [facility] records, pt had a dislodged peg tube. He started having coffee ground emesis 10/18/21 with elevated HR and hypotension. He was sent to the ER for further workup. He was found to be hypotensive and ICU was consulted for further management. \"      10/25 Pt co some increased pain and discomfort overnight, was restrted on PTA fentanyl patch and prn ativan. 10/26 TCT tolerated X24 hours       S/P:   Procedure(s):  ESOPHAGOGASTRODUODENOSCOPY (EGD) at Bedside  ESOPHAGOGASTRODUODENAL (EGD) BIOPSY    Plan:     1. GIB  - GI consulted, appreciate input  - PPI BID  - SP EGD    - Hgb stable, 9.2 this am     2. Hemorrhagic shock  - Sp PRBCs, FFP and k centra   - Serial H and H, stable   - Monitor closely for s/s of bleeding  - Volume resuscitated  - Retacrit started per nephrology   - CT revealed large fluid filled mass, Hematoma? General surgery consulted  - Fluid mass drained by IR 10/23, plans for drain to remain X3 days then fu CT scan 10/26 to eval need for surgical intervention versus IR intervention. Will monitor drainage and amount while drain in place     3. Supratherapeutic INR  - Sp FFP and k centra  - Hold coumadin for now as active bleeding  - Serial INR, last INR 1.1 (10/19)    4.  Acute on chronic hypoxic respiratory failure with tracheostomy   - Continue MV  - SBT daily   - Pulm hygiene   - HOB >30   - Aspiration precautions   - Vent weaned, RR now 16 from 22 ABG appreciated   - Speech consulted for inline, appreciate input  - Tcollar trials during the day as tolerated  - Will try 24 TCT  - Rate last night     5. ESRD on HD  - Cont HD per renal    6. Acute blood loss anemia   - Transfuse as needed to keep hgb >7  - Monitor closely for ss bleeding  - See above     7. PEG tube dislodged   - EGD per GI  - PEG removed, not in stomach  - CT abd and pelvis ordered, fluid collection noted, hematoma versus infection   - Await GI recs, will need new PEG placement at some point, will have to wait until fluid collection resolved   - Wound consulted for old peg site   - NPO for now, bowel rest per GI   -CT abd, pelvis appreciated, await GI recs on fluid collection   - General surgery consulted for fluid collection    8. Leukocytosis   - Improved this am 16, down from 17  - continue broad spectrum abx   - On zosyn  - Continue diflucan for yeast out of wound   - ID consult   - Cultures pending       Subjective:   Overnight Events:   10/26/2021        Objective:     Visit Vitals  /72   Pulse (!) 106   Temp 99.2 °F (37.3 °C)   Resp 24   Ht 5' 11.5\" (1.816 m)   Wt 139.3 kg (307 lb 1.6 oz)   SpO2 96%   BMI 42.23 kg/m²    O2 Flow Rate (L/min): 10 l/min O2 Device: Tracheal collar Temp (24hrs), Av.2 °F (37.3 °C), Min:97.8 °F (36.6 °C), Max:100.7 °F (38.2 °C)           Hemodynamics:   PAP:   CO:     Wedge:   CI:     CVP:    SVR:       PVR:       Ventilator Settings:  Mode Rate Tidal Volume Pressure FiO2 PEEP   Spontaneous      8 cm H2O 40 % 5 cm H20     Peak airway pressure: 15 cm H2O    Minute ventilation: 15.2 l/min        Intake/Output:     Intake/Output Summary (Last 24 hours) at 10/26/2021 0823  Last data filed at 10/26/2021 0700  Gross per 24 hour   Intake 1435 ml   Output 2560 ml   Net -1125 ml       Physical Exam:  General:  Trach and on vent    Eyes:  Sclera anicteric. Pupils equal, round, reactive to light. Mouth/Throat: Orotracheal tube in place. Neck: Supple. Lungs:   Clear to auscultation bilaterally, good effort. Cardiovascular:  Regular rate and rhythm, no murmur, click, rub, or gallop. Abdomen: Old peg removed, scar and dressing intact    Extremities: No cyanosis or edema. Skin: No acute rash or lesions. Lymph Nodes: Cervical and supraclavicular normal.   Musculoskeletal:  No swelling or deformity. Lines/Devices:  Intact, no erythema, drainage, or tenderness. Labs & Data: Reviewed    Medications: Reviewed    Chest X-Ray:    TTE:    Multidisciplinary Rounds Completed: Yes    ABCDEF Bundle/Checklist Completed: Yes    SPECIAL EQUIPMENT: None    DISPOSITION: Stay in ICU    CRITICAL CARE CONSULTANT NOTE  I had a face to face encounter with the patient, reviewed and interpreted patient data including clinical events, labs, images, vital signs, I/O's, and examined patient. I have discussed the case and the plan and management of the patient's care with the consulting services, the bedside nurses and the respiratory therapist.      NOTE OF PERSONAL INVOLVEMENT IN CARE   This patient has a high probability of imminent, clinically significant deterioration, which requires the highest level of preparedness to intervene urgently. I participated in the decision-making and personally managed or directed the management of the following life and organ supporting interventions that required my frequent assessment to treat or prevent imminent deterioration. I personally spent 35 minutes of critical care time. This is time spent at this critically ill patient's bedside actively involved in patient care as well as the coordination of care and discussions with the patient's family. This does not include any procedural time which has been billed separately.     Kirby Temple NP    [facility] Critical Care  10/26/2021

## 2021-10-26 NOTE — PROGRESS NOTES
Progress Note    Patient: Aleisha Hurley MRN: [id number]  SSN: [id number]    YOB: 1970  Age: 53 y.o. Sex: male      Admit Date: 10/18/2021    7 Days Post-Op    Procedure:  Procedure(s):  ESOPHAGOGASTRODUODENOSCOPY (EGD) at Bedside  ESOPHAGOGASTRODUODENAL (EGD) BIOPSY    Subjective:     Patient without complaints. Objective:     Visit Vitals  /62   Pulse (!) 113   Temp 98.3 °F (36.8 °C)   Resp 26   Ht 5' 11.5\" (1.816 m)   Wt 307 lb 1.6 oz (139.3 kg)   SpO2 90%   BMI 42.23 kg/m²       Temp (24hrs), Av.2 °F (37.3 °C), Min:98.1 °F (36.7 °C), Max:100.7 °F (38.2 °C)      Physical Exam:    General alert and oriented no acute distress  Abdomen soft nontender nondistended  Drain with purulent fluid    Data Review: images and reports reviewed  CT- Loculated collection of the left anterolateral abdominal wall  contains predominantly fluid. Increasingly hypodense. Pigtail catheter in the  anterior aspect of the collection. Collection is diminished in size overall. 32  cm in maximal cranial coronal dimension previously 41 cm. ADDITIONAL COMMENTS: N/A     IMPRESSION  Slightly diminished size of left anterolateral wall abdominal collection with  pigtail drainage catheter.     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   -   Lab Review: All lab results for the last 24 hours reviewed. Recent Results (from the past 24 hour(s))   HEPATIC FUNCTION PANEL    Collection Time: 10/26/21  5:27 AM   Result Value Ref Range    Protein, total 5.8 (L) 6.4 - 8.2 g/dL    Albumin 2.5 (L) 3.5 - 5.0 g/dL    Globulin 3.3 2.0 - 4.0 g/dL    A-G Ratio 0.8 (L) 1.1 - 2.2      Bilirubin, total 0.8 0.2 - 1.0 MG/DL    Bilirubin, direct 0.2 0.0 - 0.2 MG/DL    Alk.  phosphatase 117 45 - 117 U/L    AST (SGOT) 27 15 - 37 U/L    ALT (SGPT) 32 12 - 78 U/L   METABOLIC PANEL, BASIC    Collection Time: 10/26/21  5:27 AM   Result Value Ref Range    Sodium 136 136 - 145 mmol/L    Potassium 4.4 3.5 - 5.1 mmol/L    Chloride 104 97 - 108 mmol/L    CO2 24 21 - 32 mmol/L    Anion gap 8 5 - 15 mmol/L    Glucose 86 65 - 100 mg/dL    BUN 25 (H) 6 - 20 MG/DL    Creatinine 2.33 (H) 0.70 - 1.30 MG/DL    BUN/Creatinine ratio 11 (L) 12 - 20      GFR est AA 36 (L) >60 ml/min/1.73m2    GFR est non-AA 30 (L) >60 ml/min/1.73m2    Calcium 8.7 8.5 - 10.1 MG/DL   MAGNESIUM    Collection Time: 10/26/21  5:27 AM   Result Value Ref Range    Magnesium 1.9 1.6 - 2.4 mg/dL   PHOSPHORUS    Collection Time: 10/26/21  5:27 AM   Result Value Ref Range    Phosphorus 3.1 2.6 - 4.7 MG/DL   CBC W/O DIFF    Collection Time: 10/26/21  5:27 AM   Result Value Ref Range    WBC 11.8 (H) 4.1 - 11.1 K/uL    RBC 3.06 (L) 4.10 - 5.70 M/uL    HGB 9.2 (L) 12.1 - 17.0 g/dL    HCT 30.6 (L) 36.6 - 50.3 %    .0 (H) 80.0 - 99.0 FL    MCH 30.1 26.0 - 34.0 PG    MCHC 30.1 30.0 - 36.5 g/dL    RDW 18.8 (H) 11.5 - 14.5 %    PLATELET 154 551 - 742 K/uL    MPV 9.5 8.9 - 12.9 FL    NRBC 0.2 (H) 0  WBC    ABSOLUTE NRBC 0.02 (H) 0.00 - 0.01 K/uL       Assessment:     Hospital Problems  Never Reviewed        Codes Class Noted POA    Abdominal wall fluid collections ICD-10-CM: R18.8  ICD-9-CM: 789.59  10/25/2021 Unknown        Severe protein-calorie malnutrition ([facility] [address] ICD-10-CM: I66  ICD-9-CM: 262  10/21/2021 Unknown        GIB (gastrointestinal bleeding) ICD-10-CM: K92.2  ICD-9-CM: 578.9  10/18/2021 Unknown              Plan/Recommendations/Medical Decision Making:   Abdominal wall abscess-seems to be improving with percutaneous drainage. Discussed with radiology they did not feel that there was a need for further drainage as long as the current drain was working. Suspect that this will take several weeks until this finally resolves. No plans for general surgical intervention at this time.

## 2021-10-26 NOTE — PROCEDURES
[facility] / [phone]    Vitals Pre Post Assessment Pre Post   /74 114/102 LOC A&O 2 A&O x2    118 Lungs No SOB No SOB   Resp 22 26 Cardiac tachycardia tachycardia   Temp 98.6 97.7 Skin Dry, warm Dry, warm   Weight   Edema generalized generalized   Tele status Monitored  monitored Pain 0 0     Orders   Duration: Start: 6595 End: 2130 Total: 3 hrs   Dialyzer: Dialyzer/Set Up Inspection: Revaclear (10/26/21 1830)   K Bath: Dialysate K (mEq/L): 2 (10/26/21 1830)   Ca Bath: Dialysate CA (mEq/L): 2.5 (10/26/21 1830)   Na: Dialysate NA (mEq/L): 140 (10/26/21 1830)   Bicarb: Dialysate HCO3 (mEq/L): 35 (10/26/21 1830)   Target Fluid Removal: Goal/Amount of Fluid to Remove (mL): 2000 mL (10/26/21 1830)     Access   Type & Location: Right CVC: Each catheter limb disinfected per p&p, caps removed, hubs disinfected per p&p.    Comments:                                        Labs   HBsAg (Antigen) / date: 10/20/21 negative                                              HBsAb (Antibody) / date: 10/20/21 immune   Source:    Obtained/Reviewed  Critical Results Called HGB   Date Value Ref Range Status   10/26/2021 9.2 (L) 12.1 - 17.0 g/dL Final     Potassium   Date Value Ref Range Status   10/26/2021 4.4 3.5 - 5.1 mmol/L Final     Comment:     SPECIMEN HEMOLYZED, RESULTS MAY BE AFFECTED  INVESTIGATED PER DELTA CHECK PROTOCOL       Calcium   Date Value Ref Range Status   10/26/2021 8.7 8.5 - 10.1 MG/DL Final     BUN   Date Value Ref Range Status   10/26/2021 25 (H) 6 - 20 MG/DL Final     Creatinine   Date Value Ref Range Status   10/26/2021 2.33 (H) 0.70 - 1.30 MG/DL Final     Comment:     INVESTIGATED PER DELTA CHECK PROTOCOL        Meds Given   Name Dose Route        Heparin 3600 units HD ports           Adequacy / Fluid    Total Liters Process: 65 L   Net Fluid Removed: 2000 cc      Comments   Time Out Done:   (Time) 1825   Admitting Diagnosis:    Consent obtained/signed: Informed Consent Verified: Yes (10/26/21 200)   Machine / Hoy Cocks # Machine Number: [id number] (10/26/21 5670)   Primary Nurse Rpt Pre: Chani Bhatti RN   Primary Nurse Rpt Post: Sima Aguilar RN   Pt Education: Access care, procedure   Care Plan: Continue HD tx as per MD order   Pts outpatient clinic:      Tx Summary   Comments: Tolerated tx well, at the end all possible blood in circuit returned with 300 cc NS, ports flushed with NS, locked with Heparin, cleaned per P&P, caps applied.

## 2021-10-26 NOTE — PROGRESS NOTES
Transition of Care Plan   RUR- Medium    DISPOSITION:Rehab will need auth    F/U with PCP/Specialist     Transport: [facility]  Patient remains in the ICU , has been on TC at 40%  for 36 hours. Therapy is recommending rehab for patient. CM spoke with patient's wife Mariaa Solares and she would like the referral sent to [facility] and rehab. [place] called and spoke with Evan Tesfaye in admissions [phone]. Per Evan Tesfaye trach will need to be uncuffed and oxygen less than 28%. Plan will be to change trach out tomorrow to a #6 cuffless, currently patient is at 40%, nursing will attempt to wean. Faxed requested clinical to [phone]   CM explained to wife that insurance may deny payment for transport as they will pass many rehabs to get to [place]. She voiced her understanding. CM will continue to follow.   Jacob Barajas RN,Care Management

## 2021-10-26 NOTE — PROGRESS NOTES
[facility] [address]    Name: Cherelle Giang MRN: [id number]   : 1970 [facility]   Date: 10/26/2021        IMPRESSION:   SERGEY, HD dependent - HD - MWF   Hypotension persists - on min pressors if needed - BPs are better  Hypervolemia with predominantly central congestion. Anemia of multifactorial origin  Respiratory failure due to a complicated Covid pneumonia, off vent has trach collar  Hypokalemia pre HD - resolved  Fluid Overload      PLAN:   For HD today (10/26). This should help to optimize the volume status and provide clearance. IV albumin x 2 doses prn for intra-dialytic hypotension. Resp management as per intensivist team  Anemia management- start Retacrit. Iron sat 31%, will see Epo resistance during acute inflammatory state, may take some time to respond to EPO, transfuse if Hgb < 7. Subjective/Interval History:     Lizet Quiroz --> 10/24/21    I have reviewed the flowsheet and previous days notes. ROS: Awake and interactive, breathing ok via trach. Now with Dobhoff, wants something cold to drink, dry mouth. Had drainage of left abd wall fluid collection:    IMPRESSION        1. Successful ultrasound guided placement of 10 [place] percutaneous drain into a  left abdominal wall fluid collection. 2. Needle sampling of the more inferior collection of the left abdominal wall  yielded only a small amount of clot, presumed noninfected hematoma. Jose Sos --> 10/25/21    BPs are better today. Remains on vent support. Getting ready to start HD when I entered the room. Dorothy --> 10/26/21     Feeling better. Scheduled for ct with IV contrast today.        Objective:   Vital Signs:    Visit Vitals  BP 90/64   Pulse (!) 115   Temp 98.1 °F (36.7 °C)   Resp 30   Ht 5' 11.5\" (1.816 m)   Wt 139.3 kg (307 lb 1.6 oz)   SpO2 (!) 88%   BMI 42.23 kg/m²       O2 Device: Tracheal collar, Tracheostomy   O2 Flow Rate (L/min): 10 l/min   Temp (24hrs), Av.3 °F (37.4 °C), Min:98.1 °F (36.7 °C), Max:100.7 °F (38.2 °C)       Intake/Output:   Last shift:      10/26 0701 - 10/26 1900  In: 250 [P.O.:240]  Out: 50 [Drains:50]  Last 3 shifts: 10/24 1901 - 10/26 0700  In: 5980 [P.O.:400; I.V.:600]  Out: 2560 [Drains:60]    Intake/Output Summary (Last 24 hours) at 10/26/2021 1046  Last data filed at 10/26/2021 0900  Gross per 24 hour   Intake 1625 ml   Output 2610 ml   Net -985 ml        Physical Exam:    Seen in the ICU - Bed 4. General:    Awake and responding appropriately. Head:   Normocephalic. .    Neck:  Trach - capped    Lungs:   Clear to auscultation, No Wheezing , Rhonchi or rales. Heart:   No S3 gallop , no pericardial rub. Abdomen:   No clear-cut distension    Extremities: Leg oedema + to ++    Neurologic: Interacting appropriately. DATA:  Labs:  Recent Labs     10/26/21  0527 10/25/21  0509 10/24/21  0507    140 138   K 4.4 3.3* 3.4*    105 105   CO2 24 22 21   BUN 25* 50* 39*   CREA 2.33* 3.68* 3.24*   CA 8.7 8.5 8.5   ALB 2.5*  --   --    PHOS 3.1  --   --    MG 1.9  --   --      Recent Labs     10/26/21  0527 10/25/21  0512 10/24/21  0511   WBC 11.8* 11.5* 16.8*   HGB 9.2* 7.8* 9.1*   HCT 30.6* 25.6* 29.3*    298 348     No results for input(s): DOLORES, KU, CLU, CREAU in the last 72 hours.     No lab exists for component: PROU    Total time spent with patient:            Care Plan discussed with:       ICU Nursing , [facility]    Rachel Gould MD

## 2021-10-26 NOTE — PROGRESS NOTES
Problem: Dysphagia (Adult)  Goal: *Acute Goals and Plan of Care (Insert Text)  Description: Speech therapy goals  Initiated 10/25/2021   1. Patient will tolerate soft and bite sized diet with thin liquids with PMV in place without s/s of aspiration within 7 days     Outcome: Progressing Towards Goal     Problem: Voice Impaired (Adult)  Goal: *Acute Goals and Plan of Care (Insert Text)  Description: Speech Therapy Goals  Initiated 10/25/2021    1. Patient will tolerate PMV during all waking hours without adverse effects within 7 days. Outcome: Progressing Towards Goal     SPEECH LANGUAGE PATHOLOGY DYSPHAGIA/PMV TREATMENT  Patient: Kwasi Nunes (35 y.o. male)  Date: 10/26/2021  Diagnosis: GIB (gastrointestinal bleeding) [K92.2] <principal problem not specified>  Procedure(s) (LRB):  ESOPHAGOGASTRODUODENOSCOPY (EGD) at Bedside (N/A)  ESOPHAGOGASTRODUODENAL (EGD) BIOPSY (N/A) 7 Days Post-Op  Precautions:  Fall, Skin, Contact    ASSESSMENT:  Pt tolerating soft and bite sized diet without any difficulty nor adverse effects since FEES yesterday. Therefore, recommend pt continue on diet. Suspect as trach downsized pt will be able to swallow even better. Additionally, pt has been tolerating PMV during all waking hours without any deficits, adverse effects, nor signs of back pressure indicative of Co2 retention. Pt has not required ventilator support for over 24 hours. Consider trach downsize to a #6 cuffless trach and continue PMV. If pt tolerates, can then consider red-capping trials to work toward decannulation. Given significant improvements, pt would be an excellent candidate for inpatient rehab from SLP standpoint.       PLAN:  Recommendations and Planned Interventions:  --soft and bite sized diet/thin liquids  --general aspiration precautions  --PMV must be placed with all PO  --good oral care  --consider trach downsize to a #6 cuffless    Patient continues to benefit from skilled intervention to address the above impairments. Continue treatment per established plan of care. Speaking Valve Placement:  SLP / RT / RN and patient/family  Recommended Speaking Valve Wearing Schedule:  [x]    As tolerated  Discharge Recommendations:  Inpatient Rehab     SUBJECTIVE:   Patient stated, \"I wish I didn't have to drink this stuff but I can swallow fine re: contrast that he was drinking with his nurse for procedure. OBJECTIVE:   Cognitive and Communication Status:  Neurologic State: Alert  Orientation Level: Oriented X4  Cognition: Follows commands  Perception: Appears intact  Perseveration: No perseveration noted  Safety/Judgement: Awareness of environment  Tracheostomy:  #8 Shiley Cuffed Trach      Vital Signs During PMV Placement  O2: 94  RR:  21  HR: 115       Oxygen Therapy  O2 Sat (%): 94 %  Pulse via Oximetry: 110 beats per minute  O2 Device: Tracheal collar  Skin Assessment: Skin breakdown present (see comment/note)  Skin Protection for O2 Device: Yes  Orientation: Anterior  Location: Neck  Interventions: Skin Barrier;Mouth Care  O2 Flow Rate (L/min): 10 l/min  FIO2 (%): 40 %  Dysphagia Treatment:  Tracheostomy:    PMV Trial   Vocal Quality: No impairment  Oral Assessment:  Oral Assessment  Labial: No impairment  Dentition: Natural  Oral Hygiene: oral mucosa moist and clear of secretions  Lingual: No impairment  Velum: No impairment  Mandible: No impairment  P.O. Trials:  Patient Position: upright in bed  Vocal quality prior to P.O.: No impairment  Consistency Presented:  Thin liquid  How Presented: Straw     Bolus Acceptance: No impairment  Bolus Formation/Control: No impairment     Propulsion: No impairment  Oral Residue: None  Initiation of Swallow: No impairment  Laryngeal Elevation: Functional  Aspiration Signs/Symptoms: None  Pharyngeal Phase Characteristics: No impairment, issues, or problems   Effective Modifications: None    Pain:  Pain Scale 1: Numeric (0 - 10)  Pain Intensity 1: 0       After treatment: Call bell within reach and Nursing notified    COMMUNICATION/EDUCATION:     The patient's plan of care including recommendations, planned interventions, and recommended diet changes were discussed with: Registered nurse. Education was provided to patient, family, and staff regarding speaking valve placement, wearing schedule, safety precautions including cuff deflation and removal when sleeping, and care/cleaning guidelines.       MALCOLM Eli  Time Calculation: 12 mins

## 2021-10-27 LAB
ANION GAP SERPL CALC-SCNC: 8 MMOL/L (ref 5–15)
BUN SERPL-MCNC: 17 MG/DL (ref 6–20)
BUN/CREAT SERPL: 9 (ref 12–20)
CALCIUM SERPL-MCNC: 8.5 MG/DL (ref 8.5–10.1)
CHLORIDE SERPL-SCNC: 103 MMOL/L (ref 97–108)
CO2 SERPL-SCNC: 26 MMOL/L (ref 21–32)
CREAT SERPL-MCNC: 1.98 MG/DL (ref 0.7–1.3)
ERYTHROCYTE [DISTWIDTH] IN BLOOD BY AUTOMATED COUNT: 18.9 % (ref 11.5–14.5)
GLUCOSE SERPL-MCNC: 94 MG/DL (ref 65–100)
HCT VFR BLD AUTO: 27.2 % (ref 36.6–50.3)
HGB BLD-MCNC: 8.5 G/DL (ref 12.1–17)
MAGNESIUM SERPL-MCNC: 1.8 MG/DL (ref 1.6–2.4)
MCH RBC QN AUTO: 30.2 PG (ref 26–34)
MCHC RBC AUTO-ENTMCNC: 31.3 G/DL (ref 30–36.5)
MCV RBC AUTO: 96.8 FL (ref 80–99)
NRBC # BLD: 0.02 K/UL (ref 0–0.01)
NRBC BLD-RTO: 0.2 PER 100 WBC
PHOSPHATE SERPL-MCNC: 2.6 MG/DL (ref 2.6–4.7)
PLATELET # BLD AUTO: 374 K/UL (ref 150–400)
PMV BLD AUTO: 9.7 FL (ref 8.9–12.9)
POTASSIUM SERPL-SCNC: 3.3 MMOL/L (ref 3.5–5.1)
RBC # BLD AUTO: 2.81 M/UL (ref 4.1–5.7)
SODIUM SERPL-SCNC: 137 MMOL/L (ref 136–145)
WBC # BLD AUTO: 12.8 K/UL (ref 4.1–11.1)

## 2021-10-27 PROCEDURE — 77030013076 HC PCH OST BAG COLO -A

## 2021-10-27 PROCEDURE — 77010033678 HC OXYGEN DAILY

## 2021-10-27 PROCEDURE — 74011250637 HC RX REV CODE- 250/637: Performed by: NURSE PRACTITIONER

## 2021-10-27 PROCEDURE — 85027 COMPLETE CBC AUTOMATED: CPT

## 2021-10-27 PROCEDURE — 74011250636 HC RX REV CODE- 250/636: Performed by: NURSE PRACTITIONER

## 2021-10-27 PROCEDURE — 74011250636 HC RX REV CODE- 250/636: Performed by: HEALTH CARE PROVIDER

## 2021-10-27 PROCEDURE — 74011000258 HC RX REV CODE- 258: Performed by: HEALTH CARE PROVIDER

## 2021-10-27 PROCEDURE — 94640 AIRWAY INHALATION TREATMENT: CPT

## 2021-10-27 PROCEDURE — 74011000250 HC RX REV CODE- 250: Performed by: NURSE PRACTITIONER

## 2021-10-27 PROCEDURE — C9113 INJ PANTOPRAZOLE SODIUM, VIA: HCPCS | Performed by: NURSE PRACTITIONER

## 2021-10-27 PROCEDURE — 80048 BASIC METABOLIC PNL TOTAL CA: CPT

## 2021-10-27 PROCEDURE — 83735 ASSAY OF MAGNESIUM: CPT

## 2021-10-27 PROCEDURE — P9045 ALBUMIN (HUMAN), 5%, 250 ML: HCPCS | Performed by: NURSE PRACTITIONER

## 2021-10-27 PROCEDURE — 74011250636 HC RX REV CODE- 250/636: Performed by: INTERNAL MEDICINE

## 2021-10-27 PROCEDURE — 36415 COLL VENOUS BLD VENIPUNCTURE: CPT

## 2021-10-27 PROCEDURE — 36600 WITHDRAWAL OF ARTERIAL BLOOD: CPT

## 2021-10-27 PROCEDURE — P9047 ALBUMIN (HUMAN), 25%, 50ML: HCPCS | Performed by: INTERNAL MEDICINE

## 2021-10-27 PROCEDURE — 90935 HEMODIALYSIS ONE EVALUATION: CPT

## 2021-10-27 PROCEDURE — 92507 TX SP LANG VOICE COMM INDIV: CPT

## 2021-10-27 PROCEDURE — 84100 ASSAY OF PHOSPHORUS: CPT

## 2021-10-27 PROCEDURE — 65610000006 HC RM INTENSIVE CARE

## 2021-10-27 PROCEDURE — 77010033711 HC HIGH FLOW OXYGEN

## 2021-10-27 PROCEDURE — 99231 SBSQ HOSP IP/OBS SF/LOW 25: CPT | Performed by: SURGERY

## 2021-10-27 RX ORDER — ALBUMIN HUMAN 50 G/1000ML
25 SOLUTION INTRAVENOUS ONCE
Status: COMPLETED | OUTPATIENT
Start: 2021-10-27 | End: 2021-10-27

## 2021-10-27 RX ORDER — MIDODRINE HYDROCHLORIDE 5 MG/1
10 TABLET ORAL EVERY 8 HOURS
Status: DISCONTINUED | OUTPATIENT
Start: 2021-10-27 | End: 2021-11-18 | Stop reason: HOSPADM

## 2021-10-27 RX ORDER — MIDODRINE HYDROCHLORIDE 5 MG/1
5 TABLET ORAL EVERY 8 HOURS
Status: DISCONTINUED | OUTPATIENT
Start: 2021-10-27 | End: 2021-10-27

## 2021-10-27 RX ADMIN — POTASSIUM BICARBONATE 20 MEQ: 391 TABLET, EFFERVESCENT ORAL at 08:47

## 2021-10-27 RX ADMIN — Medication: at 14:04

## 2021-10-27 RX ADMIN — Medication 9 MG: at 22:23

## 2021-10-27 RX ADMIN — Medication 10 ML: at 22:23

## 2021-10-27 RX ADMIN — FLUCONAZOLE IN SODIUM CHLORIDE 200 MG: 2 INJECTION, SOLUTION INTRAVENOUS at 16:12

## 2021-10-27 RX ADMIN — COLLAGENASE SANTYL: 250 OINTMENT TOPICAL at 14:04

## 2021-10-27 RX ADMIN — SODIUM CHLORIDE 40 MG: 9 INJECTION INTRAMUSCULAR; INTRAVENOUS; SUBCUTANEOUS at 20:30

## 2021-10-27 RX ADMIN — ALBUMIN (HUMAN) 25 G: 0.25 INJECTION, SOLUTION INTRAVENOUS at 08:50

## 2021-10-27 RX ADMIN — PIPERACILLIN AND TAZOBACTAM 3.38 G: 3; .375 INJECTION, POWDER, LYOPHILIZED, FOR SOLUTION INTRAVENOUS at 16:30

## 2021-10-27 RX ADMIN — LORAZEPAM 0.5 MG: 2 INJECTION INTRAMUSCULAR; INTRAVENOUS at 08:48

## 2021-10-27 RX ADMIN — POTASSIUM BICARBONATE 20 MEQ: 391 TABLET, EFFERVESCENT ORAL at 07:33

## 2021-10-27 RX ADMIN — HEPARIN SODIUM 1800 UNITS: 1000 INJECTION INTRAVENOUS; SUBCUTANEOUS at 15:14

## 2021-10-27 RX ADMIN — IPRATROPIUM BROMIDE AND ALBUTEROL SULFATE 3 ML: .5; 3 SOLUTION RESPIRATORY (INHALATION) at 09:09

## 2021-10-27 RX ADMIN — HYDROMORPHONE HYDROCHLORIDE 0.5 MG: 1 INJECTION, SOLUTION INTRAMUSCULAR; INTRAVENOUS; SUBCUTANEOUS at 17:18

## 2021-10-27 RX ADMIN — IPRATROPIUM BROMIDE AND ALBUTEROL SULFATE 3 ML: .5; 3 SOLUTION RESPIRATORY (INHALATION) at 23:05

## 2021-10-27 RX ADMIN — ALBUMIN (HUMAN) 25 G: 12.5 INJECTION, SOLUTION INTRAVENOUS at 14:01

## 2021-10-27 RX ADMIN — CHLORHEXIDINE GLUCONATE 15 ML: 0.12 RINSE ORAL at 14:05

## 2021-10-27 RX ADMIN — LORAZEPAM 0.5 MG: 2 INJECTION INTRAMUSCULAR; INTRAVENOUS at 20:30

## 2021-10-27 RX ADMIN — SODIUM CHLORIDE 40 MG: 9 INJECTION INTRAMUSCULAR; INTRAVENOUS; SUBCUTANEOUS at 08:47

## 2021-10-27 RX ADMIN — Medication 10 ML: at 07:33

## 2021-10-27 RX ADMIN — Medication 10 ML: at 06:00

## 2021-10-27 RX ADMIN — PIPERACILLIN AND TAZOBACTAM 3.38 G: 3; .375 INJECTION, POWDER, LYOPHILIZED, FOR SOLUTION INTRAVENOUS at 04:19

## 2021-10-27 RX ADMIN — CHLORHEXIDINE GLUCONATE 15 ML: 0.12 RINSE ORAL at 20:31

## 2021-10-27 RX ADMIN — MIDODRINE HYDROCHLORIDE 10 MG: 5 TABLET ORAL at 22:23

## 2021-10-27 RX ADMIN — MIDODRINE HYDROCHLORIDE 5 MG: 5 TABLET ORAL at 14:02

## 2021-10-27 NOTE — PROGRESS NOTES
Physical Therapy: Defer    Chart reviewed and attempted to see pt for PT treatment. Pt currently on HD with ~2 hours left. Will defer and continue to follow.     Mike Fountain, PT, DPT

## 2021-10-27 NOTE — PROGRESS NOTES
1930: Bedside and Verbal shift change report given to [address] (oncoming nurse) by Kimmie Velazquez RN (offgoing nurse). Report included the following information SBAR, Kardex, ED Summary, Intake/Output, MAR, Recent Results, Cardiac Rhythm ST and Alarm Parameters . 0730: Bedside and Verbal shift change report given to Margo Holman (oncoming nurse) by Kay Ibarra RN (offgoing nurse). Report included the following information SBAR, Kardex, ED Summary, Intake/Output, MAR, Recent Results, Cardiac Rhythm ST and Alarm Parameters .

## 2021-10-27 NOTE — PROGRESS NOTES
Bedside and Verbal shift change report given to Tim Mederos RN  (oncoming nurse) by Murrell Najjar, RN  (offgoing nurse). Report included the following information SBAR, Kardex, Intake/Output, MAR, Cardiac Rhythm ST and Alarm Parameters .

## 2021-10-27 NOTE — PROGRESS NOTES
Comprehensive Nutrition Assessment    Type and Reason for Visit: Reassess    Nutrition Recommendations/Plan:      Encourage oral intake; consume supplements between meals     Document intake of meals and ONS on flowsheet    Nutrition Assessment:    Pt transferred from [address] [place] [address] d/t GIB. PMHx: Morbid obesity, Adult polycystic kidney disease, HTN, Gout. Recent prolonged hospitalization d/t COVID 19 PNA; s/p trach and PEG placement in addition to being dialysis dependent 2/2 SERGEY. GIB resolved. EGD 10/19 indicated reflux esophagitis; G-tube was removed (not in stomach). DHT placed 10/20 but eventually removed d/t diet advancement. Now on TC x 48 hours. Abdominal wall fluid collection-drain placed 10/23. GI consulted d/t leakage out of PEG tube site. Good to see pt doing better overall. Just completed dialysis so a little sleepy during RD visit. Happy with diet advancement and meals but indicated he would eat more if the food was hot. Seems frustrated. Accepts the Ensure shakes fairly well-will change ONS to Ensure Enlive until PO intake improves (this formula is higher in calories). Provided menu to patient. Lytes being managed with dialysis; potassium slightly BNL today. Weight stable. Malnutrition Assessment:  Malnutrition Status:  Severe malnutrition    Context:  Acute illness     Findings of the 6 clinical characteristics of malnutrition:   Energy Intake:  7 - 50% or less of est energy requirements for 5 or more days (NPO x 4 days)  Weight Loss:  Unable to assess     Body Fat Loss:  No significant body fat loss,     Muscle Mass Loss:  1 - Mild muscle mass loss, Temples (temporalis)  Fluid Accumulation:  7 - Moderate to severe, Extremities   Strength:  Not performed       Nutritionally Significant Medications:   Protonix, Effer-K    Estimated Daily Nutrient Needs:  Energy (kcal): 2490 (MSJ x 1.1); Weight Used for Energy Requirements: Admission (137 kg)  Protein (g): 132 (1.5g/kg IBW);  Weight Used for Protein Requirements: Ideal  Fluid (ml/day):  ; Method Used for Fluid Requirements: 1 ml/kcal    Nutrition Related Findings:       BM: 10/26  Edema: 2+ BUE, BLE  Wounds:  None       Current Nutrition Therapies:   Diet: Soft and Bite sized  Supplements: Ensure Enlive bid, Magic cup @ lunch  Additional Caloric Sources:   None   Meal intake: Patient Vitals for the past 168 hrs:   % Diet Eaten   10/26/21 2100 26 - 50%   10/26/21 0900 26 - 50%   10/25/21 1800 51 - 75%   10/23/21 1600 0%     Supplement Intake: No data found. Anthropometric Measures:  · Height:  5' 11.5\" (181.6 cm)  · Current Body Wt:  139.5 kg (307 lb 8.7 oz)   · Admission Body Wt:  301 lb 2.4 oz    · Usual Body Wt:        · Ideal Body Wt:  175:  175.7 %     · BMI Categories:  Obese class 3 (BMI 40.0 or greater)     Wt Readings from Last 10 Encounters:   10/27/21 139.5 kg (307 lb 8.7 oz)       Nutrition Diagnosis:   · Inadequate oral intake related to altered GI structure as evidenced by intake 26-50%. Nutrition Interventions:   Food and/or Nutrient Delivery: Continue current diet, Modify oral nutrition supplement  Nutrition Education and Counseling: No recommendations at this time  Coordination of Nutrition Care: Continue to monitor while inpatient, Interdisciplinary rounds    Goals:  Consume at least 75% of meals and ONS in next 5-7 days.        Nutrition Monitoring and Evaluation:   Behavioral-Environmental Outcomes: None identified  Food/Nutrient Intake Outcomes: Food and nutrient intake, Supplement intake  Physical Signs/Symptoms Outcomes: Biochemical data, Weight, GI status, Fluid status or edema    Discharge Planning:    Continue current diet, Continue oral nutrition supplement     Marcellus Bradshaw RD [facility]  Contact: Gayle Norris

## 2021-10-27 NOTE — PROGRESS NOTES
[facility] [address]    Name: Michael Armendariz MRN: [id number]   : 1970 Hospital: [address]   Date: 10/27/2021        IMPRESSION:   SERGEY, HD dependent - HD - MWF   Hypotension - BPs are better  Hypervolemia / Fluid Overload - improving incrementally  Anemia of multifactorial origin  Respiratory failure due to a complicated Covid pneumonia, off vent has trach collar  Hypokalemia  -  mild  Fluid Overload - incremental improvement      PLAN:   For HD today (10/27). This should help to optimize the volume status and provide clearance. A 4 K dialysate will be used to address hypokalemia. IV albumin x 3  doses prn for intra-dialytic hypotension. Will increase the HD time to 4 hours as well. This should help to mitigate hypotension during HD. Resp management as per intensivist team  Anemia management- start Retacrit. Iron sat 31%, will see Epo resistance during acute inflammatory state, may take some time to respond to EPO, transfuse if Hgb < 7. Subjective/Interval History:     Rafita Nvooa --> 10/24/21    I have reviewed the flowsheet and previous days notes. ROS: Awake and interactive, breathing ok via trach. Now with Dobhoff, wants something cold to drink, dry mouth. Had drainage of left abd wall fluid collection:    IMPRESSION        1. Successful ultrasound guided placement of 10 [place] percutaneous drain into a  left abdominal wall fluid collection. 2. Needle sampling of the more inferior collection of the left abdominal wall  yielded only a small amount of clot, presumed noninfected hematoma. Jh Luxar --> 10/25/21    BPs are better today. Remains on vent support. Getting ready to start HD when I entered the room. Dorothy --> 10/26/21     Feeling better. Scheduled for ct with IV contrast today. Jh Erickson --> 10/27/21    Had the CT yesterday. The findings were noted. Had HD as well.        Objective:   Vital Signs:    Visit Vitals  BP 97/67   Pulse (!) 115   Temp 97.9 °F (36.6 °C)   Resp 23   Ht 5' 11.5\" (1.816 m)   Wt 139.5 kg (307 lb 8.7 oz)   SpO2 93%   BMI 42.30 kg/m²       O2 Device: Tracheal collar, Tracheostomy   O2 Flow Rate (L/min): 10 l/min   Temp (24hrs), Av.1 °F (36.7 °C), Min:97.7 °F (36.5 °C), Max:98.6 °F (37 °C)       Intake/Output:   Last shift:      No intake/output data recorded. Last 3 shifts: 10/25 190 - 10/27 07  In: 1210 [P.O.:680; I.V.:400]  Out: 5 [Drains:85]    Intake/Output Summary (Last 24 hours) at 10/27/2021 0831  Last data filed at 10/27/2021 0400  Gross per 24 hour   Intake 710 ml   Output 2085 ml   Net -1375 ml          Physical Exam:      Seen in the ICU - Bed 4. General:    Awake and responding appropriately. Head:   Normocephalic. .    Neck:  Trach - capped    Lungs:   Clear to auscultation, No Wheezing , Rhonchi or rales. Heart:   No S3 gallop , no pericardial rub. Abdomen:   No clear-cut distension    Extremities: Leg oedema --> improving incrementally    Neurologic: Interacting appropriately. DATA:  Labs:  Recent Labs     10/27/21  0437 10/26/21  0527 10/25/21  0509    136 140   K 3.3* 4.4 3.3*    104 105   CO2 26 24 22   BUN 17 25* 50*   CREA 1.98* 2.33* 3.68*   CA 8.5 8.7 8.5   ALB  --  2.5*  --    PHOS 2.6 3.1  --    MG 1.8 1.9  --      Recent Labs     10/27/21  0437 10/26/21  0527 10/25/21  0512   WBC 12.8* 11.8* 11.5*   HGB 8.5* 9.2* 7.8*   HCT 27.2* 30.6* 25.6*    340 298     No results for input(s): DOLORES, KU, CLU, CREAU in the last 72 hours.     No lab exists for component: PROU    Total time spent with patient:            Care Plan discussed with:       ICU Nursing , [facility] Acute Dialysis team    Tye Box MD

## 2021-10-27 NOTE — DIALYSIS
Hemodialysis / [phone]    Vitals Pre Post Assessment Pre Post   BP BP: 92/61 (10/27/21 1100) 107/73 LOC axox4 axox4   HR Pulse (Heart Rate): (!) 109 (10/27/21 1100) 111 Lungs Decreased, diminished Decreased diminished   Resp Resp Rate: 27 (10/27/21 1100) 28 Cardiac nsr nsr   Temp Temp: 97.9 °F (36.6 °C) (10/27/21 0400) 97.8 Skin Red, flaky, buised No change   Weight Pre-Dialysis Weight: 140.5 kg (309 lb 11.9 oz) (10/25/21 0927)  Edema Generalize, + lower extremity No change   Tele status Remote Remote Pain Pain Intensity 1: 0 (10/27/21 0400) 0     Orders   Duration: Start: 1115 End: 1500 Total: 3.5   Dialyzer: Dialyzer/Set Up Inspection: Revaclear (10/27/21 1100)   K Bath: Dialysate K (mEq/L): 4 (10/27/21 1100)   Ca Bath: Dialysate CA (mEq/L): 2.5 (10/27/21 1100)   Na: Dialysate NA (mEq/L): 140 (10/27/21 1100)   Bicarb: Dialysate HCO3 (mEq/L): 35 (10/27/21 1100)   Target Fluid Removal: Goal/Amount of Fluid to Remove (mL): 3000 mL (10/27/21 1100)     Access   Type & Location: RIJ CVC: Dressing CDI. No s/s of infection. Both lumens aspirate & flush well. Running well at .    Comments:                                        Labs   HBsAg (Antigen) / date:                    Neg            10/20/21                HBsAb (Antibody) / date: Susc  10/20/21   Source:    Obtained/Reviewed  Critical Results Called HGB   Date Value Ref Range Status   10/27/2021 8.5 (L) 12.1 - 17.0 g/dL Final     Potassium   Date Value Ref Range Status   10/27/2021 3.3 (L) 3.5 - 5.1 mmol/L Final     Comment:     INVESTIGATED PER DELTA CHECK PROTOCOL     Calcium   Date Value Ref Range Status   10/27/2021 8.5 8.5 - 10.1 MG/DL Final     BUN   Date Value Ref Range Status   10/27/2021 17 6 - 20 MG/DL Final     Creatinine   Date Value Ref Range Status   10/27/2021 1.98 (H) 0.70 - 1.30 MG/DL Final        Meds Given   Name Dose Route   HEPARIN 1/1000 1.8ML  ART   HEPARIN 1/1000 1.8ML MAURA          Adequacy / Fluid    Total Liters Process: 81 Net Fluid Removed: 6983      Comments   Time Out Done:   (Time) 1100 yes   Admitting Diagnosis: Covid   Consent obtained/signed: Informed Consent Verified: Yes (10/27/21 1100)   Machine / RO # Machine Number: [id number] (10/27/21 1100)   Primary Nurse Rpt Pre: Sanjay Springer RN   Primary Nurse Rpt Post: Sanjay Springer RN   Pt Education: tx options, length   Care Plan: Follow MD POC   Pts outpatient clinic: New Pt,     Tx Summary      SBAR received from Primary RN. Pt A&Ox4. Consent signed & on file. 1100: Each catheter limb disinfected per p&p, caps removed, hubs disinfected per p&p. Each lumen aspirated for blood return and flushed with Normal Saline per policy. 1115 VSS. Dialysis Tx initiated. 1500: Tx ended early. Pt stated to clot, with 15 minutes left in tx. Did not want to loos blood, so rinsed back. VSS. Each dialysis catheter limb disinfected per p&p, all possible blood returned per p&p, and each dialysis hub disinfected per p&p. Each lumen flushed, post dialysis catheter Heparin dwell instilled per order, and caps applied. Bed locked and in the lowest position, call bell and belongings in reach. SBAR given to Primary, RN. Patient is stable at time of  my departure. All Dialysis related medications have been reviewed.        Comments:

## 2021-10-27 NOTE — PROGRESS NOTES
SLP Contact Note    Discussed with EMELI GRANADOS, appreciate her assistance. Pt reportedly receiving downsize to 6 cuffless today. Pt tolerating PMV during all hours (even when asleep). Therefore, recommend red-capping after trach downsize is complete. SLP will continue to follow for swallowing. Full note to follow.       Thank you,  SARAH KnowlesEd, [facility]  Speech-Language Pathologist

## 2021-10-27 NOTE — PROGRESS NOTES
Progress Note    Patient: [facility] MRN: [id number]  SSN: [id number]    YOB: 1970  Age: 53 y.o. Sex: male      Admit Date: 10/18/2021    8 Days Post-Op    Procedure:  Procedure(s):  ESOPHAGOGASTRODUODENOSCOPY (EGD) at Bedside  ESOPHAGOGASTRODUODENAL (EGD) BIOPSY    Subjective:     Patient without new complaints    Objective:     Visit Vitals  BP 94/69   Pulse (!) 108   Temp 97.9 °F (36.6 °C)   Resp 28   Ht 5' 11.5\" (1.816 m)   Wt 307 lb 8.7 oz (139.5 kg)   SpO2 96%   BMI 42.30 kg/m²       Temp (24hrs), Av.1 °F (36.7 °C), Min:97.7 °F (36.5 °C), Max:98.6 °F (37 °C)      Physical Exam:    General alert and oriented no acute distress  Abdomen soft nontender  Drain still purulent-85 out of the drain. Data Review: images and reports reviewed    Lab Review: All lab results for the last 24 hours reviewed.   Recent Results (from the past 24 hour(s))   CBC W/O DIFF    Collection Time: 10/27/21  4:37 AM   Result Value Ref Range    WBC 12.8 (H) 4.1 - 11.1 K/uL    RBC 2.81 (L) 4.10 - 5.70 M/uL    HGB 8.5 (L) 12.1 - 17.0 g/dL    HCT 27.2 (L) 36.6 - 50.3 %    MCV 96.8 80.0 - 99.0 FL    MCH 30.2 26.0 - 34.0 PG    MCHC 31.3 30.0 - 36.5 g/dL    RDW 18.9 (H) 11.5 - 14.5 %    PLATELET 446 223 - 131 K/uL    MPV 9.7 8.9 - 12.9 FL    NRBC 0.2 (H) 0  WBC    ABSOLUTE NRBC 0.02 (H) 0.00 - 3.48 K/uL   METABOLIC PANEL, BASIC    Collection Time: 10/27/21  4:37 AM   Result Value Ref Range    Sodium 137 136 - 145 mmol/L    Potassium 3.3 (L) 3.5 - 5.1 mmol/L    Chloride 103 97 - 108 mmol/L    CO2 26 21 - 32 mmol/L    Anion gap 8 5 - 15 mmol/L    Glucose 94 65 - 100 mg/dL    BUN 17 6 - 20 MG/DL    Creatinine 1.98 (H) 0.70 - 1.30 MG/DL    BUN/Creatinine ratio 9 (L) 12 - 20      GFR est AA 44 (L) >60 ml/min/1.73m2    GFR est non-AA 36 (L) >60 ml/min/1.73m2    Calcium 8.5 8.5 - 10.1 MG/DL   MAGNESIUM    Collection Time: 10/27/21  4:37 AM   Result Value Ref Range    Magnesium 1.8 1.6 - 2.4 mg/dL   PHOSPHORUS Collection Time: 10/27/21  4:37 AM   Result Value Ref Range    Phosphorus 2.6 2.6 - 4.7 MG/DL       Assessment:     Hospital Problems  Never Reviewed        Codes Class Noted POA    Abdominal wall fluid collections ICD-10-CM: R18.8  ICD-9-CM: 789.59  10/25/2021 Unknown        Severe protein-calorie malnutrition ([facility] [address] ICD-10-CM: S76  ICD-9-CM: 262  10/21/2021 Unknown        GIB (gastrointestinal bleeding) ICD-10-CM: K92.2  ICD-9-CM: 578.9  10/18/2021 Unknown              Plan/Recommendations/Medical Decision Making:   Drain appears to be working. Continue antibiotics  Monitor output  Probably worthwhile in the next week or so to repeat the CT scan to see how things are coming,   Assuming that the drain continues to put out.   If drainage stops would scan sooner to ensure adequate drainage

## 2021-10-27 NOTE — PROGRESS NOTES
Problem: Voice Impaired (Adult)  Goal: *Acute Goals and Plan of Care (Insert Text)  Description: Speech Therapy Goals  Initiated 10/25/2021    1. Patient will tolerate PMV during all waking hours without adverse effects within 7 days. Outcome: Progressing Towards Goal     SPEECH LANGUAGE PATHOLOGY TREATMENT  Patient: Gabriella Sanches (51 y.o. male)  Date: 10/27/2021  Diagnosis: GIB (gastrointestinal bleeding) [K92.2] <principal problem not specified>  Procedure(s) (LRB):  ESOPHAGOGASTRODUODENOSCOPY (EGD) at Bedside (N/A)  ESOPHAGOGASTRODUODENAL (EGD) BIOPSY (N/A) 8 Days Post-Op  Precautions: Fall, Skin, Contact    ASSESSMENT:  Pt still with size 8 trach XLT as downsize had not been completed at time of service. Despite this, pt still with excellent tolerance of PMV without any overt difficulties, stable vital signs, and pt is wearing it even when asleep without difficulty. Would recommend, after trach downsize, consideration of red-capping trials. Will continue to follow. PLAN:  Patient continues to benefit from skilled intervention to address the above impairments. Continue treatment per established plan of care. Speaking Valve Placement Recommendations:  SLP / RT / RN and patient/family    Recommended Speaking Valve Wearing Schedule:  [x]    As tolerated  Discharge Recommendations:  Inpatient Rehab     SUBJECTIVE:   Patient stated, \"It's going fine.     OBJECTIVE:   Treatment & Interventions:  #8 Shiley Cuffed Trach    Vital Signs During PMV Placement  O2: 96  RR:  23        Oxygen Therapy  O2 Sat (%): 97 %  Pulse via Oximetry: 109 beats per minute  O2 Device: Tracheal collar  Skin Assessment: Skin breakdown present (see comment/note)  Skin Protection for O2 Device: Yes  Orientation: Bilateral  Location: Neck  Interventions: Skin Barrier  O2 Flow Rate (L/min): 10 l/min  FIO2 (%): 35 %     Pain:  Pain Scale 1: Numeric (0 - 10)  Pain Intensity 1: 0       After treatment:   Call bell within reach and Nursing notified    COMMUNICATION/EDUCATION:     The patients plan of care was discussed with: Physical therapist, Registered nurse, Case management, and NP . Education was provided to patient, family, and staff regarding speaking valve placement, wearing schedule, safety precautions including cuff deflation and removal when sleeping, and care/cleaning guidelines.     Radha Brown SLP  Time Calculation: 30 mins

## 2021-10-27 NOTE — PROGRESS NOTES
[facility]    ICU TEAM Progress Note    Name: Cherelle Giang   : 1970   MRN: [id number]   Date: 10/27/2021      Assessment:   Reason for ICU Admission: MV and hypotension     Brief HPI:    -  \"Pt is a 54 yo M with PMH ESRD on HD, takes coumadin secondary to hx of DVT RUE with a recent prolonged hospitalization secondary to COVID PNA in August of this year. He ultimately required trach and peg and was in rehab at [facility]. Pt unable to provide HPI secondary to current cognition, therefore information is obtained via chart and provider. Per [facility] records, pt had a dislodged peg tube. He started having coffee ground emesis 10/18/21 with elevated HR and hypotension. He was sent to the ER for further workup. He was found to be hypotensive and ICU was consulted for further management. \"      10/25 Pt co some increased pain and discomfort overnight, was restrted on PTA fentanyl patch and prn ativan. 10/26 TCT tolerated X24 hours     10/27 Trach exchanged, downsized to cuffless 6 from 8 XL      S/P:   Procedure(s):  ESOPHAGOGASTRODUODENOSCOPY (EGD) at Bedside  ESOPHAGOGASTRODUODENAL (EGD) BIOPSY    Plan:     1. GIB  - GI consulted, appreciate input  - PPI BID  - SP EGD    - Hgb stable, 8.5 this am     2. Hemorrhagic shock  - Sp PRBCs, FFP and k centra   - Serial H and H, stable   - Monitor closely for s/s of bleeding  - Volume resuscitated  - Retacrit started per nephrology   - CT revealed large fluid filled mass, Hematoma? General surgery consulted  - Fluid mass drained by IR 10/23, plans for drain to remain X3 days then fu CT scan 10/26 to eval need for surgical intervention versus IR intervention. Will monitor drainage and amount while drain in place   -CT scan 10/26 slightly diminished size of the abdominal collection with pigtail drainage catheter     3. Supratherapeutic INR  - Sp FFP and k centra  - Hold coumadin for now as active bleeding  - Serial INR, last INR 1.1 (10/19)    4.  Acute on chronic hypoxic respiratory failure with tracheostomy   - Continue MV  - SBT daily   - Pulm hygiene   - HOB >30   - Aspiration precautions   - Vent weaned, RR now 16 from 22 ABG appreciated   - Speech consulted for inline, appreciate input  - Tcollar trials during the day as tolerated  - Will try 24 TCT  - Rate last night     5. ESRD on HD  - Cont HD per renal    6. Acute blood loss anemia   - Transfuse as needed to keep hgb >7  - Monitor closely for ss bleeding  - See above     7. PEG tube dislodged   - EGD per GI  - PEG removed, not in stomach  - CT abd and pelvis ordered, fluid collection noted, hematoma versus infection   - Await GI recs, will need new PEG placement at some point, will have to wait until fluid collection resolved   - Wound consulted for old peg site   - NPO for now, bowel rest per GI   -CT abd, pelvis appreciated, await GI recs on fluid collection   - General surgery consulted for fluid collection    8.  Leukocytosis   - Improved this am 16, down from 17  - continue broad spectrum abx   - On zosyn  - Continue diflucan for yeast out of wound   - ID consult   - Cultures pending       Subjective:   Overnight Events:   10/27/2021        Objective:     Visit Vitals  BP 97/67   Pulse (!) 115   Temp 97.9 °F (36.6 °C)   Resp 23   Ht 5' 11.5\" (1.816 m)   Wt 139.5 kg (307 lb 8.7 oz)   SpO2 93%   BMI 42.30 kg/m²    O2 Flow Rate (L/min): 10 l/min O2 Device: Tracheal collar, Tracheostomy Temp (24hrs), Av.1 °F (36.7 °C), Min:97.7 °F (36.5 °C), Max:98.6 °F (37 °C)           Hemodynamics:   PAP:   CO:     Wedge:   CI:     CVP:    SVR:       PVR:       Ventilator Settings:  Mode Rate Tidal Volume Pressure FiO2 PEEP   Spontaneous      8 cm H2O 35 % 5 cm H20     Peak airway pressure: 15 cm H2O    Minute ventilation: 15.2 l/min        Intake/Output:     Intake/Output Summary (Last 24 hours) at 10/27/2021 0809  Last data filed at 10/27/2021 0400  Gross per 24 hour   Intake 710 ml   Output 2085 ml   Net -1375 ml Physical Exam:  General:  Trach and on vent    Eyes:  Sclera anicteric. Pupils equal, round, reactive to light. Mouth/Throat: Orotracheal tube in place. Neck: Supple. Lungs:   Clear to auscultation bilaterally, good effort. Cardiovascular:  Regular rate and rhythm, no murmur, click, rub, or gallop. Abdomen: Old peg removed, scar and dressing intact    Extremities: No cyanosis or edema. Skin: No acute rash or lesions. Lymph Nodes: Cervical and supraclavicular normal.   Musculoskeletal:  No swelling or deformity. Lines/Devices:  Intact, no erythema, drainage, or tenderness. Labs & Data: Reviewed    Medications: Reviewed    Chest X-Ray:    TTE:    Multidisciplinary Rounds Completed: Yes    ABCDEF Bundle/Checklist Completed: Yes    SPECIAL EQUIPMENT: None    DISPOSITION: Stay in ICU    CRITICAL CARE CONSULTANT NOTE  I had a face to face encounter with the patient, reviewed and interpreted patient data including clinical events, labs, images, vital signs, I/O's, and examined patient. I have discussed the case and the plan and management of the patient's care with the consulting services, the bedside nurses and the respiratory therapist.      NOTE OF PERSONAL INVOLVEMENT IN CARE   This patient has a high probability of imminent, clinically significant deterioration, which requires the highest level of preparedness to intervene urgently. I participated in the decision-making and personally managed or directed the management of the following life and organ supporting interventions that required my frequent assessment to treat or prevent imminent deterioration. I personally spent 35 minutes of critical care time. This is time spent at this critically ill patient's bedside actively involved in patient care as well as the coordination of care and discussions with the patient's family. This does not include any procedural time which has been billed separately.     Katerina Woodall [facility]  10/27/2021

## 2021-10-28 LAB
ANION GAP SERPL CALC-SCNC: 9 MMOL/L (ref 5–15)
BACTERIA SPEC CULT: NORMAL
BASOPHILS # BLD: 0.1 K/UL (ref 0–0.1)
BASOPHILS NFR BLD: 1 % (ref 0–1)
BUN SERPL-MCNC: 15 MG/DL (ref 6–20)
BUN/CREAT SERPL: 8 (ref 12–20)
CALCIUM SERPL-MCNC: 8.8 MG/DL (ref 8.5–10.1)
CHLORIDE SERPL-SCNC: 98 MMOL/L (ref 97–108)
CO2 SERPL-SCNC: 25 MMOL/L (ref 21–32)
CREAT SERPL-MCNC: 1.85 MG/DL (ref 0.7–1.3)
DIFFERENTIAL METHOD BLD: ABNORMAL
EOSINOPHIL # BLD: 0.2 K/UL (ref 0–0.4)
EOSINOPHIL NFR BLD: 2 % (ref 0–7)
ERYTHROCYTE [DISTWIDTH] IN BLOOD BY AUTOMATED COUNT: 18.9 % (ref 11.5–14.5)
GLUCOSE SERPL-MCNC: 90 MG/DL (ref 65–100)
HCT VFR BLD AUTO: 26.8 % (ref 36.6–50.3)
HGB BLD-MCNC: 8.3 G/DL (ref 12.1–17)
IMM GRANULOCYTES # BLD AUTO: 0 K/UL
IMM GRANULOCYTES NFR BLD AUTO: 0 %
INR PPP: 1.1 (ref 0.9–1.1)
LYMPHOCYTES # BLD: 2.3 K/UL (ref 0.8–3.5)
LYMPHOCYTES NFR BLD: 21 % (ref 12–49)
MAGNESIUM SERPL-MCNC: 1.8 MG/DL (ref 1.6–2.4)
MCH RBC QN AUTO: 30.3 PG (ref 26–34)
MCHC RBC AUTO-ENTMCNC: 31 G/DL (ref 30–36.5)
MCV RBC AUTO: 97.8 FL (ref 80–99)
MONOCYTES # BLD: 1 K/UL (ref 0–1)
MONOCYTES NFR BLD: 9 % (ref 5–13)
NEUTS BAND NFR BLD MANUAL: 1 % (ref 0–6)
NEUTS SEG # BLD: 7.4 K/UL (ref 1.8–8)
NEUTS SEG NFR BLD: 66 % (ref 32–75)
NRBC # BLD: 0 K/UL (ref 0–0.01)
NRBC BLD-RTO: 0 PER 100 WBC
PHOSPHATE SERPL-MCNC: 2.4 MG/DL (ref 2.6–4.7)
PLATELET # BLD AUTO: 332 K/UL (ref 150–400)
PLATELET COMMENTS,PCOM: ABNORMAL
PMV BLD AUTO: 9.7 FL (ref 8.9–12.9)
POTASSIUM SERPL-SCNC: 4 MMOL/L (ref 3.5–5.1)
PROTHROMBIN TIME: 11.9 SEC (ref 9–11.1)
RBC # BLD AUTO: 2.74 M/UL (ref 4.1–5.7)
RBC MORPH BLD: ABNORMAL
RBC MORPH BLD: ABNORMAL
SERVICE CMNT-IMP: NORMAL
SODIUM SERPL-SCNC: 132 MMOL/L (ref 136–145)
WBC # BLD AUTO: 11 K/UL (ref 4.1–11.1)

## 2021-10-28 PROCEDURE — 92507 TX SP LANG VOICE COMM INDIV: CPT

## 2021-10-28 PROCEDURE — 77030006998

## 2021-10-28 PROCEDURE — 80048 BASIC METABOLIC PNL TOTAL CA: CPT

## 2021-10-28 PROCEDURE — 85025 COMPLETE CBC W/AUTO DIFF WBC: CPT

## 2021-10-28 PROCEDURE — 74011000258 HC RX REV CODE- 258: Performed by: HEALTH CARE PROVIDER

## 2021-10-28 PROCEDURE — 74011000250 HC RX REV CODE- 250: Performed by: NURSE PRACTITIONER

## 2021-10-28 PROCEDURE — 74011250636 HC RX REV CODE- 250/636: Performed by: NURSE PRACTITIONER

## 2021-10-28 PROCEDURE — 77010033711 HC HIGH FLOW OXYGEN

## 2021-10-28 PROCEDURE — 74011250636 HC RX REV CODE- 250/636: Performed by: INTERNAL MEDICINE

## 2021-10-28 PROCEDURE — 85610 PROTHROMBIN TIME: CPT

## 2021-10-28 PROCEDURE — 77030018622 HC TU TRACH CUF1 COVD -B

## 2021-10-28 PROCEDURE — 74011250636 HC RX REV CODE- 250/636: Performed by: HEALTH CARE PROVIDER

## 2021-10-28 PROCEDURE — 77030018631

## 2021-10-28 PROCEDURE — 92526 ORAL FUNCTION THERAPY: CPT

## 2021-10-28 PROCEDURE — 99231 SBSQ HOSP IP/OBS SF/LOW 25: CPT | Performed by: SURGERY

## 2021-10-28 PROCEDURE — 77030009927

## 2021-10-28 PROCEDURE — 74011250637 HC RX REV CODE- 250/637: Performed by: INTERNAL MEDICINE

## 2021-10-28 PROCEDURE — 83735 ASSAY OF MAGNESIUM: CPT

## 2021-10-28 PROCEDURE — 74011250637 HC RX REV CODE- 250/637: Performed by: NURSE PRACTITIONER

## 2021-10-28 PROCEDURE — 77010033678 HC OXYGEN DAILY

## 2021-10-28 PROCEDURE — 97535 SELF CARE MNGMENT TRAINING: CPT

## 2021-10-28 PROCEDURE — 84100 ASSAY OF PHOSPHORUS: CPT

## 2021-10-28 PROCEDURE — 65610000006 HC RM INTENSIVE CARE

## 2021-10-28 PROCEDURE — 36415 COLL VENOUS BLD VENIPUNCTURE: CPT

## 2021-10-28 PROCEDURE — 97110 THERAPEUTIC EXERCISES: CPT

## 2021-10-28 PROCEDURE — C9113 INJ PANTOPRAZOLE SODIUM, VIA: HCPCS | Performed by: NURSE PRACTITIONER

## 2021-10-28 PROCEDURE — 77030018626 HC TU TRACH CUF3 COVD -B

## 2021-10-28 PROCEDURE — 94640 AIRWAY INHALATION TREATMENT: CPT

## 2021-10-28 PROCEDURE — 77030018630 HC TU TRACH UNCUF1 COVD -B

## 2021-10-28 RX ORDER — SODIUM,POTASSIUM PHOSPHATES 280-250MG
1 POWDER IN PACKET (EA) ORAL 3 TIMES DAILY
Status: COMPLETED | OUTPATIENT
Start: 2021-10-28 | End: 2021-10-30

## 2021-10-28 RX ADMIN — Medication 10 ML: at 05:55

## 2021-10-28 RX ADMIN — Medication: at 17:53

## 2021-10-28 RX ADMIN — Medication: at 14:42

## 2021-10-28 RX ADMIN — ONDANSETRON 4 MG: 2 INJECTION INTRAMUSCULAR; INTRAVENOUS at 21:26

## 2021-10-28 RX ADMIN — Medication 10 ML: at 14:42

## 2021-10-28 RX ADMIN — SODIUM CHLORIDE 40 MG: 9 INJECTION INTRAMUSCULAR; INTRAVENOUS; SUBCUTANEOUS at 21:17

## 2021-10-28 RX ADMIN — POTASSIUM & SODIUM PHOSPHATES POWDER PACK 280-160-250 MG 1 PACKET: 280-160-250 PACK at 17:26

## 2021-10-28 RX ADMIN — EPOETIN ALFA-EPBX 10000 UNITS: 10000 INJECTION, SOLUTION INTRAVENOUS; SUBCUTANEOUS at 21:22

## 2021-10-28 RX ADMIN — FLUCONAZOLE IN SODIUM CHLORIDE 200 MG: 2 INJECTION, SOLUTION INTRAVENOUS at 14:41

## 2021-10-28 RX ADMIN — MIDODRINE HYDROCHLORIDE 10 MG: 5 TABLET ORAL at 22:06

## 2021-10-28 RX ADMIN — IPRATROPIUM BROMIDE AND ALBUTEROL SULFATE 3 ML: .5; 3 SOLUTION RESPIRATORY (INHALATION) at 19:08

## 2021-10-28 RX ADMIN — SODIUM CHLORIDE 40 MG: 9 INJECTION INTRAMUSCULAR; INTRAVENOUS; SUBCUTANEOUS at 09:34

## 2021-10-28 RX ADMIN — Medication 10 ML: at 21:18

## 2021-10-28 RX ADMIN — MIDODRINE HYDROCHLORIDE 10 MG: 5 TABLET ORAL at 14:42

## 2021-10-28 RX ADMIN — CHLORHEXIDINE GLUCONATE 15 ML: 0.12 RINSE ORAL at 21:18

## 2021-10-28 RX ADMIN — MIDODRINE HYDROCHLORIDE 10 MG: 5 TABLET ORAL at 05:55

## 2021-10-28 RX ADMIN — LORAZEPAM 0.5 MG: 2 INJECTION INTRAMUSCULAR; INTRAVENOUS at 09:33

## 2021-10-28 RX ADMIN — Medication 10 ML: at 22:27

## 2021-10-28 RX ADMIN — Medication 1 CAPSULE: at 17:53

## 2021-10-28 RX ADMIN — COLLAGENASE SANTYL: 250 OINTMENT TOPICAL at 14:46

## 2021-10-28 RX ADMIN — IPRATROPIUM BROMIDE AND ALBUTEROL SULFATE 3 ML: .5; 3 SOLUTION RESPIRATORY (INHALATION) at 09:06

## 2021-10-28 RX ADMIN — PIPERACILLIN AND TAZOBACTAM 3.38 G: 3; .375 INJECTION, POWDER, LYOPHILIZED, FOR SOLUTION INTRAVENOUS at 04:23

## 2021-10-28 RX ADMIN — CHLORHEXIDINE GLUCONATE 15 ML: 0.12 RINSE ORAL at 14:42

## 2021-10-28 RX ADMIN — LORAZEPAM 0.5 MG: 2 INJECTION INTRAMUSCULAR; INTRAVENOUS at 22:05

## 2021-10-28 RX ADMIN — HYDROMORPHONE HYDROCHLORIDE 0.5 MG: 1 INJECTION, SOLUTION INTRAMUSCULAR; INTRAVENOUS; SUBCUTANEOUS at 05:55

## 2021-10-28 RX ADMIN — POTASSIUM & SODIUM PHOSPHATES POWDER PACK 280-160-250 MG 1 PACKET: 280-160-250 PACK at 14:42

## 2021-10-28 RX ADMIN — Medication 9 MG: at 22:06

## 2021-10-28 RX ADMIN — PIPERACILLIN AND TAZOBACTAM 3.38 G: 3; .375 INJECTION, POWDER, LYOPHILIZED, FOR SOLUTION INTRAVENOUS at 17:26

## 2021-10-28 NOTE — PROGRESS NOTES
Bedside and Verbal shift change report given to ELIAS Michelle (oncoming nurse) by Rosa Guan RN  (offgoing nurse). Report included the following information SBAR, Kardex, Intake/Output, MAR, Recent Results, Cardiac Rhythm ST and Alarm Parameters .

## 2021-10-28 NOTE — PROGRESS NOTES
1530: Bedside and Verbal shift change report given to Amy RN (oncoming nurse) by [address] RN (offgoing nurse). Report included the following information SBAR, Kardex, Intake/Output, MAR, Recent Results and Cardiac Rhythm ST.    1930: Bedside and Verbal shift change report given to [address] (oncoming nurse) by Kane Granados RN (offgoing nurse).  Report included the following information SBAR, Kardex, Intake/Output, MAR, Recent Results and Cardiac Rhythm ST.

## 2021-10-28 NOTE — CONSULTS
[address]  [address] [facility], [address]       GI PROGRESS NOTE  Mando Varma, [facility] office  [phone] NP in-hospital cell phone M-F until 4:30  After 5pm or on weekends, please call  for physician on call      NAME: Zulma Thornton   :  1970   MRN:  [id number]       Subjective:   He continues to make improvement, no further GI blood loss. He's off ventilator and tolerating PO per SLP. He has pigtail drain in abdominal wall abscess. He has ostomy over old PEG site with similar drainage to pigtail drain. Objective:     VITALS:   Last 24hrs VS reviewed since prior progress note. Most recent are:  Visit Vitals  BP 97/69   Pulse (!) 105   Temp 99.2 °F (37.3 °C)   Resp 23   Ht 5' 11.5\" (1.816 m)   Wt 134.1 kg (295 lb 10.2 oz)   SpO2 98%   BMI 40.66 kg/m²       PHYSICAL EXAM:  General: Cooperative, no acute distress    Neurologic:  Alert and oriented  HEENT: EOMI, no scleral icterus   Lungs:  Trach collar  Heart:  Regular rate  Abdomen: Soft, obese, no tenderness, old PEG site with ostomy over, pigtail LUQ     Extremities: warm  Psych:   Not anxious or agitated. Lab Data Reviewed:     Recent Results (from the past 24 hour(s))   CBC WITH AUTOMATED DIFF    Collection Time: 10/28/21  4:39 AM   Result Value Ref Range    WBC 11.0 4.1 - 11.1 K/uL    RBC 2.74 (L) 4.10 - 5.70 M/uL    HGB 8.3 (L) 12.1 - 17.0 g/dL    HCT 26.8 (L) 36.6 - 50.3 %    MCV 97.8 80.0 - 99.0 FL    MCH 30.3 26.0 - 34.0 PG    MCHC 31.0 30.0 - 36.5 g/dL    RDW 18.9 (H) 11.5 - 14.5 %    PLATELET 402 575 - 332 K/uL    MPV 9.7 8.9 - 12.9 FL    NRBC 0.0 0  WBC    ABSOLUTE NRBC 0.00 0.00 - 0.01 K/uL    NEUTROPHILS 66 32 - 75 %    BAND NEUTROPHILS 1 0 - 6 %    LYMPHOCYTES 21 12 - 49 %    MONOCYTES 9 5 - 13 %    EOSINOPHILS 2 0 - 7 %    BASOPHILS 1 0 - 1 %    IMMATURE GRANULOCYTES 0 %    ABS. NEUTROPHILS 7.4 1.8 - 8.0 K/UL    ABS. LYMPHOCYTES 2.3 0.8 - 3.5 K/UL    ABS.  MONOCYTES 1.0 0.0 - 1.0 K/UL    ABS. EOSINOPHILS 0.2 0.0 - 0.4 K/UL    ABS. BASOPHILS 0.1 0.0 - 0.1 K/UL    ABS. IMM. GRANS. 0.0 K/UL    DF MANUAL      PLATELET COMMENTS Large Platelets      RBC COMMENTS ANISOCYTOSIS  1+        RBC COMMENTS MACROCYTOSIS  1+       METABOLIC PANEL, BASIC    Collection Time: 10/28/21  4:39 AM   Result Value Ref Range    Sodium 132 (L) 136 - 145 mmol/L    Potassium 4.0 3.5 - 5.1 mmol/L    Chloride 98 97 - 108 mmol/L    CO2 25 21 - 32 mmol/L    Anion gap 9 5 - 15 mmol/L    Glucose 90 65 - 100 mg/dL    BUN 15 6 - 20 MG/DL    Creatinine 1.85 (H) 0.70 - 1.30 MG/DL    BUN/Creatinine ratio 8 (L) 12 - 20      GFR est AA 47 (L) >60 ml/min/1.73m2    GFR est non-AA 39 (L) >60 ml/min/1.73m2    Calcium 8.8 8.5 - 10.1 MG/DL   MAGNESIUM    Collection Time: 10/28/21  4:39 AM   Result Value Ref Range    Magnesium 1.8 1.6 - 2.4 mg/dL   PHOSPHORUS    Collection Time: 10/28/21  4:39 AM   Result Value Ref Range    Phosphorus 2.4 (L) 2.6 - 4.7 MG/DL         IMPRESSION  1. CHEST: Severe parenchymal lung disease, likely more chronic than acute. Tracheostomy device, NG tube. Main pulmonary outflow tract enlarged, correlate  for pulmonary arterial hypertension. 2. ABDOMEN: Left anterior abdominal and pelvic wall hematoma or gas containing  fluid collection 41 x 5.7 x 4.6 cm. . Correlate for recent instrumentation versus  infection. Numerous hepatic cysts. CT stigmata of end-stage renal disease. Mild  gaseous distention of colon. Other incidental findings. 3. PELVIS: Small free fluid. IMPRESSION  Slightly diminished size of left anterolateral wall abdominal collection with  pigtail drainage catheter.   Assessment:     · Feeding difficulties: initial PEG dislodged and hematoma/fluid collection in abdomen status post drain, SLP following and cleared for diet  · GI bleed: hematemesis in the setting of supratheraputic INR status post PRBC's, FFP and K-centra; hgb 8.3 status post 2 units pRBC's; EGD 10/19/21: grade D reflux esophagitis, gastric polyps, no G tube in stomach, mild patchy erythema duodenal bulb   · Recent COVID pneumonia trach/PEG  · ESRD on HD  · DVT on Coumadin PTA INR 5.2-->1.1     Patient Active Problem List   Diagnosis Code    GIB (gastrointestinal bleeding) K92.2    Severe protein-calorie malnutrition ([facility] [address] E43    Abdominal wall fluid collections R18.8     Plan:     · On Zosyn  · Surgery following - they can manage abscess drainage      Signed By: Belinda Choi NP     10/28/2021  9:06 AM     Agree with above  Not much to add to his management   To follow Dr Caal Heads recommendations  Will sign off

## 2021-10-28 NOTE — PROGRESS NOTES
[facility] [address]    Name: Zulma Thornton MRN: [id number]   : 1970 [facility]   Date: 10/28/2021        IMPRESSION:   SERGEY, HD dependent - HD - MWF   Hypotension - BPs are better  Hypervolemia / Fluid Overload - improving  Anemia of multifactorial origin  Respiratory failure - sec to due to Covid pneumonia --> trach collar  Hypokalemia  -  Mild - resolved  Hypophosphatemia - Mild      PLAN:   Hold HD today (10/28). HD again tomorrow (10/29)   Replete Phos  IV albumin x 3  doses prn for intra-dialytic hypotension. Will increase the HD time to 4 hours as well. This should help to mitigate hypotension during HD. Anemia management --> EPO. Consider PRBCs for Hgb < 7. Avoid NSAIDs + IV contrast  Dose meds for his GFR  Watch for renal recovery     Subjective/Interval History:     Lissa Palumbo --> 10/24/21    I have reviewed the flowsheet and previous days notes. ROS: Awake and interactive, breathing ok via trach. Now with Dobhoff, wants something cold to drink, dry mouth. Had drainage of left abd wall fluid collection:    IMPRESSION        1. Successful ultrasound guided placement of 10 Lao percutaneous drain into a  left abdominal wall fluid collection. 2. Needle sampling of the more inferior collection of the left abdominal wall  yielded only a small amount of clot, presumed noninfected hematoma. Hedda Cristi --> 10/25/21    BPs are better today. Remains on vent support. Getting ready to start HD when I entered the room. Dorothy --> 10/26/21     Feeling better. Scheduled for ct with IV contrast today. Hedda Cristi --> 10/27/21    Had the CT yesterday. The findings were noted. Had HD as well. Hedda Cristi --> 10/28/21      HD was complicated by cramping and hypotension yesterday. This was addresses. 2.8 litres were removed with HD. Felt better today.       Objective:   Vital Signs:    Visit Vitals  /67   Pulse (!) 103   Temp 100 °F (37.8 °C)   Resp 22   Ht 5' 11.5\" (1.816 m) Wt 134.1 kg (295 lb 10.2 oz)   SpO2 96%   BMI 40.66 kg/m²       O2 Device: Tracheal collar   O2 Flow Rate (L/min): 10 l/min   Temp (24hrs), Av °F (37.2 °C), Min:98.6 °F (37 °C), Max:100 °F (37.8 °C)       Intake/Output:   Last shift:      No intake/output data recorded. Last 3 shifts: 10/26 1901 - 10/28 0700  In: 680 [P.O.:360; I.V.:300]  Out: 4955 [Drains:135]    Intake/Output Summary (Last 24 hours) at 10/28/2021 0845  Last data filed at 10/28/2021 0600  Gross per 24 hour   Intake 310 ml   Output 2920 ml   Net -2610 ml          Physical Exam:      Seen in the ICU - Bed 4. General:    Resting in bed comfortably. Head:   Normocephalic. .    Neck:  Trach - capped    Lungs:   Clear to auscultation, No Wheezing , Rhonchi or rales. Heart:   No S3 gallop , no pericardial rub. Abdomen:   No clear-cut distension    Extremities: Leg oedema --> improving       Neurologic: Responding to questions        DATA:  Labs:  Recent Labs     10/28/21  0439 10/27/21  0437 10/26/21  0527   * 137 136   K 4.0 3.3* 4.4   CL 98 103 104   CO2 25 26 24   BUN 15 17 25*   CREA 1.85* 1.98* 2.33*   CA 8.8 8.5 8.7   ALB  --   --  2.5*   PHOS 2.4* 2.6 3.1   MG 1.8 1.8 1.9     Recent Labs     10/28/21  0439 10/27/21  0437 10/26/21  0527   WBC 11.0 12.8* 11.8*   HGB 8.3* 8.5* 9.2*   HCT 26.8* 27.2* 30.6*    374 340     No results for input(s): DOLORES, KU, CLU, CREAU in the last 72 hours.     No lab exists for component: PROU    Total time spent with patient:            Care Plan discussed with:           Curt De La Rosa MD

## 2021-10-28 NOTE — PROGRESS NOTES
Problem: Mobility Impaired (Adult and Pediatric)  Goal: *Acute Goals and Plan of Care (Insert Text)  Description: FUNCTIONAL STATUS PRIOR TO ADMISSION: Patient was independent and active without use of DME prior to August 2021. Hospitalized in [place], [place] from August to mid October with COVID 19, now with trach and on HD. Was admitted to [facility] for four days prior to admission and stated he had not begun therapy there. HOME SUPPORT PRIOR TO ADMISSION: The patient lived with his wife of 27 years. Lives in [place], [place] Admitted from [facility]. Physical Therapy Goals  Goals re-assessed 10/28/2021   1. Patient will move from supine to sit and sit to supine, scoot up and down, and roll side to side in bed with maximal assistance x2 within 7 day(s). 2.  Patient will tolerate HOB elevated at 50 degrees 30 min BID within 7 days. 3.  Patient will be independent in supine HEP for LEs within 7 days. Initiated 10/22/2021  1. Patient will move from supine to sit and sit to supine, scoot up and down, and roll side to side in bed with maximal assistance x2 within 7 day(s). 2.  Patient will tolerate HOB elevated at 50 degrees 10 min BID within 7 days. 3.  Patient will be independent in supine HEP for LEs within 7 days. 4.  Patient will tolerate PRAFO boot (alternating feet every 2 hours) within 7 days. Goal met  Outcome: Progressing Towards Goal  PHYSICAL THERAPY TREATMENT: WEEKLY REASSESSMENT  Patient: Demetria Celeste (55 y.o. male)  Date: 10/28/2021  Primary Diagnosis: GIB (gastrointestinal bleeding) [K92.2]  Procedure(s) (LRB):  ESOPHAGOGASTRODUODENOSCOPY (EGD) at Bedside (N/A)  ESOPHAGOGASTRODUODENAL (EGD) BIOPSY (N/A) 9 Days Post-Op   Precautions: Fall, Skin, Contact    ASSESSMENT  Patient continues with skilled PT services and is progressing slowly towards goals. Pt now on trach collar 10 L/min, PMV in place and pt conversant and willing to work with therapy.   He was able to move all extremities on command, however grossly weak - most strength in LUE and RLE. Pt with bilat heel cord tightness. Pt and RN report he has been wearing PRAFO and adhering to wear schedule (2 hours on each leg). Performed AAROM in [address]. He tolerated upright positioning with totalA utilizing bed mechanics and required totalA to reposition in bed 2* L trunk lean. Recommending IPR vs SNF pending progress in acute care. Current Level of Function Impacting Discharge (mobility/balance): totalA for all mobility     Other factors to consider for discharge: [facility], from [address], on trach collar         PLAN :  Goals have been updated based on progression since last assessment. Patient continues to benefit from skilled intervention to address the above impairments. Recommendations and Planned Interventions: bed mobility training, transfer training, gait training, therapeutic exercises, neuromuscular re-education, patient and family training/education, and therapeutic activities      Frequency/Duration: Patient will be followed by physical therapy:  3 times a week to address goals. Recommendation for discharge: (in order for the patient to meet his/her long term goals)  SNF vs IPR pending progress in acute care    This discharge recommendation:  Has not yet been discussed the attending provider and/or case management    IF patient discharges home will need the following DME: to be determined (TBD)         SUBJECTIVE:   Patient stated I've been switching the boot from foot to foot.     OBJECTIVE DATA SUMMARY:   HISTORY:    Past Medical History:   Diagnosis Date    COVID     Dialysis patient [facility])     started around the end of september 2021    Polycystic kidney disease     S/P percutaneous endoscopic gastrostomy (PEG) tube placement ([facility] [address]    No past surgical history on file.     Home Situation  Home Environment:  (admitted from [facility])  Support Systems: Spouse/Significant Other    EXAMINATION/PRESENTATION/DECISION MAKING:   Critical Behavior:  Neurologic State: Alert  Orientation Level: Oriented X4  Cognition: Appropriate decision making, Appropriate for age attention/concentration  Safety/Judgement: Awareness of environment    Functional Mobility:  Bed Mobility:  Supine to Sit: Total assistance    Balance:   Sitting: Impaired; With support (L trunk lean)    Activity Tolerance:   Poor    After treatment patient left in no apparent distress:   Supine in bed and Call bell within reach    COMMUNICATION/EDUCATION:   The patients plan of care was discussed with: Occupational therapist, Speech therapist, and Registered nurse. Fall prevention education was provided and the patient/caregiver indicated understanding., Patient/family have participated as able in goal setting and plan of care. , and Patient/family agree to work toward stated goals and plan of care.     Thank you for this referral.  Paradise Ta, PT, DPT   Time Calculation: 18 mins

## 2021-10-28 NOTE — PROGRESS NOTES
Problem: Dysphagia (Adult)  Goal: *Acute Goals and Plan of Care (Insert Text)  Description: Speech therapy goals  Initiated 10/25/2021   1. Patient will tolerate soft and bite sized diet with thin liquids with PMV in place without s/s of aspiration within 7 days     Outcome: Progressing Towards Goal     Problem: Voice Impaired (Adult)  Goal: *Acute Goals and Plan of Care (Insert Text)  Description: Speech Therapy Goals  Initiated 10/25/2021    1. Patient will tolerate PMV during all waking hours without adverse effects within 7 days. Outcome: Progressing Towards Goal     SPEECH LANGUAGE PATHOLOGY DYSPHAGIA/PMV TREATMENT  Patient: Gabriella Sanches (19 y.o. male)  Date: 10/28/2021  Diagnosis: GIB (gastrointestinal bleeding) [K92.2] <principal problem not specified>  Procedure(s) (LRB):  ESOPHAGOGASTRODUODENOSCOPY (EGD) at Bedside (N/A)  ESOPHAGOGASTRODUODENAL (EGD) BIOPSY (N/A) 9 Days Post-Op  Precautions: Fall, Skin, Contact    ASSESSMENT:  Pt tolerating soft and bite sized diet and thin liquids without overt difficulty nor adverse effects. Recommend diet advancement, however, will advance to easy to chew as pt is edentulous. Will follow up x1 for diet safety and tolerance. Additionally, pt continues to tolerate PMV during all hours without adverse effects nor evidence of back pressure/breath stacking. Has been lucid and interactive also supporting a lack of Co2 retention. Given this, recommend pt have red-cap placed after trach is downsized to a 6 cuffless. Per discussion with RN, ENT will be here today to change trach. Contacted NP and RT about red-capping following. If pt can tolerate red-capping for 24 hours, could consider decannulation at that time.       PLAN:  Recommendations and Planned Interventions:  --easy to chew diet/thin liquids  --good oral care  --meds as tolerated  --once pt is decannulated, will required some stoma pressure while stoma heals    Patient continues to benefit from skilled intervention to address the above impairments. Continue treatment per established plan of care. Speaking Valve Placement:  SLP / RT / RN and patient/family  Recommended Speaking Valve Wearing Schedule:  [x]    As tolerated  Discharge Recommendations:  Inpatient Rehab     SUBJECTIVE:   Patient stated, \"Maybe something a little softer because I don't have teeth. OBJECTIVE:   Cognitive and Communication Status:  Neurologic State: Alert  Orientation Level: Oriented X4  Cognition: Appropriate decision making, Appropriate for age attention/concentration  Perception: Appears intact  Perseveration: No perseveration noted  Safety/Judgement: Awareness of environment  Tracheostomy:  #8 Shiley Cuffed XLT Trach    Vital Signs During PMV Placement  O2: 94  RR: 23       Oxygen Therapy  O2 Sat (%): 96 %  Pulse via Oximetry: 99 beats per minute  O2 Device: Tracheal collar  Skin Assessment: Clean, dry, & intact  Skin Protection for O2 Device: Yes  Orientation: Bilateral  Location: Neck  Interventions: Skin Barrier  O2 Flow Rate (L/min): 10 l/min  FIO2 (%): 35 %  Dysphagia Treatment:  Tracheostomy:  Airway Clearance  Suction: Trach  Suction Device: Suction catheter  Suction Catheter Size: 14 Fr  Sputum Method Obtained: Tracheal  Sputum Amount: Small  Sputum Color/Odor: White  Sputum Consistency: Thin     PMV Trial   Vocal Quality: No impairment  Oral Assessment:  Oral Assessment  Labial: No impairment  Dentition: Edentulous  Lingual: No impairment  Velum: No impairment  Mandible: No impairment  P.O. Trials:  Patient Position: upright in bed  Vocal quality prior to P.O.: No impairment  Consistency Presented: Thin liquid; Solid  How Presented: Spoon;Straw (RN fed)     Bolus Acceptance: No impairment  Bolus Formation/Control: No impairment     Propulsion: No impairment  Oral Residue: None  Initiation of Swallow: No impairment  Laryngeal Elevation: Functional  Aspiration Signs/Symptoms: None  Pharyngeal Phase Characteristics: No impairment, issues, or problems            Oral Phase Severity: No impairment  Pharyngeal Phase Severity : No impairment    Pain:  Pain Scale 1: Numeric (0 - 10)  Pain Intensity 1: 7  Pain Location 1: Generalized    After treatment:   Call bell within reach and Nursing notified    COMMUNICATION/EDUCATION:     The patient's plan of care including recommendations, planned interventions, and recommended diet changes were discussed with: Registered nurse. Education was provided to patient, family, and staff regarding speaking valve placement, wearing schedule, safety precautions including cuff deflation and removal when sleeping, and care/cleaning guidelines.       MALCOLM Ramos  Time Calculation: 20 mins

## 2021-10-28 NOTE — PROGRESS NOTES
Palliative Medicine  [place]: [phone] ([phone]  [facility]: [phone])      The SW spoke with bedside RN and Unit , patient is working with PT/OT/Speech, making progress- off ventilation, downsize in trache and if continued improvement, possible decannulation in the upcoming days. Case Management is working on rehabilitation placement closer to the patient's home. SW attempted to meet with patient for psychosocial support- met at bedside, sleeping and did not arouse. SW called the patient's wife Massiel Rose- she remembers speaking with Palliative Medicine team when patient initially admitted- she has been receiving daily updates from primary team/RN staff, and is seeing progress in the patient's status. SW offered support, inquired about any specific worries or concerns- Massiel Rose shared that she was able to visit the patient last Saturday, and they video call daily- both her and the patient wish healing was a faster process, but she verbalized that the patient is \"where we need to be\" at this time, verbalized taking care one-step-at-a-time. The patient's spouse has contact information for Palliative Medicine if any needs or concerns arise. Please call Palliative Medicine for any specific concerns or changes in the patient's condition. Goals remain clear for full restorative measures. Thank you for including Palliative Medicine in the care of Demetria Celeste.        Catie Strickland LCSW  [phone]

## 2021-10-28 NOTE — PROGRESS NOTES
[facility]    ICU TEAM Progress Note    Name: Dustin Andre   : 1970   MRN: [id number]   Date: 10/28/2021      Assessment:   Reason for ICU Admission: MV and hypotension     Brief HPI:    -  \"Pt is a 54 yo M with PMH ESRD on HD, takes coumadin secondary to hx of DVT RUE with a recent prolonged hospitalization secondary to COVID PNA in August of this year. He ultimately required trach and peg and was in rehab at [facility]. Pt unable to provide HPI secondary to current cognition, therefore information is obtained via chart and provider. Per [facility] records, pt had a dislodged peg tube. He started having coffee ground emesis 10/18/21 with elevated HR and hypotension. He was sent to the ER for further workup. He was found to be hypotensive and ICU was consulted for further management. \"      10/25 Pt co some increased pain and discomfort overnight, was restrted on PTA fentanyl patch and prn ativan. 10/26 TCT tolerated X24 hours           S/P:   Procedure(s):  ESOPHAGOGASTRODUODENOSCOPY (EGD) at Bedside  ESOPHAGOGASTRODUODENAL (EGD) BIOPSY    Plan:     1. GIB  - GI consulted, appreciate input  - PPI BID  - SP EGD    - Hgb stable, 8.5 this am   -Tolerating PO intacke    2. Hemorrhagic shock  - Sp PRBCs, FFP and k centra   - Serial H and H, stable   - Monitor closely for s/s of bleeding  - Volume resuscitated  - Retacrit started per nephrology   - CT revealed large fluid filled mass, Hematoma? General surgery consulted  - Fluid mass drained by IR 10/23, plans for drain to remain X3 days then fu CT scan 10/26 to eval need for surgical intervention versus IR intervention. Will monitor drainage and amount while drain in place   -CT scan 10/26 slightly diminished size of the abdominal collection with pigtail drainage catheter     3. Supratherapeutic INR  - Sp FFP and k centra  - Hold coumadin for now as active bleeding  - Serial INR, last INR 1.1 (10/19), will repeat coags today.      4. Acute on chronic hypoxic respiratory failure with tracheostomy   - Continue MV  - Has been on trach collar with speaking valve, plan for downsize of trach today. If tolerates possible decannulation tomorrow?  - Pulm hygiene   - HOB >30   - Aspiration precautions   - Speech following      5. ESRD on HD  - Cont HD per renal    6. Acute blood loss anemia   - Transfuse as needed to keep hgb >7  - Monitor closely for ss bleeding  - See above     7. PEG tube dislodged   - EGD per GI  - PEG removed, not in stomach  - CT abd and pelvis ordered, fluid collection noted, hematoma versus infection   - Await GI recs, will need new PEG placement at some point, will have to wait until fluid collection resolved   - Wound consulted for old peg site   - General surgery consulted for fluid collection    8. Leukocytosis   - Improved this am 11, down from 12.8  - continue broad spectrum abx   - On zosyn  - Continue diflucan for yeast out of wound   - ID consult       Subjective:   Overnight Events:   10/28/2021    No acute events, tolerated trach collar     Objective:     Visit Vitals  /73   Pulse 96   Temp 100 °F (37.8 °C)   Resp 19   Ht 5' 11.5\" (1.816 m)   Wt 134.1 kg (295 lb 10.2 oz)   SpO2 96%   BMI 40.66 kg/m²    O2 Flow Rate (L/min): 10 l/min O2 Device: Tracheal collar Temp (24hrs), Av °F (37.2 °C), Min:98.6 °F (37 °C), Max:100 °F (37.8 °C)           Hemodynamics:   PAP:   CO:     Wedge:   CI:     CVP:    SVR:       PVR:       Ventilator Settings:  Mode Rate Tidal Volume Pressure FiO2 PEEP   Spontaneous      8 cm H2O 35 % 5 cm H20     Peak airway pressure: 15 cm H2O    Minute ventilation: 15.2 l/min        Intake/Output:     Intake/Output Summary (Last 24 hours) at 10/28/2021 1002  Last data filed at 10/28/2021 0600  Gross per 24 hour   Intake 310 ml   Output 2920 ml   Net -2610 ml       Physical Exam:  General:  Trach and on vent    Eyes:  Sclera anicteric. Pupils equal, round, reactive to light.    Mouth/Throat: Orotracheal tube in place.   Neck: Supple. Lungs:   Clear to auscultation bilaterally, good effort. Cardiovascular:  Regular rate and rhythm, no murmur, click, rub, or gallop. Abdomen: Old peg removed, scar and dressing intact    Extremities: No cyanosis or edema. Skin: No acute rash or lesions. Lymph Nodes: Cervical and supraclavicular normal.   Musculoskeletal:  No swelling or deformity. Lines/Devices:  Intact, no erythema, drainage, or tenderness. Labs & Data: Reviewed    Medications: Reviewed    Chest X-Ray:    TTE:    Multidisciplinary Rounds Completed: Yes    ABCDEF Bundle/Checklist Completed: Yes    SPECIAL EQUIPMENT: None    DISPOSITION: Stay in ICU    CRITICAL CARE CONSULTANT NOTE  I had a face to face encounter with the patient, reviewed and interpreted patient data including clinical events, labs, images, vital signs, I/O's, and examined patient. I have discussed the case and the plan and management of the patient's care with the consulting services, the bedside nurses and the respiratory therapist.      NOTE OF PERSONAL INVOLVEMENT IN CARE   This patient has a high probability of imminent, clinically significant deterioration, which requires the highest level of preparedness to intervene urgently. I participated in the decision-making and personally managed or directed the management of the following life and organ supporting interventions that required my frequent assessment to treat or prevent imminent deterioration. I personally spent 40 minutes of critical care time. This is time spent at this critically ill patient's bedside actively involved in patient care as well as the coordination of care and discussions with the patient's family. This does not include any procedural time which has been billed separately.     Kennedy Roberts NP    [facility] [place] [facility]  10/28/2021

## 2021-10-28 NOTE — PROGRESS NOTES
Trachs @ bedside for Otolarygology to downsize trach:     #6 XLT Proximal cuffed, CFS & plug   #7 XLT Proximal CFS available only, no cuffed & no plug available    Patient currently has #8 XLT Proximal cuffed

## 2021-10-28 NOTE — PROGRESS NOTES
Problem: Self Care Deficits Care Plan (Adult)  Goal: *Acute Goals and Plan of Care (Insert Text)  Description:   FUNCTIONAL STATUS PRIOR TO ADMISSION: at baseline, pt was independent, living with wife, working at [employer] Pt was admitted to [facility] in August and discharged to [facility] 10/14. Pt had not yet started therapy     HOME SUPPORT: only at [facility] for 4 days prior to this admission    Occupational Therapy Goals  Initiated 10/22/2021  1. Patient will perform AAROM R UE using L UE as motor assist with minimal assistance/contact guard assist within 7 day(s). 2.  Patient will perform grooming in supported sitting using RUE as motor assist with minimal assistance/contact guard assist within 7 day(s). 3.  Patient will perform upper body dressing with moderate assistance  within 7 day(s). 4.  Patient will perform rolling in bed for bed pan placement with moderate A x 2 within 7 days. 5.  Patient will perform supported ADL task in modified bed to chair position x 5 minutes within 7 days  6  Outcome: Progressing Towards Goal   OCCUPATIONAL THERAPY TREATMENT  Patient: Dustin Andre (31 y.o. male)  Date: 10/28/2021  Diagnosis: GIB (gastrointestinal bleeding) [K92.2] <principal problem not specified>  Procedure(s) (LRB):  ESOPHAGOGASTRODUODENOSCOPY (EGD) at Bedside (N/A)  ESOPHAGOGASTRODUODENAL (EGD) BIOPSY (N/A) 9 Days Post-Op  Precautions: Fall, Skin, Contact  Chart, occupational therapy assessment, plan of care, and goals were reviewed. ASSESSMENT  Patient continues with skilled OT services and is progressing towards goals. Patient received reclined in bed, cleared by RN to see for therapy. Patient alert and pleasant throughout, following all commands. BUE grossly deconditioned, RUE>LE. RUE distally weaker while LUE is proximally weaker. With elbow support, able to complete grooming with Mod A using LUE.  Completed light ROM and gentle stretching to maintain joint integrity and improve ROM/strength at bedlevel. Reports minimal discomfort with shoulder flexion BUE. Currently on trach collar with PMV and SpO2 maintained >94% throughout, HR and BP stable. As patient was highly independent prior to initial admission, and now tolerating decreased levels of supplemental oxygen, would likely benefit from IPR to maximize ADL independence prior to returning home. Current Level of Function Impacting Discharge (ADLs): up to total A currently. Other factors to consider for discharge: Indep prior to initial admission, motivated to participate with therapy, good insight into deficits         PLAN :  Patient continues to benefit from skilled intervention to address the above impairments. Continue treatment per established plan of care to address goals. Recommend with staff: Staff recommendations to increase patient independence, orientation and prevent and/or decrease potential agitation as able and safe:   -keeping day/night schedule with lights and sleeping  -bed/chair position or to chair during meals (regardless if patient eating the meal)  -completing ADLs, including self-feeding  -one step commands/simple information to allow patient time to process and respond  -inform patient what you are doing prior to doing it    Recommend next OT session: utilizing BUE in ADL    Recommendation for discharge: (in order for the patient to meet his/her long term goals)  Therapy 3 hours per day 5-7 days per week    This discharge recommendation:  Has not yet been discussed the attending provider and/or case management    IF patient discharges home will need the following DME: TBD pending progress       SUBJECTIVE:   Patient stated Mickiel Rosi usually helps the swelling.     OBJECTIVE DATA SUMMARY:   Cognitive/Behavioral Status:  Neurologic State: Alert  Orientation Level: Oriented X4  Cognition: Appropriate decision making; Follows commands  Perception: Appears intact  Perseveration: No perseveration noted  Safety/Judgement: Awareness of environment; Insight into deficits    Functional Mobility and Transfers for ADLs:  Bed Mobility:       Transfers:             Balance:       ADL Intervention:       Grooming  Grooming Assistance: Moderate assistance  Position Performed: Long sitting on bed  Washing Face: Moderate assistance                             Cognitive Retraining  Safety/Judgement: Awareness of environment; Insight into deficits    Therapeutic Exercises:   BUE ROM and gentle stretching to maintain joint integrity     Pain:  Mild discomfort at end of ROM    Activity Tolerance:   Fair and tolerates ADLs without rest breaks    After treatment patient left in no apparent distress:   Supine in bed, Call bell within reach, and Side rails x 3    COMMUNICATION/COLLABORATION:   The patients plan of care was discussed with: Physical therapist and Registered nurse.      Morenita Hernandez OT  Time Calculation: 16 mins

## 2021-10-28 NOTE — PROGRESS NOTES
Progress Note    Patient: Carter Goncalves MRN: [id number]  SSN: [id number]    YOB: 1970  Age: 53 y.o. Sex: male      Admit Date: 10/18/2021    9 Days Post-Op    Procedure:  Procedure(s):  ESOPHAGOGASTRODUODENOSCOPY (EGD) at Bedside  ESOPHAGOGASTRODUODENAL (EGD) BIOPSY    Subjective:     Patient without new complaints. He has been tolerating a diet. Objective:     Visit Vitals  BP 97/67 (BP 1 Location: Left lower arm, BP Patient Position: At rest)   Pulse (!) 113   Temp 99.2 °F (37.3 °C)   Resp 26   Ht 5' 11.5\" (1.816 m)   Wt 295 lb 10.2 oz (134.1 kg)   SpO2 97%   BMI 40.66 kg/m²       Temp (24hrs), Av °F (37.2 °C), Min:98.1 °F (36.7 °C), Max:100 °F (37.8 °C)      Physical Exam:    General- Alert in no acute distress  Abdomen soft nontender nondistended  Pigtail drain still putting out purulent material  Data Review: images and reports reviewed    Lab Review: All lab results for the last 24 hours reviewed. Recent Results (from the past 24 hour(s))   CBC WITH AUTOMATED DIFF    Collection Time: 10/28/21  4:39 AM   Result Value Ref Range    WBC 11.0 4.1 - 11.1 K/uL    RBC 2.74 (L) 4.10 - 5.70 M/uL    HGB 8.3 (L) 12.1 - 17.0 g/dL    HCT 26.8 (L) 36.6 - 50.3 %    MCV 97.8 80.0 - 99.0 FL    MCH 30.3 26.0 - 34.0 PG    MCHC 31.0 30.0 - 36.5 g/dL    RDW 18.9 (H) 11.5 - 14.5 %    PLATELET 499 095 - 957 K/uL    MPV 9.7 8.9 - 12.9 FL    NRBC 0.0 0  WBC    ABSOLUTE NRBC 0.00 0.00 - 0.01 K/uL    NEUTROPHILS 66 32 - 75 %    BAND NEUTROPHILS 1 0 - 6 %    LYMPHOCYTES 21 12 - 49 %    MONOCYTES 9 5 - 13 %    EOSINOPHILS 2 0 - 7 %    BASOPHILS 1 0 - 1 %    IMMATURE GRANULOCYTES 0 %    ABS. NEUTROPHILS 7.4 1.8 - 8.0 K/UL    ABS. LYMPHOCYTES 2.3 0.8 - 3.5 K/UL    ABS. MONOCYTES 1.0 0.0 - 1.0 K/UL    ABS. EOSINOPHILS 0.2 0.0 - 0.4 K/UL    ABS. BASOPHILS 0.1 0.0 - 0.1 K/UL    ABS. IMM.  GRANS. 0.0 K/UL    DF MANUAL      PLATELET COMMENTS Large Platelets      RBC COMMENTS ANISOCYTOSIS  1+        RBC COMMENTS MACROCYTOSIS  1+       METABOLIC PANEL, BASIC    Collection Time: 10/28/21  4:39 AM   Result Value Ref Range    Sodium 132 (L) 136 - 145 mmol/L    Potassium 4.0 3.5 - 5.1 mmol/L    Chloride 98 97 - 108 mmol/L    CO2 25 21 - 32 mmol/L    Anion gap 9 5 - 15 mmol/L    Glucose 90 65 - 100 mg/dL    BUN 15 6 - 20 MG/DL    Creatinine 1.85 (H) 0.70 - 1.30 MG/DL    BUN/Creatinine ratio 8 (L) 12 - 20      GFR est AA 47 (L) >60 ml/min/1.73m2    GFR est non-AA 39 (L) >60 ml/min/1.73m2    Calcium 8.8 8.5 - 10.1 MG/DL   MAGNESIUM    Collection Time: 10/28/21  4:39 AM   Result Value Ref Range    Magnesium 1.8 1.6 - 2.4 mg/dL   PHOSPHORUS    Collection Time: 10/28/21  4:39 AM   Result Value Ref Range    Phosphorus 2.4 (L) 2.6 - 4.7 MG/DL       Assessment:     Hospital Problems  Never Reviewed        Codes Class Noted POA    Abdominal wall fluid collections ICD-10-CM: R18.8  ICD-9-CM: 789.59  10/25/2021 Unknown        Severe protein-calorie malnutrition ([facility] [address] ICD-10-CM: T47  ICD-9-CM: 262  10/21/2021 Unknown        GIB (gastrointestinal bleeding) ICD-10-CM: K92.2  ICD-9-CM: 578.9  10/18/2021 Unknown              Plan/Recommendations/Medical Decision Making:   Continue pigtail drain. May need repeat CT scan in a few days just to see how things are going. Would scan sooner if it stops to drain. If it does not seem to be getting any better on CT may need second drain.

## 2021-10-28 NOTE — PROGRESS NOTES
Transition of Care Plan   RUR- Low  /  Medium / High Risks    DISPOSITION:[facility]   F/U with PCP/Specialist     Transport: [facility]  Care manager edwina Robles Gathers in admissions at [facility] and rehab. Rj Zeng has a facility in [facility] for HD. CM spoke with [employer] with [place] and provided her demographic information. Patient will tentatively be on a M-W-F schedule with a 1:00 - 2:00 pm chair time. Ref # [id number].  [employer] will fax check list for information needed to complete referral.

## 2021-10-28 NOTE — PROGRESS NOTES
1930: Bedside and Verbal shift change report given to Darren Alegre (oncoming nurse) by Syeda Hudson RN (offgoing nurse). Report included the following information SBAR, Kardex, ED Summary, Intake/Output, MAR, Recent Results, Cardiac Rhythm ST and Alarm Parameters . 2130: Arpita NP notified of BP 82/48 MAP 58. Midodrine dose increased to 10mg q8h.    0730: Bedside and Verbal shift change report given to Margo Holman (oncoming nurse) by [address] RN (offgoing nurse). Report included the following information SBAR, Kardex, ED Summary, Intake/Output, MAR, Recent Results, Cardiac Rhythm ST and Alarm Parameters .

## 2021-10-29 LAB
ALBUMIN SERPL-MCNC: 2.6 G/DL (ref 3.5–5)
ANION GAP SERPL CALC-SCNC: 10 MMOL/L (ref 5–15)
BASOPHILS # BLD: 0.1 K/UL (ref 0–0.1)
BASOPHILS NFR BLD: 1 % (ref 0–1)
BUN SERPL-MCNC: 25 MG/DL (ref 6–20)
BUN/CREAT SERPL: 9 (ref 12–20)
CALCIUM SERPL-MCNC: 8.8 MG/DL (ref 8.5–10.1)
CHLORIDE SERPL-SCNC: 98 MMOL/L (ref 97–108)
CO2 SERPL-SCNC: 25 MMOL/L (ref 21–32)
CREAT SERPL-MCNC: 2.82 MG/DL (ref 0.7–1.3)
DIFFERENTIAL METHOD BLD: ABNORMAL
EOSINOPHIL # BLD: 0.3 K/UL (ref 0–0.4)
EOSINOPHIL NFR BLD: 2 % (ref 0–7)
ERYTHROCYTE [DISTWIDTH] IN BLOOD BY AUTOMATED COUNT: 19 % (ref 11.5–14.5)
GLUCOSE SERPL-MCNC: 102 MG/DL (ref 65–100)
HCT VFR BLD AUTO: 26 % (ref 36.6–50.3)
HGB BLD-MCNC: 8.1 G/DL (ref 12.1–17)
IMM GRANULOCYTES # BLD AUTO: 0.4 K/UL (ref 0–0.04)
IMM GRANULOCYTES NFR BLD AUTO: 3 % (ref 0–0.5)
LYMPHOCYTES # BLD: 2.4 K/UL (ref 0.8–3.5)
LYMPHOCYTES NFR BLD: 19 % (ref 12–49)
MAGNESIUM SERPL-MCNC: 1.8 MG/DL (ref 1.6–2.4)
MCH RBC QN AUTO: 30.5 PG (ref 26–34)
MCHC RBC AUTO-ENTMCNC: 31.2 G/DL (ref 30–36.5)
MCV RBC AUTO: 97.7 FL (ref 80–99)
MONOCYTES # BLD: 1.6 K/UL (ref 0–1)
MONOCYTES NFR BLD: 13 % (ref 5–13)
NEUTS SEG # BLD: 7.7 K/UL (ref 1.8–8)
NEUTS SEG NFR BLD: 62 % (ref 32–75)
NRBC # BLD: 0 K/UL (ref 0–0.01)
NRBC BLD-RTO: 0 PER 100 WBC
PHOSPHATE SERPL-MCNC: 3.2 MG/DL (ref 2.6–4.7)
PLATELET # BLD AUTO: 352 K/UL (ref 150–400)
PLATELET COMMENTS,PCOM: ABNORMAL
PMV BLD AUTO: 9.6 FL (ref 8.9–12.9)
POTASSIUM SERPL-SCNC: 3.8 MMOL/L (ref 3.5–5.1)
RBC # BLD AUTO: 2.66 M/UL (ref 4.1–5.7)
RBC MORPH BLD: ABNORMAL
RBC MORPH BLD: ABNORMAL
SODIUM SERPL-SCNC: 133 MMOL/L (ref 136–145)
WBC # BLD AUTO: 12.5 K/UL (ref 4.1–11.1)

## 2021-10-29 PROCEDURE — 74011250637 HC RX REV CODE- 250/637: Performed by: INTERNAL MEDICINE

## 2021-10-29 PROCEDURE — P9047 ALBUMIN (HUMAN), 25%, 50ML: HCPCS | Performed by: INTERNAL MEDICINE

## 2021-10-29 PROCEDURE — 74011250636 HC RX REV CODE- 250/636: Performed by: INTERNAL MEDICINE

## 2021-10-29 PROCEDURE — 74011250637 HC RX REV CODE- 250/637: Performed by: NURSE PRACTITIONER

## 2021-10-29 PROCEDURE — 36600 WITHDRAWAL OF ARTERIAL BLOOD: CPT

## 2021-10-29 PROCEDURE — 90935 HEMODIALYSIS ONE EVALUATION: CPT

## 2021-10-29 PROCEDURE — 74011250636 HC RX REV CODE- 250/636: Performed by: NURSE PRACTITIONER

## 2021-10-29 PROCEDURE — 77010033678 HC OXYGEN DAILY

## 2021-10-29 PROCEDURE — 80069 RENAL FUNCTION PANEL: CPT

## 2021-10-29 PROCEDURE — 65610000006 HC RM INTENSIVE CARE

## 2021-10-29 PROCEDURE — 94640 AIRWAY INHALATION TREATMENT: CPT

## 2021-10-29 PROCEDURE — 83735 ASSAY OF MAGNESIUM: CPT

## 2021-10-29 PROCEDURE — 85025 COMPLETE CBC W/AUTO DIFF WBC: CPT

## 2021-10-29 PROCEDURE — 97530 THERAPEUTIC ACTIVITIES: CPT

## 2021-10-29 PROCEDURE — 99232 SBSQ HOSP IP/OBS MODERATE 35: CPT | Performed by: NURSE PRACTITIONER

## 2021-10-29 PROCEDURE — 36415 COLL VENOUS BLD VENIPUNCTURE: CPT

## 2021-10-29 PROCEDURE — 74011000250 HC RX REV CODE- 250: Performed by: NURSE PRACTITIONER

## 2021-10-29 PROCEDURE — C9113 INJ PANTOPRAZOLE SODIUM, VIA: HCPCS | Performed by: NURSE PRACTITIONER

## 2021-10-29 PROCEDURE — 97535 SELF CARE MNGMENT TRAINING: CPT

## 2021-10-29 RX ORDER — ALBUMIN HUMAN 250 G/1000ML
25 SOLUTION INTRAVENOUS
Status: DISCONTINUED | OUTPATIENT
Start: 2021-10-29 | End: 2021-11-18 | Stop reason: HOSPADM

## 2021-10-29 RX ADMIN — Medication: at 09:54

## 2021-10-29 RX ADMIN — FLUCONAZOLE IN SODIUM CHLORIDE 200 MG: 2 INJECTION, SOLUTION INTRAVENOUS at 15:06

## 2021-10-29 RX ADMIN — MIDODRINE HYDROCHLORIDE 10 MG: 5 TABLET ORAL at 06:06

## 2021-10-29 RX ADMIN — Medication 10 ML: at 22:08

## 2021-10-29 RX ADMIN — COLLAGENASE SANTYL: 250 OINTMENT TOPICAL at 09:55

## 2021-10-29 RX ADMIN — POTASSIUM & SODIUM PHOSPHATES POWDER PACK 280-160-250 MG 1 PACKET: 280-160-250 PACK at 09:54

## 2021-10-29 RX ADMIN — HEPARIN SODIUM 1800 UNITS: 1000 INJECTION INTRAVENOUS; SUBCUTANEOUS at 20:04

## 2021-10-29 RX ADMIN — Medication: at 19:01

## 2021-10-29 RX ADMIN — CHLORHEXIDINE GLUCONATE 15 ML: 0.12 RINSE ORAL at 09:57

## 2021-10-29 RX ADMIN — Medication 10 ML: at 06:06

## 2021-10-29 RX ADMIN — Medication 9 MG: at 22:07

## 2021-10-29 RX ADMIN — IPRATROPIUM BROMIDE AND ALBUTEROL SULFATE 3 ML: .5; 3 SOLUTION RESPIRATORY (INHALATION) at 07:24

## 2021-10-29 RX ADMIN — IPRATROPIUM BROMIDE AND ALBUTEROL SULFATE 3 ML: .5; 3 SOLUTION RESPIRATORY (INHALATION) at 20:25

## 2021-10-29 RX ADMIN — HEPARIN SODIUM 1800 UNITS: 1000 INJECTION INTRAVENOUS; SUBCUTANEOUS at 20:03

## 2021-10-29 RX ADMIN — ALBUMIN (HUMAN) 25 G: 0.25 INJECTION, SOLUTION INTRAVENOUS at 19:03

## 2021-10-29 RX ADMIN — LORAZEPAM 0.5 MG: 2 INJECTION INTRAMUSCULAR; INTRAVENOUS at 22:07

## 2021-10-29 RX ADMIN — Medication 10 ML: at 13:52

## 2021-10-29 RX ADMIN — MIDODRINE HYDROCHLORIDE 10 MG: 5 TABLET ORAL at 13:53

## 2021-10-29 RX ADMIN — SODIUM CHLORIDE 40 MG: 9 INJECTION INTRAMUSCULAR; INTRAVENOUS; SUBCUTANEOUS at 09:54

## 2021-10-29 RX ADMIN — MIDODRINE HYDROCHLORIDE 10 MG: 5 TABLET ORAL at 22:07

## 2021-10-29 RX ADMIN — POTASSIUM & SODIUM PHOSPHATES POWDER PACK 280-160-250 MG 1 PACKET: 280-160-250 PACK at 12:20

## 2021-10-29 RX ADMIN — CHLORHEXIDINE GLUCONATE 15 ML: 0.12 RINSE ORAL at 20:22

## 2021-10-29 RX ADMIN — SODIUM CHLORIDE 40 MG: 9 INJECTION INTRAMUSCULAR; INTRAVENOUS; SUBCUTANEOUS at 22:07

## 2021-10-29 RX ADMIN — Medication 1 CAPSULE: at 09:54

## 2021-10-29 RX ADMIN — POTASSIUM & SODIUM PHOSPHATES POWDER PACK 280-160-250 MG 1 PACKET: 280-160-250 PACK at 16:26

## 2021-10-29 RX ADMIN — LORAZEPAM 0.5 MG: 2 INJECTION INTRAMUSCULAR; INTRAVENOUS at 09:56

## 2021-10-29 NOTE — PROGRESS NOTES
1930: Bedside and Verbal shift change report given to Annie Bloom (oncoming nurse) by Connie Harden RN (offgoing nurse). Report included the following information SBAR, Kardex, ED Summary, Intake/Output, MAR, Recent Results, Cardiac Rhythm ST and Alarm Parameters . 0730: Bedside and Verbal shift change report given to Pablo GRIFFIN (oncoming nurse) by Annie Bloom (offgoing nurse). Report included the following information SBAR, Kardex, ED Summary, Intake/Output, MAR, Recent Results, Cardiac Rhythm SR and Alarm Parameters .

## 2021-10-29 NOTE — PROGRESS NOTES
Music Therapy Assessment  [address] [facility] [id number]     1970  53 y.o.  male    Patient Telephone Number: [phone] (home)   Mandaeism Affiliation: No preference   Language: English   Patient Active Problem List    Diagnosis Date Noted    Abdominal wall fluid collections 10/25/2021    Severe protein-calorie malnutrition ([address] 10/21/2021    GIB (gastrointestinal bleeding) 10/18/2021        Date: 10/29/2021            Total Time (in minutes): 37          [facility] 7S1 INTENSIVE CARE    Mental Status:   [x] Alert [  ] Ana Shank [  ]  Confused  [  ] Minimally responsive  [  ] Sleeping    Communication Status: [  ] Impaired Speech [  ] Nonverbal -N/A    Physical Status:   [x] Oxygen in use-trach  [  ] Hard of Hearing [  ] Vision Impaired  [  ] Ambulatory  [  ] Ambulatory with assistance [  ] Non-ambulatory     Music Preferences, Background: [address], especially [employer]inger Death [employer]unch is the pt's favorite band. He expressed enjoying their albums Got Your 6 and F8. He also affirmed liking the bands Green [employer] and [employer].    Clinical Problem addressed: Emotional and social support. Goal(s) met in session:  Physical/Pain management (Scale of 1-10):    Pre-session rating: Pt denied pain at the moment. Post-session rating: Pt didn't report on pain.   [  ] Increased relaxation   [  ] Affected breathing patterns  [  ] Decreased muscle tension   [  ] Decreased agitation  [  ] Affected heart rate    [  ] Increased alertness     Emotional/Psychological:  [x] Increased self-expression   [  ] Decreased aggressive behavior   [  ] Decreased feelings of stress  [  ] Discussed healthy coping skills     [  ] Improved mood    [  ] Decreased withdrawn behavior     Social:  [x] Decreased feelings of isolation/loneliness [x] Positive social interaction   [  ] Provided support and/or comfort for family/friends    Spiritual:  [  ] Spiritual support    [  ] Expressed peace  [  ] Expressed hayley    [  ] Discussed beliefs    Techniques Utilized (Check all that apply):   [  ] Procedural support MT [  ] Music for relaxation [x] Patient preferred music  [  ] Sabrina analysis  [  ] Song choice  [x] Music for validation  [  ] Entrainment  [  ] Movement to music [  ] Guided visualization  [  ] Liz Gross  [  ] Patient instrument playing [  ] Erline Bosworth writing  [  ] Christopher Contreras along   [  ] Luisana Serrano  [  ] Sensory stimulation  [x] Active Listening  [  ] Music for spiritual support [  ] Making of CDs as gifts    Session Observations:  F/up visit; Patient (pt) was alert lying in bed. This music therapist (MT) asked pt how he was feeling, and then offered him music therapy. Pt agreed to hear a Five Finger Death Punch song (one of his favorite bands). MT sang Brighter Side of [place]ovenia Mina and shook an egg shaker for percussive accompaniment. Pt increased self-expression in response to the music as evidenced by (AEB) sharing lyrics he related to in the song and about how he interpreted it. He shared he served in [employer] but didn't want to talk about it beyond this. He became tearful when he spoke about his son currently serving in the [employer]. MT provided active listening and words of validation and support. As the conversation continued, pt spoke about his love for his family, specially speaking about his spouse, children and dog. He said they aren't able to visit him often since he's been in the hospital because they live six hours away. He hopes to video chat with his spouse, especially since tomorrow is his birthday. A staff member entered to initiate the pt's dialysis. MT wished the pt a happy birthday while concluding the session. Pt thanked MT for the visit.     JORDY FoleyBC (Music Therapist-Board Certified)  Spiritual Care Department  Referral-based service

## 2021-10-29 NOTE — PROGRESS NOTES
ICU TEAM Progress Note    Name: Mary Ivy   : 1970   MRN: [id number]   Date: 10/29/2021      Assessment:   Reason for ICU Admission: Mechanical Ventilation and hypotension     Brief HPI:    -  \"Pt is a 54 yo M with PMH ESRD on HD, takes coumadin secondary to hx of DVT RUE with a recent prolonged hospitalization secondary to COVID PNA in August of this year. He ultimately required trach and peg and was in rehab at [facility]. Pt unable to provide HPI secondary to current cognition, therefore information is obtained via chart and provider. Per [facility] records, pt had a dislodged peg tube. He started having coffee ground emesis 10/18/21 with elevated HR and hypotension. He was sent to the ER for further workup. He was found to be hypotensive and ICU was consulted for further management. \"      10/25 Pt co some increased pain and discomfort overnight, was restrted on PTA fentanyl patch and prn ativan. 10/26 TCT tolerated X24 hours     10/29 hgb trending down - 8.1 (10/29) from 9.2 (10/26). Patient remains off the vent on trach collar with humidification. Otolaryngology consulted to downsize trach. Worsening SERGEY on HD- plan to receive dialysis today. S/P:   Procedure(s):  ESOPHAGOGASTRODUODENOSCOPY (EGD) at Bedside  ESOPHAGOGASTRODUODENAL (EGD) BIOPSY    Plan:     1. GIB  - GI consulted, appreciate input  - PPI BID  - SP EGD    - Hgb 8.1 this am   -Tolerating PO intacke    2. Hemorrhagic shock  - Sp PRBCs, FFP and k centra   - Serial H and H, stable   - Monitor closely for s/s of bleeding  - Volume resuscitated  - Retacrit started per nephrology   - CT revealed large fluid filled mass, Hematoma? General surgery consulted  - Fluid mass drained by IR 10/23, plans for drain to remain X3 days then fu CT scan 10/26 to eval need for surgical intervention versus IR intervention.  Will monitor drainage and amount while drain in place   -CT scan 10/26 slightly diminished size of the abdominal collection with pigtail drainage catheter     3. Supratherapeutic INR  - Sp FFP and k centra  - Hold coumadin for now as active bleeding  - Serial INR, last INR 1.1 (10/28)     4. Acute on chronic hypoxic respiratory failure with tracheostomy   - Continue MV  - Has been on trach collar with speaking valve, plan for downsize of trach today by otolaryngology. - Pulm hygiene   - HOB >30   - Aspiration precautions   - Speech following    5. ESRD on HD MWF  - Cont HD per renal    6. Acute blood loss anemia   - Transfuse as needed to keep hgb >7  - Monitor closely for ss bleeding  -hgb 8.1 today  - See above     7. PEG tube dislodged   - EGD per GI  - PEG removed, not in stomach  - CT abd and pelvis ordered, fluid collection noted, hematoma versus infection   - Await GI recs, will need new PEG placement at some point, will have to wait until fluid collection resolved   - Wound consulted for old peg site   - General surgery consulted for fluid collection    8.  Leukocytosis   - Improved this am 12.5  - continue broad spectrum abx   - On zosyn  - Continue diflucan for yeast out of wound   - ID consult       Subjective:   Overnight Events:   10/29/2021    No acute events, tolerated trach collar     Objective:     Visit Vitals  BP 97/65   Pulse 93   Temp 98.3 °F (36.8 °C)   Resp 20   Ht 5' 11.5\" (1.816 m)   Wt 135.9 kg (299 lb 9.7 oz)   SpO2 94%   BMI 41.20 kg/m²    O2 Flow Rate (L/min): 10 l/min O2 Device: Tracheal collar Temp (24hrs), Av.6 °F (37 °C), Min:97.9 °F (36.6 °C), Max:99.2 °F (37.3 °C)           Hemodynamics:   PAP:   CO:     Wedge:   CI:     CVP:    SVR:       PVR:       Ventilator Settings:  Mode Rate Tidal Volume Pressure FiO2 PEEP   Spontaneous      8 cm H2O 35 % 5 cm H20     Peak airway pressure: 15 cm H2O    Minute ventilation: 15.2 l/min        Intake/Output:     Intake/Output Summary (Last 24 hours) at 10/29/2021 0850  Last data filed at 10/29/2021 0400  Gross per 24 hour   Intake 750 ml   Output 215 ml   Net 535 ml Physical Exam:  General:  Trach and on vent    Eyes:  Sclera anicteric. Pupils equal, round, reactive to light. Mouth/Throat: Orotracheal tube in place. Neck: Supple. Lungs:   Clear to auscultation bilaterally, good effort. Cardiovascular:  Regular rate and rhythm, no murmur, click, rub, or gallop. Abdomen: Old peg removed, scar and dressing intact    Extremities: No cyanosis or edema. Skin: No acute rash or lesions. Lymph Nodes: Cervical and supraclavicular normal.   Musculoskeletal:  No swelling or deformity. Lines/Devices:  Intact, no erythema, drainage, or tenderness. Labs & Data: Reviewed    Medications: Reviewed    Multidisciplinary Rounds Completed: Yes    ABCDEF Bundle/Checklist Completed: Yes    SPECIAL EQUIPMENT: None    DISPOSITION: Stay in ICU    CRITICAL CARE CONSULTANT NOTE  I had a face to face encounter with the patient, reviewed and interpreted patient data including clinical events, labs, images, vital signs, I/O's, and examined patient. I have discussed the case and the plan and management of the patient's care with the consulting services, the bedside nurses and the respiratory therapist.      NOTE OF PERSONAL INVOLVEMENT IN CARE   This patient has a high probability of imminent, clinically significant deterioration, which requires the highest level of preparedness to intervene urgently. I participated in the decision-making and personally managed or directed the management of the following life and organ supporting interventions that required my frequent assessment to treat or prevent imminent deterioration. Doyle Bledsoe [facility] Student  10/29/2021     EMELI Monzon was present for the entirety of the assessment and care of this patient.

## 2021-10-29 NOTE — PROGRESS NOTES
Problem: Self Care Deficits Care Plan (Adult)  Goal: *Acute Goals and Plan of Care (Insert Text)  Description:   FUNCTIONAL STATUS PRIOR TO ADMISSION: at baseline, pt was independent, living with wife, working at [employer] Pt was admitted to [facility] in August and discharged to [facility] 10/14. Pt had not yet started therapy     HOME SUPPORT: only at [facility] for 4 days prior to this admission    Occupational Therapy Goals  Initiated 10/22/2021; weekly reassessment 10/29/2021 - continue all goals  1. Patient will perform AAROM R UE using L UE as motor assist with minimal assistance/contact guard assist within 7 day(s). 2.  Patient will perform grooming in supported sitting using RUE as motor assist with minimal assistance/contact guard assist within 7 day(s). 3.  Patient will perform upper body dressing with moderate assistance within 7 day(s). 4.  Patient will perform rolling in bed for bed pan placement with moderate A x 2 within 7 days. 5.  Patient will perform supported ADL task in modified bed to chair position x 5 minutes within 7 days. Outcome: Progressing Towards Goal    OCCUPATIONAL THERAPY TREATMENT/WEEKLY RE-ASSESSMENT  Patient: Campos Woodard (79 y.o. male)  Date: 10/29/2021  Diagnosis: GIB (gastrointestinal bleeding) [K92.2] <principal problem not specified>  Procedure(s) (LRB):  ESOPHAGOGASTRODUODENOSCOPY (EGD) at Bedside (N/A)  ESOPHAGOGASTRODUODENAL (EGD) BIOPSY (N/A) 10 Days Post-Op  Precautions: Fall, Skin, Contact  Chart, occupational therapy assessment, plan of care, and goals were reviewed. ASSESSMENT  Patient continues with skilled OT services and is progressing slowly towards goals. Patient received on trach collar 10 L/min with PMV in place, patient conversant and willing to work with therapy with encouragement.   He continues to present with grossly decreased strength (most pronounced in RUE and LLE), impaired balance (L trunk lean), decreased activity tolerance, and limited ROM/coordination in BUE (R<L). Patient requiring total A for bed mobility and mod A for self feeding with LUE propped on pillow and max A to setup tray. Encouraged patient to remain in this position until ~1:30 to assess/progress his sitting tolerance. Discussed case with CM who reports pt will need to tolerate sitting for 4 hours for dialysis at SNF upon discharge. Recommending SNF upon discharge. Current Level of Function Impacting Discharge (ADLs): up to total A for ADLs    Other factors to consider for discharge: trach collar         PLAN :  Goals have been updated based on progression since last assessment. Patient continues to benefit from skilled intervention to address the above impairments. Continue to follow patient 3 times a week to address goals. Recommend with staff: Recommend with nursing, ADLs with supervision/setup, bed to chair 3x/day for meals and toileting via bedpan. Thank you for completing as able in order to maintain patient strength, endurance and independence. Recommend next OT session: ADLs in upright sitting    Recommendation for discharge: (in order for the patient to meet his/her long term goals)  Therapy up to 5 days/week in SNF setting    This discharge recommendation:  Has been made in collaboration with the attending provider and/or case management    IF patient discharges home will need the following DME: hospital bed and mechanical lift       SUBJECTIVE:   Patient stated Sherrell Puri is going to be awful.     OBJECTIVE DATA SUMMARY:   Cognitive/Behavioral Status:  Neurologic State: Alert  Orientation Level: Oriented X4  Cognition: Appropriate for age attention/concentration; Follows commands  Perception: Cues to maintain midline in sitting (L lateral lean)  Perseveration: No perseveration noted  Safety/Judgement: Awareness of environment;Decreased insight into deficits; Fall prevention (can do more than he believes he can)    Functional Mobility and Transfers for ADLs:  Bed Mobility:  Supine to Sit: Total assistance  Scooting: Total assistance;Assist x2    Transfers:  NT this session    Balance:  Sitting: Impaired; With support (L lateral lean)    ADL Intervention:  Patient used LUE to self feed in supported/propped position on pillows/ bedside table - did not assist with RUE 2/2 c/o pain with mobility. Feeding  Feeding Assistance: Moderate assistance  Container Management: Total assistance (dependent)  Utensil Management: Minimum assistance  Food to Mouth: Minimum assistance  Drink to Mouth: Minimum assistance  Cues: Physical assistance;Verbal cues provided  Adaptive Equipment:  (cup with lid)    Cognitive Retraining  Safety/Judgement: Awareness of environment;Decreased insight into deficits; Fall prevention (can do more than he believes he can)    Pain:  Did not quantify but reporting soreness from lying still    Activity Tolerance:   Fair and requires rest breaks    After treatment patient left in no apparent distress:   Call bell within reach, Side rails x 3, and sitting upright in chair position    COMMUNICATION/COLLABORATION:   The patients plan of care was discussed with: Physical therapist and Registered nurse.      Chicho Álvarez OT  Time Calculation: 18 mins

## 2021-10-29 NOTE — PROGRESS NOTES
0730: Bedside and Verbal shift change report given to ELIAS Shah (oncoming nurse) by Nell Watkins RN (offgoing nurse). Report included the following information SBAR, Kardex, Intake/Output, MAR, Recent Results, Cardiac Rhythm SR-ST and Alarm Parameters . 0930: Sara Lipscomb NP notified of drainage from old PEG site, NP to communicate findings with her MD.    1100: Called EENT consult to [address] [place] [address], provided cell phone number to attending NP Norberto Castillo for him to call the consulted provider directly. 1545: Trach down sized to #[id number](Uncuffed) by Dr. Carmen Calles: Shift report given to [facility], RN using SBAR.

## 2021-10-29 NOTE — PROGRESS NOTES
[place] [facility] note    Patient evaluated and chart reviewed. Complicated history of severe illness secondary to many things including COVID-19 and requiring tracheotomy for oxygenation and ventilation and now doing better and patients current tracheotomy have been placed and not changed and was in need of assessment and downsizing. Patient assessed, currently a number eight XLT tracheotomy tube in place with Cuff  Patient no t needing ventilation assistance anymore. Procedure note: with patient verbal permission, tracheotomy tube change was explained and reviewed ahead of time. With nursing assistance, the tracheotomy tube was removed and a new cutlass XLT number six was inserted, confirmed that it was in good position, and secured    Tracheotomy dependent patient now doing better with downsize tracheotomy tube inserted today successfully.  <BR >Respiratory and ICU staff can now on their own successfully migrate patient to further downsizes possibly to a number four if available, or can Tcap current one and consider decannulation in a timely fashion if patient tolerates capping for 24 hours. Respiratory and speech and ICU staff should carry forward on that.     We will be signing off at this time

## 2021-10-29 NOTE — PROGRESS NOTES
SLP Contact Note    Noted for weekend: once patient's trach is downsized by ENT please consider placing  if RT in agreement. If patient tolerates this for at least 24 hours and RT is in agreement, RT could consider decannulating. SLP to follow up on Monday.       Thank you,  SARAH HorowitzEd, [address] [facility]  Speech-Language Pathologist

## 2021-10-29 NOTE — PROGRESS NOTES
General Surgery Daily Progress Note    Admit Date: 10/18/2021  Post-Operative Day: 10 Days Post-Op from Procedure(s):  ESOPHAGOGASTRODUODENOSCOPY (EGD) at Bedside  ESOPHAGOGASTRODUODENAL (EGD) BIOPSY     Subjective:     Last 24 hrs: nurse reports peg site is putting out a stool-like substance. Otherwise pt is doing well. He says he is moving his bowels; tolerating diet. C/o foot pain    Objective:     Blood pressure 97/65, pulse 93, temperature 98.3 °F (36.8 °C), resp. rate 20, height 5' 11.5\" (1.816 m), weight 299 lb 9.7 oz (135.9 kg), SpO2 94 %. Temp (24hrs), Av.6 °F (37 °C), Min:97.9 °F (36.6 °C), Max:99.2 °F (37.3 °C)      _____________________  Physical Exam:     Alert and Oriented, x3, in no acute distress. Cardiovascular: RRR, trace peripheral edema  Lungs:CTAB anteriorally, trached  Abdomen: soft, nl BS, peg site w/ small appliance w/ sm amt liquid brown contents; perc drain w/ red-brown drainage - 215cc out last 24hrs      Assessment:   Active Problems:    GIB (gastrointestinal bleeding) (10/18/2021)      Severe protein-calorie malnutrition ([address] (10/21/2021)      Abdominal wall fluid collections (10/25/2021)            Plan: Will d/w Dr Melvin Farrell regarding the peg drainage   Cont perc drain - CT in a few days  Cont gi/dvt proph  Cont diet  Dialysis per schedule  Monitor labs      ADMIT NOTE    Attending addendum:  I was available to supervise the APC. I have reviewed the APC's note  We discussed the plan of care.     Patient stable in ICU, brown thick output from catheter    OBJECTIVE:  Visit Vitals  BP 98/64   Pulse (!) 105   Temp 98 °F (36.7 °C)   Resp 24   Ht 5' 11.5\" (1.816 m)   Wt 299 lb 9.7 oz (135.9 kg)   SpO2 96%   BMI 41.20 kg/m²     General: No acute distress, conversant  Eyes: PERRLA, no scleral icterus  HENT: Normocephalic without oral lesions  Neck: Trachea midline without LAD  Cardiac: Normal pulse rate and rhythm  Pulmonary: Symmetric chest rise with normal effort  GI: Soft, obese, brown thick output from catheter  Skin: Warm without rash  Extremities: No edema or joint stiffness  Psych: Appropriate mood and affect    ASSESSMENT / Plan:    I agree with the APC's assessment and plan with the following additions/modifications:    Continue antibiotics, continue drain, flush twice daily  And for repeat CT scan on Tuesday unless becomes more acutely ill before then. Even if this is stool, not much to do at this point besides continued drainage. Signed By: Kristie Miles MD  Bariatric and General Surgeon  [facility]    October 29, 2021          Data Review:    Recent Labs     10/29/21  0352 10/28/21  0439 10/27/21  0437   WBC 12.5* 11.0 12.8*   HGB 8.1* 8.3* 8.5*   HCT 26.0* 26.8* 27.2*    332 374     Recent Labs     10/29/21  0352 10/28/21  1746 10/28/21  0439 10/27/21  0437   *  --  132* 137   K 3.8  --  4.0 3.3*   CL 98  --  98 103   CO2 25  --  25 26   *  --  90 94   BUN 25*  --  15 17   CREA 2.82*  --  1.85* 1.98*   CA 8.8  --  8.8 8.5   MG 1.8  --  1.8 1.8   PHOS 3.2  --  2.4* 2.6   ALB 2.6*  --   --   --    INR  --  1.1  --   --      No results for input(s): AML, LPSE in the last 72 hours.         ______________________  Medications:    Current Facility-Administered Medications   Medication Dose Route Frequency    potassium, sodium phosphates (NEUTRA-PHOS) packet 1 Packet  1 Packet Oral TID    epoetin kayley-epbx (RETACRIT) injection 10,000 Units  10,000 Units SubCUTAneous Q TUE, THU & SAT    B complex-vitaminC-folic acid (NEPHROCAP) cap  1 Capsule Oral DAILY    midodrine (PROAMATINE) tablet 10 mg  10 mg Oral Q8H    heparin (porcine) 1,000 unit/mL injection 1,800 Units  1,800 Units InterCATHeter DIALYSIS PRN    And    heparin (porcine) 1,000 unit/mL injection 1,800 Units  1,800 Units InterCATHeter DIALYSIS PRN    fentaNYL (DURAGESIC) 25 mcg/hr patch 1 Patch  1 Patch TransDERmal Q72H    HYDROmorphone (DILAUDID) injection 0.5 mg  0.5 mg IntraVENous Q6H PRN    LORazepam (ATIVAN) injection 0.5 mg  0.5 mg IntraVENous Q12H    LORazepam (ATIVAN) injection 1 mg  1 mg IntraVENous Q12H PRN    melatonin tablet 9 mg  9 mg Oral QHS    albuterol-ipratropium (DUO-NEB) 2.5 MG-0.5 MG/3 ML  3 mL Nebulization BID    collagenase (SANTYL) 250 unit/gram ointment   Topical DAILY    albumin human 25% (BUMINATE) solution 25 g  25 g IntraVENous DIALYSIS MON, WED & FRI    fluconazole (DIFLUCAN) 200mg/100 mL IVPB (premix)  200 mg IntraVENous Q24H    sodium chloride (NS) flush 5-40 mL  5-40 mL IntraVENous Q8H    sodium chloride (NS) flush 5-40 mL  5-40 mL IntraVENous PRN    pantoprazole (PROTONIX) 40 mg in 0.9% sodium chloride 10 mL injection  40 mg IntraVENous Q12H    chlorhexidine (ORAL CARE KIT) 0.12 % mouthwash 15 mL  15 mL Oral Q12H    balsam peru-castor oiL (VENELEX) ointment   Topical BID    sodium chloride (NS) flush 5-10 mL  5-10 mL IntraVENous PRN    sodium chloride (NS) flush 5-40 mL  5-40 mL IntraVENous Q8H    sodium chloride (NS) flush 5-40 mL  5-40 mL IntraVENous PRN    acetaminophen (TYLENOL) tablet 650 mg  650 mg Oral Q6H PRN    Or    acetaminophen (TYLENOL) suppository 650 mg  650 mg Rectal Q6H PRN    polyethylene glycol (MIRALAX) packet 17 g  17 g Oral DAILY PRN    ondansetron (ZOFRAN ODT) tablet 4 mg  4 mg Oral Q8H PRN    Or    ondansetron (ZOFRAN) injection 4 mg  4 mg IntraVENous Q6H PRN    albuterol (PROVENTIL VENTOLIN) nebulizer solution 2.5 mg  2.5 mg Nebulization Q4H PRN       Imtiaz Corrales NP  10/29/2021

## 2021-10-29 NOTE — PROGRESS NOTES
[facility] [address]    Name: Campos Woodard MRN: [id number]   : 1970 Hospital: [facility]   Date: 10/29/2021        IMPRESSION:   SERGEY, HD dependent - HD - MWF   Hypotension - BPs are better  Hypervolemia / Fluid Overload - improving  Anemia of multifactorial origin  Respiratory failure - sec to due to Covid pneumonia --> trach collar  Hypokalemia  -  Mild - resolved  Hypophosphatemia - Mild - resolved      PLAN:   HD again today (10/29)   IV albumin x 3  doses prn for intra-dialytic hypotension. Will increase the HD time to 4 hours as well. This should help to mitigate hypotension during HD. Anemia management --> EPO. Consider PRBCs for Hgb < 7. Avoid NSAIDs + IV contrast  Dose meds for his GFR  Watch for renal recovery     Subjective/Interval History:     Clifford andrew --> 10/24/21    I have reviewed the flowsheet and previous days notes. ROS: Awake and interactive, breathing ok via trach. Now with Dobhoff, wants something cold to drink, dry mouth. Had drainage of left abd wall fluid collection:    IMPRESSION        1. Successful ultrasound guided placement of 10 American percutaneous drain into a  left abdominal wall fluid collection. 2. Needle sampling of the more inferior collection of the left abdominal wall  yielded only a small amount of clot, presumed noninfected hematoma. Hoang Barahona --> 10/25/21    BPs are better today. Remains on vent support. Getting ready to start HD when I entered the room. Dorothy --> 10/26/21     Feeling better. Scheduled for ct with IV contrast today. Hoang Barahona --> 10/27/21    Had the CT yesterday. The findings were noted. Had HD as well. Hoang Barahona --> 10/28/21      HD was complicated by cramping and hypotension yesterday. This was addresses. 2.8 litres were removed with HD. Felt better today. Hoang Barahona --> 10/29/21      Did not rest well last night. Reported musculoskeletal discomfort.     Objective:   Vital Signs:    Visit Vitals  BP 97/65   Pulse 93 Temp 98.3 °F (36.8 °C)   Resp 20   Ht 5' 11.5\" (1.816 m)   Wt 135.9 kg (299 lb 9.7 oz)   SpO2 94%   BMI 41.20 kg/m²       O2 Device: Tracheal collar   O2 Flow Rate (L/min): 10 l/min   Temp (24hrs), Av.6 °F (37 °C), Min:97.9 °F (36.6 °C), Max:99.2 °F (37.3 °C)       Intake/Output:   Last shift:      No intake/output data recorded. Last 3 shifts: 10/27 1901 - 10/29 0700  In: 1060 [P.O.:240; I.V.:800]  Out: 315 [Drains:315]    Intake/Output Summary (Last 24 hours) at 10/29/2021 0915  Last data filed at 10/29/2021 0400  Gross per 24 hour   Intake 750 ml   Output 215 ml   Net 535 ml          Physical Exam:      Seen in the ICU - Bed 4. General:    Resting in bed comfortably. Head:   Normocephalic. .    Neck:  Trach - capped    Lungs:   Rhonchi +                           No rales. Heart:   No S3 gallop , no pericardial rub. Abdomen:   No clear-cut distension    Extremities: Leg oedema --> improving                             Neurologic: Responding to questions        DATA:  Labs:  Recent Labs     10/29/21  0352 10/28/21  0439 10/27/21  0437   * 132* 137   K 3.8 4.0 3.3*   CL 98 98 103   CO2 25 25 26   BUN 25* 15 17   CREA 2.82* 1.85* 1.98*   CA 8.8 8.8 8.5   ALB 2.6*  --   --    PHOS 3.2 2.4* 2.6   MG 1.8 1.8 1.8     Recent Labs     10/29/21  0352 10/28/21  0439 10/27/21  0437   WBC 12.5* 11.0 12.8*   HGB 8.1* 8.3* 8.5*   HCT 26.0* 26.8* 27.2*    332 374     No results for input(s): DOLORES, KU, CLU, CREAU in the last 72 hours.     No lab exists for component: PROU    Total time spent with patient:            Care Plan discussed with:           Salvatore Stokes MD

## 2021-10-29 NOTE — DIALYSIS
Hemodialysis / [phone]    Vitals Pre Post Assessment Pre Post   BP BP: (!) 103/28 (10/29/21 1701) 101/76   LOC AOX3 AOX3   HR Pulse (Heart Rate): (!) 102 (10/29/21 1701) 112 Lungs CTA CTA   Resp Resp Rate: 29 (10/29/21 1701) 24 Cardiac REGULAR REGULAR   Temp Temp: 98.5 °F (36.9 °C) (10/29/21 1701) 97.6 Skin WARM DRY INTACT WARM DRY INTACT   Weight Pre-Dialysis Weight: 140.5 kg (309 lb 11.9 oz) (10/25/21 0927)  Edema generalized generalized   Tele status   Pain Pain Intensity 1: 0 (10/29/21 1600) 0/10     Orders   Duration: Start: Hemodialysis Start Time: 8592 (10/29/21 1701) End: 2010 Total: 3.25   Dialyzer: Dialyzer/Set Up Inspection: Revaclear (10/29/21 1701)   K Bath: Dialysate K (mEq/L): 4 (10/29/21 1701)   Ca Bath: Dialysate CA (mEq/L): 2.5 (10/29/21 1701)   Na: Dialysate NA (mEq/L): 138 (10/29/21 1701)   Bicarb: Dialysate HCO3 (mEq/L): 35 (10/29/21 1701)   Target Fluid Removal: Goal/Amount of Fluid to Remove (mL): 300 mL (10/29/21 1701)     Access   Type & Location:    Comments:                                        Labs   HBsAg (Antigen) / date: Hepatitis B surface Ag   Date Value Ref Range Status   10/20/2021 0.16 Index Final                                       HBsAb (Antibody) / date: Hep B surface Ab Interp.    Date Value Ref Range Status   10/20/2021 REACTIVE (A) NR   Final      Source:    Obtained/Reviewed  Critical Results Called HGB   Date Value Ref Range Status   10/29/2021 8.1 (L) 12.1 - 17.0 g/dL Final     Potassium   Date Value Ref Range Status   10/29/2021 3.8 3.5 - 5.1 mmol/L Final     Calcium   Date Value Ref Range Status   10/29/2021 8.8 8.5 - 10.1 MG/DL Final     BUN   Date Value Ref Range Status   10/29/2021 25 (H) 6 - 20 MG/DL Final     Creatinine   Date Value Ref Range Status   10/29/2021 2.82 (H) 0.70 - 1.30 MG/DL Final     Comment:     INVESTIGATED PER DELTA CHECK PROTOCOL        Meds Given   Name Dose Route   Heparin  1800units x2                 Adequacy / Fluid    Total Liters Process: 70l   Net Fluid Removed: 2000ml      Comments   Time Out Done:   (Time) 4425   Admitting Diagnosis: Gi bleed/bud   Consent obtained/signed: Informed Consent Verified: Yes (10/29/21 1701)   Machine / RO # Machine Number: Teodora Gauthier (10/29/21 1701)   Primary Nurse Rpt Pre: Pablo   Primary Nurse Rpt Post: Niki   Pt Education: procedural   Care Plan: Cont current hd poc    Pts outpatient clinic: na     Tx Summary   Comments: venous chamber clotted with 50min remaining. All blood returned. Hd stopped.

## 2021-10-29 NOTE — PROGRESS NOTES
Problem: Mobility Impaired (Adult and Pediatric)  Goal: *Acute Goals and Plan of Care (Insert Text)  Description: FUNCTIONAL STATUS PRIOR TO ADMISSION: Patient was independent and active without use of DME prior to August 2021. Hospitalized in [place], [place] from August to mid October with COVID 19, now with trach and on HD. Was admitted to [facility] for four days prior to admission and stated he had not begun therapy there. HOME SUPPORT PRIOR TO ADMISSION: The patient lived with his wife of 27 years. Lives in [place], [place] Admitted from [facility]. Physical Therapy Goals  Goals re-assessed 10/28/2021   1. Patient will move from supine to sit and sit to supine, scoot up and down, and roll side to side in bed with maximal assistance x2 within 7 day(s). 2.  Patient will tolerate HOB elevated at 50 degrees 30 min BID within 7 days. 3.  Patient will be independent in supine HEP for LEs within 7 days. Initiated 10/22/2021  1. Patient will move from supine to sit and sit to supine, scoot up and down, and roll side to side in bed with maximal assistance x2 within 7 day(s). 2.  Patient will tolerate HOB elevated at 50 degrees 10 min BID within 7 days. 3.  Patient will be independent in supine HEP for LEs within 7 days. 4.  Patient will tolerate PRAFO boot (alternating feet every 2 hours) within 7 days. Goal met  Outcome: Progressing Towards Goal  PHYSICAL THERAPY TREATMENT  Patient: Amaury Denson (13 y.o. male)  Date: 10/29/2021  Diagnosis: GIB (gastrointestinal bleeding) [K92.2] <principal problem not specified>  Procedure(s) (LRB):  ESOPHAGOGASTRODUODENOSCOPY (EGD) at Bedside (N/A)  ESOPHAGOGASTRODUODENAL (EGD) BIOPSY (N/A) 10 Days Post-Op  Precautions: Fall, Skin, Contact  Chart, physical therapy assessment, plan of care and goals were reviewed. ASSESSMENT  Patient continues with skilled PT services and is progressing slowly towards goals.  Pt received on trach collar 10 L/min with PMV in place, pt conversant and willing to work with therapy with encouragement. He continues to present with grossly decreased strength (most pronounced in RUE and LLE), impaired balance (L trunk lean), decreased activity tolerance, bilat heel cord tightness (PRAFO in place), and overall decline in functional mobility. Discussed case with CM who reports pt will need to tolerate sitting for 4 hours for dialysis at SNF upon discharge. Session focuses on positioning pt in full chair position in bed (required totalA). Encouraged pt to remain in this position until ~1:30 to assess his sitting tolerance. Recommending SNF upon discharge. Current Level of Function Impacting Discharge (mobility/balance): totalA for all mobility     Other factors to consider for discharge: fall risk, PMH, poor activity tolerance          PLAN :  Patient continues to benefit from skilled intervention to address the above impairments. Continue treatment per established plan of care. to address goals. Recommendation for discharge: (in order for the patient to meet his/her long term goals)  Therapy up to 5 days/week in SNF setting    This discharge recommendation:  Has been made in collaboration with the attending provider and/or case management    IF patient discharges home will need the following DME: to be determined (TBD)       SUBJECTIVE:   Patient stated Irene Coffman is going to be a bad day.     OBJECTIVE DATA SUMMARY:   Critical Behavior:  Neurologic State: Alert, Eyes open spontaneously  Orientation Level: Oriented X4  Cognition: Follows commands  Safety/Judgement: Awareness of environment  Functional Mobility Training:  Bed Mobility:  Supine to Sit: Total assistance  Scooting: Total assistance;Assist x2    Balance:  Sitting: Impaired; With support (L lean)    Activity Tolerance:   Poor    After treatment patient left in no apparent distress:   Bed in chair position, call bell/pancake in place, RN updated, all needs met    COMMUNICATION/COLLABORATION: The patients plan of care was discussed with: Occupational therapist, Registered nurse, and Case management.      Nanci Steven PT, DPT   Time Calculation: 29 mins

## 2021-10-30 LAB
ALBUMIN SERPL-MCNC: 2.8 G/DL (ref 3.5–5)
ALBUMIN/GLOB SERPL: 0.9 {RATIO} (ref 1.1–2.2)
ALP SERPL-CCNC: 114 U/L (ref 45–117)
ALT SERPL-CCNC: 21 U/L (ref 12–78)
ANION GAP SERPL CALC-SCNC: 10 MMOL/L (ref 5–15)
AST SERPL-CCNC: 13 U/L (ref 15–37)
BILIRUB SERPL-MCNC: 0.7 MG/DL (ref 0.2–1)
BUN SERPL-MCNC: 17 MG/DL (ref 6–20)
BUN/CREAT SERPL: 7 (ref 12–20)
CALCIUM SERPL-MCNC: 9 MG/DL (ref 8.5–10.1)
CHLORIDE SERPL-SCNC: 101 MMOL/L (ref 97–108)
CO2 SERPL-SCNC: 25 MMOL/L (ref 21–32)
CREAT SERPL-MCNC: 2.37 MG/DL (ref 0.7–1.3)
ERYTHROCYTE [DISTWIDTH] IN BLOOD BY AUTOMATED COUNT: 19.7 % (ref 11.5–14.5)
GLOBULIN SER CALC-MCNC: 3 G/DL (ref 2–4)
GLUCOSE SERPL-MCNC: 93 MG/DL (ref 65–100)
HCT VFR BLD AUTO: 26.6 % (ref 36.6–50.3)
HGB BLD-MCNC: 8.5 G/DL (ref 12.1–17)
MCH RBC QN AUTO: 31.3 PG (ref 26–34)
MCHC RBC AUTO-ENTMCNC: 32 G/DL (ref 30–36.5)
MCV RBC AUTO: 97.8 FL (ref 80–99)
NRBC # BLD: 0 K/UL (ref 0–0.01)
NRBC BLD-RTO: 0 PER 100 WBC
PLATELET # BLD AUTO: 311 K/UL (ref 150–400)
PMV BLD AUTO: 9.5 FL (ref 8.9–12.9)
POTASSIUM SERPL-SCNC: 3.5 MMOL/L (ref 3.5–5.1)
PROT SERPL-MCNC: 5.8 G/DL (ref 6.4–8.2)
RBC # BLD AUTO: 2.72 M/UL (ref 4.1–5.7)
SODIUM SERPL-SCNC: 136 MMOL/L (ref 136–145)
WBC # BLD AUTO: 12.2 K/UL (ref 4.1–11.1)

## 2021-10-30 PROCEDURE — 74011000250 HC RX REV CODE- 250: Performed by: NURSE PRACTITIONER

## 2021-10-30 PROCEDURE — 94640 AIRWAY INHALATION TREATMENT: CPT

## 2021-10-30 PROCEDURE — 36415 COLL VENOUS BLD VENIPUNCTURE: CPT

## 2021-10-30 PROCEDURE — C9113 INJ PANTOPRAZOLE SODIUM, VIA: HCPCS | Performed by: NURSE PRACTITIONER

## 2021-10-30 PROCEDURE — 80053 COMPREHEN METABOLIC PANEL: CPT

## 2021-10-30 PROCEDURE — 65610000006 HC RM INTENSIVE CARE

## 2021-10-30 PROCEDURE — 74011250636 HC RX REV CODE- 250/636: Performed by: NURSE PRACTITIONER

## 2021-10-30 PROCEDURE — 74011250637 HC RX REV CODE- 250/637: Performed by: NURSE PRACTITIONER

## 2021-10-30 PROCEDURE — 74011250637 HC RX REV CODE- 250/637: Performed by: INTERNAL MEDICINE

## 2021-10-30 PROCEDURE — 85027 COMPLETE CBC AUTOMATED: CPT

## 2021-10-30 PROCEDURE — 74011250636 HC RX REV CODE- 250/636: Performed by: INTERNAL MEDICINE

## 2021-10-30 RX ORDER — LORAZEPAM 0.5 MG/1
0.5 TABLET ORAL 2 TIMES DAILY
Status: DISCONTINUED | OUTPATIENT
Start: 2021-10-30 | End: 2021-11-12

## 2021-10-30 RX ADMIN — FLUCONAZOLE IN SODIUM CHLORIDE 200 MG: 2 INJECTION, SOLUTION INTRAVENOUS at 15:09

## 2021-10-30 RX ADMIN — SODIUM CHLORIDE 40 MG: 9 INJECTION INTRAMUSCULAR; INTRAVENOUS; SUBCUTANEOUS at 20:18

## 2021-10-30 RX ADMIN — IPRATROPIUM BROMIDE AND ALBUTEROL SULFATE 3 ML: .5; 3 SOLUTION RESPIRATORY (INHALATION) at 20:48

## 2021-10-30 RX ADMIN — Medication 10 ML: at 22:00

## 2021-10-30 RX ADMIN — Medication 10 ML: at 05:36

## 2021-10-30 RX ADMIN — MIDODRINE HYDROCHLORIDE 10 MG: 5 TABLET ORAL at 05:29

## 2021-10-30 RX ADMIN — COLLAGENASE SANTYL: 250 OINTMENT TOPICAL at 08:51

## 2021-10-30 RX ADMIN — LORAZEPAM 0.5 MG: 0.5 TABLET ORAL at 08:54

## 2021-10-30 RX ADMIN — Medication 10 ML: at 22:01

## 2021-10-30 RX ADMIN — Medication 10 ML: at 13:03

## 2021-10-30 RX ADMIN — SODIUM CHLORIDE 40 MG: 9 INJECTION INTRAMUSCULAR; INTRAVENOUS; SUBCUTANEOUS at 08:50

## 2021-10-30 RX ADMIN — Medication: at 08:51

## 2021-10-30 RX ADMIN — CHLORHEXIDINE GLUCONATE 15 ML: 0.12 RINSE ORAL at 08:52

## 2021-10-30 RX ADMIN — Medication: at 17:32

## 2021-10-30 RX ADMIN — CHLORHEXIDINE GLUCONATE 15 ML: 0.12 RINSE ORAL at 21:00

## 2021-10-30 RX ADMIN — Medication: at 08:50

## 2021-10-30 RX ADMIN — IPRATROPIUM BROMIDE AND ALBUTEROL SULFATE 3 ML: .5; 3 SOLUTION RESPIRATORY (INHALATION) at 08:14

## 2021-10-30 RX ADMIN — MIDODRINE HYDROCHLORIDE 10 MG: 5 TABLET ORAL at 21:57

## 2021-10-30 RX ADMIN — Medication 9 MG: at 21:57

## 2021-10-30 RX ADMIN — POTASSIUM & SODIUM PHOSPHATES POWDER PACK 280-160-250 MG 1 PACKET: 280-160-250 PACK at 08:50

## 2021-10-30 RX ADMIN — MIDODRINE HYDROCHLORIDE 10 MG: 5 TABLET ORAL at 13:02

## 2021-10-30 RX ADMIN — LORAZEPAM 0.5 MG: 0.5 TABLET ORAL at 17:32

## 2021-10-30 RX ADMIN — Medication 1 CAPSULE: at 08:50

## 2021-10-30 RX ADMIN — EPOETIN ALFA-EPBX 10000 UNITS: 10000 INJECTION, SOLUTION INTRAVENOUS; SUBCUTANEOUS at 21:52

## 2021-10-30 NOTE — PROGRESS NOTES
0730: Bedside and Verbal shift change report given to ELIAS Shah (oncoming nurse) by Jacob Sanchez RN (offgoing nurse). Report included the following information SBAR, Kardex, Intake/Output, MAR, Recent Results, Cardiac Rhythm SR-ST and Alarm Parameters . 1930: Shift report given to Jacob Sanchez RN and Mika León RN using SBAR.

## 2021-10-30 NOTE — PROGRESS NOTES
Name: Semaj Trujillo MRN: [id number]   : 1970 Hospital: [address]   Date: 10/30/2021        IMPRESSION:   · SERGEY, HD dependent. Patient was dialyzed yesterday without events. Hypotension - on min pressors if needed. · Hypervolemia with predominantly central congestion. · Anemia of multifactorial origin  · Respiratory failure due to a complicated Covid pneumonia, on vent via trache  · Hypokalemia pre HD      PLAN:   · HD again on Monday. Harl Stable is notified. · Vent managing as per intensivist team  · Anemia management- start Retacrit. Check the iron profile. Subjective/Interval History:   I have reviewed the flowsheet and previous days notes. ROS:Review of systems not obtained due to patient factors. Objective:   Vital Signs:    Visit Vitals  BP (!) 89/63   Pulse (!) 105   Temp 98.6 °F (37 °C)   Resp (!) 35   Ht 5' 11.5\" (1.816 m)   Wt 135.9 kg (299 lb 9.7 oz)   SpO2 97%   BMI 41.20 kg/m²       O2 Device: Tracheal collar   O2 Flow Rate (L/min): 10 l/min   Temp (24hrs), Av °F (37.2 °C), Min:98.5 °F (36.9 °C), Max:99.5 °F (37.5 °C)       Intake/Output:   Last shift:      10/30 07 - 10/30 1900  In: 100 [P.O.:100]  Out: -   Last 3 shifts: 10/28 1901 - 10/30 07  In: 891 [P.O.:240; I.V.:200]  Out: 2190 [Drains:190]    Intake/Output Summary (Last 24 hours) at 10/30/2021 0958  Last data filed at 10/30/2021 0800  Gross per 24 hour   Intake 310 ml   Output 2000 ml   Net -1690 ml        Physical Exam:  General:    Awake, Not communicating verbally. Head:   Normocephalic, without obvious abnormality, atraumatic. Eyes:   Conjunctivae/corneas clear. Nose:  Nares normal. No drainage or sinus tenderness. Throat:    Lips, mucosa, and tongue normal.    Neck:  Trache in place  Lungs:   Clear to auscultation bilaterally. No Wheezing or Rhonchi. No rales. Chest wall:  No tenderness or deformity. No Accessory muscle use. Heart:   Regular rate and rhythm,  no murmur, rub or gallop.   Abdomen: Soft. Distended. Bowel sounds normal. PEG tube in place. Extremities: Extremities normal, atraumatic, No cyanosis. No edema. No clubbing  Skin:     Texture, turgor normal. No rashes or lesions. Not Jaundiced  Psych:  Calm. Neurologic: Non focal.      DATA:  Labs:  Recent Labs     10/30/21  0545 10/29/21  0352 10/28/21  0439    133* 132*   K 3.5 3.8 4.0    98 98   CO2 25 25 25   BUN 17 25* 15   CREA 2.37* 2.82* 1.85*   CA 9.0 8.8 8.8   ALB 2.8* 2.6*  --    PHOS  --  3.2 2.4*   MG  --  1.8 1.8     Recent Labs     10/30/21  0545 10/29/21  0352 10/28/21  0439   WBC 12.2* 12.5* 11.0   HGB 8.5* 8.1* 8.3*   HCT 26.6* 26.0* 26.8*    352 332     No results for input(s): DOLORES, KU, CLU, CREAU in the last 72 hours.     No lab exists for component: PROU    Total time spent with patient:  35 minutes    [] Critical Care Provided    Care Plan discussed with:   Jessica Tidwell MD

## 2021-10-30 NOTE — PROGRESS NOTES
ICU TEAM Progress Note    Name: Phu Lindo   : 1970   MRN: [id number]   Date: 10/30/2021      Assessment:   Reason for ICU Admission: Mechanical Ventilation and hypotension     Brief HPI:    -  \"Pt is a 54 yo M with PMH ESRD on HD, takes coumadin secondary to hx of DVT RUE with a recent prolonged hospitalization secondary to COVID PNA in August of this year. He ultimately required trach and peg and was in rehab at [facility]. Pt unable to provide HPI secondary to current cognition, therefore information is obtained via chart and provider. Per [facility] records, pt had a dislodged peg tube. He started having coffee ground emesis 10/18/21 with elevated HR and hypotension. He was sent to the ER for further workup. He was found to be hypotensive and ICU was consulted for further management. \"      10/25 Pt co some increased pain and discomfort overnight, was restrted on PTA fentanyl patch and prn ativan. 10/26 TCT tolerated X24 hours     10/29 hgb trending down - 8.1 (10/29) from 9.2 (10/26). Patient remains off the vent on trach collar with humidification. Otolaryngology consulted to downsize trach. Worsening SERGEY on HD- plan to receive dialysis today. S/P:   Procedure(s):  ESOPHAGOGASTRODUODENOSCOPY (EGD) at Bedside  ESOPHAGOGASTRODUODENAL (EGD) BIOPSY    Plan:     1. GIB  - GI consulted, appreciate input  - PPI BID  - SP EGD    - Hgb 8.5 this am   -Tolerating PO intake    2. Hemorrhagic shock  - Sp PRBCs, FFP and k centra   - Serial H and H, stable   - Monitor closely for s/s of bleeding  - Volume resuscitated  - Retacrit started per nephrology   - CT revealed large fluid filled mass,General surgery consulted  - Fluid mass drained by IR 10/23, plans for drain to remain X3 days then fu CT scan 10/26 to eval need for surgical intervention versus IR intervention.  Will monitor drainage and amount while drain in place   -CT scan 10/26 slightly diminished size of the abdominal collection with pigtail drainage catheter     3. Supratherapeutic INR  - Sp FFP and k centra  - Hold coumadin for now as active bleeding  - Serial INR, last INR 1.1 (10/28)     4. Acute on chronic hypoxic respiratory failure with tracheostomy   - Continue MV  - Has been on trach collar with speaking valve, plan for downsize of trach today by otolaryngology. - Pulm hygiene   - HOB >30   - Aspiration precautions   - Speech following    5. ESRD on HD MWF  - Cont HD per renal    6. Acute blood loss anemia   - Transfuse as needed to keep hgb >7  - Monitor closely for ss bleeding  - See above     7. PEG tube dislodged   - EGD per GI  - PEG removed, not in stomach  - CT abd and pelvis ordered, fluid collection noted, hematoma versus infection   - Wound consulted for old peg site   - General surgery consulted for fluid collection, perc drain in place,  Flush BID, repeat CT scan on Tuesday per surgery    8.  Leukocytosis   - Improved this am 12.2  - continue broad spectrum abx   - On zosyn  - Continue diflucan for yeast out of wound   - ID consult       Subjective:   Overnight Events:   10/30/2021    No acute events, tolerated trach collar     Objective:     Visit Vitals  /69   Pulse 97   Temp 99.5 °F (37.5 °C)   Resp 21   Ht 5' 11.5\" (1.816 m)   Wt 135.9 kg (299 lb 9.7 oz)   SpO2 99%   BMI 41.20 kg/m²    O2 Flow Rate (L/min): 10 l/min O2 Device: Tracheal collar Temp (24hrs), Av.1 °F (37.3 °C), Min:98.5 °F (36.9 °C), Max:99.5 °F (37.5 °C)           Hemodynamics:   PAP:   CO:     Wedge:   CI:     CVP:    SVR:       PVR:       Ventilator Settings:  Mode Rate Tidal Volume Pressure FiO2 PEEP   Spontaneous      8 cm H2O 35 % 5 cm H20     Peak airway pressure: 15 cm H2O    Minute ventilation: 15.2 l/min        Intake/Output:     Intake/Output Summary (Last 24 hours) at 10/30/2021 0828  Last data filed at 10/29/2021 2010  Gross per 24 hour   Intake 210 ml   Output 2000 ml   Net -1790 ml       Physical Exam:  General:  Trach collar   Eyes:  Sclera anicteric. Pupils equal, round, reactive to light. Mouth/Throat: Orotracheal tube in place. Neck: Supple. Lungs:   Clear to auscultation bilaterally, good effort. Cardiovascular:  Regular rate and rhythm, no murmur, click, rub, or gallop. Abdomen: Old peg removed, scar and dressing intact    Extremities: No cyanosis or edema. Skin: No acute rash or lesions. Lymph Nodes: Cervical and supraclavicular normal.   Musculoskeletal:  No swelling or deformity. Lines/Devices:  Intact, no erythema, drainage, or tenderness. Labs & Data: Reviewed    Medications: Reviewed    Multidisciplinary Rounds Completed: Yes    ABCDEF Bundle/Checklist Completed: Yes    SPECIAL EQUIPMENT: None    DISPOSITION: Transfer to non-ICU bed    CRITICAL CARE CONSULTANT NOTE  I had a face to face encounter with the patient, reviewed and interpreted patient data including clinical events, labs, images, vital signs, I/O's, and examined patient. I have discussed the case and the plan and management of the patient's care with the consulting services, the bedside nurses and the respiratory therapist.      NOTE OF PERSONAL INVOLVEMENT IN CARE   This patient has a high probability of imminent, clinically significant deterioration, which requires the highest level of preparedness to intervene urgently. I participated in the decision-making and personally managed or directed the management of the following life and organ supporting interventions that required my frequent assessment to treat or prevent imminent deterioration.         Mor[facility]YASt[facility]     Critical Care Medicine  [employer]    CC time 45 minutes

## 2021-10-30 NOTE — PROGRESS NOTES
1930: Bedside and Verbal shift change report given to [address] [place] [address] (oncoming nurse) by Willie Marquez (offgoing nurse). Report included the following information SBAR, Kardex, ED Summary, Procedure Summary, MAR, Recent Results and Cardiac Rhythm ST.    2000: Shift assessment completed. Arleth Bravo at bedside completing treatment at this time. 0000: Reassessment completed. 0400: Reassessment completed. 0730: Bedside and Verbal shift change report given to Willie Marquez (oncoming nurse) by [address] [place] [address] (offgoing nurse). Report included the following information SBAR, Kardex, ED Summary, Procedure Summary, MAR, Recent Results and Cardiac Rhythm NSR/ST.

## 2021-10-31 LAB
ALBUMIN SERPL-MCNC: 2.6 G/DL (ref 3.5–5)
ALBUMIN SERPL-MCNC: 2.6 G/DL (ref 3.5–5)
ALBUMIN/GLOB SERPL: 0.9 {RATIO} (ref 1.1–2.2)
ALP SERPL-CCNC: 113 U/L (ref 45–117)
ALT SERPL-CCNC: 19 U/L (ref 12–78)
ANION GAP SERPL CALC-SCNC: 8 MMOL/L (ref 5–15)
ANION GAP SERPL CALC-SCNC: 9 MMOL/L (ref 5–15)
AST SERPL-CCNC: 12 U/L (ref 15–37)
BILIRUB SERPL-MCNC: 0.6 MG/DL (ref 0.2–1)
BUN SERPL-MCNC: 25 MG/DL (ref 6–20)
BUN SERPL-MCNC: 25 MG/DL (ref 6–20)
BUN/CREAT SERPL: 8 (ref 12–20)
BUN/CREAT SERPL: 8 (ref 12–20)
CALCIUM SERPL-MCNC: 8.7 MG/DL (ref 8.5–10.1)
CALCIUM SERPL-MCNC: 9 MG/DL (ref 8.5–10.1)
CHLORIDE SERPL-SCNC: 101 MMOL/L (ref 97–108)
CHLORIDE SERPL-SCNC: 102 MMOL/L (ref 97–108)
CO2 SERPL-SCNC: 24 MMOL/L (ref 21–32)
CO2 SERPL-SCNC: 24 MMOL/L (ref 21–32)
CREAT SERPL-MCNC: 3.16 MG/DL (ref 0.7–1.3)
CREAT SERPL-MCNC: 3.18 MG/DL (ref 0.7–1.3)
ERYTHROCYTE [DISTWIDTH] IN BLOOD BY AUTOMATED COUNT: 19.9 % (ref 11.5–14.5)
GLOBULIN SER CALC-MCNC: 2.9 G/DL (ref 2–4)
GLUCOSE SERPL-MCNC: 101 MG/DL (ref 65–100)
GLUCOSE SERPL-MCNC: 101 MG/DL (ref 65–100)
HCT VFR BLD AUTO: 26.9 % (ref 36.6–50.3)
HGB BLD-MCNC: 8.2 G/DL (ref 12.1–17)
MCH RBC QN AUTO: 29.9 PG (ref 26–34)
MCHC RBC AUTO-ENTMCNC: 30.5 G/DL (ref 30–36.5)
MCV RBC AUTO: 98.2 FL (ref 80–99)
NRBC # BLD: 0 K/UL (ref 0–0.01)
NRBC BLD-RTO: 0 PER 100 WBC
PHOSPHATE SERPL-MCNC: 3.9 MG/DL (ref 2.6–4.7)
PLATELET # BLD AUTO: 296 K/UL (ref 150–400)
PMV BLD AUTO: 9.9 FL (ref 8.9–12.9)
POTASSIUM SERPL-SCNC: 3.4 MMOL/L (ref 3.5–5.1)
POTASSIUM SERPL-SCNC: 3.4 MMOL/L (ref 3.5–5.1)
PROT SERPL-MCNC: 5.5 G/DL (ref 6.4–8.2)
RBC # BLD AUTO: 2.74 M/UL (ref 4.1–5.7)
SODIUM SERPL-SCNC: 134 MMOL/L (ref 136–145)
SODIUM SERPL-SCNC: 134 MMOL/L (ref 136–145)
WBC # BLD AUTO: 12.4 K/UL (ref 4.1–11.1)

## 2021-10-31 PROCEDURE — 74011250637 HC RX REV CODE- 250/637: Performed by: NURSE PRACTITIONER

## 2021-10-31 PROCEDURE — 74011250637 HC RX REV CODE- 250/637: Performed by: INTERNAL MEDICINE

## 2021-10-31 PROCEDURE — 65610000006 HC RM INTENSIVE CARE

## 2021-10-31 PROCEDURE — 94762 N-INVAS EAR/PLS OXIMTRY CONT: CPT

## 2021-10-31 PROCEDURE — 80069 RENAL FUNCTION PANEL: CPT

## 2021-10-31 PROCEDURE — 74011000250 HC RX REV CODE- 250: Performed by: NURSE PRACTITIONER

## 2021-10-31 PROCEDURE — 85027 COMPLETE CBC AUTOMATED: CPT

## 2021-10-31 PROCEDURE — 99231 SBSQ HOSP IP/OBS SF/LOW 25: CPT | Performed by: SURGERY

## 2021-10-31 PROCEDURE — 74011250636 HC RX REV CODE- 250/636: Performed by: NURSE PRACTITIONER

## 2021-10-31 PROCEDURE — 36415 COLL VENOUS BLD VENIPUNCTURE: CPT

## 2021-10-31 PROCEDURE — 80053 COMPREHEN METABOLIC PANEL: CPT

## 2021-10-31 PROCEDURE — C9113 INJ PANTOPRAZOLE SODIUM, VIA: HCPCS | Performed by: NURSE PRACTITIONER

## 2021-10-31 PROCEDURE — 94640 AIRWAY INHALATION TREATMENT: CPT

## 2021-10-31 PROCEDURE — 77010033678 HC OXYGEN DAILY

## 2021-10-31 RX ADMIN — SODIUM CHLORIDE 40 MG: 9 INJECTION INTRAMUSCULAR; INTRAVENOUS; SUBCUTANEOUS at 22:42

## 2021-10-31 RX ADMIN — IPRATROPIUM BROMIDE AND ALBUTEROL SULFATE 3 ML: .5; 3 SOLUTION RESPIRATORY (INHALATION) at 07:23

## 2021-10-31 RX ADMIN — Medication: at 17:52

## 2021-10-31 RX ADMIN — CHLORHEXIDINE GLUCONATE 15 ML: 0.12 RINSE ORAL at 08:34

## 2021-10-31 RX ADMIN — Medication 10 ML: at 06:00

## 2021-10-31 RX ADMIN — LORAZEPAM 0.5 MG: 0.5 TABLET ORAL at 17:52

## 2021-10-31 RX ADMIN — COLLAGENASE SANTYL: 250 OINTMENT TOPICAL at 08:33

## 2021-10-31 RX ADMIN — Medication 9 MG: at 22:43

## 2021-10-31 RX ADMIN — LORAZEPAM 0.5 MG: 0.5 TABLET ORAL at 08:32

## 2021-10-31 RX ADMIN — Medication 1 CAPSULE: at 08:32

## 2021-10-31 RX ADMIN — IPRATROPIUM BROMIDE AND ALBUTEROL SULFATE 3 ML: .5; 3 SOLUTION RESPIRATORY (INHALATION) at 20:05

## 2021-10-31 RX ADMIN — CHLORHEXIDINE GLUCONATE 15 ML: 0.12 RINSE ORAL at 22:43

## 2021-10-31 RX ADMIN — Medication 10 ML: at 22:42

## 2021-10-31 RX ADMIN — MIDODRINE HYDROCHLORIDE 10 MG: 5 TABLET ORAL at 22:42

## 2021-10-31 RX ADMIN — SODIUM CHLORIDE 40 MG: 9 INJECTION INTRAMUSCULAR; INTRAVENOUS; SUBCUTANEOUS at 08:32

## 2021-10-31 RX ADMIN — Medication: at 08:32

## 2021-10-31 RX ADMIN — MIDODRINE HYDROCHLORIDE 10 MG: 5 TABLET ORAL at 15:30

## 2021-10-31 RX ADMIN — POTASSIUM BICARBONATE 20 MEQ: 391 TABLET, EFFERVESCENT ORAL at 06:52

## 2021-10-31 RX ADMIN — LORAZEPAM 1 MG: 2 INJECTION INTRAMUSCULAR; INTRAVENOUS at 23:00

## 2021-10-31 RX ADMIN — Medication 10 ML: at 15:30

## 2021-10-31 RX ADMIN — HYDROMORPHONE HYDROCHLORIDE 0.5 MG: 1 INJECTION, SOLUTION INTRAMUSCULAR; INTRAVENOUS; SUBCUTANEOUS at 23:02

## 2021-10-31 RX ADMIN — MIDODRINE HYDROCHLORIDE 10 MG: 5 TABLET ORAL at 06:47

## 2021-10-31 RX ADMIN — Medication 10 ML: at 06:53

## 2021-10-31 RX ADMIN — FLUCONAZOLE IN SODIUM CHLORIDE 200 MG: 2 INJECTION, SOLUTION INTRAVENOUS at 16:28

## 2021-10-31 RX ADMIN — Medication 10 ML: at 15:31

## 2021-10-31 NOTE — PROGRESS NOTES
Progress Note    Patient: Gabriella Sanches MRN: [id number]  SSN: [id number]    YOB: 1970  Age: 49 y.o. Sex: male      Admit Date: 10/18/2021    12 Days Post-Op    Procedure:  Procedure(s):  ESOPHAGOGASTRODUODENOSCOPY (EGD) at Bedside  ESOPHAGOGASTRODUODENAL (EGD) BIOPSY    Subjective:     No acute surgical issues. Pt reported mild abdominal pain. WBC elevated but stable.   Pigtail drain with thick purulent fluid    Objective:     Visit Vitals  /86   Pulse (!) 120   Temp 97.7 °F (36.5 °C)   Resp 23   Ht 5' 11.5\" (1.816 m)   Wt 295 lb 6.7 oz (134 kg)   SpO2 93%   BMI 40.63 kg/m²       Temp (24hrs), Av.8 °F (36.6 °C), Min:97.6 °F (36.4 °C), Max:98.2 °F (36.8 °C)        Physical Exam:    Gen:  NAD  Pulm:  Unlabored  Abd:  S/ND/mild TTP    Recent Results (from the past 24 hour(s))   RENAL FUNCTION PANEL    Collection Time: 10/31/21  4:45 AM   Result Value Ref Range    Sodium 134 (L) 136 - 145 mmol/L    Potassium 3.4 (L) 3.5 - 5.1 mmol/L    Chloride 102 97 - 108 mmol/L    CO2 24 21 - 32 mmol/L    Anion gap 8 5 - 15 mmol/L    Glucose 101 (H) 65 - 100 mg/dL    BUN 25 (H) 6 - 20 MG/DL    Creatinine 3.16 (H) 0.70 - 1.30 MG/DL    BUN/Creatinine ratio 8 (L) 12 - 20      GFR est AA 25 (L) >60 ml/min/1.73m2    GFR est non-AA 21 (L) >60 ml/min/1.73m2    Calcium 8.7 8.5 - 10.1 MG/DL    Phosphorus 3.9 2.6 - 4.7 MG/DL    Albumin 2.6 (L) 3.5 - 5.0 g/dL   CBC W/O DIFF    Collection Time: 10/31/21  4:45 AM   Result Value Ref Range    WBC 12.4 (H) 4.1 - 11.1 K/uL    RBC 2.74 (L) 4.10 - 5.70 M/uL    HGB 8.2 (L) 12.1 - 17.0 g/dL    HCT 26.9 (L) 36.6 - 50.3 %    MCV 98.2 80.0 - 99.0 FL    MCH 29.9 26.0 - 34.0 PG    MCHC 30.5 30.0 - 36.5 g/dL    RDW 19.9 (H) 11.5 - 14.5 %    PLATELET 951 836 - 188 K/uL    MPV 9.9 8.9 - 12.9 FL    NRBC 0.0 0  WBC    ABSOLUTE NRBC 0.00 0.00 - 1.70 K/uL   METABOLIC PANEL, COMPREHENSIVE    Collection Time: 10/31/21  4:45 AM   Result Value Ref Range    Sodium 134 (L) 136 - 145 mmol/L Potassium 3.4 (L) 3.5 - 5.1 mmol/L    Chloride 101 97 - 108 mmol/L    CO2 24 21 - 32 mmol/L    Anion gap 9 5 - 15 mmol/L    Glucose 101 (H) 65 - 100 mg/dL    BUN 25 (H) 6 - 20 MG/DL    Creatinine 3.18 (H) 0.70 - 1.30 MG/DL    BUN/Creatinine ratio 8 (L) 12 - 20      GFR est AA 25 (L) >60 ml/min/1.73m2    GFR est non-AA 21 (L) >60 ml/min/1.73m2    Calcium 9.0 8.5 - 10.1 MG/DL    Bilirubin, total 0.6 0.2 - 1.0 MG/DL    ALT (SGPT) 19 12 - 78 U/L    AST (SGOT) 12 (L) 15 - 37 U/L    Alk.  phosphatase 113 45 - 117 U/L    Protein, total 5.5 (L) 6.4 - 8.2 g/dL    Albumin 2.6 (L) 3.5 - 5.0 g/dL    Globulin 2.9 2.0 - 4.0 g/dL    A-G Ratio 0.9 (L) 1.1 - 2.2             Assessment:     Hospital Problems  Date Reviewed: 10/29/2021        Codes Class Noted POA    Abdominal wall fluid collections ICD-10-CM: R18.8  ICD-9-CM: 789.59  10/25/2021 Unknown        Severe protein-calorie malnutrition ([facility] [address] ICD-10-CM: E43  ICD-9-CM: 262  10/21/2021 Unknown        GIB (gastrointestinal bleeding) ICD-10-CM: K92.2  ICD-9-CM: 578.9  10/18/2021 Unknown              Plan/Recommendations/Medical Decision Making:     - Abdominal wall abscess:  No acute indication for operation  - Continue antibiotic therapy  - Monitor drain output  - Diet as tolerated

## 2021-10-31 NOTE — PROGRESS NOTES
0730: Shift report received from ELIAS Abraham using SBAR. 1930: Shift report given to Parker Man RN using SBAR.

## 2021-10-31 NOTE — PROGRESS NOTES
ICU TEAM Progress Note    Name: Mathew Penaloza   : 1970   MRN: [id number]   Date: 10/31/2021      Assessment:   Reason for ICU Admission: Mechanical Ventilation and hypotension     Brief HPI:    -  \"Pt is a 54 yo M with PMH ESRD on HD, takes coumadin secondary to hx of DVT RUE with a recent prolonged hospitalization secondary to COVID PNA in August of this year. He ultimately required trach and peg and was in rehab at [address]. Pt unable to provide HPI secondary to current cognition, therefore information is obtained via chart and provider. Per [facility] records, pt had a dislodged peg tube. He started having coffee ground emesis 10/18/21 with elevated HR and hypotension. He was sent to the ER for further workup. He was found to be hypotensive and ICU was consulted for further management. \"      10/25 Pt co some increased pain and discomfort overnight, was restrted on PTA fentanyl patch and prn ativan. 10/26 TCT tolerated X24 hours     10/29 hgb trending down - 8.1 (10/29) from 9.2 (10/26). Patient remains off the vent on trach collar with humidification. Otolaryngology consulted to downsize trach. Worsening SERGEY on HD- plan to receive dialysis today. 10/31- No acute events overnight. Patient is lying in bed, awake, alert and oriented x 4. On TC @ 35%, oxygen saturation  mid 90's. Family at bedside and reports improvement with patient but wants to know when he can start moving around. Patient denies any complaints. No hematemesis, hematochezia or melena.     S/P:   Procedure(s):  ESOPHAGOGASTRODUODENOSCOPY (EGD) at Bedside  ESOPHAGOGASTRODUODENAL (EGD) BIOPSY     Assessment and Plan:     1) GI/Heme :  Hemorrhagic Shock/ ABLA d/t Acute GI Bleed, now Improved   - GI  Following   - PPI BID  - SP EGD    - Serial H & H , now stable , INR stable 1.1 on 10/28  - Transfuse to keep Hgb >7  -Tolerating PO intake  -  Sp PRBCs, FFP and k centra   - Monitor closely for s/s of bleeding  - Volume resuscitated  - Retacrit started per nephrology   - CT revealed large fluid filled mass,General surgery consulted  - Fluid mass drained by IR 10/23, plans for drain to remain X3 days then fu CT scan 10/26 to eval need for surgical intervention versus IR intervention. Will monitor drainage and amount while drain in place   -CT scan 10/26 slightly diminished size of the abdominal collection with pigtail drainage catheter   - General surgery consulted for fluid collection, perc drain in place,  Flush BID, repeat CT scan on Tuesday per surgery  - SCDs for DVT prophylaxis, No chemical at this time       2. Pulmonary:  Acute on chronic hypoxic respiratory failure with tracheostomy   - Has been on trach collar with speaking valve, downsized to #6 XLT Shilley  by ENT 10/30/21  - Titrate oxygen to keep saturation > 92%   - Pulmonary  hygiene   - HOB >30   - Aspiration precautions   - Speech following    3. Renal:  ESRD on HD MWF  -  follow labs   - Nephrology following      4.  ID:  Leukocytosis   - Continue diflucan for yeast out of wound   - ID consult       5) Neuro:  -PT/OT     Code Status: Full     Goals of Care: Plan to transfer to acute rehab on Thursday         Subjective:   Overnight Events:   10/31/2021    No acute events, tolerated trach collar     Objective:     Visit Vitals  /67   Pulse (!) 115   Temp 97.8 °F (36.6 °C)   Resp 27   Ht 5' 11.5\" (1.816 m)   Wt 134 kg (295 lb 6.7 oz)   SpO2 95%   BMI 40.63 kg/m²    O2 Flow Rate (L/min): 10 l/min O2 Device: Tracheal collar Temp (24hrs), Av °F (36.7 °C), Min:97.6 °F (36.4 °C), Max:98.5 °F (36.9 °C)           Hemodynamics:   PAP:   CO:     Wedge:   CI:     CVP:    SVR:       PVR:       Ventilator Settings:  Mode Rate Tidal Volume Pressure FiO2 PEEP   Spontaneous      8 cm H2O 35 % 5 cm H20     Peak airway pressure: 15 cm H2O    Minute ventilation: 15.2 l/min        Intake/Output:     Intake/Output Summary (Last 24 hours) at 10/31/2021 1434  Last data filed at 10/31/2021 0900  Gross per 24 hour   Intake 785 ml   Output 150 ml   Net 635 ml       Physical Exam:  General:   Male Patient lying in bed  In no acute distress , Oriented x 4,   Eyes:  Sclera anicteric. Pupils equal, round, reactive to light. Mouth/Throat: Tracheostomy in place with PMV   Neck: Supple. Lungs:   Clear to auscultation bilaterally, good effort. Cardiovascular:  Regular rate and rhythm, no murmur, click, rub, or gallop. Abdomen:   LUQ with fecal output , stoma appears healthy, LLL with perc drain, purulent drainage    Extremities: No cyanosis or clubbing, bilateral LE edema present    Skin: No acute rash or lesions.                      Labs & Data: Reviewed    Medications: Reviewed      ABCDEF Bundle/Checklist Completed: Yes    SPECIAL EQUIPMENT: None    DISPOSITION: Remain in ICU today, Plans to transfer to high level acute rehab on Thursday     Total CC Time: 52 minutes exclusive of time spent on procedures           NIRMALA Weiss, AG-ACNP-BC     [facility]

## 2021-10-31 NOTE — PROGRESS NOTES
1930: Bedside shift change report given to Kandace (oncoming nurse) by Pablo (offgoing nurse). Report included the following information SBAR, Kardex, Intake/Output, MAR, Cardiac Rhythm ST, Alarm Parameters  and Dual Neuro Assessment. 2000: Shift assessment complete. Pt VSS. PIV, trach collar, and LUQ drain intact. A&O x4, MARANDA intact, PEG site oozed 45mL of brown liquid. LUQ drainage bag at 200mL. Trach on FiO2 35% and 10L O2 flow. No acute changes in status. Will continue to monitor per unit protocol. 0000: Pt reassessed. In stable condition. No acute changes noted. 0400: Pt reassessed. In stable condition. No acute changes noted. 0700: PEG site total output of 45mL, LUQ drain total output of 150mL for shift. 1 BM but no urine output throughout shift, HD (MWF). No other changes noted. 0730: Bedside shift change report given to Pablo (oncoming nurse) by Hernandez Edwards (offgoing nurse). Report included the following information SBAR, Kardex, Intake/Output, MAR, Cardiac Rhythm ST, Alarm Parameters  and Dual Neuro Assessment.

## 2021-11-01 LAB
ERYTHROCYTE [DISTWIDTH] IN BLOOD BY AUTOMATED COUNT: 19.9 % (ref 11.5–14.5)
HCT VFR BLD AUTO: 28.1 % (ref 36.6–50.3)
HGB BLD-MCNC: 8.8 G/DL (ref 12.1–17)
MAGNESIUM SERPL-MCNC: 1.6 MG/DL (ref 1.6–2.4)
MCH RBC QN AUTO: 30.7 PG (ref 26–34)
MCHC RBC AUTO-ENTMCNC: 31.3 G/DL (ref 30–36.5)
MCV RBC AUTO: 97.9 FL (ref 80–99)
NRBC # BLD: 0 K/UL (ref 0–0.01)
NRBC BLD-RTO: 0 PER 100 WBC
PHOSPHATE SERPL-MCNC: 3.8 MG/DL (ref 2.6–4.7)
PLATELET # BLD AUTO: 307 K/UL (ref 150–400)
PMV BLD AUTO: 10.2 FL (ref 8.9–12.9)
RBC # BLD AUTO: 2.87 M/UL (ref 4.1–5.7)
WBC # BLD AUTO: 15.3 K/UL (ref 4.1–11.1)

## 2021-11-01 PROCEDURE — 83735 ASSAY OF MAGNESIUM: CPT

## 2021-11-01 PROCEDURE — 99232 SBSQ HOSP IP/OBS MODERATE 35: CPT | Performed by: SURGERY

## 2021-11-01 PROCEDURE — 74011000250 HC RX REV CODE- 250: Performed by: NURSE PRACTITIONER

## 2021-11-01 PROCEDURE — 74011250637 HC RX REV CODE- 250/637: Performed by: NURSE PRACTITIONER

## 2021-11-01 PROCEDURE — 77010033678 HC OXYGEN DAILY

## 2021-11-01 PROCEDURE — 94640 AIRWAY INHALATION TREATMENT: CPT

## 2021-11-01 PROCEDURE — 36415 COLL VENOUS BLD VENIPUNCTURE: CPT

## 2021-11-01 PROCEDURE — 74011250636 HC RX REV CODE- 250/636: Performed by: NURSE PRACTITIONER

## 2021-11-01 PROCEDURE — 74011000250 HC RX REV CODE- 250: Performed by: INTERNAL MEDICINE

## 2021-11-01 PROCEDURE — 65610000006 HC RM INTENSIVE CARE

## 2021-11-01 PROCEDURE — 84100 ASSAY OF PHOSPHORUS: CPT

## 2021-11-01 PROCEDURE — 92507 TX SP LANG VOICE COMM INDIV: CPT

## 2021-11-01 PROCEDURE — P9047 ALBUMIN (HUMAN), 25%, 50ML: HCPCS | Performed by: INTERNAL MEDICINE

## 2021-11-01 PROCEDURE — C9113 INJ PANTOPRAZOLE SODIUM, VIA: HCPCS | Performed by: NURSE PRACTITIONER

## 2021-11-01 PROCEDURE — 77010033711 HC HIGH FLOW OXYGEN

## 2021-11-01 PROCEDURE — 85027 COMPLETE CBC AUTOMATED: CPT

## 2021-11-01 PROCEDURE — 74011250637 HC RX REV CODE- 250/637: Performed by: INTERNAL MEDICINE

## 2021-11-01 PROCEDURE — 97110 THERAPEUTIC EXERCISES: CPT

## 2021-11-01 PROCEDURE — 90935 HEMODIALYSIS ONE EVALUATION: CPT

## 2021-11-01 PROCEDURE — 97530 THERAPEUTIC ACTIVITIES: CPT

## 2021-11-01 PROCEDURE — 74011250636 HC RX REV CODE- 250/636: Performed by: INTERNAL MEDICINE

## 2021-11-01 PROCEDURE — 97535 SELF CARE MNGMENT TRAINING: CPT

## 2021-11-01 PROCEDURE — 92526 ORAL FUNCTION THERAPY: CPT

## 2021-11-01 RX ORDER — MAGNESIUM SULFATE 1 G/100ML
1 INJECTION INTRAVENOUS ONCE
Status: COMPLETED | OUTPATIENT
Start: 2021-11-01 | End: 2021-11-01

## 2021-11-01 RX ORDER — HEPARIN SODIUM 1000 [USP'U]/ML
4000 INJECTION, SOLUTION INTRAVENOUS; SUBCUTANEOUS
Status: DISCONTINUED | OUTPATIENT
Start: 2021-11-01 | End: 2021-11-18 | Stop reason: HOSPADM

## 2021-11-01 RX ADMIN — SODIUM CHLORIDE 40 MG: 9 INJECTION INTRAMUSCULAR; INTRAVENOUS; SUBCUTANEOUS at 20:31

## 2021-11-01 RX ADMIN — MAGNESIUM SULFATE HEPTAHYDRATE 1 G: 1 INJECTION, SOLUTION INTRAVENOUS at 20:30

## 2021-11-01 RX ADMIN — MIDODRINE HYDROCHLORIDE 10 MG: 5 TABLET ORAL at 22:00

## 2021-11-01 RX ADMIN — Medication 10 ML: at 06:27

## 2021-11-01 RX ADMIN — LORAZEPAM 0.5 MG: 0.5 TABLET ORAL at 19:04

## 2021-11-01 RX ADMIN — Medication: at 08:31

## 2021-11-01 RX ADMIN — HYDROMORPHONE HYDROCHLORIDE 0.5 MG: 1 INJECTION, SOLUTION INTRAMUSCULAR; INTRAVENOUS; SUBCUTANEOUS at 15:17

## 2021-11-01 RX ADMIN — ALBUMIN (HUMAN) 12.5 G: 0.25 INJECTION, SOLUTION INTRAVENOUS at 13:34

## 2021-11-01 RX ADMIN — MIDODRINE HYDROCHLORIDE 10 MG: 5 TABLET ORAL at 13:09

## 2021-11-01 RX ADMIN — Medication: at 18:43

## 2021-11-01 RX ADMIN — FLUCONAZOLE IN SODIUM CHLORIDE 200 MG: 2 INJECTION, SOLUTION INTRAVENOUS at 15:19

## 2021-11-01 RX ADMIN — ALTEPLASE 1.8 MG: 2.2 INJECTION, POWDER, LYOPHILIZED, FOR SOLUTION INTRAVENOUS at 15:37

## 2021-11-01 RX ADMIN — CHLORHEXIDINE GLUCONATE 15 ML: 0.12 RINSE ORAL at 20:31

## 2021-11-01 RX ADMIN — HEPARIN SODIUM 1800 UNITS: 1000 INJECTION INTRAVENOUS; SUBCUTANEOUS at 16:48

## 2021-11-01 RX ADMIN — CHLORHEXIDINE GLUCONATE 15 ML: 0.12 RINSE ORAL at 08:31

## 2021-11-01 RX ADMIN — HEPARIN SODIUM 1800 UNITS: 1000 INJECTION INTRAVENOUS; SUBCUTANEOUS at 16:47

## 2021-11-01 RX ADMIN — Medication 10 ML: at 22:00

## 2021-11-01 RX ADMIN — IPRATROPIUM BROMIDE AND ALBUTEROL SULFATE 3 ML: .5; 3 SOLUTION RESPIRATORY (INHALATION) at 08:04

## 2021-11-01 RX ADMIN — LORAZEPAM 1 MG: 2 INJECTION INTRAMUSCULAR; INTRAVENOUS at 20:34

## 2021-11-01 RX ADMIN — COLLAGENASE SANTYL: 250 OINTMENT TOPICAL at 08:32

## 2021-11-01 RX ADMIN — Medication 10 ML: at 13:09

## 2021-11-01 RX ADMIN — HEPARIN SODIUM 4000 UNITS: 1000 INJECTION INTRAVENOUS; SUBCUTANEOUS at 12:51

## 2021-11-01 RX ADMIN — LORAZEPAM 0.5 MG: 0.5 TABLET ORAL at 08:30

## 2021-11-01 RX ADMIN — ALTEPLASE 1.8 MG: 2.2 INJECTION, POWDER, LYOPHILIZED, FOR SOLUTION INTRAVENOUS at 15:38

## 2021-11-01 RX ADMIN — MIDODRINE HYDROCHLORIDE 10 MG: 5 TABLET ORAL at 06:19

## 2021-11-01 RX ADMIN — Medication 9 MG: at 21:01

## 2021-11-01 RX ADMIN — IPRATROPIUM BROMIDE AND ALBUTEROL SULFATE 3 ML: .5; 3 SOLUTION RESPIRATORY (INHALATION) at 20:32

## 2021-11-01 RX ADMIN — HYDROMORPHONE HYDROCHLORIDE 0.5 MG: 1 INJECTION, SOLUTION INTRAMUSCULAR; INTRAVENOUS; SUBCUTANEOUS at 21:01

## 2021-11-01 RX ADMIN — SODIUM CHLORIDE 40 MG: 9 INJECTION INTRAMUSCULAR; INTRAVENOUS; SUBCUTANEOUS at 08:30

## 2021-11-01 RX ADMIN — Medication 1 CAPSULE: at 08:31

## 2021-11-01 NOTE — PROGRESS NOTES
2045 Bedside and Verbal shift change report given to Maximus Wilkinson RN (oncoming nurse) by Neville Ignacio (offgoing nurse). Report included the following information SBAR, Kardex, Procedure Summary, Intake/Output, MAR and Recent Results.

## 2021-11-01 NOTE — PROGRESS NOTES
0730: Shift report received from Ama[facility] using SBAR.

## 2021-11-01 NOTE — PROGRESS NOTES
SLP Contact note    SLP treatment complete. Will advance diet to regular/thins. Pt still with PMV. Needs a white or  to work toward decannulation. SLP will work with RT and NP to find this. Full note to follow.       Thank you,  SARAH VidalEd, [address] [facility]  Speech-Language Pathologist

## 2021-11-01 NOTE — PROGRESS NOTES
Tracheostomy:    #6 XLT cuffless    Trach collar 6L  Fio2 28%     Speaking valve on    Patient agreeable to capping trials. Will do.     Spoke with Speech and Care management

## 2021-11-01 NOTE — PROGRESS NOTES
Problem: Mobility Impaired (Adult and Pediatric)  Goal: *Acute Goals and Plan of Care (Insert Text)  Description: FUNCTIONAL STATUS PRIOR TO ADMISSION: Patient was independent and active without use of DME prior to August 2021. Hospitalized in [facility] from August to mid October with COVID 19, now with trach and on HD. Was admitted to [address] [place] [address] for four days prior to admission and stated he had not begun therapy there. HOME SUPPORT PRIOR TO ADMISSION: The patient lived with his wife of 27 years. Lives in [facility] Admitted from [address] [place] [address]. Physical Therapy Goals  Goals re-assessed 10/28/2021   1. Patient will move from supine to sit and sit to supine, scoot up and down, and roll side to side in bed with maximal assistance x2 within 7 day(s). 2.  Patient will tolerate HOB elevated at 50 degrees 30 min BID within 7 days. 3.  Patient will be independent in supine HEP for LEs within 7 days. Initiated 10/22/2021  1. Patient will move from supine to sit and sit to supine, scoot up and down, and roll side to side in bed with maximal assistance x2 within 7 day(s). 2.  Patient will tolerate HOB elevated at 50 degrees 10 min BID within 7 days. 3.  Patient will be independent in supine HEP for LEs within 7 days. 4.  Patient will tolerate PRAFO boot (alternating feet every 2 hours) within 7 days. Goal met  Outcome: Progressing Towards Goal  PHYSICAL THERAPY TREATMENT  Patient: Charity Adjutant (49 y.o. male)  Date: 11/1/2021  Diagnosis: GIB (gastrointestinal bleeding) [K92.2] <principal problem not specified>  Procedure(s) (LRB):  ESOPHAGOGASTRODUODENOSCOPY (EGD) at Bedside (N/A)  ESOPHAGOGASTRODUODENAL (EGD) BIOPSY (N/A) 13 Days Post-Op  Precautions: Fall, Skin, Contact  Chart, physical therapy assessment, plan of care and goals were reviewed. ASSESSMENT  Patient continues with skilled PT services and is progressing slowly towards goals.   Continues to present with grossly decreased strength (RUE and LLE most pronounced), impaired balance (L lean), poor activity tolerance, decreased cardiopulmonary endurance, and overall decline in functional mobility. Pt continues to require totalA for all functional mobility. Transitioned to sitting in chair position in bed with bed mechanics utilized. Pt Performed BLE exercises. Continuing to recommend SNF     Of note, discussed mobility with CM as pt will need dialysis upon discharge. Pt currently remains unable to stand pivot transfer to a chair for dialysis. Current Level of Function Impacting Discharge (mobility/balance): totalA     Other factors to consider for discharge: from [facility], on trach collar         PLAN :  Patient continues to benefit from skilled intervention to address the above impairments. Continue treatment per established plan of care. to address goals. Recommendation for discharge: (in order for the patient to meet his/her long term goals)  Therapy up to 5 days/week in SNF setting    This discharge recommendation:  Has been made in collaboration with the attending provider and/or case management    IF patient discharges home will need the following DME: to be determined (TBD)       SUBJECTIVE:   Patient stated I sat up for about 2.5 hours on Friday.     OBJECTIVE DATA SUMMARY:   Critical Behavior:  Neurologic State: Alert  Orientation Level: Oriented X4  Cognition: Appropriate for age attention/concentration, Follows commands  Safety/Judgement: Awareness of environment, Fall prevention  Functional Mobility Training:  Bed Mobility:  Supine to Sit: Total assistance (bed mechanics utilized)  Scooting: Total assistance;Assist x2 (to scoot to [facility])    Balance:  Sitting: Impaired; With support (L Lateral lean)  Sitting - Static: Poor (constant support); Supported sitting    Activity Tolerance:   Poor    After treatment patient left in no apparent distress:   Supine in bed, Call bell within reach, and Bed / chair alarm activated    COMMUNICATION/COLLABORATION: The patients plan of care was discussed with: Occupational therapist and Registered nurse.      Jenniefr Maynard, PT, DPT   Time Calculation: 30 mins

## 2021-11-01 NOTE — PROGRESS NOTES
Problem: Dysphagia (Adult)  Goal: *Acute Goals and Plan of Care (Insert Text)  Description: Speech therapy goals  Initiated 10/25/2021   1. Patient will tolerate soft and bite sized diet with thin liquids with PMV in place without s/s of aspiration within 7 days     Outcome: Resolved/Met     Problem: Voice Impaired (Adult)  Goal: *Acute Goals and Plan of Care (Insert Text)  Description: Speech Therapy Goals  Initiated 10/25/2021    1. Patient will tolerate PMV during all waking hours without adverse effects within 7 days. Outcome: Resolved/Met     SPEECH LANGUAGE PATHOLOGY DYSPHAGIA/PMV TREATMENT/DISCHARGE  Patient: Cherelle Giang (55 y.o. male)  Date: 11/1/2021  Diagnosis: GIB (gastrointestinal bleeding) [K92.2] <principal problem not specified>  Procedure(s) (LRB):  ESOPHAGOGASTRODUODENOSCOPY (EGD) at Bedside (N/A)  ESOPHAGOGASTRODUODENAL (EGD) BIOPSY (N/A) 13 Days Post-Op  Precautions:  Fall, Skin, Contact    ASSESSMENT:  Pt tolerating diet without any difficulty. Discussed with patient who would like to trial regular diet with self-selecting foods that he can chew with his edentulous state. Therefore, will advance diet to regular. Pt continues to tolerate PMV. Awaiting capping trials for subsequent decannulation. No further SLP needs. Will sign off. Please reconsult should SLP be of any assistance. PLAN:  Recommendations and Planned Interventions:  --regular diet/thin liquids  --PMV with all PO  --consider capping trials    Patient will be discharged from SLP services.   Speaking Valve Placement:  SLP / RT / RN and patient/family  Recommended Speaking Valve Wearing Schedule:  [x]    As tolerated; capping trials  Discharge Recommendations:  Rehab     SUBJECTIVE:   Patient stated, \"Yes please when talking about getting a capping trial.    OBJECTIVE:   Cognitive and Communication Status:  Neurologic State: Alert  Orientation Level: Oriented X4  Cognition: Appropriate decision making, Appropriate for age attention/concentration, Appropriate safety awareness  Perception: Appears intact  Perseveration: No perseveration noted  Safety/Judgement: Awareness of environment  Tracheostomy:   #6 Shiley XLT cuffless  96% O2     Oxygen Therapy  O2 Sat (%): 98 %  Pulse via Oximetry: 109 beats per minute  O2 Device: Tracheal collar  O2 Flow Rate (L/min): 6 l/min  FIO2 (%): 28 %  Dysphagia Treatment:  Tracheostomy:        PMV Trial   Vocal Quality: No impairment  Oral Assessment:  Oral Assessment  Labial: No impairment  Dentition: Edentulous  Oral Hygiene: oral mucosa moist and clear of secretions  Lingual: No impairment  Velum: No impairment  Mandible: No impairment  P.O. Trials:  Patient Position: upright in bed  Vocal quality prior to P.O.: No impairment  Consistency Presented: Thin liquid; Solid  How Presented: Self-fed/presented;Cup/sip;Spoon;Straw;Successive swallows     Bolus Acceptance: No impairment  Bolus Formation/Control: No impairment     Propulsion: No impairment  Oral Residue: None  Initiation of Swallow: No impairment  Laryngeal Elevation: Functional  Aspiration Signs/Symptoms: None  Pharyngeal Phase Characteristics: No impairment, issues, or problems            Oral Phase Severity: No impairment  Pharyngeal Phase Severity : No impairment    Pain:  Pain Scale 1: Numeric (0 - 10)  Pain Intensity 1: 0       After treatment:   Call bell within reach and Nursing notified    COMMUNICATION/EDUCATION:     The patient's plan of care including recommendations, planned interventions, and recommended diet changes were discussed with: Registered nurse and NP . Education was provided to patient, family, and staff regarding speaking valve placement, wearing schedule, safety precautions including cuff deflation and removal when sleeping, and care/cleaning guidelines.       MALCOLM Rose  Time Calculation: 20 mins

## 2021-11-01 NOTE — PROGRESS NOTES
Admit Date: 10/18/2021      POD 13 Days Post-Op  10/19/2021      Procedure:  Procedure(s):  ESOPHAGOGASTRODUODENOSCOPY (EGD) at Bedside  ESOPHAGOGASTRODUODENAL (EGD) BIOPSY        HOSPITAL DAY:     ANTIBIOTICS: Diflucan    HPI:  Still moderate amount of thick purulent drainage from the percutaneous drain tube and minimal purulent drainage from the ostomy bag where the gastrostomy tube previously placed. Patient on regular p.o. diet. Review of Systems  Other than fatigue and generalized muscle weakness, no abdominal pain    Temp:  [97.6 °F (36.4 °C)-99.7 °F (37.6 °C)]   Pulse (Heart Rate):  []   BP: ()/(28-99)   Resp Rate:  [11-46]   O2 Sat (%):  [91 %-100 %]   Weight:  [134 kg (295 lb 6.7 oz)-137.5 kg (303 lb 2.1 oz)]      Blood pressure (!) 113/99, pulse (!) 113, temperature 97.9 °F (36.6 °C), resp. rate 19, height 5' 11.5\" (1.816 m), weight 137.5 kg (303 lb 2.1 oz), SpO2 97 %.     Temp (24hrs), Av.3 °F (36.8 °C), Min:97.7 °F (36.5 °C), Max:99.7 °F (37.6 °C)      Recent Results (from the past 48 hour(s))   RENAL FUNCTION PANEL    Collection Time: 10/31/21  4:45 AM   Result Value Ref Range    Sodium 134 (L) 136 - 145 mmol/L    Potassium 3.4 (L) 3.5 - 5.1 mmol/L    Chloride 102 97 - 108 mmol/L    CO2 24 21 - 32 mmol/L    Anion gap 8 5 - 15 mmol/L    Glucose 101 (H) 65 - 100 mg/dL    BUN 25 (H) 6 - 20 MG/DL    Creatinine 3.16 (H) 0.70 - 1.30 MG/DL    BUN/Creatinine ratio 8 (L) 12 - 20      GFR est AA 25 (L) >60 ml/min/1.73m2    GFR est non-AA 21 (L) >60 ml/min/1.73m2    Calcium 8.7 8.5 - 10.1 MG/DL    Phosphorus 3.9 2.6 - 4.7 MG/DL    Albumin 2.6 (L) 3.5 - 5.0 g/dL   CBC W/O DIFF    Collection Time: 10/31/21  4:45 AM   Result Value Ref Range    WBC 12.4 (H) 4.1 - 11.1 K/uL    RBC 2.74 (L) 4.10 - 5.70 M/uL    HGB 8.2 (L) 12.1 - 17.0 g/dL    HCT 26.9 (L) 36.6 - 50.3 %    MCV 98.2 80.0 - 99.0 FL    MCH 29.9 26.0 - 34.0 PG    MCHC 30.5 30.0 - 36.5 g/dL    RDW 19.9 (H) 11.5 - 14.5 %    PLATELET 917 770 - 400 K/uL    MPV 9.9 8.9 - 12.9 FL    NRBC 0.0 0  WBC    ABSOLUTE NRBC 0.00 0.00 - 8.40 K/uL   METABOLIC PANEL, COMPREHENSIVE    Collection Time: 10/31/21  4:45 AM   Result Value Ref Range    Sodium 134 (L) 136 - 145 mmol/L    Potassium 3.4 (L) 3.5 - 5.1 mmol/L    Chloride 101 97 - 108 mmol/L    CO2 24 21 - 32 mmol/L    Anion gap 9 5 - 15 mmol/L    Glucose 101 (H) 65 - 100 mg/dL    BUN 25 (H) 6 - 20 MG/DL    Creatinine 3.18 (H) 0.70 - 1.30 MG/DL    BUN/Creatinine ratio 8 (L) 12 - 20      GFR est AA 25 (L) >60 ml/min/1.73m2    GFR est non-AA 21 (L) >60 ml/min/1.73m2    Calcium 9.0 8.5 - 10.1 MG/DL    Bilirubin, total 0.6 0.2 - 1.0 MG/DL    ALT (SGPT) 19 12 - 78 U/L    AST (SGOT) 12 (L) 15 - 37 U/L    Alk. phosphatase 113 45 - 117 U/L    Protein, total 5.5 (L) 6.4 - 8.2 g/dL    Albumin 2.6 (L) 3.5 - 5.0 g/dL    Globulin 2.9 2.0 - 4.0 g/dL    A-G Ratio 0.9 (L) 1.1 - 2.2           XR Results (maximum last 3): Results from [place] encounter on 10/18/21    XR ABD (KUB)    Impression  Dobbhoff tube tip overlies the gastric body/antrum; if  duodenal/jejunal positioning is desired, advance 10 to 15 cm. CT Results (maximum last 3): Results from [place] encounter on 10/18/21    CT ABD PELV W CONT    Impression  Slightly diminished size of left anterolateral wall abdominal collection with  pigtail drainage catheter. 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 MRI Results (maximum last 3): No results found for this or any previous visit. Nuclear Medicine Results (maximum last 3): No results found for this or any previous visit. US Results (maximum last 3): Results from [place] encounter on 10/18/21    US CATH PERC DRN SFT TISS EXTREM/ABD WALL/NECK W SI    Impression  1. Successful ultrasound guided placement of 10 Chinese percutaneous drain into a  left abdominal wall fluid collection.   2. Needle sampling of the more inferior collection of the left abdominal wall  yielded only a small amount of clot, presumed noninfected hematoma. Physical Exam  Vitals and nursing note reviewed. Constitutional:       Comments: Tracheostomy, no acute distress   Abdominal:      Comments: Soft, obese, nontender, percutaneous drain left inferior moderate purulent brown-tan drainage and minimal similar drainage out of the old gastrostomy site and a gastrostomy appliance   Neurological:      General: No focal deficit present. Mental Status: He is alert and oriented to person, place, and time. Psychiatric:         Mood and Affect: Mood normal.         Behavior: Behavior normal.           Active Problems:    GIB (gastrointestinal bleeding) (10/18/2021)      Severe protein-calorie malnutrition ([address] (10/21/2021)      Abdominal wall fluid collections (10/25/2021)          ASSESSMENT/PLAN  Rather large abdominal wall abscess from previous migrated gastrostomy tube or migrated tube, will order follow-up CT scan abdomen with oral contrast tomorrow, this may help assess if there is any gastrocutaneous fistula present, if there is no improvement in the abscess size, this may be an indication for surgical drainage and debridement, if there is a fistula present, would give it more time to see if it closes but may eventually require surgical closure which would be significant morbidity in this patient.   Continue antimicrobials, cultures previously showed Staphylococcus, from the [facility] I cannot tell if patient is on antibiotics, may be getting them on dialysis,      FACE TO FACE time including review of any indicated imaging, discussion with patient, and other providers, exam and discussion with patient:   22        minutes    END:

## 2021-11-01 NOTE — CONSULTS
Palliative Medicine      Patient making steady progress. Goals clear for rehab. Will sign off  Please call [phone] if needed.

## 2021-11-01 NOTE — PROGRESS NOTES
Problem: Self Care Deficits Care Plan (Adult)  Goal: *Acute Goals and Plan of Care (Insert Text)  Description:   FUNCTIONAL STATUS PRIOR TO ADMISSION: at baseline, pt was independent, living with wife, working at [employer] Pt was admitted to [facility] for [employer] in August and discharged to [address] [place] [address] 10/14. Pt had not yet started therapy     HOME SUPPORT: only at [address] [place] [address] for 4 days prior to this admission    Occupational Therapy Goals  Initiated 10/22/2021; weekly reassessment 10/29/2021 - continue all goals  1. Patient will perform AAROM R UE using L UE as motor assist with minimal assistance/contact guard assist within 7 day(s). 2.  Patient will perform grooming in supported sitting using RUE as motor assist with minimal assistance/contact guard assist within 7 day(s). 3.  Patient will perform upper body dressing with moderate assistance within 7 day(s). 4.  Patient will perform rolling in bed for bed pan placement with moderate A x 2 within 7 days. 5.  Patient will perform supported ADL task in modified bed to chair position x 5 minutes within 7 days. Outcome: Progressing Towards Goal    OCCUPATIONAL THERAPY TREATMENT  Patient: Charly Ordoñez (49 y.o. male)  Date: 11/1/2021  Diagnosis: GIB (gastrointestinal bleeding) [K92.2] <principal problem not specified>  Procedure(s) (LRB):  ESOPHAGOGASTRODUODENOSCOPY (EGD) at Bedside (N/A)  ESOPHAGOGASTRODUODENAL (EGD) BIOPSY (N/A) 13 Days Post-Op  Precautions: Fall, Skin, Contact  Chart, occupational therapy assessment, plan of care, and goals were reviewed. ASSESSMENT  Patient continues with skilled OT services and is progressing towards goals. Patient ADLs continue to be limited by impaired balance, generalized weakness, limited ROM/strength/coordination in RUE, limited lower body access and baseline body habitus. Patient required SBA-mod A for grooming tasks while sitting upright in bed. Patient left with call bell in reach, RN aware, all needs met, VSS.  Will continue to follow, recommend SNF level rehab at D/C. Current Level of Function Impacting Discharge (ADLs): SBA-max A for upper body ADLs, total A for lower body     Other factors to consider for discharge: trach weaning         PLAN :  Patient continues to benefit from skilled intervention to address the above impairments. Continue treatment per established plan of care to address goals. Recommend with staff: Recommend with nursing, ADLs with supervision/setup, bed to chair position 3x/day and toileting via bedpan. Thank you for completing as able in order to maintain patient strength, endurance and independence. Recommend next OT session: bathing in supported sitting    Recommendation for discharge: (in order for the patient to meet his/her long term goals)  Therapy up to 5 days/week in SNF setting    This discharge recommendation:  Has been made in collaboration with the attending provider and/or case management    IF patient discharges home will need the following DME: hospital bed and mechanical lift       SUBJECTIVE:   Patient stated That feels better.     OBJECTIVE DATA SUMMARY:   Cognitive/Behavioral Status:  Neurologic State: Alert  Orientation Level: Oriented X4  Cognition: Appropriate for age attention/concentration; Follows commands  Perception: Appears intact  Perseveration: No perseveration noted  Safety/Judgement: Awareness of environment; Fall prevention    Functional Mobility and Transfers for ADLs:  Bed Mobility:  Supine to Sit: Total assistance (bed mechanics utilized)  Scooting: Total assistance;Assist x2 (to scoot to [facility])    Transfers:  NT this session    Balance:  Sitting: Impaired; With support (L Lateral lean)  Sitting - Static: Supported sitting; Fair (occasional)    ADL Intervention:   Patient required verbal cues and then [facility] assist to incorporate RUE into bimanual tasks while completing oral care - ultimately mod A to complete and setup/SBA to wash face using LUE.  Patient completed all of this while sitting upright in modified chair position. Grooming  Grooming Assistance: Moderate assistance  Washing Face: Stand-by assistance  Brushing Teeth: Moderate assistance; Compensatory technique training;Training to use affected extremity as a gross stabilizer  Cues: Physical assistance; Tactile cues provided;Verbal cues provided    Cognitive Retraining  Safety/Judgement: Awareness of environment; Fall prevention    Pain:  Soreness all over    Activity Tolerance:   Fair and requires rest breaks    After treatment patient left in no apparent distress:   Call bell within reach, Side rails x 3, and sitting in modified chair position    COMMUNICATION/COLLABORATION:   The patients plan of care was discussed with: Physical therapist, Registered nurse, and Physician.      Deng Moeller OT  Time Calculation: 25 mins

## 2021-11-01 NOTE — PROGRESS NOTES
Physical Therapy:     New order received. Pt is already on PT caseload and was seen by this therapist this morning. Will continue to follow per established plan of care.       Magnus Velazquez, PT, DPT

## 2021-11-01 NOTE — PROGRESS NOTES
ICU TEAM Progress Note    Name: Chelsy Hutchinson   : 1970   MRN: [id number]   Date: 2021      Assessment:   Reason for ICU Admission: Mechanical Ventilation and hypotension     Brief HPI:    -  \"Pt is a 54 yo M with PMH ESRD on HD, takes coumadin secondary to hx of DVT RUE with a recent prolonged hospitalization secondary to COVID PNA in August of this year. He ultimately required trach and peg and was in rehab at [facility]. Pt unable to provide HPI secondary to current cognition, therefore information is obtained via chart and provider. Per [facility] records, pt had a dislodged peg tube. He started having coffee ground emesis 10/18/21 with elevated HR and hypotension. He was sent to the ER for further workup. He was found to be hypotensive and ICU was consulted for further management. \"      10/25 Pt co some increased pain and discomfort overnight, was restrted on PTA fentanyl patch and prn ativan. 10/26 TCT tolerated X24 hours     10/29 hgb trending down - 8.1 (10/29) from 9.2 (10/26). Patient remains off the vent on trach collar with humidification. Otolaryngology consulted to downsize trach. Worsening SERGEY on HD- plan to receive dialysis today. 10/31- No acute events overnight. Patient is lying in bed, awake, alert and oriented x 4. On TC @ 35%, oxygen saturation  mid 90's. Family at bedside and reports improvement with patient but wants to know when he can start moving around. Patient denies any complaints. No hematemesis, hematochezia or melena. -No acute events overnight. Patient seen on morning rounds. He sitting up in bed awake alert and oriented x4. Has been tolerating PMV with all p.o. liquids. Down to 28% and 6L on TC. Oxygen saturation >92%. Afebrile, Plan to receive dialysis today.     S/P:   Procedure(s):  ESOPHAGOGASTRODUODENOSCOPY (EGD) at Bedside  ESOPHAGOGASTRODUODENAL (EGD) BIOPSY     Assessment and Plan:     1) GI/Heme :  Hemorrhagic Shock/ ABLA d/t Acute GI Bleed, now Improved   - GI  Following   - PPI BID  - SP EGD    - Serial H & H , now stable , INR stable 1.1 on 10/28  - Transfuse to keep Hgb >7  -Tolerating PO intake, Diet advanced to regular thins today per speech   -  Sp PRBCs, FFP and k centra   - Monitor closely for s/s of bleeding  - Volume resuscitated  - Retacrit started per nephrology   - CT revealed large fluid filled mass,General surgery consulted  - Fluid mass drained by IR 10/23, plans for drain to remain X3 days then fu CT scan 10/26 to eval need for surgical intervention versus IR intervention. Will monitor drainage and amount while drain in place   -CT scan 10/26 slightly diminished size of the abdominal collection with pigtail drainage catheter   - General surgery consulted for fluid collection, perc drain in place,  Flush BID, repeat CT scan on Tuesday per surgery  - SCDs for DVT prophylaxis, No chemical at this time       2. Pulmonary:  Acute on chronic hypoxic respiratory failure with tracheostomy   - Has been on trach collar with speaking valve, downsized to #6 XLT [place]evieve[place]  by ENT 10/30/21  -In coordination with speech therapy we will try capping today and see how patient tolerates  - Titrate oxygen to keep saturation > 92%   - Pulmonary  hygiene   - HOB >30   - Aspiration precautions   - Speech following    3. Renal:  ESRD on HD MWF  -  follow labs   - Nephrology following , HD today      4. ID: Persistent Leukocytosis  With Abdominal Abcess, Initial culture was coag negative staph deemed a contaminant  - Continue diflucan for yeast out of wound   - ID consult   - surgery following     5) Neuro:  -PT/OT     Code Status: Full     Goals of Care: Plan to transfer to acute rehab on Thursday         Subjective:   Overnight Events:   11/1/2021    No acute events, tolerated trach collar     Objective:     Visit Vitals  BP 99/73   Pulse (!) 109 Comment: Simultaneous filing. User may not have seen previous data.    Temp 97.7 °F (36.5 °C)   Resp 25 Comment: Simultaneous filing. User may not have seen previous data. Ht 5' 11.5\" (1.816 m)   Wt 137.5 kg (303 lb 2.1 oz)   SpO2 99% Comment: Simultaneous filing. User may not have seen previous data. BMI 41.69 kg/m²    O2 Flow Rate (L/min): 6 l/min O2 Device: Tracheal collar Temp (24hrs), Av.3 °F (36.8 °C), Min:97.7 °F (36.5 °C), Max:99.7 °F (37.6 °C)           Hemodynamics:   PAP:   CO:     Wedge:   CI:     CVP:    SVR:       PVR:       Ventilator Settings:  Mode Rate Tidal Volume Pressure FiO2 PEEP   Spontaneous      8 cm H2O 28 % 5 cm H20     Peak airway pressure: 15 cm H2O    Minute ventilation: 15.2 l/min        Intake/Output:     Intake/Output Summary (Last 24 hours) at 2021 1432  Last data filed at 2021 1000  Gross per 24 hour   Intake 550 ml   Output 400 ml   Net 150 ml       Physical Exam:  General:   Male Patient lying in bed  In no acute distress , Oriented x 4,   Eyes:  Sclera anicteric. Pupils equal, round, reactive to light. Mouth/Throat: Tracheostomy in place with PMV   Neck: Supple. Lungs:   Clear to auscultation bilaterally, good effort. Cardiovascular:  Regular rate and rhythm, no murmur, click, rub, or gallop. Abdomen:   LUQ with fecal output , stoma appears healthy, LLL with perc drain, purulent drainage    Extremities: No cyanosis or clubbing, bilateral LE edema present    Skin: No acute rash or lesions.                      Labs & Data: Reviewed    Medications: Reviewed      ABCDEF Bundle/Checklist Completed: Yes    SPECIAL EQUIPMENT: None    DISPOSITION: Remain in ICU today, Plans to transfer to high level acute rehab on Thursday     Total CC Time: 47 minutes exclusive of time spent on procedures           NIRMALA Reid, AG-ACNP-BC     [address] [place] [address]

## 2021-11-01 NOTE — PROGRESS NOTES
Name: Jimi Mo MRN: [id number]   : 1970 Hospital: [address]   Date: 2021        IMPRESSION:   · SERGEY, HD dependent. Patient was seen during HD. His catheter is sluggish  · Hypervolemia with predominantly central congestion. · Anemia of multifactorial origin  · Respiratory failure due to a complicated Covid pneumonia, on vent via trache  · Hypokalemia pre HD      PLAN:   · Complete a full HD Tx  · Next HD - Wednesday   · Use cathflow to lock the catheter ports. · Vent managing as per intensivist team  · Anemia management- start Retacrit. Check the iron profile. Subjective/Interval History:   I have reviewed the flowsheet and previous days notes. ROS:Review of systems not obtained due to patient factors. Objective:   Vital Signs:    Visit Vitals  BP 97/76   Pulse (!) 103   Temp 97.7 °F (36.5 °C)   Resp 18   Ht 5' 11.5\" (1.816 m)   Wt 137.5 kg (303 lb 2.1 oz)   SpO2 98%   BMI 41.69 kg/m²       O2 Device: Tracheal collar   O2 Flow Rate (L/min): 6 l/min   Temp (24hrs), Av.5 °F (36.9 °C), Min:97.7 °F (36.5 °C), Max:99.7 °F (37.6 °C)       Intake/Output:   Last shift:      701 - 1900  In: 250 [P.O.:240]  Out: -   Last 3 shifts: 10/30 1901 -  07  In: 911 [P.O.:840; I.V.:100]  Out: 550 [Drains:550]    Intake/Output Summary (Last 24 hours) at 2021 1608  Last data filed at 2021 1000  Gross per 24 hour   Intake 550 ml   Output 400 ml   Net 150 ml        Physical Exam:  General:    Awake, Not communicating verbally. HD Tx is in progress. Head:   Normocephalic, without obvious abnormality, atraumatic. Eyes:   Conjunctivae/corneas clear. Nose:  Nares normal. No drainage or sinus tenderness. Throat:    Lips, mucosa, and tongue normal.    Neck:  Trache in place  Lungs:   Clear to auscultation bilaterally. No Wheezing or Rhonchi. No rales. Chest wall:  No tenderness or deformity. No Accessory muscle use.   Heart:   Regular rate and rhythm,  no murmur, rub or gallop. Abdomen:   Soft. Distended. Bowel sounds normal. PEG tube in place. Extremities: Extremities normal, atraumatic, No cyanosis. No edema. No clubbing  Skin:     Texture, turgor normal. No rashes or lesions. Not Jaundiced  Psych:  Calm. Neurologic: Non focal.      DATA:  Labs:  Recent Labs     11/01/21  1258 10/31/21  0445 10/30/21  0545   NA  --  134*  134* 136   K  --  3.4*  3.4* 3.5   CL  --  101  102 101   CO2  --  24  24 25   BUN  --  25*  25* 17   CREA  --  3.18*  3.16* 2.37*   CA  --  9.0  8.7 9.0   ALB  --  2.6*  2.6* 2.8*   PHOS 3.8 3.9  --    MG 1.6  --   --      Recent Labs     11/01/21  1258 10/31/21  0445 10/30/21  0545   WBC 15.3* 12.4* 12.2*   HGB 8.8* 8.2* 8.5*   HCT 28.1* 26.9* 26.6*    296 311     No results for input(s): DOLORES, KU, CLU, CREAU in the last 72 hours.     No lab exists for component: PROU    Total time spent with patient:  35 minutes    [] Critical Care Provided    Care Plan discussed with:   Katty Reilly MD

## 2021-11-01 NOTE — PROCEDURES
[facility]                      [phone]  Vitals Pre Post Assessment Pre Post   BP BP: 100/80 (11/01/21 1300) 105/83 LOC Alert Asleep, conscious   HR Pulse (Heart Rate): (!) 110 (11/01/21 1326) 106 Lungs Clear Clear   Resp Resp Rate: 22 (11/01/21 1326) 24 Cardiac NRRR NRRR   Temp Temp: 97.7 °F (36.5 °C) (11/01/21 1200) 97.5   Skin WDI, L arm hematoma WDI   Weight 137.5 135.5 kg Edema Generalized Generalized   Pain Pain Intensity 1: 0 (11/01/21 1200) 1/10 Tele Status On cardiac monitor On Cardiac monitor     Orders   Duration: Start: 1255 End: 1615 Total: 3 hrs   Dialyzer: Dialyzer/Set Up Inspection: Revaclear (11/01/21 1300)   K Bath: Dialysate K (mEq/L): 4 (11/01/21 1300)   Ca Bath: Dialysate CA (mEq/L): 2.5 (11/01/21 1300)   Na: Dialysate NA (mEq/L): 140 (11/01/21 1300)   Bicarb:  35   Target Fluid Removal: Goal/Amount of Fluid to Remove (mL): 3000 mL (11/01/21 1300)     Access   Type & Location: R-tunneled Nav[place]et   Comments: Acces is clean, no s/s of infection, no evidenced of unwanted used. Pre as pe P&P. Upon aspiration, arterial and venous lumen sluggish and with resistance. Treatment started with reversed lines.                                     Labs   HBsAg (Antigen) / date:  Negative (10/20/2021)                                 HBsAb (Antibody) / date: Immune (10/20/2021)   Source: [facility]   Obtained/Reviewed  Critical Results Called HGB   Date Value Ref Range Status   10/31/2021 8.2 (L) 12.1 - 17.0 g/dL Final     Potassium   Date Value Ref Range Status   10/31/2021 3.4 (L) 3.5 - 5.1 mmol/L Final   10/31/2021 3.4 (L) 3.5 - 5.1 mmol/L Final     Calcium   Date Value Ref Range Status   10/31/2021 8.7 8.5 - 10.1 MG/DL Final   10/31/2021 9.0 8.5 - 10.1 MG/DL Final     BUN   Date Value Ref Range Status   10/31/2021 25 (H) 6 - 20 MG/DL Final   10/31/2021 25 (H) 6 - 20 MG/DL Final     Creatinine   Date Value Ref Range Status   10/31/2021 3.16 (H) 0.70 - 1.30 MG/DL Final   10/31/2021 3.18 (H) 0.70 - 1.30 MG/DL Final        Meds Given   Name Dose Route   Heparin 1000units-bolus Venous lumen   Heparin 1000 units/hr infusion   Cathflo 1.8 mg X2 cathether Lumen dwell     Adequacy / Fluid    Total Liters Process: 43.5 Liters   Net Fluid Removed: 2000ml      Comments   Time Out Done: 0954   Admitting Diagnosis: SERGEY, GI Bleeding   Consent obtained/signed: Informed Consent Verified: Yes (11/01/21 1300)   Machine / RO # Machine Number: [id number] (11/01/21 1300)   Primary Nurse Rpt Pre: Brian Unger RN   Primary Nurse Rpt Post: Brian Unger R.N   Pt Education: Procedure   Care Plan: Continue treatment as ordered. Pts outpatient clinic: unknown     Tx Summary   Comments:      2645: Treatment started with slow flow, 15mins after starting, noted high arterial alarms, advised patient to cough, head turned to side. 1315; tried to reverse the lines again, but more resistance from the arterial line. BF lowered down to 300-330,  Arterial pressure still high, BF maintained 300 with arterial pressure 240-250mmhg. Will continue to monitor. Informed Dr. Jennifer Perkins. Ordered to dwell the catheter  with catflo after treatment. In order to temporarily resolve the arterial issues,flushing the arterial lumen with 10 ml saline (push) helps. 1530: Arterial chamber clots noted. 1545: S/B Dr. Jennifer Perkins, proceed with carthflo dwell. Provided with comfort and care, needs attended. 1615: Treatment completed ( 3hrs), Blood rinsed back, catheter limbs disinfected as per P&P. Lumen locked with CATHFLO 1.8ml each lumen X 40mins, aspirated after. 1710: Catheter re-evaluated for patency, both lumen with good in and outflow, no more resistance noted. Flushed and locked with heparin as per lumen. Patient stable.   1715: Reports given to Brian Unger RN

## 2021-11-01 NOTE — PROGRESS NOTES
Transition of Care Plan   RUR-  Medium    DISPOSITION: [facility]     Transport: [facility]  Patient remains on Trach collar 6L FIO2 28%. Speaking valve in place. CM faxed all requested clinical information to Jd Washington in admissions with Bryson Raf. [phone]. CM also left a message for Kiera Josephandrew in admissions to update her. CM also updated wife. Explained to wife that the transport may not be a covered benefit. Will check with [facility] to get a quote. Swetha Monge RN,Care Manager.

## 2021-11-02 ENCOUNTER — APPOINTMENT (OUTPATIENT)
Dept: CT IMAGING | Age: 51
DRG: 368 | End: 2021-11-02
Attending: SURGERY
Payer: COMMERCIAL

## 2021-11-02 LAB
ALBUMIN SERPL-MCNC: 2.5 G/DL (ref 3.5–5)
ALBUMIN/GLOB SERPL: 0.7 {RATIO} (ref 1.1–2.2)
ALP SERPL-CCNC: 113 U/L (ref 45–117)
ALT SERPL-CCNC: 21 U/L (ref 12–78)
ANION GAP SERPL CALC-SCNC: 7 MMOL/L (ref 5–15)
AST SERPL-CCNC: 15 U/L (ref 15–37)
BILIRUB SERPL-MCNC: 0.7 MG/DL (ref 0.2–1)
BUN SERPL-MCNC: 22 MG/DL (ref 6–20)
BUN/CREAT SERPL: 7 (ref 12–20)
CALCIUM SERPL-MCNC: 9.4 MG/DL (ref 8.5–10.1)
CHLORIDE SERPL-SCNC: 104 MMOL/L (ref 97–108)
CO2 SERPL-SCNC: 26 MMOL/L (ref 21–32)
CREAT SERPL-MCNC: 3.1 MG/DL (ref 0.7–1.3)
ERYTHROCYTE [DISTWIDTH] IN BLOOD BY AUTOMATED COUNT: 19.9 % (ref 11.5–14.5)
GLOBULIN SER CALC-MCNC: 3.5 G/DL (ref 2–4)
GLUCOSE SERPL-MCNC: 105 MG/DL (ref 65–100)
HCT VFR BLD AUTO: 28.7 % (ref 36.6–50.3)
HGB BLD-MCNC: 8.7 G/DL (ref 12.1–17)
MCH RBC QN AUTO: 30.3 PG (ref 26–34)
MCHC RBC AUTO-ENTMCNC: 30.3 G/DL (ref 30–36.5)
MCV RBC AUTO: 100 FL (ref 80–99)
NRBC # BLD: 0 K/UL (ref 0–0.01)
NRBC BLD-RTO: 0 PER 100 WBC
PLATELET # BLD AUTO: 241 K/UL (ref 150–400)
PMV BLD AUTO: 9.8 FL (ref 8.9–12.9)
POTASSIUM SERPL-SCNC: 3.6 MMOL/L (ref 3.5–5.1)
PROT SERPL-MCNC: 6 G/DL (ref 6.4–8.2)
RBC # BLD AUTO: 2.87 M/UL (ref 4.1–5.7)
SODIUM SERPL-SCNC: 137 MMOL/L (ref 136–145)
WBC # BLD AUTO: 10.4 K/UL (ref 4.1–11.1)

## 2021-11-02 PROCEDURE — 77010033678 HC OXYGEN DAILY

## 2021-11-02 PROCEDURE — 74011000250 HC RX REV CODE- 250: Performed by: NURSE PRACTITIONER

## 2021-11-02 PROCEDURE — 74011250636 HC RX REV CODE- 250/636: Performed by: NURSE PRACTITIONER

## 2021-11-02 PROCEDURE — 74011250636 HC RX REV CODE- 250/636: Performed by: INTERNAL MEDICINE

## 2021-11-02 PROCEDURE — 77010033711 HC HIGH FLOW OXYGEN

## 2021-11-02 PROCEDURE — 94640 AIRWAY INHALATION TREATMENT: CPT

## 2021-11-02 PROCEDURE — 65660000000 HC RM CCU STEPDOWN

## 2021-11-02 PROCEDURE — 74011250637 HC RX REV CODE- 250/637: Performed by: INTERNAL MEDICINE

## 2021-11-02 PROCEDURE — 74176 CT ABD & PELVIS W/O CONTRAST: CPT

## 2021-11-02 PROCEDURE — 97110 THERAPEUTIC EXERCISES: CPT

## 2021-11-02 PROCEDURE — 2709999900 HC NON-CHARGEABLE SUPPLY

## 2021-11-02 PROCEDURE — 74011250637 HC RX REV CODE- 250/637: Performed by: HEALTH CARE PROVIDER

## 2021-11-02 PROCEDURE — 36415 COLL VENOUS BLD VENIPUNCTURE: CPT

## 2021-11-02 PROCEDURE — 85027 COMPLETE CBC AUTOMATED: CPT

## 2021-11-02 PROCEDURE — 74011250637 HC RX REV CODE- 250/637: Performed by: NURSE PRACTITIONER

## 2021-11-02 PROCEDURE — 80053 COMPREHEN METABOLIC PANEL: CPT

## 2021-11-02 PROCEDURE — C9113 INJ PANTOPRAZOLE SODIUM, VIA: HCPCS | Performed by: NURSE PRACTITIONER

## 2021-11-02 RX ORDER — HYDROMORPHONE HYDROCHLORIDE 1 MG/ML
0.5 INJECTION, SOLUTION INTRAMUSCULAR; INTRAVENOUS; SUBCUTANEOUS
Status: DISCONTINUED | OUTPATIENT
Start: 2021-11-03 | End: 2021-11-03

## 2021-11-02 RX ADMIN — MIDODRINE HYDROCHLORIDE 10 MG: 5 TABLET ORAL at 21:25

## 2021-11-02 RX ADMIN — EPOETIN ALFA-EPBX 10000 UNITS: 10000 INJECTION, SOLUTION INTRAVENOUS; SUBCUTANEOUS at 21:25

## 2021-11-02 RX ADMIN — COLLAGENASE SANTYL: 250 OINTMENT TOPICAL at 08:33

## 2021-11-02 RX ADMIN — MIDODRINE HYDROCHLORIDE 10 MG: 5 TABLET ORAL at 07:02

## 2021-11-02 RX ADMIN — LORAZEPAM 1 MG: 2 INJECTION INTRAMUSCULAR; INTRAVENOUS at 23:40

## 2021-11-02 RX ADMIN — Medication: at 18:16

## 2021-11-02 RX ADMIN — SODIUM CHLORIDE 40 MG: 9 INJECTION INTRAMUSCULAR; INTRAVENOUS; SUBCUTANEOUS at 21:25

## 2021-11-02 RX ADMIN — HYDROMORPHONE HYDROCHLORIDE 0.5 MG: 1 INJECTION, SOLUTION INTRAMUSCULAR; INTRAVENOUS; SUBCUTANEOUS at 19:17

## 2021-11-02 RX ADMIN — IPRATROPIUM BROMIDE AND ALBUTEROL SULFATE 3 ML: .5; 3 SOLUTION RESPIRATORY (INHALATION) at 19:18

## 2021-11-02 RX ADMIN — CHLORHEXIDINE GLUCONATE 15 ML: 0.12 RINSE ORAL at 21:25

## 2021-11-02 RX ADMIN — FLUCONAZOLE IN SODIUM CHLORIDE 200 MG: 2 INJECTION, SOLUTION INTRAVENOUS at 14:49

## 2021-11-02 RX ADMIN — MIDODRINE HYDROCHLORIDE 10 MG: 5 TABLET ORAL at 13:37

## 2021-11-02 RX ADMIN — Medication 10 ML: at 21:26

## 2021-11-02 RX ADMIN — CHLORHEXIDINE GLUCONATE 15 ML: 0.12 RINSE ORAL at 08:32

## 2021-11-02 RX ADMIN — SODIUM CHLORIDE 40 MG: 9 INJECTION INTRAMUSCULAR; INTRAVENOUS; SUBCUTANEOUS at 08:28

## 2021-11-02 RX ADMIN — Medication 1 CAPSULE: at 08:28

## 2021-11-02 RX ADMIN — Medication 9 MG: at 23:39

## 2021-11-02 RX ADMIN — LORAZEPAM 0.5 MG: 0.5 TABLET ORAL at 18:15

## 2021-11-02 RX ADMIN — Medication 10 ML: at 13:38

## 2021-11-02 RX ADMIN — Medication: at 08:33

## 2021-11-02 RX ADMIN — ACETAMINOPHEN 650 MG: 325 TABLET ORAL at 23:55

## 2021-11-02 RX ADMIN — Medication 10 ML: at 23:41

## 2021-11-02 RX ADMIN — LORAZEPAM 0.5 MG: 0.5 TABLET ORAL at 08:28

## 2021-11-02 RX ADMIN — Medication 10 ML: at 13:39

## 2021-11-02 RX ADMIN — Medication 10 ML: at 06:00

## 2021-11-02 RX ADMIN — HYDROMORPHONE HYDROCHLORIDE 0.5 MG: 1 INJECTION, SOLUTION INTRAMUSCULAR; INTRAVENOUS; SUBCUTANEOUS at 09:03

## 2021-11-02 NOTE — PROGRESS NOTES
Problem: Self Care Deficits Care Plan (Adult)  Goal: *Acute Goals and Plan of Care (Insert Text)  Description:   FUNCTIONAL STATUS PRIOR TO ADMISSION: at baseline, pt was independent, living with wife, working at [employer] Pt was admitted to [facility] in August and discharged to [address] [place] [address] 10/14. Pt had not yet started therapy     HOME SUPPORT: only at [address] [place] [address] for 4 days prior to this admission    Occupational Therapy Goals  Initiated 10/22/2021; weekly reassessment 10/29/2021 - continue all goals  1. Patient will perform AAROM R UE using L UE as motor assist with minimal assistance/contact guard assist within 7 day(s). 2.  Patient will perform grooming in supported sitting using RUE as motor assist with minimal assistance/contact guard assist within 7 day(s). 3.  Patient will perform upper body dressing with moderate assistance within 7 day(s). 4.  Patient will perform rolling in bed for bed pan placement with moderate A x 2 within 7 days. 5.  Patient will perform supported ADL task in modified bed to chair position x 5 minutes within 7 days. Outcome: Progressing Towards Goal   OCCUPATIONAL THERAPY TREATMENT  Patient: Cherelle Giang (49 y.o. male)  Date: 11/2/2021  Diagnosis: GIB (gastrointestinal bleeding) [K92.2] <principal problem not specified>  Procedure(s) (LRB):  ESOPHAGOGASTRODUODENOSCOPY (EGD) at Bedside (N/A)  ESOPHAGOGASTRODUODENAL (EGD) BIOPSY (N/A) 14 Days Post-Op  Precautions: Fall, Skin, Contact  Chart, occupational therapy assessment, plan of care, and goals were reviewed. ASSESSMENT  Patient continues with skilled OT services and is progressing towards goals. Patient semi supine in bed upon OT entry and agreeable to participate in therapy at this time. Patient's bed placed in chair position and patient tolerated well but did complaints of 6/10 pain along entire back side of body down into legs.  Patient participated in upper extremity therapeutic exercise while in chair position, see grid below for details. Patient's bed returned to semi supine and all needs left within reach. Patient would benefit from skilled OT services during admission to improve independence with self care and functional mobility/transfers. Recommend discharge to SNF at this time. Current Level of Function Impacting Discharge (ADLs): bilateral upper extremities weakness, R more than L limiting participation in activities of daily living     Other factors to consider for discharge: time since onset, severity of deficits, admitted from [place] :  Patient continues to benefit from skilled intervention to address the above impairments. Continue treatment per established plan of care to address goals. Recommend with staff: Recommend with nursing, ADLs with assist PRN, bed placed in chair position 3x/day and toileting via bedpan. Thank you for completing as able in order to maintain patient strength, endurance and independence. Recommendation for discharge: (in order for the patient to meet his/her long term goals)  Therapy up to 5 days/week in SNF setting    This discharge recommendation:  Has been made in collaboration with the attending provider and/or case management       SUBJECTIVE:   Patient stated Hill Mobley get me up in chair position sometimes but I haven't sat on the edge of the bed yet.     OBJECTIVE DATA SUMMARY:   Cognitive/Behavioral Status:  Neurologic State: Alert  Orientation Level: Oriented X4              Therapeutic Exercises:   Exercise: Sets: Reps:  Active range of motion: Active assist range of motion: Passive range of motion: Self range of motion: Comments:   Shoulder flexion/extension 1 10 [] [x] [] [] Max assist on right upper extremity, min assist for left   Elbow flexion/extension 1 10 [x] [x] [] [] Active on left upper extremity, active assist on right   Wrist flexion/extension 1 10 [x] [x] [] [] Active on left upper extremity, active assist on right   Finger flexion/extension 1 10 [x] [x] [] [] Active on left upper extremity, active assist on right       Pain:  6/10 along back side of body    Activity Tolerance:   Fair and nursing reports trach is capped, oxygen between % throughout session    After treatment patient left in no apparent distress:   Supine in bed, Call bell within reach and Side rails x 3    COMMUNICATION/COLLABORATION:   The patients plan of care was discussed with: Registered nurse.      Larissa Pino  Time Calculation: 17 mins

## 2021-11-02 NOTE — PROGRESS NOTES
ICU TEAM Progress Note    Name: Maged Barajas   : 1970   MRN: [id number]   Date: 2021      Assessment:   Reason for ICU Admission: Mechanical Ventilation and hypotension     Brief HPI:    -  \"Pt is a 54 yo M with PMH ESRD on HD, takes coumadin secondary to hx of DVT RUE with a recent prolonged hospitalization secondary to COVID PNA in August of this year. He ultimately required trach and peg and was in rehab at [facility]. Pt unable to provide HPI secondary to current cognition, therefore information is obtained via chart and provider. Per [facility] records, pt had a dislodged peg tube. He started having coffee ground emesis 10/18/21 with elevated HR and hypotension. He was sent to the ER for further workup. He was found to be hypotensive and ICU was consulted for further management. \"      10/25 Pt co some increased pain and discomfort overnight, was restrted on PTA fentanyl patch and prn ativan. 10/26 TCT tolerated X24 hours     10/29 hgb trending down - 8.1 (10/29) from 9.2 (10/26). Patient remains off the vent on trach collar with humidification. Otolaryngology consulted to downsize trach. Worsening SERGEY on HD- plan to receive dialysis today. 10/31- No acute events overnight. Patient is lying in bed, awake, alert and oriented x 4. On TC @ 35%, oxygen saturation  mid 90's. Family at bedside and reports improvement with patient but wants to know when he can start moving around. Patient denies any complaints. No hematemesis, hematochezia or melena. -No acute events overnight. Patient seen on morning rounds. He sitting up in bed awake alert and oriented x4. Has been tolerating PMV with all p.o. liquids. Down to 28% and 6L on TC. Oxygen saturation >92%. Afebrile, Plan to receive dialysis today. - No acute issues overnight. Continued on TC @ 28%, oxygen saturation >92%. Afebrile. Reports not sleeping well but overall no complaints. All VSS.     S/P: Procedure(s):  ESOPHAGOGASTRODUODENOSCOPY (EGD) at Bedside  ESOPHAGOGASTRODUODENAL (EGD) BIOPSY     Assessment and Plan:     1) GI/Heme :  Hemorrhagic Shock/ ABLA d/t Acute GI Bleed, now Improved   - GI  Following   - PPI BID  - SP EGD    - Serial H & H , now stable , INR stable 1.1 on 10/28  - Transfuse to keep Hgb >7  -Tolerating PO intake, Diet advanced to regular thins today per speech   -  Sp PRBCs, FFP and k centra   - Monitor closely for s/s of bleeding  - Volume resuscitated  - Retacrit started per nephrology   - CT revealed large fluid filled mass,General surgery consulted  - Fluid mass drained by IR 10/23, plans for drain to remain X3 days then fu CT scan 10/26 to eval need for surgical intervention versus IR intervention. Will monitor drainage and amount while drain in place   -CT scan 10/26 slightly diminished size of the abdominal collection with pigtail drainage catheter   - General surgery consulted for fluid collection, perc drain in place,  Flush BID, repeat CT scan today  Showed significant decrease in rectus sheath hematoma, remaining small collection is predominantly hyperdense, suggestive of congealed hematoma, and is unlikely to be drained through the catheter.  -  SCDs for DVT prophylaxis, No chemical at this time       2. Pulmonary:  Acute on chronic hypoxic respiratory failure with tracheostomy   - Has been on trach collar with speaking valve, downsized to #6 XLT Shilley  by ENT 10/30/21  - Patient was not capped  Yesterday, remained on PMV, Today in  coordination with respiratory and speech  therapy patient is now capped, will see how he tolerates for 24 hours, if tolerates then can de-cannulate   - Titrate oxygen to keep saturation > 92%   - Pulmonary  hygiene   - HOB >30   - Aspiration precautions   - Speech following    3. Renal:  ESRD on HD MWF  -  follow labs   - Nephrology following , HD yesterday      4.  ID: Persistent Leukocytosis  With Abdominal Abcess, Initial culture was coag negative staph deemed a contaminant  - Continue diflucan for yeast out of wound   - surgery following     5) Neuro:  -PT/OT     6) Skin: Coccyx Wound  - Wound care following     Code Status: Full     Goals of Care: Plan to transfer to acute rehab at some point,  working with family. Changes made today to different facility that can perform in house dialysis. This will take a while to set up. Subjective:   Overnight Events:   2021    No acute events, tolerated trach collar     Objective:     Visit Vitals  /76   Pulse (!) 106   Temp 99.1 °F (37.3 °C)   Resp 23   Ht 5' 11.5\" (1.816 m)   Wt 137.5 kg (303 lb 2.1 oz)   SpO2 95%   BMI 41.69 kg/m²    O2 Flow Rate (L/min): 6 l/min O2 Device: Tracheal collar, Humidifier Temp (24hrs), Av.5 °F (36.9 °C), Min:97.7 °F (36.5 °C), Max:99.1 °F (37.3 °C)           Hemodynamics:   PAP:   CO:     Wedge:   CI:     CVP:    SVR:       PVR:       Ventilator Settings:  Mode Rate Tidal Volume Pressure FiO2 PEEP   Spontaneous      8 cm H2O 28 % 5 cm H20     Peak airway pressure: 15 cm H2O    Minute ventilation: 15.2 l/min        Intake/Output:     Intake/Output Summary (Last 24 hours) at 2021 3486  Last data filed at 2021 8643  Gross per 24 hour   Intake 390 ml   Output 2200 ml   Net -1810 ml       Physical Exam:  General:   Male Patient lying in bed  In no acute distress , Oriented x 4,   Eyes:  Sclera anicteric. Pupils equal, round, reactive to light. Mouth/Throat: Tracheostomy in place with PMV   Neck: Supple. Lungs:   Clear to auscultation bilaterally, good effort. Cardiovascular:  Regular rate and rhythm, no murmur, click, rub, or gallop.    Abdomen:   LUQ with fecal output , stoma appears healthy, LLL with perc drain, purulent drainage    Extremities: No cyanosis or clubbing, bilateral LE edema present    Skin: Coccyx wound present with possibly some tunneling                      Labs & Data: Reviewed    IMPRESSION Ct abd/pelvis 11/2/21  Significant decrease in size of the left rectus sheath hematoma with a drainage  catheter residing within the collection. The remaining small collection is  predominantly hyperdense, suggestive of congealed hematoma, and is unlikely to  be drained through the catheter.         Medications: Reviewed      ABCDEF Bundle/Checklist Completed: Yes    SPECIAL EQUIPMENT: None    DISPOSITION: Transfer out of ICU today, Acute rehab in the future, Case Management arranging     Total CC Time: 40 minutes exclusive of time spent on procedures           NIRMALA Copeland, AG-ACNP-BC     [address]

## 2021-11-02 NOTE — WOUND CARE
Wound Care Note:     Follow-up visit for left buttock, left heel and left flank    Chart shows:  Admitted for GIB, severe-protein-calorie malnutrition and abdominal wall fluid collection  Past Medical History:   Diagnosis Date   [facility] Dialysis patient [facility])     started around the end of september 2021    Polycystic kidney disease     S/P percutaneous endoscopic gastrostomy (PEG) tube placement ([facility] [address]      WBC = 10.4 on 11/2/21  Admitted from [address] [facility] [address]:   Patient is A&O x 4, communicative, incontinent with moderate assistance needed in repositioning. Diet: Adult diet regular with nutritional supplements  Patient pre-medicated for pain by RN. Right heel and sacral skin intact and without erythema. 1. POA left medial heel DTI wound is improving measures smaller 1.5 cm x 2.5 cm, deflated blister serous/serosang, no open area, jeana-wound intact. Venelex ointment applied. 2. POA left buttock wound measures 2 cm x 4 cm x 0.2 cm with undermining at 6 o'clock measuring 2.8 cm, wound bed is covered with slough, small serous drainage, wound edges are open, jeana-wound intact. Santyl applied, gauze placed in undermining, covered with Optifoam Gentle. 3.  POA left flank fold with epithealiazed tissue, no wound noted. 4.  Bilateral buttocks with moisture associated skin damage on both sides of skin tag, left buttock is approximately 2 cm x 4 cm x 0.1 cm, right buttock is approximately 0.7 cm x 0.8 cm, wound beds are pink, blanchable, wound edges are open, jeana-wound intact. Venelex ointment applied. Spoke with Miguel Cote NP, wound care orders obtained. Patient repositioned on right side. Heels offloaded on pillows.      Recommendations:    Bilateral heels- Every 12 hours liberally apply Venelex ointment    Left flank- Daily cleanse with normal saline, apply nickel thickness of Santyl ointment and cover with Optifoam Gentle. Left buttock- Daily cleanse with normal saline, apply nickel thickness of Santyl ointment, loosely fill undermining at 6 o'clock with 2 x 2 (remaining gauze can be placed over wound) and cover with Optifoam Gentle. Skin Care & Pressure Prevention:  Minimize layers of linen/pads under patient to optimize support surface. Turn/reposition approximately every 2 hours and offload heels.   Manage incontinence / promote continence   Nourishing Skin Cream to dry skin, minimize use of briefs when able    Discussed above plan with patient & Solange Cunningham RN    Transition of Care: Plan to follow as needed while admitted to hospital.    Kristina Valentin" [address] [facility], KRZYSZTOFN, RN, [facility], [facility].  office [phone]  pager [phone] or call  to page

## 2021-11-02 NOTE — PROGRESS NOTES
[facility] Adult  Hospitalist Group     ICU Transfer/Accept Summary     This [place] being [place] [address] [place]  DATE OF TRANSFER: 11/2/2021       PATIENT ID: Bjorn Mora  MRN: [id number]   YOB: 1970    PRIMARY CARE PROVIDER: Windy Dean MD   DATE OF ADMISSION: 10/18/2021 11:51 AM    ATTENDING PHYSICIAN: Singh Dee NP  CONSULTATIONS:   IP CONSULT TO GASTROENTEROLOGY  IP CONSULT TO NEPHROLOGY  IP CONSULT TO INTERVENTIONAL RADIOLOGY  IP CONSULT TO GASTROENTEROLOGY  IP CONSULT TO GASTROENTEROLOGY  IP CONSULT TO OTOLARYNGOLOGY  IP CONSULT TO GENERAL SURGERY    PROCEDURES/SURGERIES:   Procedure(s):  ESOPHAGOGASTRODUODENOSCOPY (EGD) at Bedside  ESOPHAGOGASTRODUODENAL (EGD) BIOPSY    REASON FOR ADMISSION: <principal problem not specified>     HOSPITAL PROBLEM LIST:  Patient Active Problem List   Diagnosis Code    GIB (gastrointestinal bleeding) K92.2    Severe protein-calorie malnutrition ([facility] [address] E43    Abdominal wall fluid collections R18.8         Brief HPI and Hospital Course:    Per ICU note:   -  \"Pt is a 54 yo M with PMH ESRD on HD, takes coumadin secondary to hx of DVT RUE with a recent prolonged hospitalization secondary to COVID PNA in August of this year. He ultimately required trach and peg and was in rehab at [facility]. Pt unable to provide HPI secondary to current cognition, therefore information is obtained via chart and provider. Per [facility] records, pt had a dislodged peg tube. He started having coffee ground emesis 10/18/21 with elevated HR and hypotension. He was sent to the ER for further workup. He was found to be hypotensive and ICU was consulted for further management. \"        10/25 Pt co some increased pain and discomfort overnight, was restrted on PTA fentanyl patch and prn ativan.      10/26 TCT tolerated X24 hours      10/29 hgb trending down - 8.1 (10/29) from 9.2 (10/26). Patient remains off the vent on trach collar with humidification.  Otolaryngology consulted to downsize trach. Worsening SERGEY on HD- plan to receive dialysis today.     10/31- No acute events overnight. Patient is lying in bed, awake, alert and oriented x 4. On TC @ 35%, oxygen saturation  mid 90's. Family at bedside and reports improvement with patient but wants to know when he can start moving around. Patient denies any complaints. No hematemesis, hematochezia or melena.     11/1-No acute events overnight. Patient seen on morning rounds. He sitting up in bed awake alert and oriented x4. Has been tolerating PMV with all p.o. liquids. Down to 28% and 6L on TC. Oxygen saturation >92%. Afebrile, Plan to receive dialysis today. Hospitalist note   11/02: Seen and examined patient sitting up in bed. States that he is feeling better. Trach capped- On room air. Tolerating well. Discussed with RN. Chart reviewed. Patient to transfer out of ICU today     Assessment and Plan:     Hemorrhagic Shock/ ABLA d/t Acute GI Bleed, now Improved   - Initial CT reviewed large fluid filled mass, Drained by IR on 10/23 and pigtail drain left in place. 10/26 CT showed slightly diminished size of the abdominal wall collection.   - Repeat CT today significant decrease in size of the left rectus sheath hematoma. The small collection suggestive of congealed hematoma and is unlikely to drain through cath. - s/p EGD (11/01)  - S/P pRBCs, FFB and K centra. - Continue PPI   - Monitor labs and transfuse for hgb <7.0   - GI and GS following      Acute on chronic hypoxic respiratory failure with tracheostomy   - Has been on trach collar with speaking valve, downsized to #6 XLT Mary  by ENT 10/30/21  -In coordination with speech therapy we will try capping today and see how patient tolerates  - Titrate oxygen to keep saturation > 92%   - Pulmonary  hygiene   - HOB >30   - Aspiration precautions   - Speech following     3. Renal:  ESRD on HD MWF  -  follow labs   - Nephrology following , HD today       4.  ID: Persistent Leukocytosis  With Abdominal Abcess, Initial culture was coag negative staph deemed a contaminant  - Continue diflucan for yeast out of wound   - ID consult   - surgery following      5. Neuro:  -PT/OT     6. Severe Obesity   - BMI 41.69  - Weight loss management to be followed up outpatient         PHYSICAL EXAMINATION:  Visit Vitals  /67   Pulse (!) 108   Temp 99 °F (37.2 °C)   Resp 22   Ht 5' 11.5\" (1.816 m)   Wt 137.5 kg (303 lb 2.1 oz)   SpO2 96%   BMI 41.69 kg/m²       General:          Alert, cooperative, no distress  HEENT:           Atraumatic, MMM            Neck:               Supple, symmetrical,  thyroid: non tender  Lungs:             Clear to auscultation bilaterally. No Wheezing or Rhonchi. No rales. Trach- capped   Heart:              Regular  rhythm,  No  murmur   No edema  Abdomen:       Soft, non-tender. Not distended. Ostomy noted. Pigtain drain intact. Extremities:     No cyanosis. No clubbing,  +2 distal pulses  Skin:                Not pale. Not Jaundiced  No rashes   Psych:             Not anxious or agitated.   Neurologic:      Alert, moves all extremities with generalized weakness, oriented X 3.     CODE STATUS:   Full Code    DNR    Partial    Comfort Care     Signed:   Corona Butler NP  Date of Service:  11/2/2021  12:19 PM

## 2021-11-02 NOTE — PROGRESS NOTES
Name: Michael Armendariz MRN: [id number]   : 1970 Hospital: [address]   Date: 2021        IMPRESSION:   · ESRGEY, HD dependent. Patient was dialyzed on Monday  · Hypervolemia with predominantly central congestion. · Anemia of multifactorial origin  · Respiratory failure due to a complicated Covid pneumonia, on vent via trache  · Hypokalemia pre HD      PLAN:   · Continue present care  · Next HD - Wednesday   · Watch for renal function recovery. · Vent managing as per intensivist team  · Anemia management- start Retacrit. Check the iron profile. Subjective/Interval History:   I have reviewed the flowsheet and previous days notes. ROS:Review of systems not obtained due to patient factors. Objective:   Vital Signs:    Visit Vitals  BP 93/64   Pulse (!) 110   Temp 99 °F (37.2 °C)   Resp 21   Ht 5' 11.5\" (1.816 m)   Wt 137.5 kg (303 lb 2.1 oz)   SpO2 96%   BMI 41.69 kg/m²       O2 Device: Tracheal collar, Humidifier   O2 Flow Rate (L/min): 6 l/min   Temp (24hrs), Av.6 °F (37 °C), Min:97.7 °F (36.5 °C), Max:99.1 °F (37.3 °C)       Intake/Output:   Last shift:       07 - 1900  In: 50   Out: -   Last 3 shifts: 10/31 1901 -  0700  In: 450 [P.O.:340; I.V.:100]  Out: 2600 [Drains:600]    Intake/Output Summary (Last 24 hours) at 2021 1101  Last data filed at 2021 0707  Gross per 24 hour   Intake 150 ml   Output 2200 ml   Net -2050 ml        Physical Exam:  General:    Awake, Not communicating verbally. Head:   Normocephalic, without obvious abnormality, atraumatic. Eyes:   Conjunctivae/corneas clear. Nose:  Nares normal. No drainage or sinus tenderness. Throat:    Lips, mucosa, and tongue normal.    Neck:  Trache in place  Lungs:   Clear to auscultation bilaterally. No Wheezing or Rhonchi. No rales. Chest wall:  No tenderness or deformity. No Accessory muscle use. Heart:   Regular rate and rhythm,  no murmur, rub or gallop. Abdomen:   Soft. Distended. Bowel sounds normal. PEG tube in place. Extremities: Extremities normal, atraumatic, No cyanosis. No edema. No clubbing  Skin:     Texture, turgor normal. No rashes or lesions. Not Jaundiced  Psych:  Calm. Neurologic: Non focal.      DATA:  Labs:  Recent Labs     11/02/21  0643 11/01/21  1258 10/31/21  0445     --  134*  134*   K 3.6  --  3.4*  3.4*     --  101  102   CO2 26  --  24  24   BUN 22*  --  25*  25*   CREA 3.10*  --  3.18*  3.16*   CA 9.4  --  9.0  8.7   ALB 2.5*  --  2.6*  2.6*   PHOS  --  3.8 3.9   MG  --  1.6  --      Recent Labs     11/02/21  0643 11/01/21  1258 10/31/21  0445   WBC 10.4 15.3* 12.4*   HGB 8.7* 8.8* 8.2*   HCT 28.7* 28.1* 26.9*    307 296     No results for input(s): DOLORES, KU, CLU, CREAU in the last 72 hours.     No lab exists for component: PROU    Total time spent with patient:  35 minutes    [] Critical Care Provided    Care Plan discussed with:   Valente Marc MD

## 2021-11-02 NOTE — PROGRESS NOTES
Transition of Care Plan   RUR-  Medium    DISPOSITION: [facility] and rehab    F/U with PCP/Specialist     Transport: [facility]  Care management received a call from admission director at [facility] and rehab and as the patient will have an hour transport time each way to dialysis, patient's wife has decided she would prefer him to go to [facility] at the facility. CM spoke with Brynn Billings in admissions and emailed her the referral to [email or web address]. Per Mauricio Giron it will take a couple of days to get an [facility]. CM called [facility] for quotes for transport and BLS transport will be $1,935.54 and ALS will be $2,022.50. Emailed wife the quote of the  cost of transport. CM called Declan Mcmillan with [facility] and cancelled reservation. Kulwinder Zelaya RN,Care Management    2:00pm faxed additional clinical information to [phone].    Kulwinder Zelaya RN,Care [place]

## 2021-11-03 LAB
ALBUMIN SERPL-MCNC: 2.5 G/DL (ref 3.5–5)
ALBUMIN/GLOB SERPL: 0.7 {RATIO} (ref 1.1–2.2)
ALP SERPL-CCNC: 114 U/L (ref 45–117)
ALT SERPL-CCNC: 23 U/L (ref 12–78)
ANION GAP SERPL CALC-SCNC: 7 MMOL/L (ref 5–15)
AST SERPL-CCNC: 22 U/L (ref 15–37)
BILIRUB SERPL-MCNC: 0.7 MG/DL (ref 0.2–1)
BUN SERPL-MCNC: 30 MG/DL (ref 6–20)
BUN/CREAT SERPL: 7 (ref 12–20)
CALCIUM SERPL-MCNC: 9.5 MG/DL (ref 8.5–10.1)
CHLORIDE SERPL-SCNC: 102 MMOL/L (ref 97–108)
CO2 SERPL-SCNC: 25 MMOL/L (ref 21–32)
CREAT SERPL-MCNC: 4.02 MG/DL (ref 0.7–1.3)
ERYTHROCYTE [DISTWIDTH] IN BLOOD BY AUTOMATED COUNT: 19.9 % (ref 11.5–14.5)
GLOBULIN SER CALC-MCNC: 3.6 G/DL (ref 2–4)
GLUCOSE SERPL-MCNC: 106 MG/DL (ref 65–100)
HCT VFR BLD AUTO: 28.3 % (ref 36.6–50.3)
HGB BLD-MCNC: 8.6 G/DL (ref 12.1–17)
MCH RBC QN AUTO: 30.8 PG (ref 26–34)
MCHC RBC AUTO-ENTMCNC: 30.4 G/DL (ref 30–36.5)
MCV RBC AUTO: 101.4 FL (ref 80–99)
NRBC # BLD: 0 K/UL (ref 0–0.01)
NRBC BLD-RTO: 0 PER 100 WBC
PLATELET # BLD AUTO: 250 K/UL (ref 150–400)
PMV BLD AUTO: 9.9 FL (ref 8.9–12.9)
POTASSIUM SERPL-SCNC: 4.1 MMOL/L (ref 3.5–5.1)
PROT SERPL-MCNC: 6.1 G/DL (ref 6.4–8.2)
RBC # BLD AUTO: 2.79 M/UL (ref 4.1–5.7)
SODIUM SERPL-SCNC: 134 MMOL/L (ref 136–145)
WBC # BLD AUTO: 10.6 K/UL (ref 4.1–11.1)

## 2021-11-03 PROCEDURE — 74011250637 HC RX REV CODE- 250/637: Performed by: NURSE PRACTITIONER

## 2021-11-03 PROCEDURE — 77010033678 HC OXYGEN DAILY

## 2021-11-03 PROCEDURE — 74011000250 HC RX REV CODE- 250: Performed by: NURSE PRACTITIONER

## 2021-11-03 PROCEDURE — 74011250636 HC RX REV CODE- 250/636: Performed by: INTERNAL MEDICINE

## 2021-11-03 PROCEDURE — P9047 ALBUMIN (HUMAN), 25%, 50ML: HCPCS | Performed by: INTERNAL MEDICINE

## 2021-11-03 PROCEDURE — 74011250636 HC RX REV CODE- 250/636: Performed by: NURSE PRACTITIONER

## 2021-11-03 PROCEDURE — 65660000000 HC RM CCU STEPDOWN

## 2021-11-03 PROCEDURE — 2709999900 HC NON-CHARGEABLE SUPPLY

## 2021-11-03 PROCEDURE — 85027 COMPLETE CBC AUTOMATED: CPT

## 2021-11-03 PROCEDURE — 74011250637 HC RX REV CODE- 250/637: Performed by: INTERNAL MEDICINE

## 2021-11-03 PROCEDURE — 94640 AIRWAY INHALATION TREATMENT: CPT

## 2021-11-03 PROCEDURE — 80053 COMPREHEN METABOLIC PANEL: CPT

## 2021-11-03 PROCEDURE — 77030037877 HC DRSG MEPILEX >48IN BORD MOLN -A

## 2021-11-03 PROCEDURE — 90935 HEMODIALYSIS ONE EVALUATION: CPT

## 2021-11-03 PROCEDURE — 36415 COLL VENOUS BLD VENIPUNCTURE: CPT

## 2021-11-03 PROCEDURE — C9113 INJ PANTOPRAZOLE SODIUM, VIA: HCPCS | Performed by: NURSE PRACTITIONER

## 2021-11-03 RX ORDER — LORAZEPAM 2 MG/ML
1 INJECTION INTRAMUSCULAR
Status: DISCONTINUED | OUTPATIENT
Start: 2021-11-03 | End: 2021-11-04

## 2021-11-03 RX ORDER — HYDROMORPHONE HYDROCHLORIDE 1 MG/ML
0.5 INJECTION, SOLUTION INTRAMUSCULAR; INTRAVENOUS; SUBCUTANEOUS
Status: DISCONTINUED | OUTPATIENT
Start: 2021-11-03 | End: 2021-11-04

## 2021-11-03 RX ADMIN — Medication 1 CAPSULE: at 08:31

## 2021-11-03 RX ADMIN — FLUCONAZOLE IN SODIUM CHLORIDE 200 MG: 2 INJECTION, SOLUTION INTRAVENOUS at 19:13

## 2021-11-03 RX ADMIN — Medication 10 ML: at 21:59

## 2021-11-03 RX ADMIN — Medication: at 19:12

## 2021-11-03 RX ADMIN — HYDROMORPHONE HYDROCHLORIDE 0.5 MG: 1 INJECTION, SOLUTION INTRAMUSCULAR; INTRAVENOUS; SUBCUTANEOUS at 23:20

## 2021-11-03 RX ADMIN — Medication 10 ML: at 22:00

## 2021-11-03 RX ADMIN — LORAZEPAM 0.5 MG: 0.5 TABLET ORAL at 19:05

## 2021-11-03 RX ADMIN — HYDROMORPHONE HYDROCHLORIDE 0.5 MG: 1 INJECTION, SOLUTION INTRAMUSCULAR; INTRAVENOUS; SUBCUTANEOUS at 00:52

## 2021-11-03 RX ADMIN — LORAZEPAM 0.5 MG: 0.5 TABLET ORAL at 08:31

## 2021-11-03 RX ADMIN — SODIUM CHLORIDE 40 MG: 9 INJECTION INTRAMUSCULAR; INTRAVENOUS; SUBCUTANEOUS at 21:59

## 2021-11-03 RX ADMIN — Medication 9 MG: at 21:59

## 2021-11-03 RX ADMIN — SODIUM CHLORIDE 40 MG: 9 INJECTION INTRAMUSCULAR; INTRAVENOUS; SUBCUTANEOUS at 08:31

## 2021-11-03 RX ADMIN — HEPARIN SODIUM 1800 UNITS: 1000 INJECTION INTRAVENOUS; SUBCUTANEOUS at 17:20

## 2021-11-03 RX ADMIN — IPRATROPIUM BROMIDE AND ALBUTEROL SULFATE 3 ML: .5; 3 SOLUTION RESPIRATORY (INHALATION) at 19:18

## 2021-11-03 RX ADMIN — Medication 10 ML: at 06:12

## 2021-11-03 RX ADMIN — MIDODRINE HYDROCHLORIDE 10 MG: 5 TABLET ORAL at 06:12

## 2021-11-03 RX ADMIN — LORAZEPAM 1 MG: 2 INJECTION INTRAMUSCULAR; INTRAVENOUS at 14:36

## 2021-11-03 RX ADMIN — Medication: at 08:38

## 2021-11-03 RX ADMIN — Medication 10 ML: at 14:37

## 2021-11-03 RX ADMIN — MIDODRINE HYDROCHLORIDE 10 MG: 5 TABLET ORAL at 21:59

## 2021-11-03 RX ADMIN — HYDROMORPHONE HYDROCHLORIDE 0.5 MG: 1 INJECTION, SOLUTION INTRAMUSCULAR; INTRAVENOUS; SUBCUTANEOUS at 16:47

## 2021-11-03 RX ADMIN — ALBUMIN (HUMAN) 25 G: 0.25 INJECTION, SOLUTION INTRAVENOUS at 15:43

## 2021-11-03 RX ADMIN — IPRATROPIUM BROMIDE AND ALBUTEROL SULFATE 3 ML: .5; 3 SOLUTION RESPIRATORY (INHALATION) at 08:24

## 2021-11-03 RX ADMIN — MIDODRINE HYDROCHLORIDE 10 MG: 5 TABLET ORAL at 14:36

## 2021-11-03 RX ADMIN — COLLAGENASE SANTYL: 250 OINTMENT TOPICAL at 08:39

## 2021-11-03 NOTE — PROGRESS NOTES
Bedside and Verbal shift change report given to Donald Mireles (oncoming nurse) by Bakari Aguilar RN (offgoing nurse). Report included the following information SBAR, Kardex, Intake/Output, MAR, Recent Results, Cardiac Rhythm NSR-ST and Dual Neuro Assessment.

## 2021-11-03 NOTE — PROGRESS NOTES
Patient dialyzer clotted after 2 hours during dialysis. All possible blood in lines was returned to patient and MD [place] notified. Vital signs were stable and no signs of acute distress noted. MD states not to set up and continue treatment for remaining 53 minutes to prevent fluid gains. MD states that patient labs are 'okay' and she is watching for renal recovery. No additional dialysis is ordered for patient at this time and patient is to receive scheduled dialysis on Friday as ordered.

## 2021-11-03 NOTE — PROGRESS NOTES
1930: Bedside and Verbal shift change report given to Virgilio Dobbins RN (oncoming nurse) by Elvia Acharya (offgoing nurse). Report included the following information SBAR, Kardex, ED Summary, OR Summary, Procedure Summary, Intake/Output, MAR, Accordion, Recent Results, Med Rec Status and Cardiac Rhythm sinus rhythm. 2144: attempted to call report. 2222: TRANSFER - OUT REPORT:    Verbal report given to Viviana GRIFFIN(name) on Kwasi Nunes  being transferred to NSTU(unit) for routine progression of care       Report consisted of patients Situation, Background, Assessment and   Recommendations(SBAR). Information from the following report(s) SBAR, Kardex, ED Summary, OR Summary, Procedure Summary, Intake/Output, MAR, Accordion, Recent Results, Med Rec Status and Cardiac Rhythm sinus tachycardia was reviewed with the receiving nurse. Lines:       Opportunity for questions and clarification was provided. Patient transported with:   Monitor  O2 @ 2 liters  Registered Nurse  Tech      [phone]: spoke with wife, Danna Wolff. Updated her to patient's new room number and nurses' station phone number.

## 2021-11-03 NOTE — PROCEDURES
Hemodialysis / [phone]    Vitals Pre Post Assessment Pre Post   BP BP: 116/75 (11/03/21 1509) 106/76 LOC A&OX4 A&OX4   HR Pulse (Heart Rate): (!) 101 (11/03/21 1509) 115 Lungs Diminished  Diminished   Resp Resp Rate: 21 (11/03/21 1509) 28 Cardiac HRR S1 S2 present, bedside telemetry HRR S1 S2 present, bedside telemetry   Temp Temp: 97.8 °F (36.6 °C) (11/03/21 1509) 97.8 Skin Warm and dry, wound on sacrum , accordian drain in abdomnen, trach site with dressing CDI Warm and dry, wound on sacrum , accordian drain in abdomnen, trach site with dressing CDI   Weight Pre-Dialysis Weight: 137.5 kg (303 lb 2.1 oz) (11/01/21 1300) 134 kg Edema Abdominal, generalized, 3+ BLE Abdominal, generalized, 3+ BLE   Tele status NS-ST ST Pain 5 5     Orders   Duration: Start: 15:09 End: 17:20 Total: 2 hrs   Dialyzer: Dialyzer/Set Up Inspection: Maggy Mccallum (11/03/21 1509)   K Bath: Dialysate K (mEq/L): 4 (11/03/21 1509)   Ca Bath: Dialysate CA (mEq/L): 2.5 (11/03/21 1509)   Na: Dialysate NA (mEq/L): 140 (11/03/21 1509)   Bicarb: Dialysate HCO3 (mEq/L): 38 (11/03/21 1509)   Target Fluid Removal: Goal/Amount of Fluid to Remove (mL): 3000 mL (11/03/21 1509)     Access   Type & Location: R permcath   Comments: R permcath CVC: Dressing CDI last changed 10/29/21. No s/s of infection. Both lumens aspirate & flush well. Running well at .                                        Labs   HBsAg (Antigen) / date: Negative 10/20/21                                              HBsAb (Antibody) / date: Immune 10/20/21 (37.87)   Source: Epic   Obtained/Reviewed  Critical Results Called HGB   Date Value Ref Range Status   11/03/2021 8.6 (L) 12.1 - 17.0 g/dL Final     Potassium   Date Value Ref Range Status   11/03/2021 4.1 3.5 - 5.1 mmol/L Final     Comment:     SPECIMEN HEMOLYZED, RESULTS MAY BE AFFECTED     Calcium   Date Value Ref Range Status   11/03/2021 9.5 8.5 - 10.1 MG/DL Final     BUN   Date Value Ref Range Status   11/03/2021 30 (H) 6 - 20 MG/DL Final     Creatinine   Date Value Ref Range Status   11/03/2021 4.02 (H) 0.70 - 1.30 MG/DL Final     Comment:     INVESTIGATED PER DELTA CHECK PROTOCOL        Meds Given   Name Dose Route   Albumin 25% 25 G IV with HD at 15:43   Heparin 1000 units:1 ml 3600 units 1.8 ml to dwell in each lumen of HD CVC at end of tx 17:20          Adequacy / Fluid    Total Liters Process: 37.8 L   Net Fluid Removed: 1757 ml      Comments   Time Out Done:   (Time) 15:05   Admitting Diagnosis:  GI bleed   Consent obtained/signed: Informed Consent Verified: Yes (11/03/21 1509)   Machine / RO # Machine Number: Danielle Luna (11/03/21 [id number])   Primary Nurse Rpt Pre: Thomas Gutiérrez RN   Primary Nurse Rpt Post: Danni Mcrae RN   Pt Education: Procedural   Care Plan: Ongoing   Pts outpatient clinic: N/A     Tx Summary   Comments:    SBAR received from Primary RN. Pt arrived to HD suite A&Ox4. Consent signed & on file. 15:09- Each catheter limb disinfected per p&p, caps removed, hubs disinfected per p&p. Each lumen aspirated for blood return and flushed with Normal Saline per policy. VSS. Dialysis Tx initiated. Lines running in reverse at  to prevent arterial pressures from fluctuating below -260.  15:43- Patient BP is 107/76. Gave 25% Albumin IV 25 G in 50 ml to prevent hypotension. Patient tolerated procedure well.  17:15- TMP alarming and out of range with visible clotting in dialyzer. Flushed lines with 100 ml of NS.  17:20- Tx stopped due to high TMP/ clotting dialyzer. All possible blood returned to patient and rinse back successful. Notified MD Destiney Cruz of patient clotted dialyzer. MD states not to restart treatment to prevent patient gaining fluid and okay to hold dialysis until next scheduled treatment on Friday. MD states that patient labs are 'okay' and she is looking for signs of renal recovery. VSS. Each dialysis catheter limb disinfected per p&p, and each dialysis hub disinfected per p&p.  Each lumen flushed, post dialysis catheter Heparin dwell instilled per order, and caps applied. Bed locked and in the lowest position, call bell and belongings in reach. SBAR given to Primary, RN. Patient is stable at time of my departure. All Dialysis related medications have been reviewed.

## 2021-11-03 NOTE — PROGRESS NOTES
Attempted to see patient for PT follow up visit. Patient receiving dialysis. Will follow up at a later time as able.          BOUBACAR TobiasT

## 2021-11-03 NOTE — PROGRESS NOTES
NUTRITION  Reason for Rescreen: Reassessment        RECOMMENDATIONS:   1. Continue present diet, discontinue magic cup ONS. Interventions   - continue current diet  -removed Magic Cup ONS       Information obtained from:   patient  Past Medical History:   Diagnosis Date   [facility] patient [facility])     started around the end of september 2021    Polycystic kidney disease     S/P percutaneous endoscopic gastrostomy (PEG) tube placement ([address]      Pt seen for f/u. He has transferred to tele and out of ICU. Pt reports he feels his appetite is improved/stable. He dislikes texture of magic cup and will discontinue for him. Additionally voiced some displeasure with lack of variety of foods received. Offered menu so he is able to make selections. Pt appreciative and no additional needs voiced. Do not feel full nutrition reassessment needed for pt with stable appetite and will rescreen per policy. D/c pending for SNF.        Diet:  regular  Supplements: Magic Cup once/day and Boost Pudding once/day  Meal Intake:   Patient Vitals for the past 168 hrs:   % Diet Eaten   11/02/21 1900 76 - 100%   11/02/21 1200 76 - 100%   11/02/21 0800 76 - 100%   11/01/21 1000 26 - 50%   10/31/21 1900 26 - 50%   10/31/21 1400 51 - 75%   10/31/21 0900 26 - 50%   10/30/21 1900 26 - 50%   10/30/21 1200 51 - 75%   10/30/21 1000 26 - 50%         Weight Changes:   Stable  Wt Readings from Last 10 Encounters:   11/03/21 130.5 kg (287 lb 11.2 oz)       Nutrition-Related Concerns Identified:  None    Estimated Nutrition Needs:   Energy: 2000 (25 kcal/kg IBW)  Wt used: Ideal  Protein: 120 (1.5 g/kg IBW)  Wt used: Ideal     PLAN:   - Continue current diet  - Rescreen per policy  - Check for acceptance of supplements    Will revisit per policy    Landon Mar RD

## 2021-11-03 NOTE — PROGRESS NOTES
SLP Contact Note    Appreciate RT for capping trach yesterday. Pt has been tolerating since 12:00 yesterday. If pt continues to tolerate, would consider decannulation (which can be done by RT).        Thank you,  Adam Reyes, M.Ed, [id number] [facility]  Speech-Language Pathologist

## 2021-11-03 NOTE — PROGRESS NOTES
[address] [facility] Adult  Hospitalist Group                                                                                          Hospitalist Progress Note  Sophia Encinas MD  Answering service: [phone] OR [phone] from in house phone        Date of Service:  11/3/2021  NAME:  Alexa Perez  :  1970  MRN:  [id number]      Admission Summary:   Pt is a 52 yo M with PMH ESRD on HD, takes coumadin secondary to hx of DVT RUE with a recent prolonged hospitalization secondary to COVID PNA in August of this year. He ultimately required trach and peg and was in rehab at [address] [place] [address]. Pt unable to provide HPI secondary to current cognition, therefore information is obtained via chart and provider. Per [facility] records, pt had a dislodged peg tube. He started having coffee ground emesis 10/18/21 with elevated HR and hypotension. He was sent to the ER for further workup. He was found to be hypotensive and ICU was consulted for further management. \"        10/25 Pt co some increased pain and discomfort overnight, was restrted on PTA fentanyl patch and prn ativan.      10/26 TCT tolerated X24 hours      10/29 hgb trending down - 8.1 (10/29) from 9.2 (10/26). Patient remains off the vent on trach collar with humidification. Otolaryngology consulted to downsize trach. Worsening SERGEY on HD- plan to receive dialysis today.     10/31- No acute events overnight. Patient is lying in bed, awake, alert and oriented x 4. On TC @ 35%, oxygen saturation  mid 90's.  Family at bedside and reports improvement with patient but wants to know when he can start moving around. Patient denies any complaints. No hematemesis, hematochezia or melena.     -No acute events overnight.  Patient seen on morning rounds.  He sitting up in bed awake alert and oriented x4.  Has been tolerating PMV with all p.o. liquids. Down to 28% and 6L on TC.  Oxygen saturation >92%. Myna Leak, Plan to receive dialysis today.          Interval history / Subjective: Tracheostomy is passed now, patient denies any active complaints. Abdominal wall drain has purulent discharge in the bag     Assessment & Plan:        Hemorrhagic Shock/ ABLA d/t Acute GI Bleed, now Improved   - Initial CT reviewed large fluid filled mass, Drained by IR on 10/23 and pigtail drain left in place. 10/26 CT showed slightly diminished size of the abdominal wall collection.   - Repeat CT today significant decrease in size of the left rectus sheath hematoma. The small collection suggestive of congealed hematoma and is unlikely to drain through cath. - s/p EGD (11/01)  - S/P pRBCs, FFB and K centra. - Continue PPI   - Monitor labs and transfuse for hgb <7.0   - GI and GS following       Acute on chronic hypoxic respiratory failure with tracheostomy   - Has been on trach collar with speaking valve, downsized to #6 XLT [facility] 10/30/21, today's tracheostomy is removed and patient's tracheostomy hole dispatched  -In coordination with speech therapy we will try capping today and see how patient tolerates  - Titrate oxygen to keep saturation > 92%   - Pulmonary  hygiene   - HOB >30   - Aspiration precautions   - Speech following     3.  Renal:  ESRD on HD MWF  -  follow labs   - Nephrology following , HD today       4. ID: Persistent Leukocytosis  With Abdominal Abcess, Initial culture was coag negative staph deemed a contaminant  - Continue diflucan for yeast out of wound   - ID consult   - surgery following, abdominal wall drain is attached to bag with purulent discharge     Morbid obesity   - BMI 41.69  - Weight loss management to be followed up outpatient  Code status: Full code  DVT prophylaxis: SCDs    Care Plan discussed with: Patient/Family  Anticipated Disposition: SNF/LTC  Anticipated Discharge: Greater than 48 hours     Hospital Problems  Date Reviewed: 10/29/2021          Codes Class Noted POA    Abdominal wall fluid collections ICD-10-CM: R18.8  ICD-9-CM: 789.59  10/25/2021 Unknown Severe protein-calorie malnutrition ([facility] [address] ICD-10-CM: E43  ICD-9-CM: 262  10/21/2021 Unknown        GIB (gastrointestinal bleeding) ICD-10-CM: K92.2  ICD-9-CM: 578.9  10/18/2021 Unknown                Review of Systems:   A comprehensive review of systems was negative except for that written in the HPI. Vital Signs:    Last 24hrs VS reviewed since prior progress note. Most recent are:  Visit Vitals  BP (!) 139/119   Pulse (!) 105   Temp 97.7 °F (36.5 °C)   Resp 24   Ht 5' 11.5\" (1.816 m)   Wt 130.5 kg (287 lb 11.2 oz)   SpO2 100%   BMI 39.57 kg/m²         Intake/Output Summary (Last 24 hours) at 11/3/2021 1456  Last data filed at 11/2/2021 1900  Gross per 24 hour   Intake 100 ml   Output --   Net 100 ml        Physical Examination:     I had a face to face encounter with this patient and independently examined them on 11/3/2021 as outlined below:          Constitutional:  No acute distress, cooperative, pleasant    ENT:  Oral mucosa moist, oropharynx benign. Resp:  CTA bilaterally. No wheezing/rhonchi/rales. No accessory muscle use   CV:  Regular rhythm, normal rate, no murmurs, gallops, rubs    GI:  Soft, non distended, non tender. normoactive bowel sounds, no hepatosplenomegaly or abdominal wall drainage tube has purulent discharge in the bag    Musculoskeletal:  No edema, warm, 2+ pulses throughout    Neurologic:  Moves all extremities.   AAOx3, CN II-XII reviewed            Data Review:    Review and/or order of clinical lab test      Labs:     Recent Labs     11/03/21 0249 11/02/21  0643   WBC 10.6 10.4   HGB 8.6* 8.7*   HCT 28.3* 28.7*    241     Recent Labs     11/03/21  0249 11/02/21  0643 11/01/21  1258   * 137  --    K 4.1 3.6  --     104  --    CO2 25 26  --    BUN 30* 22*  --    CREA 4.02* 3.10*  --    * 105*  --    CA 9.5 9.4  --    MG  --   --  1.6   PHOS  --   --  3.8     Recent Labs     11/03/21  0249 11/02/21  0643   ALT 23 21    113   TBILI 0.7 0.7 TP 6.1* 6.0*   ALB 2.5* 2.5*   GLOB 3.6 3.5     No results for input(s): INR, PTP, APTT, INREXT in the last 72 hours. No results for input(s): FE, TIBC, PSAT, FERR in the last 72 hours. No results found for: FOL, RBCF   No results for input(s): PH, PCO2, PO2 in the last 72 hours. No results for input(s): CPK, CKNDX, TROIQ in the last 72 hours.     No lab exists for component: CPKMB  No results found for: CHOL, CHOLX, CHLST, CHOLV, HDL, HDLP, LDL, LDLC, DLDLP, TGLX, TRIGL, TRIGP, CHHD, CHHDX  No results found for: GLUCPOC  No results found for: COLOR, APPRN, SPGRU, REFSG, ORIANA, PROTU, GLUCU, KETU, BILU, UROU, SPENCER, LEUKU, GLUKE, EPSU, BACTU, WBCU, RBCU, CASTS, UCRY      Medications Reviewed:     Current Facility-Administered Medications   Medication Dose Route Frequency    collagenase (SANTYL) 250 unit/gram ointment   Topical DAILY    HYDROmorphone (DILAUDID) injection 0.5 mg  0.5 mg IntraVENous Q4H PRN    heparin (porcine) 1,000 unit/mL injection 4,000 Units  4,000 Units Hemodialysis DIALYSIS PRN    alteplase (CATHFLO) 1.8 mg in sterile water (preservative free) 1.8 mL injection  1.8 mg InterCATHeter DIALYSIS ONCE    alteplase (CATHFLO) 1.8 mg in sterile water (preservative free) 1.8 mL injection  1.8 mg InterCATHeter DIALYSIS ONCE    LORazepam (ATIVAN) tablet 0.5 mg  0.5 mg Oral BID    albumin human 25% (BUMINATE) solution 25 g  25 g IntraVENous DIALYSIS PRN    epoetin kayley-epbx (RETACRIT) injection 10,000 Units  10,000 Units SubCUTAneous Q TUE, THU & SAT    B complex-vitaminC-folic acid (NEPHROCAP) cap  1 Capsule Oral DAILY    midodrine (PROAMATINE) tablet 10 mg  10 mg Oral Q8H    heparin (porcine) 1,000 unit/mL injection 1,800 Units  1,800 Units InterCATHeter DIALYSIS PRN    And    heparin (porcine) 1,000 unit/mL injection 1,800 Units  1,800 Units InterCATHeter DIALYSIS PRN    fentaNYL (DURAGESIC) 25 mcg/hr patch 1 Patch  1 Patch TransDERmal Q72H    LORazepam (ATIVAN) injection 1 mg  1 mg IntraVENous Q12H PRN    melatonin tablet 9 mg  9 mg Oral QHS    albuterol-ipratropium (DUO-NEB) 2.5 MG-0.5 MG/3 ML  3 mL Nebulization BID    fluconazole (DIFLUCAN) 200mg/100 mL IVPB (premix)  200 mg IntraVENous Q24H    sodium chloride (NS) flush 5-40 mL  5-40 mL IntraVENous Q8H    sodium chloride (NS) flush 5-40 mL  5-40 mL IntraVENous PRN    pantoprazole (PROTONIX) 40 mg in 0.9% sodium chloride 10 mL injection  40 mg IntraVENous Q12H    chlorhexidine (ORAL CARE KIT) 0.12 % mouthwash 15 mL  15 mL Oral Q12H    balsam peru-castor oiL (VENELEX) ointment   Topical BID    sodium chloride (NS) flush 5-10 mL  5-10 mL IntraVENous PRN    sodium chloride (NS) flush 5-40 mL  5-40 mL IntraVENous Q8H    sodium chloride (NS) flush 5-40 mL  5-40 mL IntraVENous PRN    acetaminophen (TYLENOL) tablet 650 mg  650 mg Oral Q6H PRN    Or    acetaminophen (TYLENOL) suppository 650 mg  650 mg Rectal Q6H PRN    polyethylene glycol (MIRALAX) packet 17 g  17 g Oral DAILY PRN    ondansetron (ZOFRAN ODT) tablet 4 mg  4 mg Oral Q8H PRN    Or    ondansetron (ZOFRAN) injection 4 mg  4 mg IntraVENous Q6H PRN    albuterol (PROVENTIL VENTOLIN) nebulizer solution 2.5 mg  2.5 mg Nebulization Q4H PRN     ______________________________________________________________________  EXPECTED LENGTH OF STAY: 4d 9h  ACTUAL LENGTH OF STAY:          [address] MD

## 2021-11-03 NOTE — PROGRESS NOTES
Problem: Ventilator Management  Goal: *Patient maintains clear airway/free of aspiration  Outcome: Progressing Towards Goal     Problem: Falls - Risk of  Goal: *Absence of Falls  Description: Document Deandre Fall Risk and appropriate interventions in the flowsheet.   Outcome: Progressing Towards Goal  Note: Fall Risk Interventions:  Mobility Interventions: Communicate number of staff needed for ambulation/transfer    Mentation Interventions: More frequent rounding    Medication Interventions: Assess postural VS orthostatic hypotension    Elimination Interventions: Call light in reach, Patient to call for help with toileting needs    History of Falls Interventions: Consult care management for discharge planning, Door open when patient unattended         Problem: Patient Education: Go to Patient Education Activity  Goal: Patient/Family Education  Outcome: Progressing Towards Goal     Problem: Nutrition Deficit  Goal: *Optimize nutritional status  Outcome: Progressing Towards Goal     Problem: Patient Education: Go to Patient Education Activity  Goal: Patient/Family Education  Outcome: Progressing Towards Goal     Problem: Patient Education: Go to Patient Education Activity  Goal: Patient/Family Education  Outcome: Progressing Towards Goal     Problem: Patient Education: Go to Patient Education Activity  Goal: Patient/Family Education  Outcome: Progressing Towards Goal     Problem: Patient Education: Go to Patient Education Activity  Goal: Patient/Family Education  Outcome: Progressing Towards Goal

## 2021-11-03 NOTE — PROGRESS NOTES
Transition of Care Plan   RUR- Med 15%   DISPOSITION: SNF - [address] started 11/2   F/U with PCP/Specialist     Transport: [facility]    CM spoke with Stewart Crook in Admissions at [facility] [phone] regarding discharge plan. They started auth yesterday, 11/2. Delos Lundborg requested further clinicals including wound care notes and SLP notes to be faxed/scanned to [email or web address] when available. CM spoke with patient's spouse, Kacie Quinones, regarding transportation with [facility]. Upfront cost for spouse will be $972.53, [facility] plans to bill insurance for the remainder of the bill. CM to follow up with email instructions to [email or web address].    3:44pm: Wound care, SLP and RD notes scanned to [facility] per request.    Dimitri Brittle) Franco Lins, M.S.W.

## 2021-11-04 ENCOUNTER — APPOINTMENT (OUTPATIENT)
Dept: CT IMAGING | Age: 51
DRG: 368 | End: 2021-11-04
Attending: HOSPITALIST
Payer: COMMERCIAL

## 2021-11-04 LAB
ALBUMIN SERPL-MCNC: 2.5 G/DL (ref 3.5–5)
ALBUMIN/GLOB SERPL: 0.7 {RATIO} (ref 1.1–2.2)
ALP SERPL-CCNC: 112 U/L (ref 45–117)
ALT SERPL-CCNC: 22 U/L (ref 12–78)
ANION GAP SERPL CALC-SCNC: 5 MMOL/L (ref 5–15)
AST SERPL-CCNC: 17 U/L (ref 15–37)
BASOPHILS # BLD: 0.1 K/UL (ref 0–0.1)
BASOPHILS NFR BLD: 2 % (ref 0–1)
BILIRUB SERPL-MCNC: 0.5 MG/DL (ref 0.2–1)
BUN SERPL-MCNC: 25 MG/DL (ref 6–20)
BUN/CREAT SERPL: 7 (ref 12–20)
CALCIUM SERPL-MCNC: 9.3 MG/DL (ref 8.5–10.1)
CHLORIDE SERPL-SCNC: 102 MMOL/L (ref 97–108)
CO2 SERPL-SCNC: 27 MMOL/L (ref 21–32)
CREAT SERPL-MCNC: 3.45 MG/DL (ref 0.7–1.3)
DIFFERENTIAL METHOD BLD: ABNORMAL
EOSINOPHIL # BLD: 0.2 K/UL (ref 0–0.4)
EOSINOPHIL NFR BLD: 3 % (ref 0–7)
ERYTHROCYTE [DISTWIDTH] IN BLOOD BY AUTOMATED COUNT: 19.8 % (ref 11.5–14.5)
GLOBULIN SER CALC-MCNC: 3.6 G/DL (ref 2–4)
GLUCOSE SERPL-MCNC: 100 MG/DL (ref 65–100)
HCT VFR BLD AUTO: 28.6 % (ref 36.6–50.3)
HGB BLD-MCNC: 8.6 G/DL (ref 12.1–17)
IMM GRANULOCYTES # BLD AUTO: 0.1 K/UL (ref 0–0.04)
IMM GRANULOCYTES NFR BLD AUTO: 1 % (ref 0–0.5)
LYMPHOCYTES # BLD: 2 K/UL (ref 0.8–3.5)
LYMPHOCYTES NFR BLD: 26 % (ref 12–49)
MCH RBC QN AUTO: 30.7 PG (ref 26–34)
MCHC RBC AUTO-ENTMCNC: 30.1 G/DL (ref 30–36.5)
MCV RBC AUTO: 102.1 FL (ref 80–99)
MONOCYTES # BLD: 1.2 K/UL (ref 0–1)
MONOCYTES NFR BLD: 15 % (ref 5–13)
NEUTS SEG # BLD: 4.4 K/UL (ref 1.8–8)
NEUTS SEG NFR BLD: 53 % (ref 32–75)
NRBC # BLD: 0 K/UL (ref 0–0.01)
NRBC BLD-RTO: 0 PER 100 WBC
PLATELET # BLD AUTO: 218 K/UL (ref 150–400)
PMV BLD AUTO: 9.8 FL (ref 8.9–12.9)
POTASSIUM SERPL-SCNC: 4.2 MMOL/L (ref 3.5–5.1)
PROT SERPL-MCNC: 6.1 G/DL (ref 6.4–8.2)
RBC # BLD AUTO: 2.8 M/UL (ref 4.1–5.7)
SODIUM SERPL-SCNC: 134 MMOL/L (ref 136–145)
WBC # BLD AUTO: 8 K/UL (ref 4.1–11.1)

## 2021-11-04 PROCEDURE — 74011250637 HC RX REV CODE- 250/637: Performed by: INTERNAL MEDICINE

## 2021-11-04 PROCEDURE — 80053 COMPREHEN METABOLIC PANEL: CPT

## 2021-11-04 PROCEDURE — 74011000250 HC RX REV CODE- 250: Performed by: NURSE PRACTITIONER

## 2021-11-04 PROCEDURE — 74011250637 HC RX REV CODE- 250/637: Performed by: NURSE PRACTITIONER

## 2021-11-04 PROCEDURE — 70450 CT HEAD/BRAIN W/O DYE: CPT

## 2021-11-04 PROCEDURE — 2709999900 HC NON-CHARGEABLE SUPPLY

## 2021-11-04 PROCEDURE — 77010033678 HC OXYGEN DAILY

## 2021-11-04 PROCEDURE — 74011250636 HC RX REV CODE- 250/636: Performed by: NURSE PRACTITIONER

## 2021-11-04 PROCEDURE — 97530 THERAPEUTIC ACTIVITIES: CPT

## 2021-11-04 PROCEDURE — 99231 SBSQ HOSP IP/OBS SF/LOW 25: CPT | Performed by: SURGERY

## 2021-11-04 PROCEDURE — 36415 COLL VENOUS BLD VENIPUNCTURE: CPT

## 2021-11-04 PROCEDURE — 74011250637 HC RX REV CODE- 250/637: Performed by: HOSPITALIST

## 2021-11-04 PROCEDURE — 65660000000 HC RM CCU STEPDOWN

## 2021-11-04 PROCEDURE — 74011250636 HC RX REV CODE- 250/636: Performed by: INTERNAL MEDICINE

## 2021-11-04 PROCEDURE — 85025 COMPLETE CBC W/AUTO DIFF WBC: CPT

## 2021-11-04 PROCEDURE — 94760 N-INVAS EAR/PLS OXIMETRY 1: CPT

## 2021-11-04 PROCEDURE — 94640 AIRWAY INHALATION TREATMENT: CPT

## 2021-11-04 RX ORDER — HYDROMORPHONE HYDROCHLORIDE 1 MG/ML
0.5 INJECTION, SOLUTION INTRAMUSCULAR; INTRAVENOUS; SUBCUTANEOUS
Status: DISCONTINUED | OUTPATIENT
Start: 2021-11-04 | End: 2021-11-04

## 2021-11-04 RX ORDER — OXYCODONE HYDROCHLORIDE 5 MG/1
5 TABLET ORAL
Status: DISCONTINUED | OUTPATIENT
Start: 2021-11-04 | End: 2021-11-18 | Stop reason: HOSPADM

## 2021-11-04 RX ORDER — HYDROMORPHONE HYDROCHLORIDE 2 MG/1
1 TABLET ORAL
Status: DISCONTINUED | OUTPATIENT
Start: 2021-11-04 | End: 2021-11-18 | Stop reason: HOSPADM

## 2021-11-04 RX ORDER — PANTOPRAZOLE SODIUM 40 MG/1
40 TABLET, DELAYED RELEASE ORAL
Status: DISCONTINUED | OUTPATIENT
Start: 2021-11-04 | End: 2021-11-08

## 2021-11-04 RX ORDER — LORAZEPAM 2 MG/ML
1 INJECTION INTRAMUSCULAR
Status: DISCONTINUED | OUTPATIENT
Start: 2021-11-04 | End: 2021-11-18 | Stop reason: HOSPADM

## 2021-11-04 RX ORDER — LORAZEPAM 2 MG/ML
1 INJECTION INTRAMUSCULAR ONCE
Status: COMPLETED | OUTPATIENT
Start: 2021-11-05 | End: 2021-11-05

## 2021-11-04 RX ADMIN — MIDODRINE HYDROCHLORIDE 10 MG: 5 TABLET ORAL at 15:03

## 2021-11-04 RX ADMIN — LORAZEPAM 0.5 MG: 0.5 TABLET ORAL at 18:29

## 2021-11-04 RX ADMIN — HYDROMORPHONE HYDROCHLORIDE 0.5 MG: 1 INJECTION, SOLUTION INTRAMUSCULAR; INTRAVENOUS; SUBCUTANEOUS at 16:03

## 2021-11-04 RX ADMIN — Medication 10 ML: at 15:05

## 2021-11-04 RX ADMIN — Medication 10 ML: at 21:54

## 2021-11-04 RX ADMIN — IPRATROPIUM BROMIDE AND ALBUTEROL SULFATE 3 ML: .5; 3 SOLUTION RESPIRATORY (INHALATION) at 21:23

## 2021-11-04 RX ADMIN — FLUCONAZOLE IN SODIUM CHLORIDE 200 MG: 2 INJECTION, SOLUTION INTRAVENOUS at 15:00

## 2021-11-04 RX ADMIN — Medication: at 08:49

## 2021-11-04 RX ADMIN — MIDODRINE HYDROCHLORIDE 10 MG: 5 TABLET ORAL at 21:49

## 2021-11-04 RX ADMIN — LORAZEPAM 0.5 MG: 0.5 TABLET ORAL at 08:44

## 2021-11-04 RX ADMIN — MIDODRINE HYDROCHLORIDE 10 MG: 5 TABLET ORAL at 06:52

## 2021-11-04 RX ADMIN — Medication: at 18:36

## 2021-11-04 RX ADMIN — Medication 1 CAPSULE: at 08:43

## 2021-11-04 RX ADMIN — LORAZEPAM 1 MG: 2 INJECTION INTRAMUSCULAR; INTRAVENOUS at 02:20

## 2021-11-04 RX ADMIN — COLLAGENASE SANTYL: 250 OINTMENT TOPICAL at 08:49

## 2021-11-04 RX ADMIN — HYDROMORPHONE HYDROCHLORIDE 1 MG: 2 TABLET ORAL at 21:50

## 2021-11-04 RX ADMIN — Medication 9 MG: at 21:49

## 2021-11-04 RX ADMIN — IPRATROPIUM BROMIDE AND ALBUTEROL SULFATE 3 ML: .5; 3 SOLUTION RESPIRATORY (INHALATION) at 07:50

## 2021-11-04 RX ADMIN — PANTOPRAZOLE SODIUM 40 MG: 40 TABLET, DELAYED RELEASE ORAL at 10:24

## 2021-11-04 RX ADMIN — EPOETIN ALFA-EPBX 10000 UNITS: 10000 INJECTION, SOLUTION INTRAVENOUS; SUBCUTANEOUS at 22:57

## 2021-11-04 NOTE — PROGRESS NOTES
Spiritual Care Assessment/Progress Note  [facility]      NAME: Nisreen Rios      MRN: [id number]  AGE: 49 y.o.  SEX: male  Congregation Affiliation: No preference   Language: English     11/4/2021     Total Time (in minutes): 19     Spiritual Assessment begun in [address] through conversation with:         [x]Patient        [] Family    [] Friend(s)        Reason for Consult: Initial/Spiritual assessment, patient floor     Spiritual beliefs: (Please include comment if needed)     [x] Identifies with a hayley tradition: Has belief         [] Supported by a hayley community:            [] Claims no spiritual orientation:           [] Seeking spiritual identity:                [] Adheres to an individual form of spirituality:           [] Not able to assess:                           Identified resources for coping:      [x] Prayer                               [] Music                  [] Guided Imagery     [x] Family/friends                 [] Pet visits     [] Devotional reading                         [] Unknown     [] Other:                                              Interventions offered during this visit: (See comments for more details)    Patient Interventions: Affirmation of emotions/emotional suffering, Catharsis/review of pertinent events in supportive environment, Prayer (actual)           Plan of Care:     [] Support spiritual and/or cultural needs    [] Support AMD and/or advance care planning process      [] Support grieving process   [] Coordinate Rites and/or Rituals    [] Coordination with community clergy   [] No spiritual needs identified at this time   [] Detailed Plan of Care below (See Comments)  [] Make referral to Music Therapy  [] Make referral to Pet Therapy     [] Make referral to Addiction services  [] Make referral to [facility]  [] Make referral to Spiritual Care Partner  [] No future visits requested        [x] Contact Spiritual Care for further referrals Comments:  visited Mr. Briana Hook for an initial spiritual care assessment on the Neuro-Science Tele unit, [facility]. Mr. Briana Hook was awake, alert, and sitting up in bed when the  came into the room. He made good eye contact and greeted the  warmly. He spoke of his long hospitalization and how much he has missed his family. He has been able to visit with them some, but his family lives 6 hours away from here, so those visits have not been frequent. Mr. Briana Hook shared that he is supposed to be discharged to a rehab facility closer to home, so he is looking forward to seeing his family more often.  inquired how she could best support Mr. Chavez at this time and he requested prayer. Prayer and assurance of prayer provided at the bedside. 's are available for further support upon referral  Мария Portillo. Mary Ann Hutchinson.      Paging Service: BUTCH ([phone])

## 2021-11-04 NOTE — PROGRESS NOTES
Bedside and Verbal shift change report given to [facility] (oncoming nurse) by Mauro Mitchell RN (offgoing nurse). Report included the following information SBAR, Kardex, Intake/Output, MAR, Recent Results, Cardiac Rhythm NSR-ST and Dual Neuro Assessment.

## 2021-11-04 NOTE — PROGRESS NOTES
Patient's midline is leaking at insertion site when flushed. RN perfectserved on-call NP, PRN IV medications changed to IM. ..will contact PICC team in AM.       0700: RN called PICC team, no answer.  Will try again

## 2021-11-04 NOTE — PROGRESS NOTES
Bedside shift change report given to Mauro Mitchell (oncoming nurse) by Jameel (Student) (offgoing nurse). Report included the following information SBAR, MAR and Cardiac Rhythm (NSR/ST).

## 2021-11-04 NOTE — PROGRESS NOTES
Problem: Patient Education: Go to Patient Education Activity  Goal: Patient/Family Education  Outcome: Progressing Towards Goal     Problem: Risk for Spread of Infection  Goal: Prevent transmission of infectious organism to others  Description: Prevent the transmission of infectious organisms to other patients, staff members, and visitors. Outcome: Progressing Towards Goal     Problem: Patient Education:  Go to Education Activity  Goal: Patient/Family Education  Outcome: Progressing Towards Goal     Problem: Falls - Risk of  Goal: *Absence of Falls  Description: Document Clydene Bone Fall Risk and appropriate interventions in the flowsheet. Outcome: Progressing Towards Goal  Note: Fall Risk Interventions:  Mobility Interventions: Communicate number of staff needed for ambulation/transfer    Mentation Interventions: Adequate sleep, hydration, pain control, Evaluate medications/consider consulting pharmacy    Medication Interventions: Evaluate medications/consider consulting pharmacy    Elimination Interventions: Call light in reach, Patient to call for help with toileting needs    History of Falls Interventions: Door open when patient unattended         Problem: Pressure Injury - Risk of  Goal: *Prevention of pressure injury  Description: Document Keenan Scale and appropriate interventions in the flowsheet. Outcome: Progressing Towards Goal  Note: Pressure Injury Interventions:  Sensory Interventions: Assess changes in LOC, Turn and reposition approx. every two hours (pillows and wedges if needed)    Moisture Interventions: Absorbent underpads, Check for incontinence Q2 hours and as needed    Activity Interventions: Assess need for specialty bed    Mobility Interventions: Assess need for specialty bed, Turn and reposition approx.  every two hours(pillow and wedges), HOB 30 degrees or less    Nutrition Interventions: Document food/fluid/supplement intake    Friction and Shear Interventions: HOB 30 degrees or less, Lift sheet, Minimize layers

## 2021-11-04 NOTE — PROGRESS NOTES
Transition of Care Plan   RUR- Med. 14%   DISPOSITION: SNF - [address] started 11/2   F/U with PCP/Specialist     Transport: [facility] scheduled for 11/6 at 9am (pending stability)    CM received [facility] trip confirmation # from patient's spouse: [id number], this has been provided to [facility] and scheduled for 11/6 at [address] as discussed with patient's spouse. This may be adjusted pending medical stability. Patient will need to have transport scheduled early in the morning given the 3.5 hour drive to [place], [place].    9:44am: CM received call from Cora Almonte at [address] [facility] regarding patient's HD chair; requested Hep B Antigen and Antibody labs to be faxed to [phone]. Labs have been faxed. BETSY Arciniega.

## 2021-11-04 NOTE — PROGRESS NOTES
[facility] Adult  Hospitalist Group                                                                                          Hospitalist Progress Note  Sarah Hre MD  Answering service: [phone] from in house phone        Date of Service:  2021  NAME:  Zulma Thornton  :  1970  MRN:  [id number]      Admission Summary:   Pt is a 52 yo M with PMH ESRD on HD, takes coumadin secondary to hx of DVT RUE with a recent prolonged hospitalization secondary to COVID PNA in August of this year. He ultimately required trach and peg and was in rehab at [facility]. Pt unable to provide HPI secondary to current cognition, therefore information is obtained via chart and provider. Per [facility] records, pt had a dislodged peg tube. He started having coffee ground emesis 10/18/21 with elevated HR and hypotension. He was sent to the ER for further workup. He was found to be hypotensive and ICU was consulted for further management. \"        10/25 Pt co some increased pain and discomfort overnight, was restrted on PTA fentanyl patch and prn ativan.      10/26 TCT tolerated X24 hours      10/29 hgb trending down - 8.1 (10/29) from 9.2 (10/26). Patient remains off the vent on trach collar with humidification. Otolaryngology consulted to downsize trach. Worsening SERGEY on HD- plan to receive dialysis today.     10/31- No acute events overnight. Patient is lying in bed, awake, alert and oriented x 4. On TC @ 35%, oxygen saturation  mid 90's.  Family at bedside and reports improvement with patient but wants to know when he can start moving around. Patient denies any complaints. No hematemesis, hematochezia or melena.     -No acute events overnight.  Patient seen on morning rounds.  He sitting up in bed awake alert and oriented x4.  Has been tolerating PMV with all p.o. liquids. Down to 28% and 6L on TC.  Oxygen saturation >92%. Jake Rene, Plan to receive dialysis today.          Interval history / Subjective: Assuming care of patient today. Patient seen and examined. He does not have any specific complaints. Tracheostomy removed yesterday. Assessment & Plan:        Hemorrhagic Shock/ ABLA d/t Acute GI Bleed, now Improved   - Initial CT reviewed large fluid filled mass, Drained by IR on 10/23 and pigtail drain left in place. 10/26 CT showed slightly diminished size of the abdominal wall collection.   - Repeat CT 11/2 significant decrease in size of the left rectus sheath hematoma. The small collection suggestive of congealed hematoma and is unlikely to drain through cath. - s/p EGD (11/01)  - S/P pRBCs, FFB and K centra. - Continue PPI   - Monitor labs and transfuse for hgb <7.0   - GI and GS following      Acute on chronic hypoxic respiratory failure with tracheostomy   -Resolved, tracheostomy removed 11/3     Renal:  ESRD on HD MWF  -  follow labs   - Nephrology following        Abdominal oral yeast infection. Wound culture on 10/23 grew apparent Candida albicans, no sensitivity available. Culture also showed coag negative staph which was deemed to be contaminant  -Continue IV Diflucan. ID following. WBC normalized. - surgery following, abdominal wall drain is attached to bag with purulent discharge    Right upper extremity acute DVT, POA. Patient is on warfarin but came with hemorrhagic shock and supratherapeutic INR requiring reversal  --Anticoagulation on hold.     Right upper and left lower extremities appeared weaker than the rest on my exam today, 11/4  --Obtain head CT     Morbid obesity   - BMI 41.69  - Weight loss management to be followed up outpatient    Code status: Full code  DVT prophylaxis: SCDs    Care Plan discussed with: Patient/Family  Anticipated Disposition: SNF/LTC  Anticipated Discharge: Greater than 48 hours     Hospital Problems  Date Reviewed: 10/29/2021          Codes Class Noted POA    Abdominal wall fluid collections ICD-10-CM: R18.8  ICD-9-CM: 789.59  10/25/2021 Unknown        Severe protein-calorie malnutrition ([facility] [address] ICD-10-CM: E43  ICD-9-CM: 262  10/21/2021 Unknown        GIB (gastrointestinal bleeding) ICD-10-CM: K92.2  ICD-9-CM: 578.9  10/18/2021 Unknown                Review of Systems:   A comprehensive review of systems was negative except for that written in the HPI. Vital Signs:    Last 24hrs VS reviewed since prior progress note. Most recent are:  Visit Vitals  /78   Pulse (!) 108   Temp 99.6 °F (37.6 °C)   Resp (!) 43   Ht 5' 11.5\" (1.816 m)   Wt 134.6 kg (296 lb 11.8 oz)   SpO2 100%   BMI 40.81 kg/m²         Intake/Output Summary (Last 24 hours) at 11/4/2021 1125  Last data filed at 11/3/2021 1720  Gross per 24 hour   Intake --   Output 1757 ml   Net -1757 ml        Physical Examination:     I had a face to face encounter with this patient and independently examined them on 11/4/2021 as outlined below:          Constitutional:  No acute distress, cooperative, pleasant    ENT:  Oral mucosa moist, oropharynx benign. Resp:  CTA bilaterally. No wheezing/rhonchi/rales. No accessory muscle use   CV:  Regular rhythm, normal rate, no murmurs, gallops, rubs    GI:  Soft, non distended, non tender. normoactive bowel sounds, no hepatosplenomegaly or abdominal wall drainage tube has purulent discharge in the bag    Musculoskeletal:  No edema, warm, 2+ pulses throughout    Neurologic:  Right upper extremity and left lower extremity weak.   AAOx3, CN II-XII reviewed            Data Review:    Review and/or order of clinical lab test      Labs:     Recent Labs     11/04/21 0220 11/03/21 0249   WBC 8.0 10.6   HGB 8.6* 8.6*   HCT 28.6* 28.3*    250     Recent Labs     11/04/21 0220 11/03/21 0249 11/02/21  0643 11/01/21  1258   * 134* 137  --    K 4.2 4.1 3.6  --     102 104  --    CO2 27 25 26  --    BUN 25* 30* 22*  --    CREA 3.45* 4.02* 3.10*  --     106* 105*  --    CA 9.3 9.5 9.4  --    MG  --   --   --  1.6   PHOS  --   --   -- 3.8     Recent Labs     11/04/21  0220 11/03/21  0249 11/02/21  0643   ALT 22 23 21    114 113   TBILI 0.5 0.7 0.7   TP 6.1* 6.1* 6.0*   ALB 2.5* 2.5* 2.5*   GLOB 3.6 3.6 3.5     No results for input(s): INR, PTP, APTT, INREXT, INREXT in the last 72 hours. No results for input(s): FE, TIBC, PSAT, FERR in the last 72 hours. No results found for: FOL, RBCF   No results for input(s): PH, PCO2, PO2 in the last 72 hours. No results for input(s): CPK, CKNDX, TROIQ in the last 72 hours.     No lab exists for component: CPKMB  No results found for: CHOL, CHOLX, CHLST, CHOLV, HDL, HDLP, LDL, LDLC, DLDLP, TGLX, TRIGL, TRIGP, CHHD, CHHDX  No results found for: GLUCPOC  No results found for: COLOR, APPRN, SPGRU, REFSG, ORIANA, PROTU, GLUCU, KETU, BILU, UROU, SPENCER, LEUKU, GLUKE, EPSU, BACTU, WBCU, RBCU, CASTS, UCRY      Medications Reviewed:     Current Facility-Administered Medications   Medication Dose Route Frequency    pantoprazole (PROTONIX) tablet 40 mg  40 mg Oral ACB    HYDROmorphone (DILAUDID) injection 0.5 mg  0.5 mg IntraMUSCular Q4H PRN    LORazepam (ATIVAN) injection 1 mg  1 mg IntraMUSCular Q12H PRN    collagenase (SANTYL) 250 unit/gram ointment   Topical DAILY    heparin (porcine) 1,000 unit/mL injection 4,000 Units  4,000 Units Hemodialysis DIALYSIS PRN    alteplase (CATHFLO) 1.8 mg in sterile water (preservative free) 1.8 mL injection  1.8 mg InterCATHeter DIALYSIS ONCE    alteplase (CATHFLO) 1.8 mg in sterile water (preservative free) 1.8 mL injection  1.8 mg InterCATHeter DIALYSIS ONCE    LORazepam (ATIVAN) tablet 0.5 mg  0.5 mg Oral BID    albumin human 25% (BUMINATE) solution 25 g  25 g IntraVENous DIALYSIS PRN    epoetin kayley-epbx (RETACRIT) injection 10,000 Units  10,000 Units SubCUTAneous Q TUE, THU & SAT    B complex-vitaminC-folic acid (NEPHROCAP) cap  1 Capsule Oral DAILY    midodrine (PROAMATINE) tablet 10 mg  10 mg Oral Q8H    heparin (porcine) 1,000 unit/mL injection 1,800 Units 1,800 Units InterCATHeter DIALYSIS PRN    And    heparin (porcine) 1,000 unit/mL injection 1,800 Units  1,800 Units InterCATHeter DIALYSIS PRN    fentaNYL (DURAGESIC) 25 mcg/hr patch 1 Patch  1 Patch TransDERmal Q72H    melatonin tablet 9 mg  9 mg Oral QHS    albuterol-ipratropium (DUO-NEB) 2.5 MG-0.5 MG/3 ML  3 mL Nebulization BID    fluconazole (DIFLUCAN) 200mg/100 mL IVPB (premix)  200 mg IntraVENous Q24H    sodium chloride (NS) flush 5-40 mL  5-40 mL IntraVENous Q8H    sodium chloride (NS) flush 5-40 mL  5-40 mL IntraVENous PRN    chlorhexidine (ORAL CARE KIT) 0.12 % mouthwash 15 mL  15 mL Oral Q12H    balsam peru-castor oiL (VENELEX) ointment   Topical BID    sodium chloride (NS) flush 5-10 mL  5-10 mL IntraVENous PRN    sodium chloride (NS) flush 5-40 mL  5-40 mL IntraVENous Q8H    sodium chloride (NS) flush 5-40 mL  5-40 mL IntraVENous PRN    acetaminophen (TYLENOL) tablet 650 mg  650 mg Oral Q6H PRN    Or    acetaminophen (TYLENOL) suppository 650 mg  650 mg Rectal Q6H PRN    polyethylene glycol (MIRALAX) packet 17 g  17 g Oral DAILY PRN    ondansetron (ZOFRAN ODT) tablet 4 mg  4 mg Oral Q8H PRN    Or    ondansetron (ZOFRAN) injection 4 mg  4 mg IntraVENous Q6H PRN    albuterol (PROVENTIL VENTOLIN) nebulizer solution 2.5 mg  2.5 mg Nebulization Q4H PRN     ______________________________________________________________________  EXPECTED LENGTH OF STAY: 4d 9h  ACTUAL LENGTH OF STAY:          17                 Marlys Jordan MD

## 2021-11-04 NOTE — PROGRESS NOTES
Name: Charity Shannon MRN: [id number]   : 1970 Hospital: [address]   Date: 2021        IMPRESSION:   · SERGEY, HD dependent. Patient was dialyzed on Wednesday  · Hypervolemia with predominantly central congestion, improved. · Anemia of multifactorial origin  · Respiratory failure due to a complicated Covid pneumonia, recovered. Trache removed  · Hypokalemia pre HD      PLAN:   · Continue present care  · Next HD - on Friday  · Watch for renal function recovery. Patient needs urine output to be recorded. Will check pre HD renal panel. · Anemia management- start Retacrit. Check the iron profile. Subjective/Interval History:   I have reviewed the flowsheet and previous days notes. ROS:Review of systems not obtained due to patient factors. Objective:   Vital Signs:    Visit Vitals  BP 99/78   Pulse (!) 111   Temp 99.6 °F (37.6 °C)   Resp (!) 43   Ht 5' 11.5\" (1.816 m)   Wt 134.6 kg (296 lb 11.8 oz)   SpO2 100%   BMI 40.81 kg/m²       O2 Device: Nasal cannula   O2 Flow Rate (L/min): 3 l/min (decreased to 2 )   Temp (24hrs), Av.3 °F (36.8 °C), Min:97.8 °F (36.6 °C), Max:99.6 °F (37.6 °C)       Intake/Output:   Last shift:      No intake/output data recorded. Last 3 shifts:  1901 -  0700  In: -   Out: 1757     Intake/Output Summary (Last 24 hours) at 2021 1556  Last data filed at 11/3/2021 1720  Gross per 24 hour   Intake --   Output 1757 ml   Net -1757 ml        Physical Exam:  General:    Awake, Not communicating verbally. Head:   Normocephalic, without obvious abnormality, atraumatic. Eyes:   Conjunctivae/corneas clear. Nose:  Nares normal. No drainage or sinus tenderness. Throat:    Lips, mucosa, and tongue normal.    Neck:  Supple. No trache  Lungs:   Clear to auscultation bilaterally. No Wheezing or Rhonchi. No rales. Chest wall:  No tenderness or deformity. No Accessory muscle use. Heart:   Regular rate and rhythm,  no murmur, rub or gallop.   Abdomen: Soft. Distended. Bowel sounds normal. PEG tube in place. Extremities: Extremities normal, atraumatic, No cyanosis. No edema. No clubbing  Skin:     Texture, turgor normal. No rashes or lesions. Not Jaundiced  Psych:  Calm. Neurologic: Non focal.      DATA:  Labs:  Recent Labs     11/04/21 0220 11/03/21 0249 11/02/21  0643   * 134* 137   K 4.2 4.1 3.6    102 104   CO2 27 25 26   BUN 25* 30* 22*   CREA 3.45* 4.02* 3.10*   CA 9.3 9.5 9.4   ALB 2.5* 2.5* 2.5*     Recent Labs     11/04/21 0220 11/03/21 0249 11/02/21  0643   WBC 8.0 10.6 10.4   HGB 8.6* 8.6* 8.7*   HCT 28.6* 28.3* 28.7*    250 241     No results for input(s): DOLORES, KU, CLU, CREAU in the last 72 hours.     No lab exists for component: PROU    Total time spent with patient:  35 minutes    [] Critical Care Provided    Care Plan discussed with:   Susan Rios MD

## 2021-11-04 NOTE — PROGRESS NOTES
Problem: Mobility Impaired (Adult and Pediatric)  Goal: *Acute Goals and Plan of Care (Insert Text)  Description: FUNCTIONAL STATUS PRIOR TO ADMISSION: Patient was independent and active without use of DME prior to August 2021. Hospitalized in [place], [place] from August to mid October with COVID 19, now with trach and on HD. Was admitted to [facility] for four days prior to admission and stated he had not begun therapy there. HOME SUPPORT PRIOR TO ADMISSION: The patient lived with his wife of 27 years. Lives in [place], [place] Admitted from [facility]. Physical Therapy Goals  Goals continued 11/4/21    Physical Therapy Goals  Goals re-assessed 10/28/2021   1. Patient will move from supine to sit and sit to supine, scoot up and down, and roll side to side in bed with maximal assistance x2 within 7 day(s). 2.  Patient will tolerate HOB elevated at 50 degrees 30 min BID within 7 days. 3.  Patient will be independent in supine HEP for LEs within 7 days. Initiated 10/22/2021  1. Patient will move from supine to sit and sit to supine, scoot up and down, and roll side to side in bed with maximal assistance x2 within 7 day(s). 2.  Patient will tolerate HOB elevated at 50 degrees 10 min BID within 7 days. 3.  Patient will be independent in supine HEP for LEs within 7 days. 4.  Patient will tolerate PRAFO boot (alternating feet every 2 hours) within 7 days. Goal met      Outcome: Not Progressing Towards Goal   PHYSICAL THERAPY TREATMENT: WEEKLY REASSESSMENT  Patient: Carter Goncalves (49 y.o. male)  Date: 11/4/2021  Primary Diagnosis: GIB (gastrointestinal bleeding) [K92.2]  Procedure(s) (LRB):  ESOPHAGOGASTRODUODENOSCOPY (EGD) at Bedside (N/A)  ESOPHAGOGASTRODUODENAL (EGD) BIOPSY (N/A) 16 Days Post-Op   Precautions:   Fall, Skin, Contact      ASSESSMENT  Patient continues with skilled PT services and is not progressing towards goals. He continues to require Max Ax2 to roll R and L.  Patient demonstrates improved ability to roll and maintain R sidelying maintaining grasp with R UE on bed rail. Patient is provided Max A x2 for scooting up the bed and rolling in to R sidelying to attempt to have a bowel movement. Patient's progression toward goals since last assessment: goals continued     Current Level of Function Impacting Discharge (mobility/balance): Max Ax2 to Total     Functional Outcome Measure: The patient scored 0/28 on the Tinetti outcome measure. Other factors to consider for discharge: medical stability, increased need for assistance, inability to transfer or ambulate at this time         PLAN :  Goals have been updated based on progression since last assessment. Patient continues to benefit from skilled intervention to address the above impairments. Recommendations and Planned Interventions: bed mobility training, transfer training, gait training, therapeutic exercises, neuromuscular re-education, edema management/control, patient and family training/education, and therapeutic activities      Frequency/Duration: Patient will be followed by physical therapy:  3 times a week to address goals. Recommendation for discharge: (in order for the patient to meet his/her long term goals)  Therapy up to 5 days/week in SNF setting    This discharge recommendation:  Has been made in collaboration with the attending provider and/or case management    IF patient discharges home will need the following DME: to be determined (TBD)         SUBJECTIVE:   Patient stated i'm alright.     OBJECTIVE DATA SUMMARY:   HISTORY:    Past Medical History:   Diagnosis Date    COVID     Dialysis patient [facility])     started around the end of september 2021    Polycystic kidney disease     S/P percutaneous endoscopic gastrostomy (PEG) tube placement ([facility] [address]    No past surgical history on file.     Personal factors and/or comorbidities impacting plan of care: please see above    Home Situation  Home Environment:  (admitted from [address] [place] [address])  Support Systems: Spouse/Significant Other    EXAMINATION/PRESENTATION/DECISION MAKING:   Critical Behavior:  Neurologic State: Alert  Orientation Level: Oriented X4  Cognition: Appropriate decision making, Appropriate for age attention/concentration, Appropriate safety awareness, Follows commands  Safety/Judgement: Awareness of environment, Fall prevention  Hearing:     Skin:    Edema:   Range Of Motion:  AROM: Grossly decreased, non-functional           PROM: Grossly decreased, non-functional           Strength:    Strength: Grossly decreased, non-functional                    Tone & Sensation:   Tone: Abnormal                              Coordination:  Coordination: Grossly decreased, non-functional  Vision:      Functional Mobility:  Bed Mobility:  Rolling: Total assistance  Supine to Sit: Total assistance  Sit to Supine: Total assistance     Transfers:                             Balance:      Ambulation/Gait Training:                                                         Stairs: Therapeutic Exercises:       Functional Measure:  Tinetti test:    Sitting Balance: 0  Arises: 0  Attempts to Rise: 0  Immediate Standing Balance: 0  Standing Balance: 0  Nudged: 0  Eyes Closed: 0  Turn 360 Degrees - Continuous/Discontinuous: 0  Turn 360 Degrees - Steady/Unsteady: 0  Sitting Down: 0  Balance Score: 0 Balance total score  Indication of Gait: 0  R Step Length/Height: 0  L Step Length/Height: 0  R Foot Clearance: 0  L Foot Clearance: 0  Step Symmetry: 0  Step Continuity: 0  Path: 0  Trunk: 0  Walking Time: 0  Gait Score: 0 Gait total score  Total Score: 0/28 Overall total score         Tinetti Tool Score Risk of Falls  <19 = High Fall Risk  19-24 = Moderate Fall Risk  25-28 = Low Fall Risk  Tinetti ME. Performance-Oriented Assessment of Mobility Problems in Elderly Patients. Tracy 66; [id number].  (Scoring Description: PT Bulletin Feb. 10, 1993)    Older adults: Mariaa Lorenz et al, 2009; n = [address] [facility] elderly evaluated with ABC, DOUGLAS, ADL, and IADL)  · Mean DOUGLAS score for males aged 69-68 years = 26.21(3.40)  · Mean DOUGLAS score for females age 69-68 years = 25.16(4.30)  · Mean DOUGLAS score for males over 80 years = 23.29(6.02)  · Mean DOUGLAS score for females over 80 years = 17.20(8.32)           Pain Rating:      Activity Tolerance:   Fair    After treatment patient left in no apparent distress:   Patient positioned in right sidelying for pressure relief, Call bell within reach, and Side rails x 3    COMMUNICATION/EDUCATION:   The patients plan of care was discussed with: Registered nurse. Fall prevention education was provided and the patient/caregiver indicated understanding., Patient/family have participated as able in goal setting and plan of care. , and Patient/family agree to work toward stated goals and plan of care.     Thank you for this referral.  Da Zhou, PT   Time Calculation: 20 mins

## 2021-11-05 ENCOUNTER — APPOINTMENT (OUTPATIENT)
Dept: INTERVENTIONAL RADIOLOGY/VASCULAR | Age: 51
DRG: 368 | End: 2021-11-05
Attending: INTERNAL MEDICINE
Payer: COMMERCIAL

## 2021-11-05 LAB
ALBUMIN SERPL-MCNC: 2.4 G/DL (ref 3.5–5)
ALBUMIN/GLOB SERPL: 0.8 {RATIO} (ref 1.1–2.2)
ALP SERPL-CCNC: 111 U/L (ref 45–117)
ALT SERPL-CCNC: 20 U/L (ref 12–78)
ANION GAP SERPL CALC-SCNC: 8 MMOL/L (ref 5–15)
AST SERPL-CCNC: 19 U/L (ref 15–37)
BASOPHILS # BLD: 0.1 K/UL (ref 0–0.1)
BASOPHILS NFR BLD: 1 % (ref 0–1)
BILIRUB SERPL-MCNC: 0.6 MG/DL (ref 0.2–1)
BUN SERPL-MCNC: 32 MG/DL (ref 6–20)
BUN/CREAT SERPL: 8 (ref 12–20)
CALCIUM SERPL-MCNC: 9.3 MG/DL (ref 8.5–10.1)
CHLORIDE SERPL-SCNC: 101 MMOL/L (ref 97–108)
CO2 SERPL-SCNC: 25 MMOL/L (ref 21–32)
CREAT SERPL-MCNC: 4.02 MG/DL (ref 0.7–1.3)
DIFFERENTIAL METHOD BLD: ABNORMAL
EOSINOPHIL # BLD: 0.3 K/UL (ref 0–0.4)
EOSINOPHIL NFR BLD: 3 % (ref 0–7)
ERYTHROCYTE [DISTWIDTH] IN BLOOD BY AUTOMATED COUNT: 19.9 % (ref 11.5–14.5)
GLOBULIN SER CALC-MCNC: 3.1 G/DL (ref 2–4)
GLUCOSE SERPL-MCNC: 103 MG/DL (ref 65–100)
HCT VFR BLD AUTO: 27.2 % (ref 36.6–50.3)
HGB BLD-MCNC: 8.4 G/DL (ref 12.1–17)
IMM GRANULOCYTES # BLD AUTO: 0.1 K/UL (ref 0–0.04)
IMM GRANULOCYTES NFR BLD AUTO: 2 % (ref 0–0.5)
LYMPHOCYTES # BLD: 2.3 K/UL (ref 0.8–3.5)
LYMPHOCYTES NFR BLD: 28 % (ref 12–49)
MCH RBC QN AUTO: 31 PG (ref 26–34)
MCHC RBC AUTO-ENTMCNC: 30.9 G/DL (ref 30–36.5)
MCV RBC AUTO: 100.4 FL (ref 80–99)
MONOCYTES # BLD: 1.3 K/UL (ref 0–1)
MONOCYTES NFR BLD: 16 % (ref 5–13)
NEUTS SEG # BLD: 4.1 K/UL (ref 1.8–8)
NEUTS SEG NFR BLD: 50 % (ref 32–75)
NRBC # BLD: 0 K/UL (ref 0–0.01)
NRBC BLD-RTO: 0 PER 100 WBC
PLATELET # BLD AUTO: 225 K/UL (ref 150–400)
PMV BLD AUTO: 10 FL (ref 8.9–12.9)
POTASSIUM SERPL-SCNC: 4.5 MMOL/L (ref 3.5–5.1)
PROT SERPL-MCNC: 5.5 G/DL (ref 6.4–8.2)
RBC # BLD AUTO: 2.71 M/UL (ref 4.1–5.7)
SODIUM SERPL-SCNC: 134 MMOL/L (ref 136–145)
WBC # BLD AUTO: 8.1 K/UL (ref 4.1–11.1)

## 2021-11-05 PROCEDURE — 77010033678 HC OXYGEN DAILY

## 2021-11-05 PROCEDURE — 74011250637 HC RX REV CODE- 250/637: Performed by: HEALTH CARE PROVIDER

## 2021-11-05 PROCEDURE — 02H633Z INSERTION OF INFUSION DEVICE INTO RIGHT ATRIUM, PERCUTANEOUS APPROACH: ICD-10-PCS | Performed by: RADIOLOGY

## 2021-11-05 PROCEDURE — 74011250636 HC RX REV CODE- 250/636: Performed by: NURSE PRACTITIONER

## 2021-11-05 PROCEDURE — 65660000000 HC RM CCU STEPDOWN

## 2021-11-05 PROCEDURE — 85025 COMPLETE CBC W/AUTO DIFF WBC: CPT

## 2021-11-05 PROCEDURE — 74011250637 HC RX REV CODE- 250/637: Performed by: NURSE PRACTITIONER

## 2021-11-05 PROCEDURE — 90935 HEMODIALYSIS ONE EVALUATION: CPT

## 2021-11-05 PROCEDURE — 36558 INSERT TUNNELED CV CATH: CPT

## 2021-11-05 PROCEDURE — 74011250637 HC RX REV CODE- 250/637: Performed by: HOSPITALIST

## 2021-11-05 PROCEDURE — 74011250636 HC RX REV CODE- 250/636: Performed by: RADIOLOGY

## 2021-11-05 PROCEDURE — 77030002996 HC SUT SLK J&J -A

## 2021-11-05 PROCEDURE — 94640 AIRWAY INHALATION TREATMENT: CPT

## 2021-11-05 PROCEDURE — 99232 SBSQ HOSP IP/OBS MODERATE 35: CPT | Performed by: NURSE PRACTITIONER

## 2021-11-05 PROCEDURE — 74011000258 HC RX REV CODE- 258: Performed by: RADIOLOGY

## 2021-11-05 PROCEDURE — C1769 GUIDE WIRE: HCPCS

## 2021-11-05 PROCEDURE — 74011250636 HC RX REV CODE- 250/636: Performed by: HOSPITALIST

## 2021-11-05 PROCEDURE — 74011250637 HC RX REV CODE- 250/637: Performed by: INTERNAL MEDICINE

## 2021-11-05 PROCEDURE — C1750 CATH, HEMODIALYSIS,LONG-TERM: HCPCS

## 2021-11-05 PROCEDURE — 36415 COLL VENOUS BLD VENIPUNCTURE: CPT

## 2021-11-05 PROCEDURE — 2709999900 HC NON-CHARGEABLE SUPPLY

## 2021-11-05 PROCEDURE — 74011000250 HC RX REV CODE- 250: Performed by: RADIOLOGY

## 2021-11-05 PROCEDURE — 80053 COMPREHEN METABOLIC PANEL: CPT

## 2021-11-05 PROCEDURE — 74011000250 HC RX REV CODE- 250: Performed by: INTERNAL MEDICINE

## 2021-11-05 PROCEDURE — 36581 REPLACE TUNNELED CV CATH: CPT

## 2021-11-05 PROCEDURE — 74011000250 HC RX REV CODE- 250: Performed by: NURSE PRACTITIONER

## 2021-11-05 PROCEDURE — 74011250636 HC RX REV CODE- 250/636: Performed by: INTERNAL MEDICINE

## 2021-11-05 RX ORDER — LIDOCAINE HYDROCHLORIDE 20 MG/ML
20 INJECTION, SOLUTION INFILTRATION; PERINEURAL
Status: COMPLETED | OUTPATIENT
Start: 2021-11-05 | End: 2021-11-05

## 2021-11-05 RX ORDER — SODIUM CHLORIDE 9 MG/ML
25 INJECTION, SOLUTION INTRAVENOUS CONTINUOUS
Status: DISCONTINUED | OUTPATIENT
Start: 2021-11-05 | End: 2021-11-05 | Stop reason: HOSPADM

## 2021-11-05 RX ORDER — MIDAZOLAM HYDROCHLORIDE 1 MG/ML
5 INJECTION, SOLUTION INTRAMUSCULAR; INTRAVENOUS
Status: DISCONTINUED | OUTPATIENT
Start: 2021-11-05 | End: 2021-11-05

## 2021-11-05 RX ORDER — NALOXONE HYDROCHLORIDE 0.4 MG/ML
0.4 INJECTION, SOLUTION INTRAMUSCULAR; INTRAVENOUS; SUBCUTANEOUS AS NEEDED
Status: DISCONTINUED | OUTPATIENT
Start: 2021-11-05 | End: 2021-11-05 | Stop reason: HOSPADM

## 2021-11-05 RX ORDER — HEPARIN SODIUM 1000 [USP'U]/ML
10000 INJECTION, SOLUTION INTRAVENOUS; SUBCUTANEOUS
Status: COMPLETED | OUTPATIENT
Start: 2021-11-05 | End: 2021-11-05

## 2021-11-05 RX ORDER — FENTANYL CITRATE 50 UG/ML
100 INJECTION, SOLUTION INTRAMUSCULAR; INTRAVENOUS
Status: DISCONTINUED | OUTPATIENT
Start: 2021-11-05 | End: 2021-11-05

## 2021-11-05 RX ORDER — FLUMAZENIL 0.1 MG/ML
0.5 INJECTION INTRAVENOUS ONCE
Status: DISCONTINUED | OUTPATIENT
Start: 2021-11-05 | End: 2021-11-05 | Stop reason: HOSPADM

## 2021-11-05 RX ADMIN — ALTEPLASE 1.8 MG: 2.2 INJECTION, POWDER, LYOPHILIZED, FOR SOLUTION INTRAVENOUS at 10:30

## 2021-11-05 RX ADMIN — Medication: at 11:00

## 2021-11-05 RX ADMIN — COLLAGENASE SANTYL: 250 OINTMENT TOPICAL at 11:30

## 2021-11-05 RX ADMIN — MIDAZOLAM 5 MG: 1 INJECTION INTRAMUSCULAR; INTRAVENOUS at 17:00

## 2021-11-05 RX ADMIN — PANTOPRAZOLE SODIUM 40 MG: 40 TABLET, DELAYED RELEASE ORAL at 07:11

## 2021-11-05 RX ADMIN — OXYCODONE 5 MG: 5 TABLET ORAL at 00:01

## 2021-11-05 RX ADMIN — MIDODRINE HYDROCHLORIDE 10 MG: 5 TABLET ORAL at 21:54

## 2021-11-05 RX ADMIN — HEPARIN SODIUM 4000 UNITS: 1000 INJECTION INTRAVENOUS; SUBCUTANEOUS at 09:02

## 2021-11-05 RX ADMIN — CHLORHEXIDINE GLUCONATE 15 ML: 0.12 RINSE ORAL at 09:00

## 2021-11-05 RX ADMIN — Medication 9 MG: at 21:54

## 2021-11-05 RX ADMIN — SODIUM CHLORIDE 25 ML/HR: 9 INJECTION, SOLUTION INTRAVENOUS at 16:44

## 2021-11-05 RX ADMIN — LIDOCAINE HYDROCHLORIDE 10 ML: 20 INJECTION, SOLUTION INFILTRATION; PERINEURAL at 16:00

## 2021-11-05 RX ADMIN — HYDROMORPHONE HYDROCHLORIDE 1 MG: 2 TABLET ORAL at 18:13

## 2021-11-05 RX ADMIN — LORAZEPAM 1 MG: 2 INJECTION INTRAMUSCULAR; INTRAVENOUS at 00:01

## 2021-11-05 RX ADMIN — Medication 10 ML: at 07:12

## 2021-11-05 RX ADMIN — HEPARIN SODIUM 1800 UNITS: 1000 INJECTION INTRAVENOUS; SUBCUTANEOUS at 13:52

## 2021-11-05 RX ADMIN — IPRATROPIUM BROMIDE AND ALBUTEROL SULFATE 3 ML: .5; 3 SOLUTION RESPIRATORY (INHALATION) at 09:23

## 2021-11-05 RX ADMIN — FENTANYL CITRATE 25 MCG: 50 INJECTION INTRAMUSCULAR; INTRAVENOUS at 17:12

## 2021-11-05 RX ADMIN — Medication 10 ML: at 21:54

## 2021-11-05 RX ADMIN — CEFAZOLIN 3 G: 10 INJECTION, POWDER, FOR SOLUTION INTRAVENOUS at 16:55

## 2021-11-05 RX ADMIN — ACETAMINOPHEN 650 MG: 325 TABLET ORAL at 13:26

## 2021-11-05 RX ADMIN — ALTEPLASE 1.8 MG: 2.2 INJECTION, POWDER, LYOPHILIZED, FOR SOLUTION INTRAVENOUS at 10:29

## 2021-11-05 RX ADMIN — Medication 10 ML: at 18:04

## 2021-11-05 RX ADMIN — LORAZEPAM 0.5 MG: 0.5 TABLET ORAL at 10:16

## 2021-11-05 RX ADMIN — MIDODRINE HYDROCHLORIDE 10 MG: 5 TABLET ORAL at 07:11

## 2021-11-05 RX ADMIN — FENTANYL CITRATE 50 MCG: 50 INJECTION INTRAMUSCULAR; INTRAVENOUS at 17:00

## 2021-11-05 RX ADMIN — HEPARIN SODIUM 3800 UNITS: 1000 INJECTION INTRAVENOUS; SUBCUTANEOUS at 16:00

## 2021-11-05 RX ADMIN — LORAZEPAM 0.5 MG: 0.5 TABLET ORAL at 18:01

## 2021-11-05 RX ADMIN — MIDODRINE HYDROCHLORIDE 10 MG: 5 TABLET ORAL at 13:26

## 2021-11-05 RX ADMIN — Medication 1 CAPSULE: at 10:16

## 2021-11-05 RX ADMIN — Medication: at 18:16

## 2021-11-05 RX ADMIN — OXYCODONE 5 MG: 5 TABLET ORAL at 21:54

## 2021-11-05 RX ADMIN — Medication 10 ML: at 18:05

## 2021-11-05 RX ADMIN — LORAZEPAM 1 MG: 2 INJECTION INTRAMUSCULAR; INTRAVENOUS at 21:54

## 2021-11-05 RX ADMIN — IPRATROPIUM BROMIDE AND ALBUTEROL SULFATE 3 ML: .5; 3 SOLUTION RESPIRATORY (INHALATION) at 21:55

## 2021-11-05 NOTE — PROGRESS NOTES
Bedside shift change report given to Iraj Vela RN (oncoming nurse) by Luz Maria Acevedo RN (offgoing nurse). Report included the following information SBAR, Kardex, Intake/Output, MAR, Recent Results, Cardiac Rhythm Sinus Tach and Dual Neuro Assessment.

## 2021-11-05 NOTE — DIALYSIS
Hemodialysis / [phone]    Vitals Pre Post Assessment Pre Post   BP BP: (!) 117/93 (11/05/21 0952) 121/84 LOC AOX3 AOX3   HR Pulse (Heart Rate): (!) 116 (11/05/21 1000) 107 Lungs CTA CTA   Resp Resp Rate: 17 (11/05/21 0952) 22 Cardiac REGULAR REGULAR   Temp Temp: 98 °F (36.7 °C) (11/05/21 0952) 98.6 Skin WARM DRY INTACT WARM DRY INTACT   Weight Pre-Dialysis Weight: 137.5 kg (303 lb 2.1 oz) (11/01/21 1300)  Edema GENERALIZED GENERALIZED   Tele status   Pain Pain Intensity 1: 7 (11/04/21 2150) 3/10     Orders   Duration: Start: Hemodialysis Start Time: 2476 (11/05/21 0858) End: 1400 Total: 3   Dialyzer: Dialyzer/Set Up Inspection: Revaclear (11/05/21 0858)   K Bath: Dialysate K (mEq/L): 4 (11/05/21 0858)   Ca Bath: Dialysate CA (mEq/L): 2.5 (11/05/21 0858)   Na: Dialysate NA (mEq/L): 138 (11/05/21 0858)   Bicarb: Dialysate HCO3 (mEq/L): 35 (11/05/21 0858)   Target Fluid Removal: Goal/Amount of Fluid to Remove (mL): 3000 mL (11/05/21 0858)     Access   Type & Location: RIJ TUNNELED HD CVC-Sluggish blood return from red lumen, blue lumen brisk blood return. Lines reversed at onset of HD. Dressing cdi, Each catheter limb disinfected per p&p, caps removed, hubs disinfected per p&p. Each dialysis catheter limb disinfected per p&p, blood returned per p&p, each dialysis hub disinfected per p&p, post dialysis catheter dwell instilled per order, and caps applied. Comments:                                        Labs   HBsAg (Antigen) / date: Hepatitis B surface Ag   Date Value Ref Range Status   10/20/2021 0.16 Index Final                                       HBsAb (Antibody) / date: Hep B surface Ab Interp.    Date Value Ref Range Status   10/20/2021 REACTIVE (A) NR   Final      Source:    Obtained/Reviewed  Critical Results Called HGB   Date Value Ref Range Status   11/05/2021 8.4 (L) 12.1 - 17.0 g/dL Final     Potassium   Date Value Ref Range Status   11/05/2021 4.5 3.5 - 5.1 mmol/L Final     Calcium   Date Value Ref Range Status   11/05/2021 9.3 8.5 - 10.1 MG/DL Final     BUN   Date Value Ref Range Status   11/05/2021 32 (H) 6 - 20 MG/DL Final     Creatinine   Date Value Ref Range Status   11/05/2021 4.02 (H) 0.70 - 1.30 MG/DL Final        Meds Given   Name Dose Route   Heparin  4000units pre hd    cathflo 1.8mg per lumen    Heparin  1800units per lumen Post hd      Adequacy / Fluid    Total Liters Process: 35L   Net Fluid Removed: 2000ml      Comments   Time Out Done:   (Time) 0845   Admitting Diagnosis: bud   Consent obtained/signed: Informed Consent Verified: Yes (11/05/21 0890)   Machine / Ennis Edgar # Machine Number: [id number] (11/05/21 6719)   Primary Nurse Rpt Pre: Caretha Goldmann RN   Primary Nurse Rpt Post: Caretha Goldmann RN    Pt Education: procedural   Care Plan: Cont current hd poc    Pts outpatient clinic: na     Tx Summary   Comments:       After treatment was started art pressures cont to climb until tx had to be terminated at 0914. Order for cathflo noted and given at [phone]. Cathflo aspirated at 1145. Red lumen still sluggish. Lines reversed upon restarting HD at 1149. HD ended at 1400 2/2 clotted lines and poorly functioning CVC. Dr. Jh Erickson notified. Pt schedule for cvc replace today.

## 2021-11-05 NOTE — WOUND CARE
Wound Care Note:     Re-consulted by nurse request for several wounds at intergluteal cleft    Chart shows:  Admitted for GIB, severe protein-calorie malnutrition and abdominal wall fluid collection  Past Medical History:   Diagnosis Date   Verlee Rings Dialysis patient [facility])     started around the end of september 2021    Polycystic kidney disease     S/P percutaneous endoscopic gastrostomy (PEG) tube placement ([facility] [address]      WBC = 8.1 on 11/5/21  Admitted from [address]:   Patient is A&O x 4, communicative, incontinent with moderate assistance needed in repositioning. Bed: [place]  Diet: Adult diet regular    Left heels and sacral skin intact and without erythema. 1. POA left heel with maroon crusted area approximately 0.4 cm x 2.5 cm, slight blister has resolved, no drainage, wound edges are closed. Venelex ointment being applied. 2.  POA left buttock wound looks the same, covered with slough, undermining at 6 o'clock still about 2.8 cm, small serous drainage, wound edges are open. Santyl ointment, 2 x 2 and Optifoam gentle applied. 3.  POA left flank wound resolved. 4.  Bilateral lower buttocks with moisture associated skin damage on both sides of skin tag, wound beds are pink, moist, wound edges are open, look the same. Calazime lotion applied. No additional wound noted. Patient repositioned on right side. Heels offloaded; one on pillow, the other in multipodus boot. Recommendations:    Continue with current wound care except no dressing needed for left flank. Bilateral heels- Every 12 hours liberally apply Venelex ointment     Left buttock- Daily cleanse with normal saline, apply nickel thickness of Santyl ointment, loosely fill undermining at 6 o'clock with 2 x 2 (remaining gauze can be placed over wound) and cover with Optifoam Gentle.     Skin Care & Pressure Prevention:  Minimize layers of linen/pads under patient to optimize support surface. Turn/reposition approximately every 2 hours and offload heels.   Manage incontinence / promote continence   Nourishing Skin Cream to dry skin, minimize use of briefs when able    Discussed above plan with patient & ELIAS Jennings    Transition of Care: Plan to follow as needed while admitted to hospital.    April Ovens" NELDA Way, RN, [facility], [facility].  office [phone]  pager [phone] or call  to page

## 2021-11-05 NOTE — ROUTINE PROCESS
TRANSFER - OUT REPORT:    Verbal report given to ELIAS Jennings(name) on Gabriella Sanches  being transferred to [facility](unit) for routine progression of care       Report consisted of patients Situation, Background, Assessment and   Recommendations(SBAR). Information from the following report(s) SBAR, Procedure Summary and MAR was reviewed with the receiving nurse. Lines:       Opportunity for questions and clarification was provided.       Patient transported with:   [employer]

## 2021-11-05 NOTE — PROGRESS NOTES
Occupational Therapy  11/05/21     Patient currently on OT caseload. OT weekly re-assess/tx attempted at 8:31 am however per nursing, patient currently on dialysis. Will continue to follow patient and attempt OT at a later time.      Thank you,  Dominic Flowers OTR/L

## 2021-11-05 NOTE — PROGRESS NOTES
Patient's midline is still leaking. .. RN contacted PICC team during dayshift but according to patient, no one looked at it. Upon evening assessment it flushed without leaking but patient was soaked at shift change from the continuous run of his antibiotic. .. Will contact PICC team in the morning.

## 2021-11-05 NOTE — PROGRESS NOTES
[facility] [address]    Name: Holly Sanchez MRN: [id number]   : 1970 Hospital[facility]   Date: 2021        IMPRESSION:   SERGEY, HD dependent - HD - MWF   Hypotension - BPs are better  Hypervolemia / Fluid Overload - improving  Anemia of multifactorial origin  Respiratory failure - sec to due to Covid pneumonia   Hypokalemia  -  Mild - resolved  Hypophosphatemia - Mild - resolved      PLAN:   HD again today ()   IV albumin x 3  doses prn for intra-dialytic hypotension. Will increase the HD time to 4 hours as well. This should help to mitigate hypotension during HD. Anemia management --> EPO. Consider PRBCs for Hgb < 7. Avoid NSAIDs + IV contrast  Dose meds for his GFR  Watch for renal recovery     Subjective/Interval History:     Chanda Reynoso --> 10/24/21    I have reviewed the flowsheet and previous days notes. ROS: Awake and interactive, breathing ok via trach. Now with Dobhoff, wants something cold to drink, dry mouth. Had drainage of left abd wall fluid collection:    IMPRESSION        1. Successful ultrasound guided placement of 10 Israeli percutaneous drain into a  left abdominal wall fluid collection. 2. Needle sampling of the more inferior collection of the left abdominal wall  yielded only a small amount of clot, presumed noninfected hematoma. Kenisha Davis --> 10/25/21    BPs are better today. Remains on vent support. Getting ready to start HD when I entered the room. Dorothy --> 10/26/21     Feeling better. Scheduled for ct with IV contrast today. Kenisha Davis --> 10/27/21    Had the CT yesterday. The findings were noted. Had HD as well. Kenisha Davis --> 10/28/21      HD was complicated by cramping and hypotension yesterday. This was addresses. 2.8 litres were removed with HD. Felt better today. Kenisha Davis --> 10/29/21      Did not rest well last night. Reported musculoskeletal discomfort. Dorothy --> 21 --> F/u HD    No major complaints.   HD is due today.        Objective:   Vital Signs:    Visit Vitals  /79 (BP 1 Location: Left lower arm, BP Patient Position: At rest)   Pulse (!) 102   Temp 98 °F (36.7 °C)   Resp 17   Ht 5' 11.5\" (1.816 m)   Wt 133.5 kg (294 lb 5 oz)   SpO2 99%   BMI 40.48 kg/m²       O2 Device: Nasal cannula   O2 Flow Rate (L/min): 2 l/min   Temp (24hrs), Av.3 °F (36.8 °C), Min:97.6 °F (36.4 °C), Max:99.6 °F (37.6 °C)       Intake/Output:   Last shift:      No intake/output data recorded. Last 3 shifts:  1901 -  07  In: -   Out: 300 [Drains:300]    Intake/Output Summary (Last 24 hours) at 2021 0942  Last data filed at 2021 0033  Gross per 24 hour   Intake --   Output 300 ml   Net -300 ml          Physical Exam:      Seen in Room 656      General:    Resting in bed comfortably. Head:   Normocephalic. Receiving neb Rx  . Chest               R. Tunneled HD catheter    Lungs:   No Wheezes                          No rales. Heart:   No S3 gallop , no pericardial rub. Abdomen:   No clear-cut distension    Extremities: Leg oedema + to ++                             Neurologic: Responding to questions        DATA:  Labs:  Recent Labs     21   * 134* 134*   K 4.5 4.2 4.1    102 102   CO2 25 27 25   BUN 32* 25* 30*   CREA 4.02* 3.45* 4.02*   CA 9.3 9.3 9.5   ALB 2.4* 2.5* 2.5*     Recent Labs     21  024   WBC 8.1 8.0 10.6   HGB 8.4* 8.6* 8.6*   HCT 27.2* 28.6* 28.3*    218 250     No results for input(s): DOLORES, KU, CLU, CREAU in the last 72 hours.     No lab exists for component: PROU    Total time spent with patient:            Care Plan discussed with:           Severo Freed, MD

## 2021-11-05 NOTE — PROGRESS NOTES
[address] [facility]  Hospitalist Group                                                                                          Hospitalist Progress Note  Bandar Singh MD  Answering service: [phone] OR [phone] from in house phone        Date of Service:  2021  NAME:  Christy Marcial  :  1970  MRN:  [id number]      Admission Summary:   Pt is a 52 yo M with PMH ESRD on HD, takes coumadin secondary to hx of DVT RUE with a recent prolonged hospitalization secondary to COVID PNA in August of this year. He ultimately required trach and peg and was in rehab at [address] [place] [address]. Pt unable to provide HPI secondary to current cognition, therefore information is obtained via chart and provider. Per [facility] records, pt had a dislodged peg tube. He started having coffee ground emesis 10/18/21 with elevated HR and hypotension. He was sent to the ER for further workup. He was found to be hypotensive and ICU was consulted for further management. \"        10/25 Pt co some increased pain and discomfort overnight, was restrted on PTA fentanyl patch and prn ativan.      10/26 TCT tolerated X24 hours      10/29 hgb trending down - 8.1 (10/29) from 9.2 (10/26). Patient remains off the vent on trach collar with humidification. Otolaryngology consulted to downsize trach. Worsening SERGEY on HD- plan to receive dialysis today.     10/31- No acute events overnight. Patient is lying in bed, awake, alert and oriented x 4. On TC @ 35%, oxygen saturation  mid 90's.  Family at bedside and reports improvement with patient but wants to know when he can start moving around. Patient denies any complaints. No hematemesis, hematochezia or melena.     -No acute events overnight.  Patient seen on morning rounds.  He sitting up in bed awake alert and oriented x4.  Has been tolerating PMV with all p.o. liquids. Down to 28% and 6L on TC.  Oxygen saturation >92%. Veverly Hutching, Plan to receive dialysis today.          Interval history / Subjective: Patient seen and examined this afternoon. He was speaking on the phone with his wife. He complains of back pain. Assessment & Plan:        Hemorrhagic Shock/ ABLA d/t Acute GI Bleed, now Improved   - Initial CT reviewed large fluid filled mass, Drained by IR on 10/23 and pigtail drain left in place. 10/26 CT showed slightly diminished size of the abdominal wall collection.   - Repeat CT 11/2 significant decrease in size of the left rectus sheath hematoma. The small collection suggestive of congealed hematoma and is unlikely to drain through cath. - s/p EGD (11/01)  - S/P pRBCs, FFB and K centra. - Continue PPI   - Monitor labs and transfuse for hgb <7.0   - GI and GS following      Acute on chronic hypoxic respiratory failure with tracheostomy   -Resolved, tracheostomy removed 11/3     Renal:  ESRD on HD MWF  -  follow labs   - Nephrology following        Abdominal oral yeast infection. Wound culture on 10/23 grew apparent Candida albicans, no sensitivity available. Culture also showed coag negative staph which was deemed to be contaminant  -Completed 15 days of IV Diflucan. WBC normalized. - surgery following, abdominal wall drain is attached to bag with purulent discharge  -Probably needs repeat CT prior to discharge. Right upper extremity acute DVT, POA. Patient is on warfarin but came with hemorrhagic shock and supratherapeutic INR requiring reversal  --Anticoagulation on hold. Right upper and left lower extremities appeared weaker than the rest on my exam today, 11/4  --Noncontrast head CT is negative.     Morbid obesity   - BMI 41.69  - Weight loss management to be followed up outpatient    Code status: Full code  DVT prophylaxis: SCDs    Care Plan discussed with: Patient/Family  Anticipated Disposition: Accepted to [facility] and rehab SNF. Surgery indicated patient will have to be discharged with a pigtail in place.   No local surgeon to see patient before 12/1, will need repeat CT on 11/12. May need to repeat  CT of abdomen and pelvis early next week prior to discharge. Will follow with general surgery for final recommendations  Anticipated Discharge: Greater than 48 hours     Hospital Problems  Date Reviewed: 10/29/2021          Codes Class Noted POA    Abdominal wall fluid collections ICD-10-CM: R18.8  ICD-9-CM: 789.59  10/25/2021 Unknown        Severe protein-calorie malnutrition ([facility] [address] ICD-10-CM: J03  ICD-9-CM: 262  10/21/2021 Unknown        GIB (gastrointestinal bleeding) ICD-10-CM: K92.2  ICD-9-CM: 578.9  10/18/2021 Unknown                Review of Systems:   A comprehensive review of systems was negative except for that written in the HPI. Vital Signs:    Last 24hrs VS reviewed since prior progress note. Most recent are:  Visit Vitals  BP 99/72 (BP 1 Location: Left lower arm, BP Patient Position: At rest)   Pulse (!) 110   Temp 97.8 °F (36.6 °C)   Resp 25   Ht 5' 11.5\" (1.816 m)   Wt 133.5 kg (294 lb 5 oz)   SpO2 95%   BMI 40.48 kg/m²         Intake/Output Summary (Last 24 hours) at 11/5/2021 1848  Last data filed at 11/5/2021 0033  Gross per 24 hour   Intake --   Output 300 ml   Net -300 ml        Physical Examination:     I had a face to face encounter with this patient and independently examined them on 11/5/2021 as outlined below:          Constitutional:  No acute distress, cooperative, pleasant    ENT:  Oral mucosa moist, oropharynx benign. Resp:  CTA bilaterally. No wheezing/rhonchi/rales. No accessory muscle use   CV:  Regular rhythm, normal rate, no murmurs, gallops, rubs    GI:  Soft, non distended, non tender. normoactive bowel sounds, no hepatosplenomegaly or abdominal wall drainage tube has purulent discharge in the bag    Musculoskeletal:  No edema, warm, 2+ pulses throughout    Neurologic:  Right upper extremity and left lower extremity weak.   AAOx3, CN II-XII reviewed            Data Review:    Review and/or order of clinical lab test      Labs: Recent Labs     11/05/21 0411 11/04/21 0220   WBC 8.1 8.0   HGB 8.4* 8.6*   HCT 27.2* 28.6*    218     Recent Labs     11/05/21 0411 11/04/21 0220 11/03/21 0249   * 134* 134*   K 4.5 4.2 4.1    102 102   CO2 25 27 25   BUN 32* 25* 30*   CREA 4.02* 3.45* 4.02*   * 100 106*   CA 9.3 9.3 9.5     Recent Labs     11/05/21 0411 11/04/21 0220 11/03/21 0249   ALT 20 22 23    112 114   TBILI 0.6 0.5 0.7   TP 5.5* 6.1* 6.1*   ALB 2.4* 2.5* 2.5*   GLOB 3.1 3.6 3.6     No results for input(s): INR, PTP, APTT, INREXT, INREXT in the last 72 hours. No results for input(s): FE, TIBC, PSAT, FERR in the last 72 hours. No results found for: FOL, RBCF   No results for input(s): PH, PCO2, PO2 in the last 72 hours. No results for input(s): CPK, CKNDX, TROIQ in the last 72 hours.     No lab exists for component: CPKMB  No results found for: CHOL, CHOLX, CHLST, CHOLV, HDL, HDLP, LDL, LDLC, DLDLP, TGLX, TRIGL, TRIGP, CHHD, CHHDX  No results found for: GLUCPOC  No results found for: COLOR, APPRN, SPGRU, REFSG, ORIANA, PROTU, GLUCU, KETU, BILU, UROU, SPENCER, LEUKU, GLUKE, EPSU, BACTU, WBCU, RBCU, CASTS, UCRY      Medications Reviewed:     Current Facility-Administered Medications   Medication Dose Route Frequency    pantoprazole (PROTONIX) tablet 40 mg  40 mg Oral ACB    LORazepam (ATIVAN) injection 1 mg  1 mg IntraVENous Q12H PRN    oxyCODONE IR (ROXICODONE) tablet 5 mg  5 mg Oral Q4H PRN    HYDROmorphone (DILAUDID) tablet 1 mg  1 mg Oral Q4H PRN    collagenase (SANTYL) 250 unit/gram ointment   Topical DAILY    heparin (porcine) 1,000 unit/mL injection 4,000 Units  4,000 Units Hemodialysis DIALYSIS PRN    alteplase (CATHFLO) 1.8 mg in sterile water (preservative free) 1.8 mL injection  1.8 mg InterCATHeter DIALYSIS ONCE    alteplase (CATHFLO) 1.8 mg in sterile water (preservative free) 1.8 mL injection  1.8 mg InterCATHeter DIALYSIS ONCE    LORazepam (ATIVAN) tablet 0.5 mg  0.5 mg Oral BID  albumin human 25% (BUMINATE) solution 25 g  25 g IntraVENous DIALYSIS PRN    epoetin kayley-epbx (RETACRIT) injection 10,000 Units  10,000 Units SubCUTAneous Q TUE, THU & SAT    B complex-vitaminC-folic acid (NEPHROCAP) cap  1 Capsule Oral DAILY    midodrine (PROAMATINE) tablet 10 mg  10 mg Oral Q8H    heparin (porcine) 1,000 unit/mL injection 1,800 Units  1,800 Units InterCATHeter DIALYSIS PRN    And    heparin (porcine) 1,000 unit/mL injection 1,800 Units  1,800 Units InterCATHeter DIALYSIS PRN    fentaNYL (DURAGESIC) 25 mcg/hr patch 1 Patch  1 Patch TransDERmal Q72H    melatonin tablet 9 mg  9 mg Oral QHS    albuterol-ipratropium (DUO-NEB) 2.5 MG-0.5 MG/3 ML  3 mL Nebulization BID    sodium chloride (NS) flush 5-40 mL  5-40 mL IntraVENous Q8H    sodium chloride (NS) flush 5-40 mL  5-40 mL IntraVENous PRN    chlorhexidine (ORAL CARE KIT) 0.12 % mouthwash 15 mL  15 mL Oral Q12H    balsam peru-castor oiL (VENELEX) ointment   Topical BID    sodium chloride (NS) flush 5-10 mL  5-10 mL IntraVENous PRN    sodium chloride (NS) flush 5-40 mL  5-40 mL IntraVENous Q8H    sodium chloride (NS) flush 5-40 mL  5-40 mL IntraVENous PRN    acetaminophen (TYLENOL) tablet 650 mg  650 mg Oral Q6H PRN    Or    acetaminophen (TYLENOL) suppository 650 mg  650 mg Rectal Q6H PRN    polyethylene glycol (MIRALAX) packet 17 g  17 g Oral DAILY PRN    ondansetron (ZOFRAN ODT) tablet 4 mg  4 mg Oral Q8H PRN    Or    ondansetron (ZOFRAN) injection 4 mg  4 mg IntraVENous Q6H PRN    albuterol (PROVENTIL VENTOLIN) nebulizer solution 2.5 mg  2.5 mg Nebulization Q4H PRN     ______________________________________________________________________  EXPECTED LENGTH OF STAY: 4d 9h  ACTUAL LENGTH OF STAY:          18                 Mary Childers MD

## 2021-11-05 NOTE — PROGRESS NOTES
Progress Note    Patient: Chelsy Hutchinson MRN: [id number]  SSN: [id number]    YOB: 1970  Age: 49 y.o. Sex: male      Admit Date: 10/18/2021    17 Days Post-Op    Procedure:  Procedure(s):  ESOPHAGOGASTRODUODENOSCOPY (EGD) at Bedside  ESOPHAGOGASTRODUODENAL (EGD) BIOPSY    Subjective:     No acute surgical issues. Patient is doing okay. Had CT scan done two days ago which showed small residual collection/abscess. Objective:     Visit Vitals  /79 (BP 1 Location: Left lower arm, BP Patient Position: At rest)   Pulse (!) 102   Temp 98 °F (36.7 °C)   Resp 17   Ht 5' 11.5\" (1.816 m)   Wt 294 lb 5 oz (133.5 kg)   SpO2 99%   BMI 40.48 kg/m²       Temp (24hrs), Av.3 °F (36.8 °C), Min:97.6 °F (36.4 °C), Max:99.6 °F (37.6 °C)      Physical Exam:    Gen:  NAD  Pulm:  Unlabored  Abd:  S/ND/appropriate TTP  Pigtail with purulent drainage    Recent Results (from the past 24 hour(s))   CBC WITH AUTOMATED DIFF    Collection Time: 21  4:11 AM   Result Value Ref Range    WBC 8.1 4.1 - 11.1 K/uL    RBC 2.71 (L) 4.10 - 5.70 M/uL    HGB 8.4 (L) 12.1 - 17.0 g/dL    HCT 27.2 (L) 36.6 - 50.3 %    .4 (H) 80.0 - 99.0 FL    MCH 31.0 26.0 - 34.0 PG    MCHC 30.9 30.0 - 36.5 g/dL    RDW 19.9 (H) 11.5 - 14.5 %    PLATELET 331 666 - 411 K/uL    MPV 10.0 8.9 - 12.9 FL    NRBC 0.0 0  WBC    ABSOLUTE NRBC 0.00 0.00 - 0.01 K/uL    NEUTROPHILS 50 32 - 75 %    LYMPHOCYTES 28 12 - 49 %    MONOCYTES 16 (H) 5 - 13 %    EOSINOPHILS 3 0 - 7 %    BASOPHILS 1 0 - 1 %    IMMATURE GRANULOCYTES 2 (H) 0.0 - 0.5 %    ABS. NEUTROPHILS 4.1 1.8 - 8.0 K/UL    ABS. LYMPHOCYTES 2.3 0.8 - 3.5 K/UL    ABS. MONOCYTES 1.3 (H) 0.0 - 1.0 K/UL    ABS. EOSINOPHILS 0.3 0.0 - 0.4 K/UL    ABS. BASOPHILS 0.1 0.0 - 0.1 K/UL    ABS. IMM.  GRANS. 0.1 (H) 0.00 - 0.04 K/UL    DF AUTOMATED     METABOLIC PANEL, COMPREHENSIVE    Collection Time: 21  4:11 AM   Result Value Ref Range    Sodium 134 (L) 136 - 145 mmol/L    Potassium 4.5 3.5 - 5.1 mmol/L    Chloride 101 97 - 108 mmol/L    CO2 25 21 - 32 mmol/L    Anion gap 8 5 - 15 mmol/L    Glucose 103 (H) 65 - 100 mg/dL    BUN 32 (H) 6 - 20 MG/DL    Creatinine 4.02 (H) 0.70 - 1.30 MG/DL    BUN/Creatinine ratio 8 (L) 12 - 20      GFR est AA 19 (L) >60 ml/min/1.73m2    GFR est non-AA 16 (L) >60 ml/min/1.73m2    Calcium 9.3 8.5 - 10.1 MG/DL    Bilirubin, total 0.6 0.2 - 1.0 MG/DL    ALT (SGPT) 20 12 - 78 U/L    AST (SGOT) 19 15 - 37 U/L    Alk. phosphatase 111 45 - 117 U/L    Protein, total 5.5 (L) 6.4 - 8.2 g/dL    Albumin 2.4 (L) 3.5 - 5.0 g/dL    Globulin 3.1 2.0 - 4.0 g/dL    A-G Ratio 0.8 (L) 1.1 - 2.2           Assessment:     Hospital Problems  Date Reviewed: 10/29/2021          Codes Class Noted POA    Abdominal wall fluid collections ICD-10-CM: R18.8  ICD-9-CM: 789.59  10/25/2021 Unknown        Severe protein-calorie malnutrition ([facility] [address] ICD-10-CM: V30  ICD-9-CM: 262  10/21/2021 Unknown        GIB (gastrointestinal bleeding) ICD-10-CM: K92.2  ICD-9-CM: 578.9  10/18/2021 Unknown              Plan/Recommendations/Medical Decision Making:     - Abdominal wall abscess:  No acute indication for operation. Continue pigtail drainage  - Continue antibiotic therapy  - May need repeat CT scan if febrile or leukocytosis develops.

## 2021-11-05 NOTE — PROGRESS NOTES
General Surgery Daily Progress Note    Admit Date: 10/18/2021  Post-Operative Day: 17 Days Post-Op from Procedure(s):  ESOPHAGOGASTRODUODENOSCOPY (EGD) at Bedside  ESOPHAGOGASTRODUODENAL (EGD) BIOPSY     Subjective:     Last 24 hrs: pt w/o specific complaints; eating breakfast; getting ready for dialysis. Nl WBC and no fevers    Objective:     Blood pressure (!) 117/93, pulse (!) 116, temperature 98 °F (36.7 °C), resp. rate 17, height 5' 11.5\" (1.816 m), weight 294 lb 5 oz (133.5 kg), SpO2 99 %. Temp (24hrs), Av.1 °F (36.7 °C), Min:97.6 °F (36.4 °C), Max:98.4 °F (36.9 °C)      _____________________  Physical Exam:     Alert and Oriented, x3, in no acute distress. Cardiovascular: tachy, no peripheral edema  Abdomen: soft, perc drain w/ milky, tan drainage - 300cc out yesterday      Assessment:   Active Problems:    GIB (gastrointestinal bleeding) (10/18/2021)      Severe protein-calorie malnutrition ([address] (10/21/2021)      Abdominal wall fluid collections (10/25/2021)            Plan:     Cont drain  ? D/c to rehab tomorrow  Cont abx    Data Review:    Recent Labs     21  0249   WBC 8.1 8.0 10.6   HGB 8.4* 8.6* 8.6*   HCT 27.2* 28.6* 28.3*    218 250     Recent Labs     21  0249   * 134* 134*   K 4.5 4.2 4.1    102 102   CO2 25 27 25   * 100 106*   BUN 32* 25* 30*   CREA 4.02* 3.45* 4.02*   CA 9.3 9.3 9.5   ALB 2.4* 2.5* 2.5*   ALT 20 22 23     No results for input(s): AML, LPSE in the last 72 hours.         ______________________  Medications:    Current Facility-Administered Medications   Medication Dose Route Frequency    pantoprazole (PROTONIX) tablet 40 mg  40 mg Oral ACB    LORazepam (ATIVAN) injection 1 mg  1 mg IntraVENous Q12H PRN    oxyCODONE IR (ROXICODONE) tablet 5 mg  5 mg Oral Q4H PRN    HYDROmorphone (DILAUDID) tablet 1 mg  1 mg Oral Q4H PRN    collagenase (SANTYL) 250 unit/gram ointment Topical DAILY    heparin (porcine) 1,000 unit/mL injection 4,000 Units  4,000 Units Hemodialysis DIALYSIS PRN    alteplase (CATHFLO) 1.8 mg in sterile water (preservative free) 1.8 mL injection  1.8 mg InterCATHeter DIALYSIS ONCE    alteplase (CATHFLO) 1.8 mg in sterile water (preservative free) 1.8 mL injection  1.8 mg InterCATHeter DIALYSIS ONCE    LORazepam (ATIVAN) tablet 0.5 mg  0.5 mg Oral BID    albumin human 25% (BUMINATE) solution 25 g  25 g IntraVENous DIALYSIS PRN    epoetin kayley-epbx (RETACRIT) injection 10,000 Units  10,000 Units SubCUTAneous Q TUE, THU & SAT    B complex-vitaminC-folic acid (NEPHROCAP) cap  1 Capsule Oral DAILY    midodrine (PROAMATINE) tablet 10 mg  10 mg Oral Q8H    heparin (porcine) 1,000 unit/mL injection 1,800 Units  1,800 Units InterCATHeter DIALYSIS PRN    And    heparin (porcine) 1,000 unit/mL injection 1,800 Units  1,800 Units InterCATHeter DIALYSIS PRN    fentaNYL (DURAGESIC) 25 mcg/hr patch 1 Patch  1 Patch TransDERmal Q72H    melatonin tablet 9 mg  9 mg Oral QHS    albuterol-ipratropium (DUO-NEB) 2.5 MG-0.5 MG/3 ML  3 mL Nebulization BID    fluconazole (DIFLUCAN) 200mg/100 mL IVPB (premix)  200 mg IntraVENous Q24H    sodium chloride (NS) flush 5-40 mL  5-40 mL IntraVENous Q8H    sodium chloride (NS) flush 5-40 mL  5-40 mL IntraVENous PRN    chlorhexidine (ORAL CARE KIT) 0.12 % mouthwash 15 mL  15 mL Oral Q12H    balsam peru-castor oiL (VENELEX) ointment   Topical BID    sodium chloride (NS) flush 5-10 mL  5-10 mL IntraVENous PRN    sodium chloride (NS) flush 5-40 mL  5-40 mL IntraVENous Q8H    sodium chloride (NS) flush 5-40 mL  5-40 mL IntraVENous PRN    acetaminophen (TYLENOL) tablet 650 mg  650 mg Oral Q6H PRN    Or    acetaminophen (TYLENOL) suppository 650 mg  650 mg Rectal Q6H PRN    polyethylene glycol (MIRALAX) packet 17 g  17 g Oral DAILY PRN    ondansetron (ZOFRAN ODT) tablet 4 mg  4 mg Oral Q8H PRN    Or    ondansetron (ZOFRAN) injection 4 mg  4 mg IntraVENous Q6H PRN    albuterol (PROVENTIL VENTOLIN) nebulizer solution 2.5 mg  2.5 mg Nebulization Q4H PRN       Viet Costa NP  11/5/2021

## 2021-11-05 NOTE — PROGRESS NOTES
LARRY: anticipate d/c to [address] [place] [address] pending surgical clearance; Pt has Pigtail Drain and needs CT scan and given that SNF is 3-4 hours away from [address] [place] [address], plan per Attending is to finalize his care before d/c to SNF; Insurance auth obtained; No Covid test needed as per SNF policy-they will test patient on admission to their facility; Pt will receive hemodialysis services at in house HD at [facility];  BLS Transport with [facility] on will call for Monday, 11/8; CM will need to fax AVS at d/c to SNF at: [phone] & contact [facility] SNF at: [phone] at d/c    RUR: 15%  1230-CM reviewed pt chart & discussed pt case in IDRs. As per Attending, pt will be medically ready for d/c tomorrow, 11/6. CM noted that transport is already set up for 9am on Saturday, 11/6. CM spoke with [facility] [phone] and she confirmed they can accept pt tomorrow, 11/6 & will start pt on their in house hemodialysis on Monday, 11/8. Pt is scheduled to receive HD services today. CM to follow for transitions of care. 1300-CM confirmed with Attending that plan is for pt to d/c to SNF with pigtail drain tomorrow. CM sent perfect serve message to Surgical NP to clarify plan for removal of drain and follow up to inform SNF. CM messaged Attending in [address] [place] [address] for clarification as well. CM to follow. 1500-CM noted Surgical NP advised pt would have to find Surgeon near SNF. However SNF liaison, Bro [facility] advised the Surgeon they usually work with cannot see pt until 12/1 and pt needs CT scan on 11/12 and follow up appt on 11/15. Therefore, Attending has advised d/c will be delayed until surgical plan is completed. CM to follow. 2245-CM contacted pt's spouse, Donn Koenig, [phone] to inform of d/c delay in lieu of Pigtail drain and surgical plan for CT scan. CM informed clinical lead as well. CM also informed Dipesh of SNF as well. CM to follow.   Juan J Cherry RN BSN CCM

## 2021-11-05 NOTE — H&P
Interventional and Vascular Radiology History and Physical    Patient: Gabriella Sanches 49 y.o. male       Chief Complaint: Hypotension and Blood in Vomit      History of Present Illness: poorly functioning dialysis catheter     History:    Past Medical History:   Diagnosis Date   [facility] Farmer Dialysis patient [facility])     started around the end of september 2021    Polycystic kidney disease     S/P percutaneous endoscopic gastrostomy (PEG) tube placement ([facility] [address]      No family history on file. Social History     Socioeconomic History    Marital status:      Spouse name: Not on file    Number of children: Not on file    Years of education: Not on file    Highest education level: Not on file   Occupational History    Not on file   Tobacco Use    Smoking status: Not on file    Smokeless tobacco: Not on file   Substance and Sexual Activity    Alcohol use: Not on file    Drug use: Not on file    Sexual activity: Not on file   Other Topics Concern    Not on file   Social History Narrative    Not on file     Social Determinants of Health     Financial Resource Strain:     Difficulty of Paying Living Expenses: Not on file   Food Insecurity:     Worried About Running Out of Food in the Last Year: Not on file    Francheska of Food in the Last Year: Not on file   Transportation Needs:     Lack of Transportation (Medical): Not on file    Lack of Transportation (Non-Medical):  Not on file   Physical Activity:     Days of Exercise per Week: Not on file    Minutes of Exercise per Session: Not on file   Stress:     Feeling of Stress : Not on file   Social Connections:     Frequency of Communication with Friends and Family: Not on file    Frequency of Social Gatherings with Friends and Family: Not on file    Attends Synagogue Services: Not on file    Active Member of Clubs or Organizations: Not on file    Attends Club or Organization Meetings: Not on file    Marital Status: Not on file   Intimate Partner Violence:     Fear of Current or Ex-Partner: Not on file    Emotionally Abused: Not on file    Physically Abused: Not on file    Sexually Abused: Not on file   Housing Stability:     Unable to Pay for Housing in the Last Year: Not on file    Number of Places Lived in the Last Year: Not on file    Unstable Housing in the Last Year: Not on file       Allergies: No Known Allergies    Current Medications:  Current Facility-Administered Medications   Medication Dose Route Frequency    heparin (porcine) 1,000 unit/mL injection 10,000 Units  10,000 Units Hemodialysis RAD ONCE    lidocaine (XYLOCAINE) 20 mg/mL (2 %) injection 400 mg  20 mL SubCUTAneous RAD ONCE    fentaNYL citrate (PF) injection 100 mcg  100 mcg IntraVENous Multiple    midazolam (VERSED) injection 5 mg  5 mg IntraVENous Multiple    0.9% sodium chloride infusion  25 mL/hr IntraVENous CONTINUOUS    flumazeniL (ROMAZICON) 0.1 mg/mL injection 0.5 mg  0.5 mg IntraVENous ONCE    naloxone (NARCAN) injection 0.4 mg  0.4 mg IntraVENous PRN    ceFAZolin (ANCEF) 3 g in 0.9%  ml IVPB  3 g IntraVENous ONCE PRN    pantoprazole (PROTONIX) tablet 40 mg  40 mg Oral ACB    LORazepam (ATIVAN) injection 1 mg  1 mg IntraVENous Q12H PRN    oxyCODONE IR (ROXICODONE) tablet 5 mg  5 mg Oral Q4H PRN    HYDROmorphone (DILAUDID) tablet 1 mg  1 mg Oral Q4H PRN    collagenase (SANTYL) 250 unit/gram ointment   Topical DAILY    heparin (porcine) 1,000 unit/mL injection 4,000 Units  4,000 Units Hemodialysis DIALYSIS PRN    alteplase (CATHFLO) 1.8 mg in sterile water (preservative free) 1.8 mL injection  1.8 mg InterCATHeter DIALYSIS ONCE    alteplase (CATHFLO) 1.8 mg in sterile water (preservative free) 1.8 mL injection  1.8 mg InterCATHeter DIALYSIS ONCE    LORazepam (ATIVAN) tablet 0.5 mg  0.5 mg Oral BID    albumin human 25% (BUMINATE) solution 25 g  25 g IntraVENous DIALYSIS PRN    epoetin kayley-epbx (RETACRIT) injection 10,000 Units  10,000 Units SubCUTAneous Q TUE, THU & SAT    B complex-vitaminC-folic acid (NEPHROCAP) cap  1 Capsule Oral DAILY    midodrine (PROAMATINE) tablet 10 mg  10 mg Oral Q8H    heparin (porcine) 1,000 unit/mL injection 1,800 Units  1,800 Units InterCATHeter DIALYSIS PRN    And    heparin (porcine) 1,000 unit/mL injection 1,800 Units  1,800 Units InterCATHeter DIALYSIS PRN    fentaNYL (DURAGESIC) 25 mcg/hr patch 1 Patch  1 Patch TransDERmal Q72H    melatonin tablet 9 mg  9 mg Oral QHS    albuterol-ipratropium (DUO-NEB) 2.5 MG-0.5 MG/3 ML  3 mL Nebulization BID    sodium chloride (NS) flush 5-40 mL  5-40 mL IntraVENous Q8H    sodium chloride (NS) flush 5-40 mL  5-40 mL IntraVENous PRN    chlorhexidine (ORAL CARE KIT) 0.12 % mouthwash 15 mL  15 mL Oral Q12H    balsam peru-castor oiL (VENELEX) ointment   Topical BID    sodium chloride (NS) flush 5-10 mL  5-10 mL IntraVENous PRN    sodium chloride (NS) flush 5-40 mL  5-40 mL IntraVENous Q8H    sodium chloride (NS) flush 5-40 mL  5-40 mL IntraVENous PRN    acetaminophen (TYLENOL) tablet 650 mg  650 mg Oral Q6H PRN    Or    acetaminophen (TYLENOL) suppository 650 mg  650 mg Rectal Q6H PRN    polyethylene glycol (MIRALAX) packet 17 g  17 g Oral DAILY PRN    ondansetron (ZOFRAN ODT) tablet 4 mg  4 mg Oral Q8H PRN    Or    ondansetron (ZOFRAN) injection 4 mg  4 mg IntraVENous Q6H PRN    albuterol (PROVENTIL VENTOLIN) nebulizer solution 2.5 mg  2.5 mg Nebulization Q4H PRN        Physical Exam:  Blood pressure 110/83, pulse (!) 109, temperature 99 °F (37.2 °C), resp. rate 23, height 5' 11.5\" (1.816 m), weight 133.5 kg (294 lb 5 oz), SpO2 100 %.   LUNG: clear to auscultation bilaterally, HEART: regular rate and rhythm, S1, S2 normal, no murmur, click, rub or gallop      Alerts:    Hospital Problems  Date Reviewed: 10/29/2021          Codes Class Noted POA    Abdominal wall fluid collections ICD-10-CM: R18.8  ICD-9-CM: 789.59  10/25/2021 Unknown        Severe protein-calorie malnutrition ([facility] [address] ICD-10-CM: E43  ICD-9-CM: 262  10/21/2021 Unknown        GIB (gastrointestinal bleeding) ICD-10-CM: K92.2  ICD-9-CM: 578.9  10/18/2021 Unknown              Laboratory:      Recent Labs     11/05/21  0411   HGB 8.4*   HCT 27.2*   WBC 8.1      BUN 32*   CREA 4.02*   K 4.5         Plan of Care/Planned Procedure:  Risks, benefits, and alternatives reviewed with patient and he agrees to proceed with the procedure. Conscious sedation will be performed with IV fentanyl and versed.  Plan is for permcath change       Jill Chan MD

## 2021-11-06 PROCEDURE — 74011250636 HC RX REV CODE- 250/636: Performed by: INTERNAL MEDICINE

## 2021-11-06 PROCEDURE — 74011250637 HC RX REV CODE- 250/637: Performed by: NURSE PRACTITIONER

## 2021-11-06 PROCEDURE — 74011250637 HC RX REV CODE- 250/637: Performed by: HOSPITALIST

## 2021-11-06 PROCEDURE — 65660000000 HC RM CCU STEPDOWN

## 2021-11-06 PROCEDURE — 74011250637 HC RX REV CODE- 250/637: Performed by: INTERNAL MEDICINE

## 2021-11-06 PROCEDURE — 74011000250 HC RX REV CODE- 250: Performed by: NURSE PRACTITIONER

## 2021-11-06 PROCEDURE — 94640 AIRWAY INHALATION TREATMENT: CPT

## 2021-11-06 RX ORDER — IPRATROPIUM BROMIDE AND ALBUTEROL SULFATE 2.5; .5 MG/3ML; MG/3ML
3 SOLUTION RESPIRATORY (INHALATION)
Status: DISCONTINUED | OUTPATIENT
Start: 2021-11-06 | End: 2021-11-18 | Stop reason: HOSPADM

## 2021-11-06 RX ADMIN — Medication 10 ML: at 06:18

## 2021-11-06 RX ADMIN — Medication 1 CAPSULE: at 08:29

## 2021-11-06 RX ADMIN — Medication 10 ML: at 13:59

## 2021-11-06 RX ADMIN — LORAZEPAM 0.5 MG: 0.5 TABLET ORAL at 17:26

## 2021-11-06 RX ADMIN — Medication: at 17:32

## 2021-11-06 RX ADMIN — Medication: at 10:05

## 2021-11-06 RX ADMIN — COLLAGENASE SANTYL: 250 OINTMENT TOPICAL at 10:05

## 2021-11-06 RX ADMIN — LORAZEPAM 0.5 MG: 0.5 TABLET ORAL at 08:29

## 2021-11-06 RX ADMIN — IPRATROPIUM BROMIDE AND ALBUTEROL SULFATE 3 ML: .5; 3 SOLUTION RESPIRATORY (INHALATION) at 08:20

## 2021-11-06 RX ADMIN — Medication 9 MG: at 21:16

## 2021-11-06 RX ADMIN — MIDODRINE HYDROCHLORIDE 10 MG: 5 TABLET ORAL at 21:16

## 2021-11-06 RX ADMIN — Medication 10 ML: at 21:16

## 2021-11-06 RX ADMIN — OXYCODONE 5 MG: 5 TABLET ORAL at 08:29

## 2021-11-06 RX ADMIN — OXYCODONE 5 MG: 5 TABLET ORAL at 01:33

## 2021-11-06 RX ADMIN — Medication 10 ML: at 21:17

## 2021-11-06 RX ADMIN — OXYCODONE 5 MG: 5 TABLET ORAL at 14:33

## 2021-11-06 RX ADMIN — OXYCODONE 5 MG: 5 TABLET ORAL at 21:16

## 2021-11-06 RX ADMIN — MIDODRINE HYDROCHLORIDE 10 MG: 5 TABLET ORAL at 06:17

## 2021-11-06 RX ADMIN — MIDODRINE HYDROCHLORIDE 10 MG: 5 TABLET ORAL at 13:58

## 2021-11-06 RX ADMIN — EPOETIN ALFA-EPBX 10000 UNITS: 10000 INJECTION, SOLUTION INTRAVENOUS; SUBCUTANEOUS at 21:16

## 2021-11-06 RX ADMIN — PANTOPRAZOLE SODIUM 40 MG: 40 TABLET, DELAYED RELEASE ORAL at 06:18

## 2021-11-06 RX ADMIN — CHLORHEXIDINE GLUCONATE 15 ML: 0.12 RINSE ORAL at 09:00

## 2021-11-06 NOTE — PROGRESS NOTES
[facility]  Hospitalist Group                                                                                          Hospitalist Progress Note  John Christiansen MD  Answering service: [phone] from in house phone        Date of Service:  2021  NAME:  Gabriella Sanches  :  1970  MRN:  [id number]      Admission Summary:   Pt is a 54 yo M with PMH ESRD on HD, takes coumadin secondary to hx of DVT RUE with a recent prolonged hospitalization secondary to COVID PNA in August of this year. He ultimately required trach and peg and was in rehab at [facility]. Pt unable to provide HPI secondary to current cognition, therefore information is obtained via chart and provider. Per [facility] records, pt had a dislodged peg tube. He started having coffee ground emesis 10/18/21 with elevated HR and hypotension. He was sent to the ER for further workup. He was found to be hypotensive and ICU was consulted for further management. \"          Interval history / Subjective:      F/u Hemorrhagic shock  Says he is tired from yesterday  Comfortably sleeping in bed  Hb stable  Assessment & Plan:     Hemorrhagic Shock/ ABLA d/t Acute GI Bleed, now Improved   - Initial CT reviewed large fluid filled mass, Drained by IR on 10/23 and pigtail drain left in place. 10/26 CT showed slightly diminished size of the abdominal wall collection.   - Repeat CT  significant decrease in size of the left rectus sheath hematoma. The small collection suggestive of congealed hematoma and is unlikely to drain through cath. - s/p EGD ()  - S/P pRBCs, FFB and K centra. - Continue PPI   - Monitor labs and transfuse for hgb <7.0   - GI and GS following      Acute on chronic hypoxic respiratory failure with tracheostomy Resolved, tracheostomy removed 11/3     Renal:  ESRD on HD MWF-Nephrology following   Abdominal oral yeast infection. Wound culture on 10/23 grew apparent Candida albicans, no sensitivity available.   Culture also showed coag negative staph which was deemed to be contaminant  -Completed 15 days of IV Diflucan. WBC normalized. - surgery following, abdominal wall drain is attached to bag with purulent discharge  -Probably needs repeat CT prior to discharge. Right upper extremity acute DVT, POA. Patient is on warfarin but came with hemorrhagic shock and supratherapeutic INR requiring reversal  --Anticoagulation on hold. Right upper and left lower extremities appeared weaker than the rest on 11/4  --Noncontrast head CT is negative.     Morbid obesity   - BMI 41.69  - Weight loss management to be followed up outpatient    Code status: Full code  DVT prophylaxis: SCDs    Care Plan discussed with: Patient/Family  Anticipated Disposition: Accepted to [facility] and rehab SNF. Surgery indicated patient will have to be discharged with a pigtail in place. No local surgeon to see patient before 12/1, will need repeat CT on 11/12. May need to repeat  CT of abdomen and pelvis early next week prior to discharge. Will follow with general surgery for final recommendations  Anticipated Discharge: Greater than 48 hours     Hospital Problems  Date Reviewed: 10/29/2021          Codes Class Noted POA    Abdominal wall fluid collections ICD-10-CM: R18.8  ICD-9-CM: 789.59  10/25/2021 Unknown        Severe protein-calorie malnutrition ([facility] [address] ICD-10-CM: R69  ICD-9-CM: 262  10/21/2021 Unknown        GIB (gastrointestinal bleeding) ICD-10-CM: K92.2  ICD-9-CM: 578.9  10/18/2021 Unknown                Review of Systems:   A comprehensive review of systems was negative except for that written in the HPI. Vital Signs:    Last 24hrs VS reviewed since prior progress note.  Most recent are:  Visit Vitals  /89   Pulse 95   Temp 97.8 °F (36.6 °C)   Resp 18   Ht 5' 11.5\" (1.816 m)   Wt 134.8 kg (297 lb 2.9 oz)   SpO2 96%   BMI 40.87 kg/m²         Intake/Output Summary (Last 24 hours) at 11/6/2021 3982  Last data filed at 11/6/2021 0315  Gross per 24 hour   Intake 480 ml   Output 100 ml   Net 380 ml        Physical Examination:     I had a face to face encounter with this patient and independently examined them on 11/6/2021 as outlined below:          Constitutional:  No acute distress, cooperative, pleasant    ENT:  Oral mucosa moist, oropharynx benign. Resp:  CTA bilaterally. No wheezing/rhonchi/rales. No accessory muscle use   CV:  Regular rhythm, normal rate, no murmurs, gallops, rubs    GI:  Soft, non distended, non tender. normoactive bowel sounds, no hepatosplenomegaly or abdominal wall drainage tube has purulent discharge in the bag    Musculoskeletal:  No edema, warm, 2+ pulses throughout    Neurologic:  Right upper extremity and left lower extremity weak. AAOx3, CN II-XII reviewed            Data Review:    Review and/or order of clinical lab test      Labs:     Recent Labs     11/05/21 0411 11/04/21 0220   WBC 8.1 8.0   HGB 8.4* 8.6*   HCT 27.2* 28.6*    218     Recent Labs     11/05/21 0411 11/04/21 0220   * 134*   K 4.5 4.2    102   CO2 25 27   BUN 32* 25*   CREA 4.02* 3.45*   * 100   CA 9.3 9.3     Recent Labs     11/05/21 0411 11/04/21 0220   ALT 20 22    112   TBILI 0.6 0.5   TP 5.5* 6.1*   ALB 2.4* 2.5*   GLOB 3.1 3.6     No results for input(s): INR, PTP, APTT, INREXT, INREXT in the last 72 hours. No results for input(s): FE, TIBC, PSAT, FERR in the last 72 hours. No results found for: FOL, RBCF   No results for input(s): PH, PCO2, PO2 in the last 72 hours. No results for input(s): CPK, CKNDX, TROIQ in the last 72 hours.     No lab exists for component: CPKMB  No results found for: CHOL, CHOLX, CHLST, CHOLV, HDL, HDLP, LDL, LDLC, DLDLP, TGLX, TRIGL, TRIGP, CHHD, CHHDX  No results found for: GLUCPOC  No results found for: COLOR, APPRN, SPGRU, REFSG, ORIANA, PROTU, GLUCU, KETU, BILU, UROU, SPENCER, LEUKU, GLUKE, EPSU, BACTU, WBCU, RBCU, CASTS, UCRY      Medications Reviewed: Current Facility-Administered Medications   Medication Dose Route Frequency    pantoprazole (PROTONIX) tablet 40 mg  40 mg Oral ACB    LORazepam (ATIVAN) injection 1 mg  1 mg IntraVENous Q12H PRN    oxyCODONE IR (ROXICODONE) tablet 5 mg  5 mg Oral Q4H PRN    HYDROmorphone (DILAUDID) tablet 1 mg  1 mg Oral Q4H PRN    collagenase (SANTYL) 250 unit/gram ointment   Topical DAILY    heparin (porcine) 1,000 unit/mL injection 4,000 Units  4,000 Units Hemodialysis DIALYSIS PRN    alteplase (CATHFLO) 1.8 mg in sterile water (preservative free) 1.8 mL injection  1.8 mg InterCATHeter DIALYSIS ONCE    alteplase (CATHFLO) 1.8 mg in sterile water (preservative free) 1.8 mL injection  1.8 mg InterCATHeter DIALYSIS ONCE    LORazepam (ATIVAN) tablet 0.5 mg  0.5 mg Oral BID    albumin human 25% (BUMINATE) solution 25 g  25 g IntraVENous DIALYSIS PRN    epoetin kayley-epbx (RETACRIT) injection 10,000 Units  10,000 Units SubCUTAneous Q TUE, THU & SAT    B complex-vitaminC-folic acid (NEPHROCAP) cap  1 Capsule Oral DAILY    midodrine (PROAMATINE) tablet 10 mg  10 mg Oral Q8H    heparin (porcine) 1,000 unit/mL injection 1,800 Units  1,800 Units InterCATHeter DIALYSIS PRN    And    heparin (porcine) 1,000 unit/mL injection 1,800 Units  1,800 Units InterCATHeter DIALYSIS PRN    fentaNYL (DURAGESIC) 25 mcg/hr patch 1 Patch  1 Patch TransDERmal Q72H    melatonin tablet 9 mg  9 mg Oral QHS    albuterol-ipratropium (DUO-NEB) 2.5 MG-0.5 MG/3 ML  3 mL Nebulization BID    sodium chloride (NS) flush 5-40 mL  5-40 mL IntraVENous Q8H    sodium chloride (NS) flush 5-40 mL  5-40 mL IntraVENous PRN    chlorhexidine (ORAL CARE KIT) 0.12 % mouthwash 15 mL  15 mL Oral Q12H    balsam peru-castor oiL (VENELEX) ointment   Topical BID    sodium chloride (NS) flush 5-10 mL  5-10 mL IntraVENous PRN    sodium chloride (NS) flush 5-40 mL  5-40 mL IntraVENous Q8H    sodium chloride (NS) flush 5-40 mL  5-40 mL IntraVENous PRN    acetaminophen (TYLENOL) tablet 650 mg  650 mg Oral Q6H PRN    Or    acetaminophen (TYLENOL) suppository 650 mg  650 mg Rectal Q6H PRN    polyethylene glycol (MIRALAX) packet 17 g  17 g Oral DAILY PRN    ondansetron (ZOFRAN ODT) tablet 4 mg  4 mg Oral Q8H PRN    Or    ondansetron (ZOFRAN) injection 4 mg  4 mg IntraVENous Q6H PRN    albuterol (PROVENTIL VENTOLIN) nebulizer solution 2.5 mg  2.5 mg Nebulization Q4H PRN     ______________________________________________________________________  EXPECTED LENGTH OF STAY: 4d 9h  ACTUAL LENGTH OF STAY:          19                 Kaiden Piper MD

## 2021-11-06 NOTE — PROGRESS NOTES
Bedside and Verbal shift change report given to Thi Flores (oncoming nurse) by Angela Zapata RN (offgoing nurse). Report included the following information SBAR, Kardex, ED Summary, Procedure Summary, Intake/Output, MAR, Recent Results, Cardiac Rhythm ST and Dual Neuro Assessment.

## 2021-11-06 NOTE — PROGRESS NOTES
[facility]    Name: Stefano Barrett MRN: [id number]   : 1970 Hospital: [address]   Date: 2021        IMPRESSION:   SERGEY, HD dependent - HD - MWF   Hypotension - BPs are better  Hypervolemia / Fluid Overload - improving  Anemia of multifactorial origin  Respiratory failure - sec to due to Covid pneumonia   Hypokalemia  -  Mild - resolved  Hypophosphatemia - Mild - resolved  Poor catheter function - despite cathflo --> changed by IR yesterday ()      PLAN:   HD again on Monday  ()   IV albumin x 3  doses prn for intra-dialytic hypotension. Will increase the HD time to 4 hours as well. This should help to mitigate hypotension during HD. Anemia management --> EPO. Consider PRBCs for Hgb < 7. Avoid NSAIDs + IV contrast  Dose meds for his GFR  Watch for renal recovery        Our team will be available over the weekend. Please call with questions. Subjective/Interval History:     Ofelia Lockwood --> 10/24/21    I have reviewed the flowsheet and previous days notes. ROS: Awake and interactive, breathing ok via trach. Now with Dobhoff, wants something cold to drink, dry mouth. Had drainage of left abd wall fluid collection:    IMPRESSION        1. Successful ultrasound guided placement of 10 [place] percutaneous drain into a  left abdominal wall fluid collection. 2. Needle sampling of the more inferior collection of the left abdominal wall  yielded only a small amount of clot, presumed noninfected hematoma. Genell Doreen --> 10/25/21    BPs are better today. Remains on vent support. Getting ready to start HD when I entered the room. Dorothy --> 10/26/21     Feeling better. Scheduled for ct with IV contrast today. [facility] Doreen --> 10/27/21    Had the CT yesterday. The findings were noted. Had HD as well. [facility] Doreen --> 10/28/21      HD was complicated by cramping and hypotension yesterday. This was addresses. 2.8 litres were removed with HD. Felt better today.       Dorothy --> 10/29/21      Did not rest well last night. Reported musculoskeletal discomfort. Dorothy --> 21 --> F/u HD    No major complaints. HD is due today. Mikaela Deal --> 21      No new complaints today. [facility] did not function as anticipated yesterday. It did not improve with cathflo. It was changed by IR yesterday. Objective:   Vital Signs:    Visit Vitals  /77 (BP 1 Location: Left lower arm, BP Patient Position: At rest)   Pulse (!) 111   Temp 97.9 °F (36.6 °C)   Resp 18   Ht 5' 11.5\" (1.816 m)   Wt 134.8 kg (297 lb 2.9 oz)   SpO2 96%   BMI 40.87 kg/m²       O2 Device: Nasal cannula   O2 Flow Rate (L/min): 2 l/min   Temp (24hrs), Av.2 °F (36.8 °C), Min:97.8 °F (36.6 °C), Max:99 °F (37.2 °C)       Intake/Output:   Last shift:      No intake/output data recorded. Last 3 shifts:  1901 -  0700  In: 480 [P.O.:480]  Out: 400 [Drains:400]    Intake/Output Summary (Last 24 hours) at 2021 1059  Last data filed at 2021 0315  Gross per 24 hour   Intake 480 ml   Output 100 ml   Net 380 ml          Physical Exam:      Seen in Room 656    General:    Resting in bed comfortably. Head:   Normocephalic. .    Chest               R. Tunneled HD catheter    Lungs:   No Wheezes                          No rales. Heart:   No S3 gallop , no pericardial rub. Abdomen:   No clear-cut distension    Extremities: Leg oedema --> improving                            Neurologic: Responding to questions        DATA:  Labs:  Recent Labs     21  0220   * 134*   K 4.5 4.2    102   CO2 25 27   BUN 32* 25*   CREA 4.02* 3.45*   CA 9.3 9.3   ALB 2.4* 2.5*     Recent Labs     21  0411 21  0220   WBC 8.1 8.0   HGB 8.4* 8.6*   HCT 27.2* 28.6*    218     No results for input(s): DOLORES, KU, CLU, CREAU in the last 72 hours.     No lab exists for component: PROU    Total time spent with patient:            Care Plan discussed with:           Hernesto Smith MD

## 2021-11-06 NOTE — PROGRESS NOTES
Bedside and Verbal shift change report given to [facility],Xiang 100 (oncoming nurse) by Randall Das RN (offgoing nurse). Report included the following information Procedure Summary, Intake/Output, MAR, Cardiac Rhythm SR/ST, Alarm Parameters  and Dual Neuro Assessment.

## 2021-11-07 PROCEDURE — 65660000000 HC RM CCU STEPDOWN

## 2021-11-07 PROCEDURE — 74011250637 HC RX REV CODE- 250/637: Performed by: INTERNAL MEDICINE

## 2021-11-07 PROCEDURE — 74011000250 HC RX REV CODE- 250: Performed by: HOSPITALIST

## 2021-11-07 PROCEDURE — 74011250637 HC RX REV CODE- 250/637: Performed by: NURSE PRACTITIONER

## 2021-11-07 PROCEDURE — 74011250637 HC RX REV CODE- 250/637: Performed by: HOSPITALIST

## 2021-11-07 PROCEDURE — 74011250637 HC RX REV CODE- 250/637: Performed by: HEALTH CARE PROVIDER

## 2021-11-07 PROCEDURE — 74011250637 HC RX REV CODE- 250/637: Performed by: STUDENT IN AN ORGANIZED HEALTH CARE EDUCATION/TRAINING PROGRAM

## 2021-11-07 RX ORDER — CALCIUM CARBONATE 200(500)MG
200 TABLET,CHEWABLE ORAL
Status: DISCONTINUED | OUTPATIENT
Start: 2021-11-07 | End: 2021-11-18 | Stop reason: HOSPADM

## 2021-11-07 RX ADMIN — Medication 10 ML: at 07:22

## 2021-11-07 RX ADMIN — Medication: at 17:30

## 2021-11-07 RX ADMIN — MIDODRINE HYDROCHLORIDE 10 MG: 5 TABLET ORAL at 13:26

## 2021-11-07 RX ADMIN — CHLORHEXIDINE GLUCONATE 15 ML: 0.12 RINSE ORAL at 21:00

## 2021-11-07 RX ADMIN — Medication 1 CAPSULE: at 08:52

## 2021-11-07 RX ADMIN — Medication: at 08:53

## 2021-11-07 RX ADMIN — MIDODRINE HYDROCHLORIDE 10 MG: 5 TABLET ORAL at 22:08

## 2021-11-07 RX ADMIN — PANTOPRAZOLE SODIUM 40 MG: 40 TABLET, DELAYED RELEASE ORAL at 07:22

## 2021-11-07 RX ADMIN — OXYCODONE 5 MG: 5 TABLET ORAL at 22:57

## 2021-11-07 RX ADMIN — Medication 10 ML: at 13:35

## 2021-11-07 RX ADMIN — Medication 9 MG: at 22:00

## 2021-11-07 RX ADMIN — OXYCODONE 5 MG: 5 TABLET ORAL at 13:34

## 2021-11-07 RX ADMIN — LORAZEPAM 0.5 MG: 0.5 TABLET ORAL at 08:52

## 2021-11-07 RX ADMIN — LORAZEPAM 0.5 MG: 0.5 TABLET ORAL at 17:30

## 2021-11-07 RX ADMIN — HYDROMORPHONE HYDROCHLORIDE 1 MG: 2 TABLET ORAL at 20:06

## 2021-11-07 RX ADMIN — COLLAGENASE SANTYL: 250 OINTMENT TOPICAL at 08:53

## 2021-11-07 RX ADMIN — Medication 10 ML: at 22:00

## 2021-11-07 RX ADMIN — HYDROMORPHONE HYDROCHLORIDE 1 MG: 2 TABLET ORAL at 01:35

## 2021-11-07 RX ADMIN — Medication 1 CAPSULE: at 12:05

## 2021-11-07 RX ADMIN — CALCIUM CARBONATE (ANTACID) CHEW TAB 500 MG 200 MG: 500 CHEW TAB at 18:35

## 2021-11-07 RX ADMIN — MIDODRINE HYDROCHLORIDE 10 MG: 5 TABLET ORAL at 07:22

## 2021-11-07 NOTE — PROGRESS NOTES
Problem: Risk for Spread of Infection  Goal: Prevent transmission of infectious organism to others  Description: Prevent the transmission of infectious organisms to other patients, staff members, and visitors. Outcome: Progressing Towards Goal     Problem: Falls - Risk of  Goal: *Absence of Falls  Description: Document Harden Reason Fall Risk and appropriate interventions in the flowsheet. Outcome: Progressing Towards Goal  Note: Fall Risk Interventions:  Mobility Interventions: Patient to call before getting OOB, Communicate number of staff needed for ambulation/transfer, Strengthening exercises (ROM-active/passive)    Mentation Interventions: Adequate sleep, hydration, pain control, Evaluate medications/consider consulting pharmacy, Increase mobility, More frequent rounding    Medication Interventions: Evaluate medications/consider consulting pharmacy    Elimination Interventions: Call light in reach, Patient to call for help with toileting needs, Stay With Me (per policy), Toileting schedule/hourly rounds    History of Falls Interventions: Bed/chair exit alarm         Problem: Pressure Injury - Risk of  Goal: *Prevention of pressure injury  Description: Document Keenan Scale and appropriate interventions in the flowsheet.   Outcome: Progressing Towards Goal  Note: Pressure Injury Interventions:  Sensory Interventions: Assess changes in LOC, Assess need for specialty bed, Float heels, Keep linens dry and wrinkle-free, Minimize linen layers    Moisture Interventions: Apply protective barrier, creams and emollients, Absorbent underpads    Activity Interventions: Increase time out of bed, Pressure redistribution bed/mattress(bed type), PT/OT evaluation    Mobility Interventions: HOB 30 degrees or less, Pressure redistribution bed/mattress (bed type), PT/OT evaluation    Nutrition Interventions: Document food/fluid/supplement intake    Friction and Shear Interventions: HOB 30 degrees or less, Minimize layers, Apply protective barrier, creams and emollients, Foam dressings/transparent film/skin sealants

## 2021-11-07 NOTE — PROGRESS NOTES
Problem: Risk for Spread of Infection  Goal: Prevent transmission of infectious organism to others  Description: Prevent the transmission of infectious organisms to other patients, staff members, and visitors. Outcome: Progressing Towards Goal     Problem: Patient Education:  Go to Education Activity  Goal: Patient/Family Education  Outcome: Progressing Towards Goal     Problem: Falls - Risk of  Goal: *Absence of Falls  Description: Document Leafy Lyon Fall Risk and appropriate interventions in the flowsheet. Outcome: Progressing Towards Goal  Note: Fall Risk Interventions:  Mobility Interventions: Communicate number of staff needed for ambulation/transfer, OT consult for ADLs, Patient to call before getting OOB, PT Consult for mobility concerns, PT Consult for assist device competence, Strengthening exercises (ROM-active/passive), Utilize walker, cane, or other assistive device    Mentation Interventions: Adequate sleep, hydration, pain control, Door open when patient unattended, More frequent rounding, Increase mobility    Medication Interventions: Assess postural VS orthostatic hypotension, Evaluate medications/consider consulting pharmacy, Patient to call before getting OOB, Teach patient to arise slowly, Utilize gait belt for transfers/ambulation    Elimination Interventions: Call light in reach, Urinal in reach    History of Falls Interventions: Utilize gait belt for transfer/ambulation, Evaluate medications/consider consulting pharmacy         Problem: Patient Education: Go to Patient Education Activity  Goal: Patient/Family Education  Outcome: Progressing Towards Goal     Problem: Pressure Injury - Risk of  Goal: *Prevention of pressure injury  Description: Document Keenan Scale and appropriate interventions in the flowsheet.   Outcome: Progressing Towards Goal  Note: Pressure Injury Interventions:  Sensory Interventions: Avoid rigorous massage over bony prominences, Discuss PT/OT consult with provider, Keep linens dry and wrinkle-free, Float heels, Minimize linen layers, Pad between skin to skin    Moisture Interventions: Absorbent underpads, Minimize layers, Limit adult briefs    Activity Interventions: Increase time out of bed, Pressure redistribution bed/mattress(bed type), PT/OT evaluation    Mobility Interventions: Float heels, HOB 30 degrees or less, Pressure redistribution bed/mattress (bed type), PT/OT evaluation    Nutrition Interventions: Document food/fluid/supplement intake    Friction and Shear Interventions: Lift sheet, Minimize layers                Problem: Patient Education: Go to Patient Education Activity  Goal: Patient/Family Education  Outcome: Progressing Towards Goal     Problem: Nutrition Deficit  Goal: *Optimize nutritional status  Outcome: Progressing Towards Goal     Problem: Nutrition Deficit  Goal: *Optimize nutritional status  Outcome: Progressing Towards Goal     Problem: Patient Education: Go to Patient Education Activity  Goal: Patient/Family Education  Outcome: Progressing Towards Goal     Problem: Patient Education: Go to Patient Education Activity  Goal: Patient/Family Education  Outcome: Progressing Towards Goal     Problem: Breathing Pattern - Ineffective  Goal: *Absence of hypoxia  Outcome: Progressing Towards Goal  Goal: *Use of effective breathing techniques  Outcome: Progressing Towards Goal  Goal: *PALLIATIVE CARE:  Alleviation of Dyspnea  Outcome: Progressing Towards Goal     Problem: Patient Education: Go to Patient Education Activity  Goal: Patient/Family Education  Outcome: Progressing Towards Goal

## 2021-11-07 NOTE — PROGRESS NOTES
[facility]  Hospitalist Group                                                                                          Hospitalist Progress Note  Mike Rao MD  Answering service: [phone] OR 36 from in house phone        Date of Service:  2021  NAME:  Vesna Mcclain  :  1970  MRN:  [id number]      Admission Summary:   Pt is a 52 yo M with PMH ESRD on HD, takes coumadin secondary to hx of DVT RUE with a recent prolonged hospitalization secondary to COVID PNA in August of this year. He ultimately required trach and peg and was in rehab at [address] [place] [address]. Pt unable to provide HPI secondary to current cognition, therefore information is obtained via chart and provider. Per [facility] records, pt had a dislodged peg tube. He started having coffee ground emesis 10/18/21 with elevated HR and hypotension. He was sent to the ER for further workup. He was found to be hypotensive and ICU was consulted for further management. \"          Interval history / Subjective:      F/u Hemorrhagic shock  Had 3 episodes of loose stools overnight not liquid per patient, no abdominal pain   Hb stable  Assessment & Plan:     Hemorrhagic Shock/ ABLA d/t Acute GI Bleed, now Improved   - Initial CT reviewed large fluid filled mass, Drained by IR on 10/23 and pigtail drain left in place. 10/26 CT showed slightly diminished size of the abdominal wall collection.   - Repeat CT  significant decrease in size of the left rectus sheath hematoma. The small collection suggestive of congealed hematoma and is unlikely to drain through cath. - s/p EGD ()  - S/P pRBCs, FFB and K centra. - Continue PPI   - Monitor labs and transfuse for hgb <7.0   - GI and GS following      Acute on chronic hypoxic respiratory failure with tracheostomy Resolved, tracheostomy removed 11/3     Renal:  ESRD on HD MWF-Nephrology following   Abdominal oral yeast infection.   Wound culture on 10/23 grew apparent Candida albicans, no sensitivity available. Culture also showed coag negative staph which was deemed to be contaminant  -Completed 15 days of IV Diflucan. WBC normalized. - surgery following, abdominal wall drain is attached to bag with purulent discharge  -will need repeat CT prior to discharge > per surgery note tentatively 11/12?  -have loose stool given probiotic trial want to avoid imodium     Right upper extremity acute DVT, POA. Patient is on warfarin but came with hemorrhagic shock and supratherapeutic INR requiring reversal  --Anticoagulation on hold. Right upper and left lower extremities appeared weaker than the rest on 11/4  --Noncontrast head CT is negative.     Morbid obesity   - BMI 41.69  - Weight loss management to be followed up outpatient    Code status: Full code  DVT prophylaxis: SCDs    Care Plan discussed with: Patient/Family  Anticipated Disposition: Accepted to [facility] and rehab SNF. Surgery indicated patient will have to be discharged with a pigtail in place. No local surgeon to see patient before 12/1, will need repeat CT on 11/12. May need to repeat  CT of abdomen and pelvis early next week prior to discharge. Will follow with general surgery for final recommendations  Anticipated Discharge: Greater than 48 hours     Hospital Problems  Date Reviewed: 11/6/2021          Codes Class Noted POA    Abdominal wall fluid collections ICD-10-CM: R18.8  ICD-9-CM: 789.59  10/25/2021 Unknown        Severe protein-calorie malnutrition ([facility] [address] ICD-10-CM: E43  ICD-9-CM: 262  10/21/2021 Unknown        * (Principal) GIB (gastrointestinal bleeding) ICD-10-CM: K92.2  ICD-9-CM: 578.9  10/18/2021 Yes                Review of Systems:   A comprehensive review of systems was negative except for that written in the HPI. Vital Signs:    Last 24hrs VS reviewed since prior progress note.  Most recent are:  Visit Vitals  /70 (BP 1 Location: Left lower arm, BP Patient Position: At rest)   Pulse (!) 115 Temp 98 °F (36.7 °C)   Resp 20   Ht 5' 11.5\" (1.816 m)   Wt 135 kg (297 lb 9.9 oz)   SpO2 98%   BMI 40.93 kg/m²         Intake/Output Summary (Last 24 hours) at 11/7/2021 1125  Last data filed at 11/7/2021 0400  Gross per 24 hour   Intake 0 ml   Output 75 ml   Net -75 ml        Physical Examination:     I had a face to face encounter with this patient and independently examined them on 11/7/2021 as outlined below:          Constitutional:  No acute distress, cooperative, pleasant    ENT:  Oral mucosa moist, oropharynx benign. Resp:  CTA bilaterally. No wheezing/rhonchi/rales. No accessory muscle use   CV:  Regular rhythm, normal rate, no murmurs, gallops, rubs    GI:  Soft, non distended, non tender. normoactive bowel sounds, no hepatosplenomegaly or abdominal wall drainage tube has purulent discharge in the bag    Musculoskeletal:  No edema, warm, 2+ pulses throughout    Neurologic:  Right upper extremity and left lower extremity weak. AAOx3, CN II-XII reviewed            Data Review:    Review and/or order of clinical lab test      Labs:     Recent Labs     11/05/21 0411   WBC 8.1   HGB 8.4*   HCT 27.2*        Recent Labs     11/05/21 0411   *   K 4.5      CO2 25   BUN 32*   CREA 4.02*   *   CA 9.3     Recent Labs     11/05/21 0411   ALT 20      TBILI 0.6   TP 5.5*   ALB 2.4*   GLOB 3.1     No results for input(s): INR, PTP, APTT, INREXT, INREXT in the last 72 hours. No results for input(s): FE, TIBC, PSAT, FERR in the last 72 hours. No results found for: FOL, RBCF   No results for input(s): PH, PCO2, PO2 in the last 72 hours. No results for input(s): CPK, CKNDX, TROIQ in the last 72 hours.     No lab exists for component: CPKMB  No results found for: CHOL, CHOLX, CHLST, CHOLV, HDL, HDLP, LDL, LDLC, DLDLP, TGLX, TRIGL, TRIGP, CHHD, CHHDX  No results found for: GLUCPOC  No results found for: COLOR, APPRN, SPGRU, REFSG, ORIANA, PROTU, GLUCU, KETU, BILU, UROU, SPENCER, LEUKU, GLUKE, EPSU, BACTU, WBCU, RBCU, CASTS, UCRY      Medications Reviewed:     Current Facility-Administered Medications   Medication Dose Route Frequency    L.acidophilus-paracasei-S.thermophil-bifidobacter (RISAQUAD) 8 billion cell capsule  1 Capsule Oral DAILY    albuterol-ipratropium (DUO-NEB) 2.5 MG-0.5 MG/3 ML  3 mL Nebulization Q6H PRN    pantoprazole (PROTONIX) tablet 40 mg  40 mg Oral ACB    LORazepam (ATIVAN) injection 1 mg  1 mg IntraVENous Q12H PRN    oxyCODONE IR (ROXICODONE) tablet 5 mg  5 mg Oral Q4H PRN    HYDROmorphone (DILAUDID) tablet 1 mg  1 mg Oral Q4H PRN    collagenase (SANTYL) 250 unit/gram ointment   Topical DAILY    heparin (porcine) 1,000 unit/mL injection 4,000 Units  4,000 Units Hemodialysis DIALYSIS PRN    alteplase (CATHFLO) 1.8 mg in sterile water (preservative free) 1.8 mL injection  1.8 mg InterCATHeter DIALYSIS ONCE    alteplase (CATHFLO) 1.8 mg in sterile water (preservative free) 1.8 mL injection  1.8 mg InterCATHeter DIALYSIS ONCE    LORazepam (ATIVAN) tablet 0.5 mg  0.5 mg Oral BID    albumin human 25% (BUMINATE) solution 25 g  25 g IntraVENous DIALYSIS PRN    epoetin kayley-epbx (RETACRIT) injection 10,000 Units  10,000 Units SubCUTAneous Q TUE, THU & SAT    B complex-vitaminC-folic acid (NEPHROCAP) cap  1 Capsule Oral DAILY    midodrine (PROAMATINE) tablet 10 mg  10 mg Oral Q8H    heparin (porcine) 1,000 unit/mL injection 1,800 Units  1,800 Units InterCATHeter DIALYSIS PRN    And    heparin (porcine) 1,000 unit/mL injection 1,800 Units  1,800 Units InterCATHeter DIALYSIS PRN    fentaNYL (DURAGESIC) 25 mcg/hr patch 1 Patch  1 Patch TransDERmal Q72H    melatonin tablet 9 mg  9 mg Oral QHS    sodium chloride (NS) flush 5-40 mL  5-40 mL IntraVENous Q8H    sodium chloride (NS) flush 5-40 mL  5-40 mL IntraVENous PRN    chlorhexidine (ORAL CARE KIT) 0.12 % mouthwash 15 mL  15 mL Oral Q12H    balsam peru-castor oiL (VENELEX) ointment   Topical BID    sodium chloride (NS) flush 5-10 mL  5-10 mL IntraVENous PRN    sodium chloride (NS) flush 5-40 mL  5-40 mL IntraVENous Q8H    sodium chloride (NS) flush 5-40 mL  5-40 mL IntraVENous PRN    acetaminophen (TYLENOL) tablet 650 mg  650 mg Oral Q6H PRN    Or    acetaminophen (TYLENOL) suppository 650 mg  650 mg Rectal Q6H PRN    polyethylene glycol (MIRALAX) packet 17 g  17 g Oral DAILY PRN    ondansetron (ZOFRAN ODT) tablet 4 mg  4 mg Oral Q8H PRN    Or    ondansetron (ZOFRAN) injection 4 mg  4 mg IntraVENous Q6H PRN    albuterol (PROVENTIL VENTOLIN) nebulizer solution 2.5 mg  2.5 mg Nebulization Q4H PRN     ______________________________________________________________________  EXPECTED LENGTH OF STAY: 4d 9h  ACTUAL LENGTH OF STAY:          20                 Ella Lindo MD

## 2021-11-07 NOTE — PROGRESS NOTES
Resting in bed comfortably. Mrs Sameer Ruth was present. Reported GI symptoms overnight. No new chemistries. HD again tomorrow (11/8).

## 2021-11-08 ENCOUNTER — APPOINTMENT (OUTPATIENT)
Dept: GENERAL RADIOLOGY | Age: 51
DRG: 368 | End: 2021-11-08
Attending: SURGERY
Payer: COMMERCIAL

## 2021-11-08 LAB
ALBUMIN SERPL-MCNC: 2.8 G/DL (ref 3.5–5)
ALBUMIN/GLOB SERPL: 0.8 {RATIO} (ref 1.1–2.2)
ALP SERPL-CCNC: 134 U/L (ref 45–117)
ALT SERPL-CCNC: 12 U/L (ref 12–78)
ANION GAP SERPL CALC-SCNC: 7 MMOL/L (ref 5–15)
AST SERPL-CCNC: 18 U/L (ref 15–37)
BASOPHILS # BLD: 0.1 K/UL (ref 0–0.1)
BASOPHILS NFR BLD: 1 % (ref 0–1)
BILIRUB SERPL-MCNC: 0.7 MG/DL (ref 0.2–1)
BUN SERPL-MCNC: 18 MG/DL (ref 6–20)
BUN/CREAT SERPL: 8 (ref 12–20)
CALCIUM SERPL-MCNC: 9.5 MG/DL (ref 8.5–10.1)
CHLORIDE SERPL-SCNC: 102 MMOL/L (ref 97–108)
CO2 SERPL-SCNC: 29 MMOL/L (ref 21–32)
COMMENT, HOLDF: NORMAL
CREAT SERPL-MCNC: 2.21 MG/DL (ref 0.7–1.3)
DIFFERENTIAL METHOD BLD: ABNORMAL
EOSINOPHIL # BLD: 0.2 K/UL (ref 0–0.4)
EOSINOPHIL NFR BLD: 1 % (ref 0–7)
ERYTHROCYTE [DISTWIDTH] IN BLOOD BY AUTOMATED COUNT: 19.2 % (ref 11.5–14.5)
GLOBULIN SER CALC-MCNC: 3.3 G/DL (ref 2–4)
GLUCOSE SERPL-MCNC: 87 MG/DL (ref 65–100)
HCT VFR BLD AUTO: 27.8 % (ref 36.6–50.3)
HGB BLD-MCNC: 8.5 G/DL (ref 12.1–17)
IMM GRANULOCYTES # BLD AUTO: 0.2 K/UL (ref 0–0.04)
IMM GRANULOCYTES NFR BLD AUTO: 2 % (ref 0–0.5)
LYMPHOCYTES # BLD: 1.7 K/UL (ref 0.8–3.5)
LYMPHOCYTES NFR BLD: 15 % (ref 12–49)
MCH RBC QN AUTO: 30.6 PG (ref 26–34)
MCHC RBC AUTO-ENTMCNC: 30.6 G/DL (ref 30–36.5)
MCV RBC AUTO: 100 FL (ref 80–99)
MONOCYTES # BLD: 1.2 K/UL (ref 0–1)
MONOCYTES NFR BLD: 11 % (ref 5–13)
NEUTS SEG # BLD: 7.8 K/UL (ref 1.8–8)
NEUTS SEG NFR BLD: 70 % (ref 32–75)
NRBC # BLD: 0 K/UL (ref 0–0.01)
NRBC BLD-RTO: 0 PER 100 WBC
PLATELET # BLD AUTO: 177 K/UL (ref 150–400)
PMV BLD AUTO: 9.8 FL (ref 8.9–12.9)
POTASSIUM SERPL-SCNC: 3.5 MMOL/L (ref 3.5–5.1)
PROT SERPL-MCNC: 6.1 G/DL (ref 6.4–8.2)
RBC # BLD AUTO: 2.78 M/UL (ref 4.1–5.7)
SAMPLES BEING HELD,HOLD: NORMAL
SODIUM SERPL-SCNC: 138 MMOL/L (ref 136–145)
WBC # BLD AUTO: 11.1 K/UL (ref 4.1–11.1)

## 2021-11-08 PROCEDURE — 36415 COLL VENOUS BLD VENIPUNCTURE: CPT

## 2021-11-08 PROCEDURE — 99232 SBSQ HOSP IP/OBS MODERATE 35: CPT | Performed by: NURSE PRACTITIONER

## 2021-11-08 PROCEDURE — 74011250637 HC RX REV CODE- 250/637: Performed by: INTERNAL MEDICINE

## 2021-11-08 PROCEDURE — 85025 COMPLETE CBC W/AUTO DIFF WBC: CPT

## 2021-11-08 PROCEDURE — 74011250636 HC RX REV CODE- 250/636: Performed by: INTERNAL MEDICINE

## 2021-11-08 PROCEDURE — 74011250636 HC RX REV CODE- 250/636: Performed by: NURSE PRACTITIONER

## 2021-11-08 PROCEDURE — 97110 THERAPEUTIC EXERCISES: CPT

## 2021-11-08 PROCEDURE — 74011250637 HC RX REV CODE- 250/637: Performed by: HOSPITALIST

## 2021-11-08 PROCEDURE — P9047 ALBUMIN (HUMAN), 25%, 50ML: HCPCS | Performed by: INTERNAL MEDICINE

## 2021-11-08 PROCEDURE — 74011000250 HC RX REV CODE- 250: Performed by: NURSE PRACTITIONER

## 2021-11-08 PROCEDURE — 74011250637 HC RX REV CODE- 250/637: Performed by: NURSE PRACTITIONER

## 2021-11-08 PROCEDURE — 65660000000 HC RM CCU STEPDOWN

## 2021-11-08 PROCEDURE — 90935 HEMODIALYSIS ONE EVALUATION: CPT

## 2021-11-08 PROCEDURE — 74011250637 HC RX REV CODE- 250/637: Performed by: STUDENT IN AN ORGANIZED HEALTH CARE EDUCATION/TRAINING PROGRAM

## 2021-11-08 PROCEDURE — 80053 COMPREHEN METABOLIC PANEL: CPT

## 2021-11-08 RX ORDER — PANTOPRAZOLE SODIUM 40 MG/1
40 TABLET, DELAYED RELEASE ORAL
Status: DISCONTINUED | OUTPATIENT
Start: 2021-11-09 | End: 2021-11-18 | Stop reason: HOSPADM

## 2021-11-08 RX ADMIN — MIDODRINE HYDROCHLORIDE 10 MG: 5 TABLET ORAL at 22:49

## 2021-11-08 RX ADMIN — COLLAGENASE SANTYL: 250 OINTMENT TOPICAL at 11:18

## 2021-11-08 RX ADMIN — Medication: at 11:18

## 2021-11-08 RX ADMIN — LORAZEPAM 0.5 MG: 0.5 TABLET ORAL at 17:48

## 2021-11-08 RX ADMIN — MIDODRINE HYDROCHLORIDE 10 MG: 5 TABLET ORAL at 08:11

## 2021-11-08 RX ADMIN — Medication 10 ML: at 22:00

## 2021-11-08 RX ADMIN — ALBUMIN (HUMAN) 25 G: 0.25 INJECTION, SOLUTION INTRAVENOUS at 08:32

## 2021-11-08 RX ADMIN — HYDROMORPHONE HYDROCHLORIDE 1 MG: 2 TABLET ORAL at 20:11

## 2021-11-08 RX ADMIN — Medication 10 ML: at 14:42

## 2021-11-08 RX ADMIN — Medication 10 ML: at 06:00

## 2021-11-08 RX ADMIN — PANTOPRAZOLE SODIUM 40 MG: 40 TABLET, DELAYED RELEASE ORAL at 11:28

## 2021-11-08 RX ADMIN — HYDROMORPHONE HYDROCHLORIDE 1 MG: 2 TABLET ORAL at 08:07

## 2021-11-08 RX ADMIN — Medication 1 CAPSULE: at 11:17

## 2021-11-08 RX ADMIN — CALCIUM CARBONATE (ANTACID) CHEW TAB 500 MG 200 MG: 500 CHEW TAB at 11:30

## 2021-11-08 RX ADMIN — LORAZEPAM 0.5 MG: 0.5 TABLET ORAL at 11:17

## 2021-11-08 RX ADMIN — Medication 10 ML: at 14:43

## 2021-11-08 RX ADMIN — Medication: at 17:53

## 2021-11-08 RX ADMIN — HYDROMORPHONE HYDROCHLORIDE 1 MG: 2 TABLET ORAL at 03:07

## 2021-11-08 RX ADMIN — HEPARIN SODIUM 1800 UNITS: 1000 INJECTION INTRAVENOUS; SUBCUTANEOUS at 09:46

## 2021-11-08 RX ADMIN — Medication 9 MG: at 22:49

## 2021-11-08 RX ADMIN — HYDROMORPHONE HYDROCHLORIDE 1 MG: 2 TABLET ORAL at 14:41

## 2021-11-08 RX ADMIN — CALCIUM CARBONATE (ANTACID) CHEW TAB 500 MG 200 MG: 500 CHEW TAB at 17:48

## 2021-11-08 RX ADMIN — CHLORHEXIDINE GLUCONATE 15 ML: 0.12 RINSE ORAL at 21:00

## 2021-11-08 RX ADMIN — LIDOCAINE HYDROCHLORIDE 40 ML: 20 SOLUTION ORAL; TOPICAL at 01:20

## 2021-11-08 RX ADMIN — MIDODRINE HYDROCHLORIDE 10 MG: 5 TABLET ORAL at 14:41

## 2021-11-08 NOTE — DIALYSIS
Hemodialysis / [phone]    Vitals Pre Post Assessment Pre Post   BP BP: 121/79 (11/08/21 0545) 114/84 LOC axox4 No change   HR Pulse (Heart Rate): (!) 104 (11/08/21 0605) 112 Lungs clear No change   Resp Resp Rate: 18 (11/08/21 0545) 18 Cardiac nsr No change   Temp Temp: 97.6 °F (36.4 °C) (11/08/21 0200) 97.8 Skin wdi No change   Weight Pre-Dialysis Weight: 137.5 kg (303 lb 2.1 oz) (11/01/21 1300)  Edema Generalized, lower extremity No change   Tele status remote remote Pain Pain Intensity 1: 0 (11/07/21 1800) 3     Orders   Duration: Start: 0600 End: 1000 Total: 4.0   Dialyzer: Dialyzer/Set Up Inspection: Nya Hoffmann ([id number]) (11/08/21 0545)   K Bath: Dialysate K (mEq/L): 3 (11/08/21 0545)   Ca Bath: Dialysate CA (mEq/L): 2.5 (11/08/21 0545)   Na: Dialysate NA (mEq/L): 140 (11/08/21 0545)   Bicarb: Dialysate HCO3 (mEq/L): 35 (11/08/21 0545)   Target Fluid Removal: Goal/Amount of Fluid to Remove (mL): 3000 mL (11/08/21 0545)                           Access   Type & Location:  RIJ CVC: Dressing CDI. No s/s of infection. Both lumens aspirate & flush well. Running well at .     Comments:                                        Labs   HBsAg (Antigen) / date:             10/20/21 neg                                  HBsAb (Antibody) / date:              10/20/21 Immune   Source:    Obtained/Reviewed  Critical Results Called HGB   Date Value Ref Range Status   11/05/2021 8.4 (L) 12.1 - 17.0 g/dL Final     Potassium   Date Value Ref Range Status   11/05/2021 4.5 3.5 - 5.1 mmol/L Final     Calcium   Date Value Ref Range Status   11/05/2021 9.3 8.5 - 10.1 MG/DL Final     BUN   Date Value Ref Range Status   11/05/2021 32 (H) 6 - 20 MG/DL Final     Creatinine   Date Value Ref Range Status   11/05/2021 4.02 (H) 0.70 - 1.30 MG/DL Final        Meds Given   Name Dose Route   heparin 1.9ml art   heparin 1.9 chandler          Adequacy / Fluid    Total Liters Process: 61.0   Net Fluid Removed: 2500      Comments Time Out Done:   (Time) 0545 Yes   Admitting Diagnosis: Infection   Consent obtained/signed: Informed Consent Verified: Yes (11/08/21 3794)   Machine / RO # Machine Number: [id number] (11/08/21 9989)   Primary Nurse Rpt Pre: Lonny Diaz RN   Primary Nurse Rpt Post: Delvis Joseph RN   Pt Education: Access care, tx times   Care Plan: Follow md POC   Pts outpatient clinic: [place]     Tx Summary     SBAR received from Primary RN. Pt  A&Ox4. Consent signed & on file. 9309: Each catheter limb disinfected per p&p, caps removed, hubs disinfected per p&p. Each lumen aspirated for blood return and flushed with Normal Saline per policy. 0600 VSS. Dialysis Tx initiated. 0730 pt restless, moaning. Rn called to help reposition. Will continue to monitor. Pt dialyzer switched, as TMP was out of range and alarming, all blood rinsed bad and reset, and tx restarted. 1000: Tx ended. VSS. Each dialysis catheter limb disinfected per p&p, all possible blood returned per p&p, and each dialysis hub disinfected per p&p. Each lumen flushed, post dialysis catheter Heparin dwell instilled per order, and caps applied. Bed locked and in the lowest position, call bell and belongings in reach. SBAR given to Primary, RN. Patient is stable at time of my departure. All Dialysis related medications have been reviewed.        Comments:

## 2021-11-08 NOTE — PROGRESS NOTES
Occupational Therapy Note:  Chart reviewed and attempted to see pt for weekly re-assessment. Pt currently on HD and will be finished around 10:15a. Will follow up later as able.   Theresa Schumacher OTR/JEOVANNY, LYUDMILAIS

## 2021-11-08 NOTE — PROGRESS NOTES
Bedside and Verbal shift change report given to Nitza Nieves 69 (oncoming nurse) by Libby Goodman RN (offgoing nurse). Report included the following information SBAR, Kardex, Intake/Output and MAR.

## 2021-11-08 NOTE — PROGRESS NOTES
Physical Therapy - defer  Chart reviewed in prep for therapy session. Noted hypotension. RN requesting therapy defer this morning. Will check back this afternoon as able.

## 2021-11-08 NOTE — PROGRESS NOTES
Name: Shannon Olivas MRN: [id number]   : 1970 Hospital: [address]   Date: 2021        IMPRESSION:   · SERGEY, HD dependent. Patient is receiving HD  · Hypervolemia with predominantly central congestion, improved. · Anemia of multifactorial origin  · Respiratory failure due to a complicated Covid pneumonia, recovered. Trache removed  · Hypokalemia pre HD      PLAN:   · Continue present care  · Next HD - on Wednesday  · Watch for renal function recovery. Patient needs urine output to be recorded. Will check pre HD renal panel. · Anemia management- start Retacrit. Check the iron profile. Subjective/Interval History:   I have reviewed the flowsheet and previous days notes. ROS:Review of systems not obtained due to patient factors. Objective:   Vital Signs:    Visit Vitals  /84   Pulse (!) 113   Temp 97.8 °F (36.6 °C) (Oral) Comment: 97.8   Resp 18   Ht 5' 11.5\" (1.816 m)   Wt 135.1 kg (297 lb 13.5 oz)   SpO2 96%   BMI 40.96 kg/m²       O2 Device: None (Room air)   O2 Flow Rate (L/min): 0 l/min   Temp (24hrs), Av °F (36.7 °C), Min:97.6 °F (36.4 °C), Max:98.3 °F (36.8 °C)       Intake/Output:   Last shift:       0701 -  1900  In: -   Out: 2500   Last 3 shifts: No intake/output data recorded. Intake/Output Summary (Last 24 hours) at 2021 1156  Last data filed at 2021 1000  Gross per 24 hour   Intake --   Output 2500 ml   Net -2500 ml        Physical Exam:  General:    Awake, alert, answers all questions appropriately. HD Tx in progress. Head:   Normocephalic, without obvious abnormality, atraumatic. Eyes:   Conjunctivae/corneas clear. Nose:  Nares normal. No drainage or sinus tenderness. Throat:    Lips, mucosa, and tongue normal.    Neck:  Supple. No trache  Lungs:   Clear to auscultation bilaterally. No Wheezing or Rhonchi. No rales. Chest wall:  No tenderness or deformity. No Accessory muscle use.   Heart:   Regular rate and rhythm,  no murmur, rub or gallop. Abdomen:   Soft. Distended. Bowel sounds normal. PEG tube in place. Extremities: Extremities normal, atraumatic, No cyanosis. No edema. No clubbing  Skin:     Texture, turgor normal. No rashes or lesions. Not Jaundiced  Psych:  Calm. Neurologic: Non focal.      DATA:  Labs:  No results for input(s): NA, K, CL, CO2, BUN, CREA, CA, ALB, PHOS, MG in the last 72 hours. No results for input(s): WBC, HGB, HCT, PLT, HGBEXT, HCTEXT, PLTEXT, HGBEXT, HCTEXT, PLTEXT in the last 72 hours. No results for input(s): DOLORES, KU, CLU, CREAU in the last 72 hours.     No lab exists for component: PROU    Total time spent with patient:  35 minutes    [] Critical Care Provided    Care Plan discussed with:   Lulu Mendez MD

## 2021-11-08 NOTE — PROGRESS NOTES
Transition of Care Plan   RUR- Low 9%   DISPOSITION: SNF - [place] [id number] - pending medical stability/surgical plan   F/U with PCP/Specialist     Transport: [facility]     CM received call from patient's spouse, Ave Every, requesting update of discharge date. Discharge is pending surgical plan currently, patient has pigtail drain. CT has been ordered. CM to follow for [facility] plan. CM to update Gabby Garcia in Admissions at [facility] when surgical plan identified. Insurance [facility] has been obtained. Luciana Anderson M.S.SKIP.

## 2021-11-08 NOTE — ROUTINE PROCESS
Bedside and Verbal shift change report given to [id number] (oncoming nurse) by Renzo Bey (offgoing nurse). Report included the following information SBAR, Kardex, ED Summary, MAR, Cardiac Rhythm NSR/ST, Alarm Parameters  and Dual Neuro Assessment.

## 2021-11-08 NOTE — PROGRESS NOTES
[facility]                                                                                          Hospitalist Progress Note  Irena Davenport MD  Answering service: [phone] OR [phone] from in house phone        Date of Service:  2021  NAME:  Holly Sanchez  :  1970  MRN:  [id number]      Admission Summary:   Pt is a 52 yo M with PMH ESRD on HD, takes coumadin secondary to hx of DVT RUE with a recent prolonged hospitalization secondary to COVID PNA in August of this year. He ultimately required trach and peg and was in rehab at [address] [place] [address]. Pt unable to provide HPI secondary to current cognition, therefore information is obtained via chart and provider. Per [facility] records, pt had a dislodged peg tube. He started having coffee ground emesis 10/18/21 with elevated HR and hypotension. He was sent to the ER for further workup. He was found to be hypotensive and ICU was consulted for further management. \"          Interval history / Subjective:      F/u Hemorrhagic shock  I have seen and examined patient earlier today, he just completed dialysis. He has no specific complaints. I have talked with surgery prior to coming to see patient. I shared with patient that general surgery recommended he is best served if he stays in a rehab facility around [facility] so he can have surgical follow-up. He insisted he wants to go closer to home. Assessment & Plan:     Hemorrhagic shock due to hematemesis due to severe esophagitis. Acute blood loss anemia.   --EGD on 10/19 showed LA grade D reflux esophagitis up to the mid esophagus, few sessile polyps in the stomach, mild patchy erythema in the distal bulb of the duodenum. - s/p EGD ()  - S/P 2 units of pRBCs, 1 unit of FFB and K centra. - Continue PPI   - Monitor labs and transfuse for hgb <7.0. Hemoglobin has remained> 8.  - GI and GS following     Left abdominal abscess. History of dislodged PEG tube.   --CT A/P on 10/20 showed left anterior abdominal and pelvic wall hematoma or gas containing fluid collection. --He underwent ultrasound guided fluid drainage and percutaneous drain placement which revealed thick purulent fluid, 18 [address] drain was left in place. -- Wound culture on 10/23 grew apparent Candida albicans, no sensitivity available. Culture also showed coag negative staph which was deemed to be contaminant  -Completed 15 days of IV Diflucan. WBC normalized. -Follow up CT on 10/26 showed slightly diminished size of the abdominal wall collection.   - Repeat CT on 11/2 significant decrease in size of the left rectus sheath hematoma. The small collection suggestive of congealed hematoma and is unlikely to drain through cath. - surgery following, abdominal wall drain is attached to bag with purulent discharge  --Discussed with general surgery, CT abdomen pelvis with contrast and x-ray sinogram scheduled for today. Acute on chronic hypoxic respiratory failure with tracheostomy Resolved, tracheostomy removed 11/3     Renal:  ESRD on HD MWF-Nephrology following        Right upper extremity acute DVT, POA. Patient was on warfarin but came with hemorrhagic shock and supratherapeutic INR requiring reversal  --Anticoagulation on hold. --We will discuss with GI regarding resumption of anticoagulation. Right upper and left lower extremities appeared weaker than the rest on 11/4  --Noncontrast head CT is negative.     Morbid obesity   - BMI 41.69  - Weight loss management to be followed up outpatient    Code status: Full code  DVT prophylaxis: SCDs    Care Plan discussed with: Patient/Family  Anticipated Disposition: Accepted to [facility] and rehab SNF. Surgery indicated patient will have to be discharged with a pigtail in place. No local surgeon to see patient before 12/1, will need repeat CT on 11/12. May need to repeat  CT of abdomen and pelvis early next week prior to discharge.   Will follow with general surgery for final recommendations  Anticipated Discharge: Greater than 48 hours     Hospital Problems  Date Reviewed: 11/6/2021          Codes Class Noted POA    Abdominal wall fluid collections ICD-10-CM: R18.8  ICD-9-CM: 789.59  10/25/2021 Unknown        Severe protein-calorie malnutrition ([facility] [address] ICD-10-CM: E43  ICD-9-CM: 262  10/21/2021 Unknown        * (Principal) GIB (gastrointestinal bleeding) ICD-10-CM: K92.2  ICD-9-CM: 578.9  10/18/2021 Yes                Review of Systems:   A comprehensive review of systems was negative except for that written in the HPI. Vital Signs:    Last 24hrs VS reviewed since prior progress note. Most recent are:  Visit Vitals  /87   Pulse (!) 116   Temp 97.8 °F (36.6 °C)   Resp 25   Ht 5' 11.5\" (1.816 m)   Wt 135.1 kg (297 lb 13.5 oz)   SpO2 96%   BMI 40.96 kg/m²         Intake/Output Summary (Last 24 hours) at 11/8/2021 1658  Last data filed at 11/8/2021 1000  Gross per 24 hour   Intake --   Output 2500 ml   Net -2500 ml        Physical Examination:     I had a face to face encounter with this patient and independently examined them on 11/8/2021 as outlined below:          Constitutional:  No acute distress, cooperative, pleasant    ENT:  Oral mucosa moist, oropharynx benign. Resp:  CTA bilaterally. No wheezing/rhonchi/rales. No accessory muscle use   CV:  Regular rhythm, normal rate, no murmurs, gallops, rubs    GI:  Soft, non distended, non tender. normoactive bowel sounds, no hepatosplenomegaly or abdominal wall drainage tube has purulent discharge in the bag    Musculoskeletal:  No edema, warm, 2+ pulses throughout    Neurologic:  Right upper extremity and left lower extremity weak.   AAOx3, CN II-XII reviewed            Data Review:    Review and/or order of clinical lab test      Labs:     Recent Labs     11/08/21  1120   WBC 11.1   HGB 8.5*   HCT 27.8*        Recent Labs     11/08/21  1120      K 3.5      CO2 29   BUN 18   CREA 2.21* GLU 87   CA 9.5     Recent Labs     11/08/21  1120   ALT 12   *   TBILI 0.7   TP 6.1*   ALB 2.8*   GLOB 3.3     No results for input(s): INR, PTP, APTT, INREXT, INREXT in the last 72 hours. No results for input(s): FE, TIBC, PSAT, FERR in the last 72 hours. No results found for: FOL, RBCF   No results for input(s): PH, PCO2, PO2 in the last 72 hours. No results for input(s): CPK, CKNDX, TROIQ in the last 72 hours.     No lab exists for component: CPKMB  No results found for: CHOL, CHOLX, CHLST, CHOLV, HDL, HDLP, LDL, LDLC, DLDLP, TGLX, TRIGL, TRIGP, CHHD, CHHDX  No results found for: GLUCPOC  No results found for: COLOR, APPRN, SPGRU, REFSG, ORIANA, PROTU, GLUCU, KETU, BILU, UROU, SPENCER, LEUKU, GLUKE, EPSU, BACTU, WBCU, RBCU, CASTS, UCRY      Medications Reviewed:     Current Facility-Administered Medications   Medication Dose Route Frequency    iohexoL (OMNIPAQUE) 300 mg iodine/mL contrast injection 150 mL  150 mL Oral RAD ONCE    L.acidophilus-paracasei-S.thermophil-bifidobacter (RISAQUAD) 8 billion cell capsule  1 Capsule Oral DAILY    calcium carbonate (TUMS) chewable tablet 200 mg [elemental]  200 mg Oral TID WITH MEALS    albuterol-ipratropium (DUO-NEB) 2.5 MG-0.5 MG/3 ML  3 mL Nebulization Q6H PRN    pantoprazole (PROTONIX) tablet 40 mg  40 mg Oral ACB    LORazepam (ATIVAN) injection 1 mg  1 mg IntraVENous Q12H PRN    oxyCODONE IR (ROXICODONE) tablet 5 mg  5 mg Oral Q4H PRN    HYDROmorphone (DILAUDID) tablet 1 mg  1 mg Oral Q4H PRN    collagenase (SANTYL) 250 unit/gram ointment   Topical DAILY    heparin (porcine) 1,000 unit/mL injection 4,000 Units  4,000 Units Hemodialysis DIALYSIS PRN    alteplase (CATHFLO) 1.8 mg in sterile water (preservative free) 1.8 mL injection  1.8 mg InterCATHeter DIALYSIS ONCE    alteplase (CATHFLO) 1.8 mg in sterile water (preservative free) 1.8 mL injection  1.8 mg InterCATHeter DIALYSIS ONCE    LORazepam (ATIVAN) tablet 0.5 mg  0.5 mg Oral BID    albumin human 25% (BUMINATE) solution 25 g  25 g IntraVENous DIALYSIS PRN    epoetin kayley-epbx (RETACRIT) injection 10,000 Units  10,000 Units SubCUTAneous Q TUE, THU & SAT    B complex-vitaminC-folic acid (NEPHROCAP) cap  1 Capsule Oral DAILY    midodrine (PROAMATINE) tablet 10 mg  10 mg Oral Q8H    heparin (porcine) 1,000 unit/mL injection 1,800 Units  1,800 Units InterCATHeter DIALYSIS PRN    And    heparin (porcine) 1,000 unit/mL injection 1,800 Units  1,800 Units InterCATHeter DIALYSIS PRN    fentaNYL (DURAGESIC) 25 mcg/hr patch 1 Patch  1 Patch TransDERmal Q72H    melatonin tablet 9 mg  9 mg Oral QHS    sodium chloride (NS) flush 5-40 mL  5-40 mL IntraVENous Q8H    sodium chloride (NS) flush 5-40 mL  5-40 mL IntraVENous PRN    chlorhexidine (ORAL CARE KIT) 0.12 % mouthwash 15 mL  15 mL Oral Q12H    balsam peru-castor oiL (VENELEX) ointment   Topical BID    sodium chloride (NS) flush 5-10 mL  5-10 mL IntraVENous PRN    sodium chloride (NS) flush 5-40 mL  5-40 mL IntraVENous Q8H    sodium chloride (NS) flush 5-40 mL  5-40 mL IntraVENous PRN    acetaminophen (TYLENOL) tablet 650 mg  650 mg Oral Q6H PRN    Or    acetaminophen (TYLENOL) suppository 650 mg  650 mg Rectal Q6H PRN    polyethylene glycol (MIRALAX) packet 17 g  17 g Oral DAILY PRN    ondansetron (ZOFRAN ODT) tablet 4 mg  4 mg Oral Q8H PRN    Or    ondansetron (ZOFRAN) injection 4 mg  4 mg IntraVENous Q6H PRN    albuterol (PROVENTIL VENTOLIN) nebulizer solution 2.5 mg  2.5 mg Nebulization Q4H PRN     ______________________________________________________________________  EXPECTED LENGTH OF STAY: 4d 9h  ACTUAL LENGTH OF STAY:          21                 Melissa Spencer MD

## 2021-11-08 NOTE — PROGRESS NOTES
General Surgery Daily Progress Note    Admit Date: 10/18/2021  Post-Operative Day: 20 Days Post-Op from Procedure(s):  ESOPHAGOGASTRODUODENOSCOPY (EGD) at Bedside  ESOPHAGOGASTRODUODENAL (EGD) BIOPSY     Subjective:     Last 24 hrs: pt said he is feeling \"rough\". Denies abd pain. Objective:     Blood pressure 114/84, pulse (!) 113, temperature 97.8 °F (36.6 °C), temperature source Oral, resp. rate 18, height 5' 11.5\" (1.816 m), weight 297 lb 13.5 oz (135.1 kg), SpO2 96 %. Temp (24hrs), Av °F (36.7 °C), Min:97.6 °F (36.4 °C), Max:98.3 °F (36.8 °C)      _____________________  Physical Exam:     Alert and Oriented, x3, in no acute distress. Cardiovascular: tachycardic, no peripheral edema  Abdomen: soft, obese; perc drain w/ tan, milky drainage - 75cc out last 24hrs    Assessment:   Principal Problem:    GIB (gastrointestinal bleeding) (10/18/2021)    Active Problems:    Severe protein-calorie malnutrition ([address] (10/21/2021)      Abdominal wall fluid collections (10/25/2021)            Plan:     CT-sinogram today; assess size of abscess and/or presence of fistula  Cont PPI  If drain needs to stay, he may need SNF locally so he can f/u Dr Linda Avitia:  Pt , discussed and reviewed with NP, and questions answered with NP, and I agree/ concur with note NP. Apparently CT scan abdomen last week done without oral contrast therefore cannot assess for gastrocutaneous fistula from previous gastrostomy tube. Patient clinically improving with much less drain output, no drain output from the old gastrostomy tube site, continue antibiotics, will order CT sinogram to assess for size of the residual cavity and whether any gastroabscess cutaneous fistula present. If connection to the stomach then drain will need to stay until connection fistula closed or if required surgical correction of this.   If cavity small and no connection to the stomach then perhaps drain can be removed and treated with antibiotics alone, skilled nursing facility rehab decision pending above, but at this point if drain needs to remain in place then patient best served by skilled nursing facility locally around [place] rather than transfer to the [address] [place] [address] part of the [facility] where he does not have any surgical follow-up available to him. FACE TO FACE time including review of any indicated imaging, discussion with NP  and other providers, 28   minutes. Data Review:    No results for input(s): WBC, HGB, HCT, PLT, HGBEXT, HCTEXT, PLTEXT in the last 72 hours. No results for input(s): NA, K, CL, CO2, GLU, BUN, CREA, CA, MG, PHOS, ALB, TBIL, ALT, INR, INREXT in the last 72 hours. No lab exists for component: SGOT  No results for input(s): AML, LPSE in the last 72 hours.         ______________________  Medications:    Current Facility-Administered Medications   Medication Dose Route Frequency    L.acidophilus-paracasei-S.thermophil-bifidobacter (RISAQUAD) 8 billion cell capsule  1 Capsule Oral DAILY    calcium carbonate (TUMS) chewable tablet 200 mg [elemental]  200 mg Oral TID WITH MEALS    albuterol-ipratropium (DUO-NEB) 2.5 MG-0.5 MG/3 ML  3 mL Nebulization Q6H PRN    pantoprazole (PROTONIX) tablet 40 mg  40 mg Oral ACB    LORazepam (ATIVAN) injection 1 mg  1 mg IntraVENous Q12H PRN    oxyCODONE IR (ROXICODONE) tablet 5 mg  5 mg Oral Q4H PRN    HYDROmorphone (DILAUDID) tablet 1 mg  1 mg Oral Q4H PRN    collagenase (SANTYL) 250 unit/gram ointment   Topical DAILY    heparin (porcine) 1,000 unit/mL injection 4,000 Units  4,000 Units Hemodialysis DIALYSIS PRN    alteplase (CATHFLO) 1.8 mg in sterile water (preservative free) 1.8 mL injection  1.8 mg InterCATHeter DIALYSIS ONCE    alteplase (CATHFLO) 1.8 mg in sterile water (preservative free) 1.8 mL injection  1.8 mg InterCATHeter DIALYSIS ONCE    LORazepam (ATIVAN) tablet 0.5 mg  0.5 mg Oral BID    albumin human 25% (BUMINATE) solution 25 g  25 g IntraVENous DIALYSIS PRN    epoetin kayley-epbx (RETACRIT) injection 10,000 Units  10,000 Units SubCUTAneous Q TUE, THU & SAT    B complex-vitaminC-folic acid (NEPHROCAP) cap  1 Capsule Oral DAILY    midodrine (PROAMATINE) tablet 10 mg  10 mg Oral Q8H    heparin (porcine) 1,000 unit/mL injection 1,800 Units  1,800 Units InterCATHeter DIALYSIS PRN    And    heparin (porcine) 1,000 unit/mL injection 1,800 Units  1,800 Units InterCATHeter DIALYSIS PRN    fentaNYL (DURAGESIC) 25 mcg/hr patch 1 Patch  1 Patch TransDERmal Q72H    melatonin tablet 9 mg  9 mg Oral QHS    sodium chloride (NS) flush 5-40 mL  5-40 mL IntraVENous Q8H    sodium chloride (NS) flush 5-40 mL  5-40 mL IntraVENous PRN    chlorhexidine (ORAL CARE KIT) 0.12 % mouthwash 15 mL  15 mL Oral Q12H    balsam peru-castor oiL (VENELEX) ointment   Topical BID    sodium chloride (NS) flush 5-10 mL  5-10 mL IntraVENous PRN    sodium chloride (NS) flush 5-40 mL  5-40 mL IntraVENous Q8H    sodium chloride (NS) flush 5-40 mL  5-40 mL IntraVENous PRN    acetaminophen (TYLENOL) tablet 650 mg  650 mg Oral Q6H PRN    Or    acetaminophen (TYLENOL) suppository 650 mg  650 mg Rectal Q6H PRN    polyethylene glycol (MIRALAX) packet 17 g  17 g Oral DAILY PRN    ondansetron (ZOFRAN ODT) tablet 4 mg  4 mg Oral Q8H PRN    Or    ondansetron (ZOFRAN) injection 4 mg  4 mg IntraVENous Q6H PRN    albuterol (PROVENTIL VENTOLIN) nebulizer solution 2.5 mg  2.5 mg Nebulization Q4H PRN       Josue Kaufman NP  11/8/2021

## 2021-11-08 NOTE — PROGRESS NOTES
Problem: Mobility Impaired (Adult and Pediatric)  Goal: *Acute Goals and Plan of Care (Insert Text)  Description: FUNCTIONAL STATUS PRIOR TO ADMISSION: Patient was independent and active without use of DME prior to August 2021. Hospitalized in [facility] from August to mid October with COVID 19, now with trach and on HD. Was admitted to [facility] for four days prior to admission and stated he had not begun therapy there. HOME SUPPORT PRIOR TO ADMISSION: The patient lived with his wife of 27 years. Lives in [facility] Admitted from [facility]. Physical Therapy Goals  Goals continued 11/4/21    Physical Therapy Goals  Goals re-assessed 10/28/2021   1. Patient will move from supine to sit and sit to supine, scoot up and down, and roll side to side in bed with maximal assistance x2 within 7 day(s). 2.  Patient will tolerate HOB elevated at 50 degrees 30 min BID within 7 days. 3.  Patient will be independent in supine HEP for LEs within 7 days. Initiated 10/22/2021  1. Patient will move from supine to sit and sit to supine, scoot up and down, and roll side to side in bed with maximal assistance x2 within 7 day(s). 2.  Patient will tolerate HOB elevated at 50 degrees 10 min BID within 7 days. 3.  Patient will be independent in supine HEP for LEs within 7 days. 4.  Patient will tolerate PRAFO boot (alternating feet every 2 hours) within 7 days. Goal met      Outcome: Not Met     PHYSICAL THERAPY TREATMENT  Patient: Kristian Kenney (54 y.o. male)  Date: 11/8/2021  Diagnosis: GIB (gastrointestinal bleeding) [K92.2]   GIB (gastrointestinal bleeding)  Procedure(s) (LRB):  ESOPHAGOGASTRODUODENOSCOPY (EGD) at Bedside (N/A)  ESOPHAGOGASTRODUODENAL (EGD) BIOPSY (N/A) 20 Days Post-Op  Precautions: Fall, Skin, Contact  Chart, physical therapy assessment, plan of care and goals were reviewed. ASSESSMENT  Patient continues with skilled PT services and is not progressing towards goals.   Received patient moaning, complaining of back pain, requesting pain medication. Pt used his call bell to alert nursing. He is agreeable to LE exs supine and to work towards bed in chair position for upright while waiting for meds. He demonstrates good follow through with exercises today though requiring assistance for all due to his weakness (Lt>Rt). He demonstrates poor tolerance to sitting up in the bed in chair position due to his back pain. Made RN aware of pt's request for pain medication. Current Level of Function Impacting Discharge (mobility/balance): Total Assist using bed features to sit up (bed in modified chair position)    Other factors to consider for discharge: unable to transfer or ambulate, debility         PLAN :  Patient continues to benefit from skilled intervention to address the above impairments. Continue treatment per established plan of care. to address goals. Recommendation for discharge: (in order for the patient to meet his/her long term goals)  Therapy up to 5 days/week in SNF setting    This discharge recommendation:  Has been made in collaboration with the attending provider and/or case management    IF patient discharges home will need the following DME: to be determined (TBD)        SUBJECTIVE:   Patient stated Tell them I need my pain meds. That's all I'm asking for.     OBJECTIVE DATA SUMMARY:   Critical Behavior:  Neurologic State: Alert  Orientation Level: Oriented X4  Cognition: Appropriate decision making, Appropriate for age attention/concentration, Appropriate safety awareness, Follows commands  Safety/Judgement: Awareness of environment, Fall prevention  Functional Mobility Training:  Bed Mobility:  Supine to Sit: Total assistance (using bed features for chair in modified chair position)  Sit to Supine: Total assistance    Transfers:     Balance:  Sitting: Impaired;  With support  Ambulation/Gait Training:            Therapeutic Exercises/ Activity:   (BLE ex x10 each)   - Hip Abd/Add (AAROM)   - SAQ (AAROM for end range w/ cues to hold 3 sec)   - Quad Set   - Ankle pump (Lt PROM for PF, light resistance DF; Rt AROM - cues to increase ROM and hold DF for calf stretch)   - Heel slide (AAROM)  Supine to sit using bed features. Achieved bed in modified chair position. Pt tolerated 5 mins, limited by pain. Cues provided to raise and hold head in upright position. Pain Rating:  Pt moaning on arrival and throughout session citing back pain & requesting pain meds (used call bell to call RN)    Activity Tolerance:   Poor and pain limited participation in bed mobility      After treatment patient left in no apparent distress:   Supine in bed, Call bell within reach, and Side rails x 3    COMMUNICATION/COLLABORATION:   The patients plan of care was discussed with: Registered nurse.      Onelia Cannon, PT   Time Calculation: 28 mins

## 2021-11-09 ENCOUNTER — APPOINTMENT (OUTPATIENT)
Dept: CT IMAGING | Age: 51
DRG: 368 | End: 2021-11-09
Attending: HOSPITALIST
Payer: COMMERCIAL

## 2021-11-09 ENCOUNTER — APPOINTMENT (OUTPATIENT)
Dept: GENERAL RADIOLOGY | Age: 51
DRG: 368 | End: 2021-11-09
Attending: SURGERY
Payer: COMMERCIAL

## 2021-11-09 LAB
ALBUMIN SERPL-MCNC: 2.6 G/DL (ref 3.5–5)
ALBUMIN/GLOB SERPL: 0.7 {RATIO} (ref 1.1–2.2)
ALP SERPL-CCNC: 131 U/L (ref 45–117)
ALT SERPL-CCNC: 15 U/L (ref 12–78)
ANION GAP SERPL CALC-SCNC: 7 MMOL/L (ref 5–15)
AST SERPL-CCNC: 17 U/L (ref 15–37)
ATRIAL RATE: 106 BPM
BASOPHILS # BLD: 0.1 K/UL (ref 0–0.1)
BASOPHILS NFR BLD: 1 % (ref 0–1)
BILIRUB SERPL-MCNC: 0.5 MG/DL (ref 0.2–1)
BUN SERPL-MCNC: 28 MG/DL (ref 6–20)
BUN/CREAT SERPL: 9 (ref 12–20)
CALCIUM SERPL-MCNC: 9.9 MG/DL (ref 8.5–10.1)
CALCULATED P AXIS, ECG09: 55 DEGREES
CALCULATED R AXIS, ECG10: -46 DEGREES
CALCULATED T AXIS, ECG11: 17 DEGREES
CHLORIDE SERPL-SCNC: 103 MMOL/L (ref 97–108)
CO2 SERPL-SCNC: 25 MMOL/L (ref 21–32)
CREAT SERPL-MCNC: 3.16 MG/DL (ref 0.7–1.3)
DIAGNOSIS, 93000: NORMAL
DIFFERENTIAL METHOD BLD: ABNORMAL
EOSINOPHIL # BLD: 0.2 K/UL (ref 0–0.4)
EOSINOPHIL NFR BLD: 1 % (ref 0–7)
ERYTHROCYTE [DISTWIDTH] IN BLOOD BY AUTOMATED COUNT: 19.5 % (ref 11.5–14.5)
GLOBULIN SER CALC-MCNC: 3.6 G/DL (ref 2–4)
GLUCOSE SERPL-MCNC: 106 MG/DL (ref 65–100)
HCT VFR BLD AUTO: 30 % (ref 36.6–50.3)
HGB BLD-MCNC: 9 G/DL (ref 12.1–17)
IMM GRANULOCYTES # BLD AUTO: 0.3 K/UL (ref 0–0.04)
IMM GRANULOCYTES NFR BLD AUTO: 2 % (ref 0–0.5)
LYMPHOCYTES # BLD: 2.2 K/UL (ref 0.8–3.5)
LYMPHOCYTES NFR BLD: 18 % (ref 12–49)
MCH RBC QN AUTO: 30.7 PG (ref 26–34)
MCHC RBC AUTO-ENTMCNC: 30 G/DL (ref 30–36.5)
MCV RBC AUTO: 102.4 FL (ref 80–99)
MONOCYTES # BLD: 1.5 K/UL (ref 0–1)
MONOCYTES NFR BLD: 12 % (ref 5–13)
NEUTS SEG # BLD: 8.3 K/UL (ref 1.8–8)
NEUTS SEG NFR BLD: 66 % (ref 32–75)
NRBC # BLD: 0 K/UL (ref 0–0.01)
NRBC BLD-RTO: 0 PER 100 WBC
P-R INTERVAL, ECG05: 170 MS
PLATELET # BLD AUTO: 208 K/UL (ref 150–400)
PMV BLD AUTO: 9.7 FL (ref 8.9–12.9)
POTASSIUM SERPL-SCNC: 3.9 MMOL/L (ref 3.5–5.1)
PROT SERPL-MCNC: 6.2 G/DL (ref 6.4–8.2)
Q-T INTERVAL, ECG07: 338 MS
QRS DURATION, ECG06: 86 MS
QTC CALCULATION (BEZET), ECG08: 448 MS
RBC # BLD AUTO: 2.93 M/UL (ref 4.1–5.7)
SODIUM SERPL-SCNC: 135 MMOL/L (ref 136–145)
VENTRICULAR RATE, ECG03: 106 BPM
WBC # BLD AUTO: 12.6 K/UL (ref 4.1–11.1)

## 2021-11-09 PROCEDURE — 93005 ELECTROCARDIOGRAM TRACING: CPT

## 2021-11-09 PROCEDURE — 74176 CT ABD & PELVIS W/O CONTRAST: CPT

## 2021-11-09 PROCEDURE — 80053 COMPREHEN METABOLIC PANEL: CPT

## 2021-11-09 PROCEDURE — 97535 SELF CARE MNGMENT TRAINING: CPT

## 2021-11-09 PROCEDURE — 85025 COMPLETE CBC W/AUTO DIFF WBC: CPT

## 2021-11-09 PROCEDURE — 2709999900 HC NON-CHARGEABLE SUPPLY

## 2021-11-09 PROCEDURE — 97110 THERAPEUTIC EXERCISES: CPT

## 2021-11-09 PROCEDURE — 65660000000 HC RM CCU STEPDOWN

## 2021-11-09 PROCEDURE — 74011250637 HC RX REV CODE- 250/637: Performed by: STUDENT IN AN ORGANIZED HEALTH CARE EDUCATION/TRAINING PROGRAM

## 2021-11-09 PROCEDURE — 36415 COLL VENOUS BLD VENIPUNCTURE: CPT

## 2021-11-09 PROCEDURE — 74011000636 HC RX REV CODE- 636: Performed by: HOSPITALIST

## 2021-11-09 PROCEDURE — 74011250637 HC RX REV CODE- 250/637: Performed by: HOSPITALIST

## 2021-11-09 PROCEDURE — 74011250636 HC RX REV CODE- 250/636: Performed by: INTERNAL MEDICINE

## 2021-11-09 PROCEDURE — 74011250637 HC RX REV CODE- 250/637: Performed by: NURSE PRACTITIONER

## 2021-11-09 PROCEDURE — 74011250637 HC RX REV CODE- 250/637: Performed by: INTERNAL MEDICINE

## 2021-11-09 PROCEDURE — 76080 X-RAY EXAM OF FISTULA: CPT

## 2021-11-09 PROCEDURE — 99232 SBSQ HOSP IP/OBS MODERATE 35: CPT | Performed by: SURGERY

## 2021-11-09 RX ADMIN — CALCIUM CARBONATE (ANTACID) CHEW TAB 500 MG 200 MG: 500 CHEW TAB at 13:24

## 2021-11-09 RX ADMIN — LORAZEPAM 0.5 MG: 0.5 TABLET ORAL at 09:15

## 2021-11-09 RX ADMIN — HYDROMORPHONE HYDROCHLORIDE 1 MG: 2 TABLET ORAL at 13:14

## 2021-11-09 RX ADMIN — Medication 10 ML: at 06:00

## 2021-11-09 RX ADMIN — MIDODRINE HYDROCHLORIDE 10 MG: 5 TABLET ORAL at 06:15

## 2021-11-09 RX ADMIN — Medication 10 ML: at 21:22

## 2021-11-09 RX ADMIN — Medication 10 ML: at 13:29

## 2021-11-09 RX ADMIN — OXYCODONE 5 MG: 5 TABLET ORAL at 23:57

## 2021-11-09 RX ADMIN — Medication: at 09:15

## 2021-11-09 RX ADMIN — PANTOPRAZOLE SODIUM 40 MG: 40 TABLET, DELAYED RELEASE ORAL at 17:05

## 2021-11-09 RX ADMIN — CALCIUM CARBONATE (ANTACID) CHEW TAB 500 MG 200 MG: 500 CHEW TAB at 17:05

## 2021-11-09 RX ADMIN — OXYCODONE 5 MG: 5 TABLET ORAL at 17:19

## 2021-11-09 RX ADMIN — Medication 1 CAPSULE: at 09:15

## 2021-11-09 RX ADMIN — Medication 10 ML: at 13:28

## 2021-11-09 RX ADMIN — CALCIUM CARBONATE (ANTACID) CHEW TAB 500 MG 200 MG: 500 CHEW TAB at 09:15

## 2021-11-09 RX ADMIN — Medication: at 17:05

## 2021-11-09 RX ADMIN — COLLAGENASE SANTYL: 250 OINTMENT TOPICAL at 09:15

## 2021-11-09 RX ADMIN — LORAZEPAM 0.5 MG: 0.5 TABLET ORAL at 17:05

## 2021-11-09 RX ADMIN — IOHEXOL 50 ML: 350 INJECTION, SOLUTION INTRAVENOUS at 10:00

## 2021-11-09 RX ADMIN — EPOETIN ALFA-EPBX 10000 UNITS: 10000 INJECTION, SOLUTION INTRAVENOUS; SUBCUTANEOUS at 21:22

## 2021-11-09 RX ADMIN — MIDODRINE HYDROCHLORIDE 10 MG: 5 TABLET ORAL at 21:21

## 2021-11-09 RX ADMIN — MIDODRINE HYDROCHLORIDE 10 MG: 5 TABLET ORAL at 13:27

## 2021-11-09 RX ADMIN — PANTOPRAZOLE SODIUM 40 MG: 40 TABLET, DELAYED RELEASE ORAL at 09:15

## 2021-11-09 RX ADMIN — CHLORHEXIDINE GLUCONATE 15 ML: 0.12 RINSE ORAL at 21:00

## 2021-11-09 RX ADMIN — Medication 9 MG: at 21:21

## 2021-11-09 RX ADMIN — OXYCODONE 5 MG: 5 TABLET ORAL at 09:15

## 2021-11-09 NOTE — CONSULTS
[address]  [place], [address]       GI PROGRESS NOTE  [facility]  [phone] NP in-hospital cell phone M-F until 4:30  After 5pm or on weekends, please call  for physician on call      NAME: Emelia Smith   :  1970   MRN:  [id number]       Subjective:   He continue to make progress, tolerating diet. Objective:     VITALS:   Last 24hrs VS reviewed since prior progress note. Most recent are:  Visit Vitals  /77   Pulse (!) 112   Temp 98.5 °F (36.9 °C)   Resp 20   Ht 5' 11.5\" (1.816 m)   Wt 132.4 kg (291 lb 14.2 oz)   SpO2 100%   BMI 40.14 kg/m²       PHYSICAL EXAM:  General: Cooperative, no acute distress    Neurologic:  Alert and oriented  HEENT: EOMI, no scleral icterus   Lungs:  No increased WOB  Heart:  Regular rate  Abdomen: Soft, obese, no tenderness, old PEG site with ostomy over, pigtail LUQ     Extremities: warm  Psych:   Not anxious or agitated. Lab Data Reviewed:     Recent Results (from the past 24 hour(s))   CBC WITH AUTOMATED DIFF    Collection Time: 21 11:20 AM   Result Value Ref Range    WBC 11.1 4.1 - 11.1 K/uL    RBC 2.78 (L) 4.10 - 5.70 M/uL    HGB 8.5 (L) 12.1 - 17.0 g/dL    HCT 27.8 (L) 36.6 - 50.3 %    .0 (H) 80.0 - 99.0 FL    MCH 30.6 26.0 - 34.0 PG    MCHC 30.6 30.0 - 36.5 g/dL    RDW 19.2 (H) 11.5 - 14.5 %    PLATELET 127 612 - 128 K/uL    MPV 9.8 8.9 - 12.9 FL    NRBC 0.0 0  WBC    ABSOLUTE NRBC 0.00 0.00 - 0.01 K/uL    NEUTROPHILS 70 32 - 75 %    LYMPHOCYTES 15 12 - 49 %    MONOCYTES 11 5 - 13 %    EOSINOPHILS 1 0 - 7 %    BASOPHILS 1 0 - 1 %    IMMATURE GRANULOCYTES 2 (H) 0.0 - 0.5 %    ABS. NEUTROPHILS 7.8 1.8 - 8.0 K/UL    ABS. LYMPHOCYTES 1.7 0.8 - 3.5 K/UL    ABS. MONOCYTES 1.2 (H) 0.0 - 1.0 K/UL    ABS. EOSINOPHILS 0.2 0.0 - 0.4 K/UL    ABS. BASOPHILS 0.1 0.0 - 0.1 K/UL    ABS. IMM.  GRANS. 0.2 (H) 0.00 - 0.04 K/UL    DF AUTOMATED     METABOLIC PANEL, COMPREHENSIVE Collection Time: 11/08/21 11:20 AM   Result Value Ref Range    Sodium 138 136 - 145 mmol/L    Potassium 3.5 3.5 - 5.1 mmol/L    Chloride 102 97 - 108 mmol/L    CO2 29 21 - 32 mmol/L    Anion gap 7 5 - 15 mmol/L    Glucose 87 65 - 100 mg/dL    BUN 18 6 - 20 MG/DL    Creatinine 2.21 (H) 0.70 - 1.30 MG/DL    BUN/Creatinine ratio 8 (L) 12 - 20      GFR est AA 38 (L) >60 ml/min/1.73m2    GFR est non-AA 32 (L) >60 ml/min/1.73m2    Calcium 9.5 8.5 - 10.1 MG/DL    Bilirubin, total 0.7 0.2 - 1.0 MG/DL    ALT (SGPT) 12 12 - 78 U/L    AST (SGOT) 18 15 - 37 U/L    Alk. phosphatase 134 (H) 45 - 117 U/L    Protein, total 6.1 (L) 6.4 - 8.2 g/dL    Albumin 2.8 (L) 3.5 - 5.0 g/dL    Globulin 3.3 2.0 - 4.0 g/dL    A-G Ratio 0.8 (L) 1.1 - 2.2     SAMPLES BEING HELD    Collection Time: 11/08/21 11:20 AM   Result Value Ref Range    SAMPLES BEING HELD 1PST     COMMENT        Add-on orders for these samples will be processed based on acceptable specimen integrity and analyte stability, which may vary by analyte. CBC WITH AUTOMATED DIFF    Collection Time: 11/09/21  5:13 AM   Result Value Ref Range    WBC 12.6 (H) 4.1 - 11.1 K/uL    RBC 2.93 (L) 4.10 - 5.70 M/uL    HGB 9.0 (L) 12.1 - 17.0 g/dL    HCT 30.0 (L) 36.6 - 50.3 %    .4 (H) 80.0 - 99.0 FL    MCH 30.7 26.0 - 34.0 PG    MCHC 30.0 30.0 - 36.5 g/dL    RDW 19.5 (H) 11.5 - 14.5 %    PLATELET 665 315 - 043 K/uL    MPV 9.7 8.9 - 12.9 FL    NRBC 0.0 0  WBC    ABSOLUTE NRBC 0.00 0.00 - 0.01 K/uL    NEUTROPHILS 66 32 - 75 %    LYMPHOCYTES 18 12 - 49 %    MONOCYTES 12 5 - 13 %    EOSINOPHILS 1 0 - 7 %    BASOPHILS 1 0 - 1 %    IMMATURE GRANULOCYTES 2 (H) 0.0 - 0.5 %    ABS. NEUTROPHILS 8.3 (H) 1.8 - 8.0 K/UL    ABS. LYMPHOCYTES 2.2 0.8 - 3.5 K/UL    ABS. MONOCYTES 1.5 (H) 0.0 - 1.0 K/UL    ABS. EOSINOPHILS 0.2 0.0 - 0.4 K/UL    ABS. BASOPHILS 0.1 0.0 - 0.1 K/UL    ABS. IMM.  GRANS. 0.3 (H) 0.00 - 0.04 K/UL    DF AUTOMATED     METABOLIC PANEL, COMPREHENSIVE    Collection Time: 11/09/21  5:13 AM   Result Value Ref Range    Sodium 135 (L) 136 - 145 mmol/L    Potassium 3.9 3.5 - 5.1 mmol/L    Chloride 103 97 - 108 mmol/L    CO2 25 21 - 32 mmol/L    Anion gap 7 5 - 15 mmol/L    Glucose 106 (H) 65 - 100 mg/dL    BUN 28 (H) 6 - 20 MG/DL    Creatinine 3.16 (H) 0.70 - 1.30 MG/DL    BUN/Creatinine ratio 9 (L) 12 - 20      GFR est AA 25 (L) >60 ml/min/1.73m2    GFR est non-AA 21 (L) >60 ml/min/1.73m2    Calcium 9.9 8.5 - 10.1 MG/DL    Bilirubin, total 0.5 0.2 - 1.0 MG/DL    ALT (SGPT) 15 12 - 78 U/L    AST (SGOT) 17 15 - 37 U/L    Alk. phosphatase 131 (H) 45 - 117 U/L    Protein, total 6.2 (L) 6.4 - 8.2 g/dL    Albumin 2.6 (L) 3.5 - 5.0 g/dL    Globulin 3.6 2.0 - 4.0 g/dL    A-G Ratio 0.7 (L) 1.1 - 2.2         Assessment:     · GI bleed: hematemesis in the setting of supratheraputic INR status post PRBC's, FFP and K-centra; hgb 9 status post 2 units pRBC's (last 10/18/21); EGD 10/19/21: grade D reflux esophagitis, gastric polyps, no G tube in stomach, mild patchy erythema duodenal bulb   · Abdominal abscess: PEG dislodged and hematoma/fluid collection in abdomen status post drain   · Recent COVID pneumonia trach/PEG  · ESRD on HD  · DVT on Coumadin PTA     Patient Active Problem List   Diagnosis Code    GIB (gastrointestinal bleeding) K92.2    Severe protein-calorie malnutrition ([facility] [address] E43    Abdominal wall fluid collections R18.8     Plan:     · Okay to resume anticoagulation as necessary      Signed By: Isrrael Harding NP     11/9/2021  9:06 AM     GI Attending: If anticoagulation needs to be restarted, then this should be restarted. We will be available if GI bleeding recurs. Please call with any questions. Will sign off for now. Bassam Peralta MD

## 2021-11-09 NOTE — PROGRESS NOTES
Progress Note    Patient: Demetria Celeste MRN: [id number]  SSN: [id number]    YOB: 1970  Age: 49 y.o. Sex: male      Admit Date: 10/18/2021    21 Days Post-Op    Procedure:  Procedure(s):  ESOPHAGOGASTRODUODENOSCOPY (EGD) at Bedside  ESOPHAGOGASTRODUODENAL (EGD) BIOPSY    Subjective:     Patient without new complaints. Objective:     Visit Vitals  /83 (BP 1 Location: Left arm, BP Patient Position: At rest)   Pulse (!) 120   Temp 98.6 °F (37 °C)   Resp 28   Ht 5' 11.5\" (1.816 m)   Wt 291 lb 14.2 oz (132.4 kg)   SpO2 99%   BMI 40.14 kg/m²       Temp (24hrs), Av °F (36.7 °C), Min:97.7 °F (36.5 °C), Max:98.6 °F (37 °C)      Physical Exam:    Alert no acute distress. Abdomen soft nontender still with some drainage from PEG tube site  Still with purulent drainage from pigtail. Unclear exactly how much is coming out    Data Review: images and reports reviewed  CT-   1. Stable enlargement of the left abdominal wall musculature status post  percutaneous drainage. Evaluation for abscess is limited without IV contrast.  The patient's IV was nonfunctioning. A small residual abscess is difficult to  exclude however this is unchanged. Contrast injection through the catheter  demonstrates pooling of contrast material dependently within the muscle without  evidence of fistula.     2.  Polycystic kidneys. Polycystic liver. Reviewed with radiology  Collection seems to be smaller nothing else to drain  Lab Review: All lab results for the last 24 hours reviewed.   Recent Results (from the past 24 hour(s))   CBC WITH AUTOMATED DIFF    Collection Time: 21  5:13 AM   Result Value Ref Range    WBC 12.6 (H) 4.1 - 11.1 K/uL    RBC 2.93 (L) 4.10 - 5.70 M/uL    HGB 9.0 (L) 12.1 - 17.0 g/dL    HCT 30.0 (L) 36.6 - 50.3 %    .4 (H) 80.0 - 99.0 FL    MCH 30.7 26.0 - 34.0 PG    MCHC 30.0 30.0 - 36.5 g/dL    RDW 19.5 (H) 11.5 - 14.5 %    PLATELET 965 182 - 244 K/uL    MPV 9.7 8.9 - 12.9 FL    NRBC 0.0 0  WBC    ABSOLUTE NRBC 0.00 0.00 - 0.01 K/uL    NEUTROPHILS 66 32 - 75 %    LYMPHOCYTES 18 12 - 49 %    MONOCYTES 12 5 - 13 %    EOSINOPHILS 1 0 - 7 %    BASOPHILS 1 0 - 1 %    IMMATURE GRANULOCYTES 2 (H) 0.0 - 0.5 %    ABS. NEUTROPHILS 8.3 (H) 1.8 - 8.0 K/UL    ABS. LYMPHOCYTES 2.2 0.8 - 3.5 K/UL    ABS. MONOCYTES 1.5 (H) 0.0 - 1.0 K/UL    ABS. EOSINOPHILS 0.2 0.0 - 0.4 K/UL    ABS. BASOPHILS 0.1 0.0 - 0.1 K/UL    ABS. IMM. GRANS. 0.3 (H) 0.00 - 0.04 K/UL    DF AUTOMATED     METABOLIC PANEL, COMPREHENSIVE    Collection Time: 11/09/21  5:13 AM   Result Value Ref Range    Sodium 135 (L) 136 - 145 mmol/L    Potassium 3.9 3.5 - 5.1 mmol/L    Chloride 103 97 - 108 mmol/L    CO2 25 21 - 32 mmol/L    Anion gap 7 5 - 15 mmol/L    Glucose 106 (H) 65 - 100 mg/dL    BUN 28 (H) 6 - 20 MG/DL    Creatinine 3.16 (H) 0.70 - 1.30 MG/DL    BUN/Creatinine ratio 9 (L) 12 - 20      GFR est AA 25 (L) >60 ml/min/1.73m2    GFR est non-AA 21 (L) >60 ml/min/1.73m2    Calcium 9.9 8.5 - 10.1 MG/DL    Bilirubin, total 0.5 0.2 - 1.0 MG/DL    ALT (SGPT) 15 12 - 78 U/L    AST (SGOT) 17 15 - 37 U/L    Alk. phosphatase 131 (H) 45 - 117 U/L    Protein, total 6.2 (L) 6.4 - 8.2 g/dL    Albumin 2.6 (L) 3.5 - 5.0 g/dL    Globulin 3.6 2.0 - 4.0 g/dL    A-G Ratio 0.7 (L) 1.1 - 2.2         Assessment:     Hospital Problems  Date Reviewed: 11/6/2021          Codes Class Noted POA    Abdominal wall fluid collections ICD-10-CM: R18.8  ICD-9-CM: 789.59  10/25/2021 Unknown        Severe protein-calorie malnutrition ([facility] [address] ICD-10-CM: E43  ICD-9-CM: 262  10/21/2021 Unknown        * (Principal) GIB (gastrointestinal bleeding) ICD-10-CM: K92.2  ICD-9-CM: 578.9  10/18/2021 Yes              Plan/Recommendations/Medical Decision Making:   Abdominal wall collection appears to be stable  Would continue to keep drain in until it is not putting anything else out. Understanding that disposition may be dependent on what this tube is doing.   Patient would very much like to go near his home which is about 4 hours away. There are no current plans for operative intervention. Once again as previously noted if abscess does redevelop the first line of treatment for this would be interventional radiology.

## 2021-11-09 NOTE — PROGRESS NOTES
Bedside and Verbal shift change report given to Nitza Nieves 69 (oncoming nurse) by Tere Kellogg (offgoing nurse). Report included the following information SBAR, Kardex, MAR, Cardiac Rhythm Sinus Tach and Dual Neuro Assessment.

## 2021-11-09 NOTE — PROGRESS NOTES
Transition of Care Plan   RUR- Low 14%   DISPOSITION: SNF - [facility] 125 - pending medical stability    F/U with PCP/Specialist     Transport: [facility] spoke with Magnolia Reese in Admissions [phone] with [facility] regarding patient's insurance [facility]. She plans to check if auth could be extended. [facility] will still accept patient with pigtail drain in, however, have requested to know the follow up plan. 4:19: Patient will need follow up CT in 10-14 days and follow up with surgeon for drain removal. Drain removal appointment with surgeon was previously made for 12/1 and not cancelled. Ayse De La Torre at [facility] plans to check if they will be able to still accept patient due to CT scan. If they have to cover cost of CT, they will not be able to accept patient. Rodolfo Delcid) HAN Kelly.S.SKIP.

## 2021-11-09 NOTE — PROGRESS NOTES
Name: Nisreen Rios MRN: [id number]   : 1970 Hospital: [address]   Date: 2021        IMPRESSION:   · SERGEY, HD dependent. Patient is on MWF schedule. · Hypervolemia with predominantly central congestion, improved. · Anemia of multifactorial origin  · Respiratory failure due to a complicated Covid pneumonia, recovered. Trache removed  · Hypokalemia pre HD      PLAN:   · Continue present care  · Next HD - on Wednesday  · Watch for renal function recovery. Patient needs urine output to be recorded. Will check pre HD renal panel. · Anemia management- start Retacrit. Check the iron profile. Subjective/Interval History:   I have reviewed the flowsheet and previous days notes. ROS:Review of systems not obtained due to patient factors. Objective:   Vital Signs:    Visit Vitals  /77   Pulse (!) 112   Temp 98.5 °F (36.9 °C)   Resp 20   Ht 5' 11.5\" (1.816 m)   Wt 132.4 kg (291 lb 14.2 oz)   SpO2 100%   BMI 40.14 kg/m²       O2 Device: None (Room air)   O2 Flow Rate (L/min): 0 l/min   Temp (24hrs), Av.9 °F (36.6 °C), Min:97.7 °F (36.5 °C), Max:98.5 °F (36.9 °C)       Intake/Output:   Last shift:      No intake/output data recorded. Last 3 shifts:  1901 -  0700  In: -   Out: 2500   No intake or output data in the 24 hours ending 21 1059     Physical Exam:  General:    Awake, alert, answers all questions appropriately. Head:   Normocephalic, without obvious abnormality, atraumatic. Eyes:   Conjunctivae/corneas clear. Nose:  Nares normal. No drainage or sinus tenderness. Throat:    Lips, mucosa, and tongue normal.    Neck:  Supple. No trache  Lungs:   Clear to auscultation bilaterally. No Wheezing or Rhonchi. No rales. Chest wall:  No tenderness or deformity. No Accessory muscle use. Heart:   Regular rate and rhythm,  no murmur, rub or gallop. Abdomen:   Soft. Distended. Bowel sounds normal. PEG tube in place.   Extremities: Extremities normal, atraumatic, No cyanosis. No edema. No clubbing  Skin:     Texture, turgor normal. No rashes or lesions. Not Jaundiced  Psych:  Calm. Neurologic: Non focal.      DATA:  Labs:  Recent Labs     11/09/21  0513 11/08/21  1120   * 138   K 3.9 3.5    102   CO2 25 29   BUN 28* 18   CREA 3.16* 2.21*   CA 9.9 9.5   ALB 2.6* 2.8*     Recent Labs     11/09/21  0513 11/08/21  1120   WBC 12.6* 11.1   HGB 9.0* 8.5*   HCT 30.0* 27.8*    177     No results for input(s): DOLORES, KU, CLU, CREAU in the last 72 hours.     No lab exists for component: PROU    Total time spent with patient:  35 minutes    [] Critical Care Provided    Care Plan discussed with:   Oumou Hoffman MD

## 2021-11-09 NOTE — PROGRESS NOTES
Problem: Self Care Deficits Care Plan (Adult)  Goal: *Acute Goals and Plan of Care (Insert Text)  Description:   FUNCTIONAL STATUS PRIOR TO ADMISSION: at baseline, pt was independent, living with wife, working at [employer] Pt was admitted to [facility] in August and discharged to [facility] 10/14. Pt had not yet started therapy     HOME SUPPORT: only at [facility] for 4 days prior to this admission    Occupational Therapy Goals  Initiated 10/22/2021; weekly reassessment 10/29/2021 - continue all goals, Re-assessed 11/9/21 - Goals updated below. 1.  Patient will perform AAROM R UE using L UE as motor assist with minimal assistance/contact guard assist within 7 day(s). NOT MET and continued. 2.  Patient will perform grooming in supported sitting using RUE as motor assist with minimal assistance/contact guard assist within 7 day(s). NOT MET and continued. 3.  Patient will perform upper body dressing with moderate assistance within 7 day(s). NOT MET and continued. 4.  Patient will perform rolling in bed for bed pan placement with moderate A x 2 within 7 days. NOT MET and continued. 5.  Patient will perform supported ADL task in modified bed to chair position x 5 minutes within 7 days. MET and upgraded to x15 minutes. Outcome: Progressing Towards Goal   OCCUPATIONAL THERAPY TREATMENT/WEEKLY RE-EVALUATION  Patient: Maged Barajas (49 y.o. male)  Date: 11/9/2021  Diagnosis: GIB (gastrointestinal bleeding) [K92.2]   GIB (gastrointestinal bleeding)  Procedure(s) (LRB):  ESOPHAGOGASTRODUODENOSCOPY (EGD) at Bedside (N/A)  ESOPHAGOGASTRODUODENAL (EGD) BIOPSY (N/A) 21 Days Post-Op  Precautions: Fall, Skin, Contact  Chart, occupational therapy assessment, plan of care, and goals were reviewed.     ASSESSMENT  Based on the objective data described below, Pt is making slow progress toward the above listed goals (met 1/5 goals this reporting period), and remains most limited d/t RUE p! and weakness, poor activity tolerance, grossly decreased strength and impaired functional mobility. Pt received in L side lying on arrival and c/o significant sacral p! following repositioning ~20-30 minutes prior to OT session, per Pt. Pt also c/o 8/10 R shoulder p! RUE TherEx limited to R wrist/digits d/t p! levels and Pt agreeable to simple grooming task performance using L hand with bed in modified chair position for x10 minutes. Continue to recommend SNF level rehab as Pt is far from his independent PLOF. Current Level of Function Impacting Discharge (ADLs): Min to Total A with ADL. Dependent for bed mobility. Other factors to consider for discharge: See above. PLAN :  Goals have been updated based on progression since last assessment. Patient continues to benefit from skilled intervention to address the above impairments. Continue to follow patient 3 times a week to address goals. Recommend with staff: Regular position changes. Encourage active ADL engagement using L hand. Ensure optimal and neutral positioning of RUE to minimize risk for injury given weakness. Pillow support beneath RUE in bed. Recommend next OT session: Continue POC. Recommendation for discharge: (in order for the patient to meet his/her long term goals)  Therapy up to 5 days/week in SNF setting    This discharge recommendation:  Has been made in collaboration with the attending provider and/or case management    Equipment recommendations for successful discharge (if) home: Defer to rehab       SUBJECTIVE:   Patient stated My shoulder hurts so bad. . It feels like it popped. \"    OBJECTIVE DATA SUMMARY:   Cognitive/Behavioral Status:  Neurologic State: Alert; Eyes open spontaneously  Orientation Level: Oriented X4  Cognition: Follows commands; Appropriate decision making; Appropriate for age attention/concentration; Appropriate safety awareness     Functional Mobility and Transfers for ADLs:  Bed Mobility:  Rolling:  Total assistance (to reposition at bed level )    ADL Intervention:     Facilitated Pts independence with simple grooming task using L hand with HOB in near fully upright position. Educated Pt on importance of monitoring sacral pain and notifying caregivers/staff via call button immediately to minimizr risk for skin breakdown as Pt c/o significant sacral p! on arrival and requesting repositioning. Grooming  Position Performed: Long sitting on bed  Washing Face: Set-up (using L hand)  Brushing/Combing Hair: Moderate assistance (using L hand)    TherEx:  1x10 reps R hand squeezes  1x10 reps R wrist flex/ext AAROM   Remaining RUE TherEx deferred 2/2 significant R shoulder p! Pain:  8/10 R shoulder pain. RN made aware. Activity Tolerance:   Poor and limited d/t R shoulder p!     After treatment patient left in no apparent distress:   Supine in bed, Heels elevated for pressure relief, Patient positioned in R sidelying for pressure relief, Call bell within reach, Bed / chair alarm activated, and Side rails x 3    COMMUNICATION/COLLABORATION:   The patients plan of care was discussed with: Registered Nurse and Patient    Wallie Olszewski, OTR/L  Time Calculation: 31 mins

## 2021-11-09 NOTE — WOUND CARE
Wound Care Note:     Re-consulted nurse request for sacrum worsening    Chart shows:  Admitted for GIB, severe protein-calorie malnutrition and abdominal wall fluid collections  Past Medical History:   Diagnosis Date    COVID     Dialysis patient [facility])     started around the end of september 2021    Polycystic kidney disease     S/P percutaneous endoscopic gastrostomy (PEG) tube placement ([facility] [address]      WBC = 12.6 on 11/9/21  Admitted from [address]:   Patient is A&O x 4, communicative, incontinent with moderate assistance needed in repositioning. Bed: Earp  Diet: Adult diet regular with nutritional supplements  Patient reports pain prior to repositioning. Right heel and right buttock   skin intact and without erythema. 1. POA left heel looks the same, maroon/brown crusted area, no drainage, wound edges are closed, jeana-wound without erythema. Offloaded. Venelex ointment applied. 2.  POA left buttock wound measures 1 cm x 3 cm x 1.5 cm with undermining at 6 o'clock being 2.6 cm, wound bed covered with slough, appears thinner today, moderate amount of tan drainage, wound edges are open, jeana-wound intact without erythema. Santyl ointment, 2 x 2 and Optifoam Gentle applied. 3.  Bilateral lower buttocks wounds are improving, left buttock wound is smaller and measures 2.8 cm x  1.5 cm x 0.1 cm, right buttock wound is also smaller and measures 0.3 cm x 0.8 cm x 0.1 cm, wound beds are pink, moist, scant serous drainage, wound edges are open, jeana-wound intact. Calazime lotion applied. 4.  Sacrum/top of gluteal cleft with small fissure measuring 0.8 cm x 0.2 cm x 0.1 cm, wound bed is pink, blanches, wound edges are open, jeana-wound intact. Calazime lotion applied. Patient repositioned supine. Heels offloaded on pillow and multipodus boot.        Recommendations:    Continue with current wound care and also apply Calazime lotion to sacrum and bilateral lower buttocks     Bilateral heels- Every 12 hours liberally apply Venelex ointment     Left buttock- Daily cleanse with normal saline, apply nickel thickness of Santyl ointment, loosely fill undermining at 6 o'clock with 2 x 2 (remaining gauze can be placed over wound) and cover with Optifoam Gentle.     Lower buttocks and sacrum- Every 12 hours apply Calazime lotion or Z guard paste (orange tube). Skin Care & Pressure Prevention:  Minimize layers of linen/pads under patient to optimize support surface. Turn/reposition approximately every 2 hours and offload heels.   Manage incontinence / promote continence   Nourishing Skin Cream to dry skin, minimize use of briefs when able    Discussed above plan with patient & Yanira Nance RN    Transition of Care: Plan to follow as needed while admitted to hospital.    NELDA Rojas, RN, [facility], [facility].  office [phone]  pager [phone] or call  to page

## 2021-11-09 NOTE — ROUTINE PROCESS
Bedside and Verbal shift change report given to [id number] (oncoming nurse) by Rashad Upton (offgoing nurse). Report included the following information SBAR, Kardex, Intake/Output, MAR, Cardiac Rhythm NSR, Alarm Parameters  and Dual Neuro Assessment.

## 2021-11-10 LAB
ALBUMIN SERPL-MCNC: 2.4 G/DL (ref 3.5–5)
ALBUMIN/GLOB SERPL: 0.6 {RATIO} (ref 1.1–2.2)
ALP SERPL-CCNC: 147 U/L (ref 45–117)
ALT SERPL-CCNC: 13 U/L (ref 12–78)
ANION GAP SERPL CALC-SCNC: 7 MMOL/L (ref 5–15)
AST SERPL-CCNC: 17 U/L (ref 15–37)
BASOPHILS # BLD: 0.1 K/UL (ref 0–0.1)
BASOPHILS NFR BLD: 1 % (ref 0–1)
BILIRUB SERPL-MCNC: 0.5 MG/DL (ref 0.2–1)
BUN SERPL-MCNC: 39 MG/DL (ref 6–20)
BUN/CREAT SERPL: 10 (ref 12–20)
CALCIUM SERPL-MCNC: 9.8 MG/DL (ref 8.5–10.1)
CHLORIDE SERPL-SCNC: 101 MMOL/L (ref 97–108)
CO2 SERPL-SCNC: 25 MMOL/L (ref 21–32)
CREAT SERPL-MCNC: 3.91 MG/DL (ref 0.7–1.3)
DIFFERENTIAL METHOD BLD: ABNORMAL
EOSINOPHIL # BLD: 0.2 K/UL (ref 0–0.4)
EOSINOPHIL NFR BLD: 2 % (ref 0–7)
ERYTHROCYTE [DISTWIDTH] IN BLOOD BY AUTOMATED COUNT: 19.3 % (ref 11.5–14.5)
GLOBULIN SER CALC-MCNC: 3.7 G/DL (ref 2–4)
GLUCOSE SERPL-MCNC: 110 MG/DL (ref 65–100)
HCT VFR BLD AUTO: 27.7 % (ref 36.6–50.3)
HGB BLD-MCNC: 8.7 G/DL (ref 12.1–17)
IMM GRANULOCYTES # BLD AUTO: 0.3 K/UL (ref 0–0.04)
IMM GRANULOCYTES NFR BLD AUTO: 2 % (ref 0–0.5)
LYMPHOCYTES # BLD: 2.2 K/UL (ref 0.8–3.5)
LYMPHOCYTES NFR BLD: 15 % (ref 12–49)
MCH RBC QN AUTO: 31.6 PG (ref 26–34)
MCHC RBC AUTO-ENTMCNC: 31.4 G/DL (ref 30–36.5)
MCV RBC AUTO: 100.7 FL (ref 80–99)
MONOCYTES # BLD: 1.2 K/UL (ref 0–1)
MONOCYTES NFR BLD: 8 % (ref 5–13)
NEUTS SEG # BLD: 11.1 K/UL (ref 1.8–8)
NEUTS SEG NFR BLD: 72 % (ref 32–75)
NRBC # BLD: 0 K/UL (ref 0–0.01)
NRBC BLD-RTO: 0 PER 100 WBC
PLATELET # BLD AUTO: 215 K/UL (ref 150–400)
PMV BLD AUTO: 9.8 FL (ref 8.9–12.9)
POTASSIUM SERPL-SCNC: 3.8 MMOL/L (ref 3.5–5.1)
PROT SERPL-MCNC: 6.1 G/DL (ref 6.4–8.2)
RBC # BLD AUTO: 2.75 M/UL (ref 4.1–5.7)
SODIUM SERPL-SCNC: 133 MMOL/L (ref 136–145)
WBC # BLD AUTO: 15.1 K/UL (ref 4.1–11.1)

## 2021-11-10 PROCEDURE — 36415 COLL VENOUS BLD VENIPUNCTURE: CPT

## 2021-11-10 PROCEDURE — 74011250637 HC RX REV CODE- 250/637: Performed by: NURSE PRACTITIONER

## 2021-11-10 PROCEDURE — 90935 HEMODIALYSIS ONE EVALUATION: CPT

## 2021-11-10 PROCEDURE — 74011250636 HC RX REV CODE- 250/636: Performed by: NURSE PRACTITIONER

## 2021-11-10 PROCEDURE — 2709999900 HC NON-CHARGEABLE SUPPLY

## 2021-11-10 PROCEDURE — 80053 COMPREHEN METABOLIC PANEL: CPT

## 2021-11-10 PROCEDURE — 74011250637 HC RX REV CODE- 250/637: Performed by: HOSPITALIST

## 2021-11-10 PROCEDURE — 85025 COMPLETE CBC W/AUTO DIFF WBC: CPT

## 2021-11-10 PROCEDURE — 74011250637 HC RX REV CODE- 250/637: Performed by: STUDENT IN AN ORGANIZED HEALTH CARE EDUCATION/TRAINING PROGRAM

## 2021-11-10 PROCEDURE — 65660000000 HC RM CCU STEPDOWN

## 2021-11-10 RX ADMIN — MIDODRINE HYDROCHLORIDE 10 MG: 5 TABLET ORAL at 22:48

## 2021-11-10 RX ADMIN — MIDODRINE HYDROCHLORIDE 10 MG: 5 TABLET ORAL at 15:47

## 2021-11-10 RX ADMIN — Medication: at 19:14

## 2021-11-10 RX ADMIN — Medication 10 ML: at 19:15

## 2021-11-10 RX ADMIN — OXYCODONE 5 MG: 5 TABLET ORAL at 14:54

## 2021-11-10 RX ADMIN — Medication 10 ML: at 05:11

## 2021-11-10 RX ADMIN — COLLAGENASE SANTYL: 250 OINTMENT TOPICAL at 14:53

## 2021-11-10 RX ADMIN — HEPARIN SODIUM 1900 UNITS: 1000 INJECTION INTRAVENOUS; SUBCUTANEOUS at 12:54

## 2021-11-10 RX ADMIN — COLLAGENASE SANTYL: 250 OINTMENT TOPICAL at 14:54

## 2021-11-10 RX ADMIN — MIDODRINE HYDROCHLORIDE 10 MG: 5 TABLET ORAL at 05:10

## 2021-11-10 RX ADMIN — Medication 10 ML: at 15:30

## 2021-11-10 RX ADMIN — HYDROMORPHONE HYDROCHLORIDE 1 MG: 2 TABLET ORAL at 22:54

## 2021-11-10 RX ADMIN — Medication 5 ML: at 22:00

## 2021-11-10 RX ADMIN — LORAZEPAM 0.5 MG: 0.5 TABLET ORAL at 17:30

## 2021-11-10 RX ADMIN — LORAZEPAM 0.5 MG: 0.5 TABLET ORAL at 10:11

## 2021-11-10 RX ADMIN — OXYCODONE 5 MG: 5 TABLET ORAL at 19:13

## 2021-11-10 RX ADMIN — PANTOPRAZOLE SODIUM 40 MG: 40 TABLET, DELAYED RELEASE ORAL at 17:29

## 2021-11-10 RX ADMIN — Medication 9 MG: at 22:48

## 2021-11-10 RX ADMIN — OXYCODONE 5 MG: 5 TABLET ORAL at 04:36

## 2021-11-10 RX ADMIN — CALCIUM CARBONATE (ANTACID) CHEW TAB 500 MG 200 MG: 500 CHEW TAB at 17:30

## 2021-11-10 RX ADMIN — HEPARIN SODIUM 1900 UNITS: 1000 INJECTION INTRAVENOUS; SUBCUTANEOUS at 12:55

## 2021-11-10 NOTE — PROGRESS NOTES
Name: Stephy Johnson MRN: [id number]   : 1970 Hospital: . [address]   Date: 11/10/2021        IMPRESSION:   · SERGEY, HD dependent. Patient is on MWF schedule. Patient is seen in his room during HD Tx in the presence of acute Radha Zamora. · Hypervolemia with predominantly central congestion, improved. · Anemia of multifactorial origin  · Respiratory failure due to a complicated Covid pneumonia, recovered. Trache removed  · Hypokalemia pre HD      PLAN:   · Continue present care  · Complete a full HD Tx today  · Next HD - on Friday  · Watch for renal function recovery. Patient needs urine output to be recorded. Will check pre HD renal panel. · Anemia management- start Retacrit. Check the iron profile. Subjective/Interval History:   I have reviewed the flowsheet and previous days notes. ROS:Review of systems not obtained due to patient factors. Objective:   Vital Signs:    Visit Vitals  BP (!) 122/90 (BP 1 Location: Left lower arm, BP Patient Position: At rest)   Pulse (!) 101   Temp 98.8 °F (37.1 °C)   Resp 19   Ht 5' 11.5\" (1.816 m)   Wt 130 kg (286 lb 9.6 oz)   SpO2 96%   BMI 39.42 kg/m²       O2 Device: None (Room air)   O2 Flow Rate (L/min): 0 l/min   Temp (24hrs), Av.6 °F (37 °C), Min:98.1 °F (36.7 °C), Max:98.8 °F (37.1 °C)       Intake/Output:   Last shift:      No intake/output data recorded. Last 3 shifts: No intake/output data recorded. No intake or output data in the 24 hours ending 11/10/21 1013     Physical Exam:  General:    Awake, alert, answers all questions appropriately. HD Tx is in progress. Head:   Normocephalic, without obvious abnormality, atraumatic. Eyes:   Conjunctivae/corneas clear. Nose:  Nares normal. No drainage or sinus tenderness. Throat:    Lips, mucosa, and tongue normal.    Neck:  Supple. No trache  Lungs:   Clear to auscultation bilaterally. No Wheezing or Rhonchi. No rales. Chest wall:  No tenderness or deformity.  No Accessory muscle use.  Heart:   Regular rate and rhythm,  no murmur, rub or gallop. Abdomen:   Soft. Distended. Bowel sounds normal. PEG tube in place. Extremities: Extremities normal, atraumatic, No cyanosis. No edema. No clubbing  Skin:     Texture, turgor normal. No rashes or lesions. Not Jaundiced  Psych:  Calm. Neurologic: Non focal.      DATA:  Labs:  Recent Labs     11/09/21  0513 11/08/21  1120   * 138   K 3.9 3.5    102   CO2 25 29   BUN 28* 18   CREA 3.16* 2.21*   CA 9.9 9.5   ALB 2.6* 2.8*     Recent Labs     11/09/21  0513 11/08/21  1120   WBC 12.6* 11.1   HGB 9.0* 8.5*   HCT 30.0* 27.8*    177     No results for input(s): DOLORES, KU, CLU, CREAU in the last 72 hours.     No lab exists for component: PROU    Total time spent with patient:  35 minutes    [] Critical Care Provided    Care Plan discussed with:   Davis Veronica MD

## 2021-11-10 NOTE — PROGRESS NOTES
Physical Therapy: Defer    Chart reviewed. Patient currently on bedside HD at this time, will follow up for PT treatment as able and appropriate.     Paradise Ta, PT, DPT

## 2021-11-10 NOTE — PROGRESS NOTES
Bedside and Verbal shift change report given to [address] rn (oncoming nurse) by Ninoska Fernando (offgoing nurse). Report included the following information SBAR, Kardex, MAR, Cardiac Rhythm ST and Dual Neuro Assessment.

## 2021-11-10 NOTE — PROGRESS NOTES
[facility]ist [facility]                                                                                          Hospitalist Progress Note  Irena Davenport MD  Answering service: [phone] from in house phone        Date of Service:  11/10/2021  NAME:  Holly Sanchez  :  1970  MRN:  [id number]      Admission Summary:   Pt is a 52 yo M with PMH ESRD on HD, takes coumadin secondary to hx of DVT RUE with a recent prolonged hospitalization secondary to COVID PNA in August of this year. He ultimately required trach and peg was sent to [facility] he has not been there too long when he was sent to our ED for hematemesis; he was admitted to [facility] on 10/15 and transferred to [facility] on 10/18. Per records reviewed, patient was intubated on , trached on , he was treated for VRE pneumonia. PEG dislodged on 10/11, treated with Unasyn until 10/17. Patient was on warfarin for right upper extremity DVT involving the subclavian, axillary and brachial veins. He started having coffee ground emesis 10/18/21 with elevated HR and hypotension. He was sent to the ER for further workup. He was found to be hypotensive and ICU was consulted for further management. \"          Interval history / Subjective:      F/u Hemorrhagic shock  Patient was receiving dialysis when I saw him earlier this morning. Discussed plans to obtain venous duplex of the right upper extremity to address the issue of anticoagulation. Assessment & Plan:     Rising WBC. Patient is afebrile. Tachycardia is not new, usually pronounced during and after dialysis  --CT abdomen on  without significant fluid collection  --Closely monitor, will initiate septic work-up if WBC continues to rise, becomes febrile      Hemorrhagic shock due to hematemesis due to severe esophagitis.   Acute blood loss anemia.   --EGD on 10/19 showed LA grade D reflux esophagitis up to the mid esophagus, few sessile polyps in the stomach, mild patchy erythema in the distal bulb of the duodenum. - s/p EGD (11/01)  - S/P 2 units of pRBCs, 1 unit of FFB and K centra. - Continue PPI   - Monitor labs and transfuse for hgb <7.0. Hemoglobin has remained> 8.  - GI signed off. Left abdominal abscess. History of dislodged PEG tube. --CT A/P on 10/20 showed left anterior abdominal and pelvic wall hematoma or gas containing fluid collection. --He underwent ultrasound guided fluid drainage and percutaneous drain placement which revealed thick purulent fluid, 18 [place] drain was left in place. -- Wound culture on 10/23 grew apparent Candida albicans, no sensitivity available. Culture also showed coag negative staph which was deemed to be contaminant  -Completed 15 days of IV Diflucan. WBC normalized. -Follow up CT on 10/26 showed slightly diminished size of the abdominal wall collection.   - Repeat CT on 11/2 significant decrease in size of the left rectus sheath hematoma. The small collection suggestive of congealed hematoma and is unlikely to drain through cath. - surgery following, abdominal wall drain is attached to bag with purulent discharge  --CT abdomen and pelvis, sinogram performed 11/9: Stable enlargement of the left abdominal wall musculature, evaluation for abscess is limited without IV contrast, a small residual abscess is difficult to exclude however this is unchanged. Contrast injection through the catheter demonstrated pooling of contrast material dependently within the muscle with out evidence of fistula. --General surgery recommending drain remain in place until follow-up CT in 10-14 days. Acute on chronic hypoxic respiratory failure with tracheostomy Resolved,  --Patient was intubated in outside hospital on 9/7 and was trached on 9/16. He was successfully decannulated here on 11/3     Renal:  ESRD on HD MWF-Nephrology following        Right upper extremity acute DVT, POA.   Patient was on warfarin but came with hemorrhagic shock and supratherapeutic INR requiring reversal  --Anticoagulation on hold. --We will obtain venous Doppler. Discussed risk versus benefit of anticoagulation with patient and his wife. Right upper and left lower extremities appeared weaker than the rest on 11/4  --Noncontrast head CT is negative.     Morbid obesity   - BMI 41.69  - Weight loss management to be followed up outpatient    Code status: Full code  DVT prophylaxis: SCDs    Care Plan discussed with: Patient/Family  Anticipated Disposition: Accepted to [facility] and rehab SNF. Surgery indicated patient will have to be discharged with a pigtail in place. No local surgeon to see patient before 12/1, will need repeat CT in 10-14 days before the pigtail drainage catheter is removed. Anticipated Discharge: Greater than 48 hours     Hospital Problems  Date Reviewed: 11/6/2021          Codes Class Noted POA    Abdominal wall fluid collections ICD-10-CM: R18.8  ICD-9-CM: 789.59  10/25/2021 Unknown        Severe protein-calorie malnutrition ([facility] [address] ICD-10-CM: E43  ICD-9-CM: 262  10/21/2021 Unknown        * (Principal) GIB (gastrointestinal bleeding) ICD-10-CM: K92.2  ICD-9-CM: 578.9  10/18/2021 Yes                Review of Systems:   A comprehensive review of systems was negative except for that written in the HPI. Vital Signs:    Last 24hrs VS reviewed since prior progress note.  Most recent are:  Visit Vitals  /88   Pulse (!) 126   Temp 97.1 °F (36.2 °C) (Oral)   Resp 28   Ht 5' 11.5\" (1.816 m)   Wt 130 kg (286 lb 9.6 oz)   SpO2 96%   BMI 39.42 kg/m²         Intake/Output Summary (Last 24 hours) at 11/10/2021 1413  Last data filed at 11/10/2021 1330  Gross per 24 hour   Intake --   Output 3000 ml   Net -3000 ml        Physical Examination:     I had a face to face encounter with this patient and independently examined them on 11/10/2021 as outlined below:          Constitutional:  No acute distress, cooperative, pleasant    ENT: Oral mucosa moist, oropharynx benign. Resp:  CTA bilaterally. No wheezing/rhonchi/rales. No accessory muscle use   CV:  Regular rhythm, normal rate, no murmurs, gallops, rubs    GI:  Soft, non distended, non tender. normoactive bowel sounds, no hepatosplenomegaly or abdominal wall drainage tube has minimal purulent discharge in the bag    Musculoskeletal:  No edema, warm, 2+ pulses throughout    Neurologic:  Right upper extremity and left lower extremity weak. AAOx3, CN II-XII reviewed            Data Review:    Review and/or order of clinical lab test      Labs:     Recent Labs     11/10/21  0945 11/09/21  0513   WBC 15.1* 12.6*   HGB 8.7* 9.0*   HCT 27.7* 30.0*    208     Recent Labs     11/10/21  0945 11/09/21  0513 11/08/21  1120   * 135* 138   K 3.8 3.9 3.5    103 102   CO2 25 25 29   BUN 39* 28* 18   CREA 3.91* 3.16* 2.21*   * 106* 87   CA 9.8 9.9 9.5     Recent Labs     11/10/21  0945 11/09/21  0513 11/08/21  1120   ALT 13 15 12   * 131* 134*   TBILI 0.5 0.5 0.7   TP 6.1* 6.2* 6.1*   ALB 2.4* 2.6* 2.8*   GLOB 3.7 3.6 3.3     No results for input(s): INR, PTP, APTT, INREXT, INREXT in the last 72 hours. No results for input(s): FE, TIBC, PSAT, FERR in the last 72 hours. No results found for: FOL, RBCF   No results for input(s): PH, PCO2, PO2 in the last 72 hours. No results for input(s): CPK, CKNDX, TROIQ in the last 72 hours.     No lab exists for component: CPKMB  No results found for: CHOL, CHOLX, CHLST, CHOLV, HDL, HDLP, LDL, LDLC, DLDLP, TGLX, TRIGL, TRIGP, CHHD, CHHDX  No results found for: GLUCPOC  No results found for: COLOR, APPRN, SPGRU, REFSG, ORIANA, PROTU, GLUCU, KETU, BILU, UROU, SPENCER, LEUKU, GLUKE, EPSU, BACTU, WBCU, RBCU, CASTS, UCRY      Medications Reviewed:     Current Facility-Administered Medications   Medication Dose Route Frequency    pantoprazole (PROTONIX) tablet 40 mg  40 mg Oral ACB&D    L.acidophilus-paracasei-S.thermophil-bifidobacter (RISAQUAD) 8 billion cell capsule  1 Capsule Oral DAILY    calcium carbonate (TUMS) chewable tablet 200 mg [elemental]  200 mg Oral TID WITH MEALS    albuterol-ipratropium (DUO-NEB) 2.5 MG-0.5 MG/3 ML  3 mL Nebulization Q6H PRN    LORazepam (ATIVAN) injection 1 mg  1 mg IntraVENous Q12H PRN    oxyCODONE IR (ROXICODONE) tablet 5 mg  5 mg Oral Q4H PRN    HYDROmorphone (DILAUDID) tablet 1 mg  1 mg Oral Q4H PRN    collagenase (SANTYL) 250 unit/gram ointment   Topical DAILY    heparin (porcine) 1,000 unit/mL injection 4,000 Units  4,000 Units Hemodialysis DIALYSIS PRN    alteplase (CATHFLO) 1.8 mg in sterile water (preservative free) 1.8 mL injection  1.8 mg InterCATHeter DIALYSIS ONCE    alteplase (CATHFLO) 1.8 mg in sterile water (preservative free) 1.8 mL injection  1.8 mg InterCATHeter DIALYSIS ONCE    LORazepam (ATIVAN) tablet 0.5 mg  0.5 mg Oral BID    albumin human 25% (BUMINATE) solution 25 g  25 g IntraVENous DIALYSIS PRN    epoetin kayley-epbx (RETACRIT) injection 10,000 Units  10,000 Units SubCUTAneous Q TUE, THU & SAT    B complex-vitaminC-folic acid (NEPHROCAP) cap  1 Capsule Oral DAILY    midodrine (PROAMATINE) tablet 10 mg  10 mg Oral Q8H    heparin (porcine) 1,000 unit/mL injection 1,800 Units  1,800 Units InterCATHeter DIALYSIS PRN    And    heparin (porcine) 1,000 unit/mL injection 1,800 Units  1,800 Units InterCATHeter DIALYSIS PRN    fentaNYL (DURAGESIC) 25 mcg/hr patch 1 Patch  1 Patch TransDERmal Q72H    melatonin tablet 9 mg  9 mg Oral QHS    sodium chloride (NS) flush 5-40 mL  5-40 mL IntraVENous Q8H    sodium chloride (NS) flush 5-40 mL  5-40 mL IntraVENous PRN    chlorhexidine (ORAL CARE KIT) 0.12 % mouthwash 15 mL  15 mL Oral Q12H    balsam peru-castor oiL (VENELEX) ointment   Topical BID    sodium chloride (NS) flush 5-10 mL  5-10 mL IntraVENous PRN    sodium chloride (NS) flush 5-40 mL  5-40 mL IntraVENous Q8H    sodium chloride (NS) flush 5-40 mL  5-40 mL IntraVENous PRN    acetaminophen (TYLENOL) tablet 650 mg  650 mg Oral Q6H PRN    Or    acetaminophen (TYLENOL) suppository 650 mg  650 mg Rectal Q6H PRN    polyethylene glycol (MIRALAX) packet 17 g  17 g Oral DAILY PRN    ondansetron (ZOFRAN ODT) tablet 4 mg  4 mg Oral Q8H PRN    Or    ondansetron (ZOFRAN) injection 4 mg  4 mg IntraVENous Q6H PRN    albuterol (PROVENTIL VENTOLIN) nebulizer solution 2.5 mg  2.5 mg Nebulization Q4H PRN     ______________________________________________________________________  EXPECTED LENGTH OF STAY: 4d 9h  ACTUAL LENGTH OF STAY:          23                 Chan Martinez MD

## 2021-11-10 NOTE — DIALYSIS
[facility] / [phone]    Vitals Pre Post Assessment Pre Post   BP BP: 113/87 (11/10/21 1000) 108/88 LOC AxO X4 AxO X4   HR Pulse (Heart Rate): 100 (11/10/21 1000) 126 Lungs Unlabored, RA Unlabored, RA   Resp Resp Rate: 20 (11/10/21 1000) 28 Cardiac RRR RRR   Temp Temp: 97.1 °F (36.2 °C) (11/10/21 1000) 97.7 Skin Warm, dry Warm, dry   Weight Pre-Dialysis Weight: 131 kg (288 lb 12.8 oz) (11/10/21 1000) 128 kg Edema Generalized 2+ Generalized 2+   Tele status Bedside Tele Bedside Tele Pain Pain Intensity 1: 7 (11/10/21 0352) 4     Orders   Duration: Start: 1000 End: 1330 Total: 3 hrs (+30min Additional Filter)   Dialyzer: Dialyzer/Set Up Inspection: F-160 (11/10/21 1000)   K Bath: Dialysate K (mEq/L): 3 (11/10/21 1000)   Ca Bath: Dialysate CA (mEq/L): 2.5 (11/10/21 1000)   Na: Dialysate NA (mEq/L): 140 (11/10/21 1000)   Bicarb: Dialysate HCO3 (mEq/L): 35 (11/10/21 1000)   Target Fluid Removal: Goal/Amount of Fluid to Remove (mL): 3000 mL (11/10/21 1000)     Access   Type & Location: R chest tunneled CVC   Comments: Gauze dressing CDI dated 11/5/21, will change to appropriate transparent dressing per hospital policy & procedure after completion of HD treatment. Each catheter limb disinfected per p&p, caps removed, hubs disinfected per p&p. +aspiration/+flush x2 ports, sluggish aspiration red port. Pre labs drawn from red port.                                        Labs   HBsAg (Antigen) / date: Negative 10/20/21                                              HBsAb (Antibody) / date: Immune 10/20/21   Source: [facility]   Obtained/Reviewed  Critical Results Called HGB   Date Value Ref Range Status   11/09/2021 9.0 (L) 12.1 - 17.0 g/dL Final     Potassium   Date Value Ref Range Status   11/09/2021 3.9 3.5 - 5.1 mmol/L Final     Calcium   Date Value Ref Range Status   11/09/2021 9.9 8.5 - 10.1 MG/DL Final     BUN   Date Value Ref Range Status   11/09/2021 28 (H) 6 - 20 MG/DL Final     Creatinine   Date Value Ref Range Status   11/09/2021 3.16 (H) 0.70 - 1.30 MG/DL Final     Comment:     INVESTIGATED PER DELTA CHECK PROTOCOL        Meds Given   Name Dose Route   Heparin 1.9 ml Red port lock   Heparin 1.9 ml Blue port lock          Adequacy / Fluid    Total Liters Process:    Net Fluid Removed: 3000 ml      Comments   Time Out Done:   (Time) 0915   Admitting Diagnosis: GIB   Consent obtained/signed: Informed Consent Verified: Yes (11/10/21 1000)   Machine / RO # Machine Number: [id number] (11/10/21 1000)   Primary Nurse Rpt Pre: MING Moore RN   Primary Nurse Rpt Post: MING Moore RN   Pt Education: CVC infection prevention & control. Care Plan: Continue current hemodialysis plan of care. Pts outpatient clinic: N/A     Tx Summary   Comments: 1000 Hemodialysis initiated. 1015 Access pressure alarms, resolved with reversing lines & decreasing . Dr. Robbie Almonte in to see pt while on dialysis. 1100 Re-reversed lines due to access alarms, resolved. 1200 Air in line alarm detected, visible air seen. Hemodialysis stopped & blood not returned. Estimated blood loss (200 ml). New filter set up, primed & tested. CVC dressing changed per policy & procedure, dressing CDI dated 11/10/21.  1230 Hemodialysis restarted. 1300 Hemodialysis completed. Each dialysis catheter limb disinfected per p&p, blood returned per p&p, each dialysis hub disinfected per p&p, post dialysis catheter Heparin dwell instilled per order, and caps applied. SBAR report given to primary RN.

## 2021-11-10 NOTE — PROGRESS NOTES
Occupational Therapy  11/10/21    Chart reviewed. Patient currently on bedside HD at this time, will follow up as able & appropriate with OT treatment.     Thank you,   Nino Coon, SUNIL, OTR/L

## 2021-11-10 NOTE — PROGRESS NOTES
[facility] [facility]  Hospitalist Group                                                                                          Hospitalist Progress Note  Maeve Hickman MD  Answering service: [phone] from in house phone        Date of Service:  2021  NAME:  Alexa Perez  :  1970  MRN:  [id number]      Admission Summary:   Pt is a 54 yo M with PMH ESRD on HD, takes coumadin secondary to hx of DVT RUE with a recent prolonged hospitalization secondary to COVID PNA in August of this year. He ultimately required trach and peg and was in rehab at [address] [place] [address]. Pt unable to provide HPI secondary to current cognition, therefore information is obtained via chart and provider. Per [facility] records, pt had a dislodged peg tube. He started having coffee ground emesis 10/18/21 with elevated HR and hypotension. He was sent to the ER for further workup. He was found to be hypotensive and ICU was consulted for further management. \"          Interval history / Subjective:      F/u Hemorrhagic shock  Patient seen and examined earlier. No complaints. Called and updated his wife this afternoon. Assessment & Plan:     Hemorrhagic shock due to hematemesis due to severe esophagitis. Acute blood loss anemia.   --EGD on 10/19 showed LA grade D reflux esophagitis up to the mid esophagus, few sessile polyps in the stomach, mild patchy erythema in the distal bulb of the duodenum. - s/p EGD ()  - S/P 2 units of pRBCs, 1 unit of FFB and K centra. - Continue PPI   - Monitor labs and transfuse for hgb <7.0. Hemoglobin has remained> 8.  - GI and GS following     Left abdominal abscess. History of dislodged PEG tube. --CT A/P on 10/20 showed left anterior abdominal and pelvic wall hematoma or gas containing fluid collection. --He underwent ultrasound guided fluid drainage and percutaneous drain placement which revealed thick purulent fluid, 18 Sami drain was left in place.   -- Wound culture on 10/23 grew apparent Candida albicans, no sensitivity available. Culture also showed coag negative staph which was deemed to be contaminant  -Completed 15 days of IV Diflucan. WBC normalized. -Follow up CT on 10/26 showed slightly diminished size of the abdominal wall collection.   - Repeat CT on 11/2 significant decrease in size of the left rectus sheath hematoma. The small collection suggestive of congealed hematoma and is unlikely to drain through cath. - surgery following, abdominal wall drain is attached to bag with purulent discharge  --CT abdomen and pelvis, sinogram performed today: Stable enlargement of the left abdominal wall musculature, evaluation for abscess is limited without IV contrast, a small residual abscess is difficult to exclude however this is unchanged. Contrast injection through the catheter demonstrated pooling of contrast material dependently within the muscle with out evidence of fistula. --General surgery recommending drain remain in place until follow-up CT in 10-14 days. Acute on chronic hypoxic respiratory failure with tracheostomy Resolved, tracheostomy removed 11/3     Renal:  ESRD on HD MWF-Nephrology following        Right upper extremity acute DVT, POA. Patient was on warfarin but came with hemorrhagic shock and supratherapeutic INR requiring reversal  --Anticoagulation on hold. --We will obtain venous Doppler. Discussed risk versus benefit of anticoagulation with patient and his wife. Right upper and left lower extremities appeared weaker than the rest on 11/4  --Noncontrast head CT is negative.     Morbid obesity   - BMI 41.69  - Weight loss management to be followed up outpatient    Code status: Full code  DVT prophylaxis: SCDs    Care Plan discussed with: Patient/Family  Anticipated Disposition: Accepted [facility]. Surgery indicated patient will have to be discharged with a pigtail in place.   No local surgeon to see patient before 12/1, will need repeat CT on 11/12. May need to repeat  CT of abdomen and pelvis early next week prior to discharge. Will follow with general surgery for final recommendations  Anticipated Discharge: Greater than 48 hours     Hospital Problems  Date Reviewed: 11/6/2021          Codes Class Noted POA    Abdominal wall fluid collections ICD-10-CM: R18.8  ICD-9-CM: 789.59  10/25/2021 Unknown        Severe protein-calorie malnutrition ([facility] [address] ICD-10-CM: E43  ICD-9-CM: 262  10/21/2021 Unknown        * (Principal) GIB (gastrointestinal bleeding) ICD-10-CM: K92.2  ICD-9-CM: 578.9  10/18/2021 Yes                Review of Systems:   A comprehensive review of systems was negative except for that written in the HPI. Vital Signs:    Last 24hrs VS reviewed since prior progress note. Most recent are:  Visit Vitals  /83 (BP 1 Location: Left arm, BP Patient Position: At rest)   Pulse (!) 122   Temp 98.1 °F (36.7 °C)   Resp 28   Ht 5' 11.5\" (1.816 m)   Wt 132.4 kg (291 lb 14.2 oz)   SpO2 99%   BMI 40.14 kg/m²       No intake or output data in the 24 hours ending 11/09/21 1904     Physical Examination:     I had a face to face encounter with this patient and independently examined them on 11/9/2021 as outlined below:          Constitutional:  No acute distress, cooperative, pleasant    ENT:  Oral mucosa moist, oropharynx benign. Resp:  CTA bilaterally. No wheezing/rhonchi/rales. No accessory muscle use   CV:  Regular rhythm, normal rate, no murmurs, gallops, rubs    GI:  Soft, non distended, non tender. normoactive bowel sounds, no hepatosplenomegaly or abdominal wall drainage tube has purulent discharge in the bag    Musculoskeletal:  No edema, warm, 2+ pulses throughout    Neurologic:  Right upper extremity and left lower extremity weak.   AAOx3, CN II-XII reviewed            Data Review:    Review and/or order of clinical lab test      Labs:     Recent Labs     11/09/21  0513 11/08/21  1120   WBC 12.6* 11.1   HGB 9.0* 8.5* HCT 30.0* 27.8*    177     Recent Labs     11/09/21  0513 11/08/21  1120   * 138   K 3.9 3.5    102   CO2 25 29   BUN 28* 18   CREA 3.16* 2.21*   * 87   CA 9.9 9.5     Recent Labs     11/09/21  0513 11/08/21  1120   ALT 15 12   * 134*   TBILI 0.5 0.7   TP 6.2* 6.1*   ALB 2.6* 2.8*   GLOB 3.6 3.3     No results for input(s): INR, PTP, APTT, INREXT, INREXT in the last 72 hours. No results for input(s): FE, TIBC, PSAT, FERR in the last 72 hours. No results found for: FOL, RBCF   No results for input(s): PH, PCO2, PO2 in the last 72 hours. No results for input(s): CPK, CKNDX, TROIQ in the last 72 hours.     No lab exists for component: CPKMB  No results found for: CHOL, CHOLX, CHLST, CHOLV, HDL, HDLP, LDL, LDLC, DLDLP, TGLX, TRIGL, TRIGP, CHHD, CHHDX  No results found for: GLUCPOC  No results found for: COLOR, APPRN, SPGRU, REFSG, ORIANA, PROTU, GLUCU, KETU, BILU, UROU, SPENCER, LEUKU, GLUKE, EPSU, BACTU, WBCU, RBCU, CASTS, UCRY      Medications Reviewed:     Current Facility-Administered Medications   Medication Dose Route Frequency    iopamidoL (ISOVUE-370) 76 % injection 100 mL  100 mL IntraVENous RAD ONCE    pantoprazole (PROTONIX) tablet 40 mg  40 mg Oral ACB&D    L.acidophilus-paracasei-S.thermophil-bifidobacter (RISAQUAD) 8 billion cell capsule  1 Capsule Oral DAILY    calcium carbonate (TUMS) chewable tablet 200 mg [elemental]  200 mg Oral TID WITH MEALS    albuterol-ipratropium (DUO-NEB) 2.5 MG-0.5 MG/3 ML  3 mL Nebulization Q6H PRN    LORazepam (ATIVAN) injection 1 mg  1 mg IntraVENous Q12H PRN    oxyCODONE IR (ROXICODONE) tablet 5 mg  5 mg Oral Q4H PRN    HYDROmorphone (DILAUDID) tablet 1 mg  1 mg Oral Q4H PRN    collagenase (SANTYL) 250 unit/gram ointment   Topical DAILY    heparin (porcine) 1,000 unit/mL injection 4,000 Units  4,000 Units Hemodialysis DIALYSIS PRN    alteplase (CATHFLO) 1.8 mg in sterile water (preservative free) 1.8 mL injection  1.8 mg InterCATHeter DIALYSIS ONCE    alteplase (CATHFLO) 1.8 mg in sterile water (preservative free) 1.8 mL injection  1.8 mg InterCATHeter DIALYSIS ONCE    LORazepam (ATIVAN) tablet 0.5 mg  0.5 mg Oral BID    albumin human 25% (BUMINATE) solution 25 g  25 g IntraVENous DIALYSIS PRN    epoetin kayley-epbx (RETACRIT) injection 10,000 Units  10,000 Units SubCUTAneous Q TUE, THU & SAT    B complex-vitaminC-folic acid (NEPHROCAP) cap  1 Capsule Oral DAILY    midodrine (PROAMATINE) tablet 10 mg  10 mg Oral Q8H    heparin (porcine) 1,000 unit/mL injection 1,800 Units  1,800 Units InterCATHeter DIALYSIS PRN    And    heparin (porcine) 1,000 unit/mL injection 1,800 Units  1,800 Units InterCATHeter DIALYSIS PRN    fentaNYL (DURAGESIC) 25 mcg/hr patch 1 Patch  1 Patch TransDERmal Q72H    melatonin tablet 9 mg  9 mg Oral QHS    sodium chloride (NS) flush 5-40 mL  5-40 mL IntraVENous Q8H    sodium chloride (NS) flush 5-40 mL  5-40 mL IntraVENous PRN    chlorhexidine (ORAL CARE KIT) 0.12 % mouthwash 15 mL  15 mL Oral Q12H    balsam peru-castor oiL (VENELEX) ointment   Topical BID    sodium chloride (NS) flush 5-10 mL  5-10 mL IntraVENous PRN    sodium chloride (NS) flush 5-40 mL  5-40 mL IntraVENous Q8H    sodium chloride (NS) flush 5-40 mL  5-40 mL IntraVENous PRN    acetaminophen (TYLENOL) tablet 650 mg  650 mg Oral Q6H PRN    Or    acetaminophen (TYLENOL) suppository 650 mg  650 mg Rectal Q6H PRN    polyethylene glycol (MIRALAX) packet 17 g  17 g Oral DAILY PRN    ondansetron (ZOFRAN ODT) tablet 4 mg  4 mg Oral Q8H PRN    Or    ondansetron (ZOFRAN) injection 4 mg  4 mg IntraVENous Q6H PRN    albuterol (PROVENTIL VENTOLIN) nebulizer solution 2.5 mg  2.5 mg Nebulization Q4H PRN     ______________________________________________________________________  EXPECTED LENGTH OF STAY: 4d 9h  ACTUAL LENGTH OF STAY:          22                 Alonza Cushing, MD

## 2021-11-10 NOTE — PROGRESS NOTES
Transition of Care Plan   RUR- Low 14%   DISPOSITION: SNF - [facility] - pending medical stability    F/U with PCP/Specialist     Transport: [facility] spoke with Magaly Willow [phone] in Admissions at [facility] regarding CT scan. She requested op notes from drain placement and asked if patient needed follow up CT vs blood work. Perfect serve message sent to Dr. Sampson Paez. 1:40pm: Per Dr. Sampson Paez, follow up CT needed to reassess for fluid. CM informed Ha Bates in Admissions at [address] [place] [address].     4:13pm: CM received call from Ha Bates in Admissions with [facility], they will be able to accommodate patient's CT for follow up. They asked about DC date, in addition, if drain removal of on 12/1 was acceptable. Message sent to Dr. Sampson Paez. Mika Ma, M.S.W.

## 2021-11-11 ENCOUNTER — APPOINTMENT (OUTPATIENT)
Dept: VASCULAR SURGERY | Age: 51
DRG: 368 | End: 2021-11-11
Attending: HOSPITALIST
Payer: COMMERCIAL

## 2021-11-11 LAB
ALBUMIN SERPL-MCNC: 2.5 G/DL (ref 3.5–5)
ALBUMIN/GLOB SERPL: 0.6 {RATIO} (ref 1.1–2.2)
ALP SERPL-CCNC: 157 U/L (ref 45–117)
ALT SERPL-CCNC: 14 U/L (ref 12–78)
ANION GAP SERPL CALC-SCNC: 7 MMOL/L (ref 5–15)
AST SERPL-CCNC: 17 U/L (ref 15–37)
BASOPHILS # BLD: 0 K/UL (ref 0–0.1)
BASOPHILS NFR BLD: 0 % (ref 0–1)
BILIRUB SERPL-MCNC: 0.6 MG/DL (ref 0.2–1)
BUN SERPL-MCNC: 27 MG/DL (ref 6–20)
BUN/CREAT SERPL: 9 (ref 12–20)
CALCIUM SERPL-MCNC: 10.1 MG/DL (ref 8.5–10.1)
CHLORIDE SERPL-SCNC: 101 MMOL/L (ref 97–108)
CO2 SERPL-SCNC: 25 MMOL/L (ref 21–32)
COVID-19 RAPID TEST, COVR: NOT DETECTED
CREAT SERPL-MCNC: 3.07 MG/DL (ref 0.7–1.3)
DIFFERENTIAL METHOD BLD: ABNORMAL
EOSINOPHIL # BLD: 0 K/UL (ref 0–0.4)
EOSINOPHIL NFR BLD: 0 % (ref 0–7)
ERYTHROCYTE [DISTWIDTH] IN BLOOD BY AUTOMATED COUNT: 18.9 % (ref 11.5–14.5)
GLOBULIN SER CALC-MCNC: 4 G/DL (ref 2–4)
GLUCOSE SERPL-MCNC: 107 MG/DL (ref 65–100)
HCT VFR BLD AUTO: 28.5 % (ref 36.6–50.3)
HGB BLD-MCNC: 8.7 G/DL (ref 12.1–17)
IMM GRANULOCYTES # BLD AUTO: 0 K/UL
IMM GRANULOCYTES NFR BLD AUTO: 0 %
LYMPHOCYTES # BLD: 2.2 K/UL (ref 0.8–3.5)
LYMPHOCYTES NFR BLD: 13 % (ref 12–49)
MCH RBC QN AUTO: 30.7 PG (ref 26–34)
MCHC RBC AUTO-ENTMCNC: 30.5 G/DL (ref 30–36.5)
MCV RBC AUTO: 100.7 FL (ref 80–99)
METAMYELOCYTES NFR BLD MANUAL: 1 %
MONOCYTES # BLD: 1.4 K/UL (ref 0–1)
MONOCYTES NFR BLD: 8 % (ref 5–13)
NEUTS SEG # BLD: 13.5 K/UL (ref 1.8–8)
NEUTS SEG NFR BLD: 78 % (ref 32–75)
NRBC # BLD: 0 K/UL (ref 0–0.01)
NRBC BLD-RTO: 0 PER 100 WBC
PLATELET # BLD AUTO: 209 K/UL (ref 150–400)
PLATELET COMMENTS,PCOM: ABNORMAL
PMV BLD AUTO: 9.7 FL (ref 8.9–12.9)
POTASSIUM SERPL-SCNC: 4 MMOL/L (ref 3.5–5.1)
PROT SERPL-MCNC: 6.5 G/DL (ref 6.4–8.2)
RBC # BLD AUTO: 2.83 M/UL (ref 4.1–5.7)
RBC MORPH BLD: ABNORMAL
RBC MORPH BLD: ABNORMAL
SODIUM SERPL-SCNC: 133 MMOL/L (ref 136–145)
SOURCE, COVRS: NORMAL
WBC # BLD AUTO: 17.3 K/UL (ref 4.1–11.1)

## 2021-11-11 PROCEDURE — 36415 COLL VENOUS BLD VENIPUNCTURE: CPT

## 2021-11-11 PROCEDURE — 74011250637 HC RX REV CODE- 250/637: Performed by: NURSE PRACTITIONER

## 2021-11-11 PROCEDURE — 85025 COMPLETE CBC W/AUTO DIFF WBC: CPT

## 2021-11-11 PROCEDURE — 93971 EXTREMITY STUDY: CPT

## 2021-11-11 PROCEDURE — 65660000000 HC RM CCU STEPDOWN

## 2021-11-11 PROCEDURE — 74011250636 HC RX REV CODE- 250/636: Performed by: HOSPITALIST

## 2021-11-11 PROCEDURE — 80053 COMPREHEN METABOLIC PANEL: CPT

## 2021-11-11 PROCEDURE — 74011250637 HC RX REV CODE- 250/637: Performed by: HEALTH CARE PROVIDER

## 2021-11-11 PROCEDURE — 74011250637 HC RX REV CODE- 250/637: Performed by: INTERNAL MEDICINE

## 2021-11-11 PROCEDURE — 74011250637 HC RX REV CODE- 250/637: Performed by: HOSPITALIST

## 2021-11-11 PROCEDURE — 87635 SARS-COV-2 COVID-19 AMP PRB: CPT

## 2021-11-11 PROCEDURE — 74011250637 HC RX REV CODE- 250/637: Performed by: STUDENT IN AN ORGANIZED HEALTH CARE EDUCATION/TRAINING PROGRAM

## 2021-11-11 PROCEDURE — 97110 THERAPEUTIC EXERCISES: CPT

## 2021-11-11 RX ADMIN — OXYCODONE 5 MG: 5 TABLET ORAL at 06:46

## 2021-11-11 RX ADMIN — Medication 5 ML: at 06:00

## 2021-11-11 RX ADMIN — CHLORHEXIDINE GLUCONATE 15 ML: 0.12 RINSE ORAL at 21:00

## 2021-11-11 RX ADMIN — MIDODRINE HYDROCHLORIDE 10 MG: 5 TABLET ORAL at 21:45

## 2021-11-11 RX ADMIN — Medication 5 ML: at 22:00

## 2021-11-11 RX ADMIN — Medication 1 CAPSULE: at 08:13

## 2021-11-11 RX ADMIN — OXYCODONE 5 MG: 5 TABLET ORAL at 21:48

## 2021-11-11 RX ADMIN — COLLAGENASE SANTYL: 250 OINTMENT TOPICAL at 09:15

## 2021-11-11 RX ADMIN — Medication: at 18:48

## 2021-11-11 RX ADMIN — LORAZEPAM 0.5 MG: 0.5 TABLET ORAL at 18:08

## 2021-11-11 RX ADMIN — CALCIUM CARBONATE (ANTACID) CHEW TAB 500 MG 200 MG: 500 CHEW TAB at 16:51

## 2021-11-11 RX ADMIN — Medication 9 MG: at 21:45

## 2021-11-11 RX ADMIN — PANTOPRAZOLE SODIUM 40 MG: 40 TABLET, DELAYED RELEASE ORAL at 06:46

## 2021-11-11 RX ADMIN — OXYCODONE 5 MG: 5 TABLET ORAL at 12:25

## 2021-11-11 RX ADMIN — Medication 1 CAPSULE: at 08:14

## 2021-11-11 RX ADMIN — LORAZEPAM 1 MG: 2 INJECTION INTRAMUSCULAR; INTRAVENOUS at 12:25

## 2021-11-11 RX ADMIN — PANTOPRAZOLE SODIUM 40 MG: 40 TABLET, DELAYED RELEASE ORAL at 16:52

## 2021-11-11 RX ADMIN — Medication 10 ML: at 14:07

## 2021-11-11 RX ADMIN — ACETAMINOPHEN 650 MG: 325 TABLET ORAL at 14:21

## 2021-11-11 RX ADMIN — Medication 10 ML: at 14:08

## 2021-11-11 RX ADMIN — CALCIUM CARBONATE (ANTACID) CHEW TAB 500 MG 200 MG: 500 CHEW TAB at 12:07

## 2021-11-11 RX ADMIN — MIDODRINE HYDROCHLORIDE 10 MG: 5 TABLET ORAL at 06:46

## 2021-11-11 RX ADMIN — LORAZEPAM 0.5 MG: 0.5 TABLET ORAL at 08:13

## 2021-11-11 RX ADMIN — Medication: at 09:16

## 2021-11-11 RX ADMIN — CALCIUM CARBONATE (ANTACID) CHEW TAB 500 MG 200 MG: 500 CHEW TAB at 08:13

## 2021-11-11 RX ADMIN — MIDODRINE HYDROCHLORIDE 10 MG: 5 TABLET ORAL at 14:03

## 2021-11-11 NOTE — PROGRESS NOTES
Bedside and Verbal shift change report given to Megan Mendoza RN (oncoming nurse) by Magdiel Garibay RN (offgoing nurse). Report included the following information SBAR, OR Summary, Procedure Summary, Intake/Output, MAR, Recent Results, Cardiac Rhythm Sinus Rhythm, Alarm Parameters , Quality Measures and Dual Neuro Assessment.

## 2021-11-11 NOTE — PROGRESS NOTES
Music Therapy Assessment  [address] [facility] [id number]     1970  49 y.o.  male    Patient Telephone Number: [phone] (home)   Jain Affiliation: No preference   Language: English   Patient Active Problem List    Diagnosis Date Noted    Abdominal wall fluid collections 10/25/2021    Severe protein-calorie malnutrition ([address] 10/21/2021    GIB (gastrointestinal bleeding) 10/18/2021        Date: 11/11/2021            Total Time (in minutes): 54          [facility] 6S NEURO-SCI TELE    Mental Status:   [x] Alert [  ] Kathren Blandcasper [  ]  Confused  [  ] Minimally responsive  [  ] Sleeping    Communication Status: [  ] Impaired Speech [  ] Nonverbal -N/A    Physical Status:   [x] Oxygen in use  [  ] Hard of Hearing [  ] Vision Impaired  [  ] Ambulatory  [  ] Ambulatory with assistance [  ] Non-ambulatory     Music Preferences, Background: [address], especially [employer] is the pt's favorite band. He expressed enjoying their albums Got Your 6 and F8. He also affirmed liking the bands Green [employer] and [employer].    Clinical Problem addressed: Support self-expression, meaningful social interaction. Goal(s) met in session:  Physical/Pain management (Scale of 1-10):    Pre-session rating: Pt reported pain but didn't rate it. Post-session rating: Pt didn't report on pain.   [  ] Increased relaxation   [  ] Affected breathing patterns  [  ] Decreased muscle tension   [  ] Decreased agitation  [  ] Affected heart rate    [  ] Increased alertness     Emotional/Psychological:  [x] Increased self-expression   [  ] Decreased aggressive behavior   [  ] Decreased feelings of stress  [  ] Discussed healthy coping skills     [  ] Improved mood    [  ] Decreased withdrawn behavior     Social:  [x] Decreased feelings of isolation/loneliness [x] Positive social interaction   [  ] Provided support and/or comfort for family/friends    Spiritual:  [  ] Spiritual support    [  ] Expressed peace  [  ] Expressed hayley    [  ] Discussed beliefs    Techniques Utilized (Check all that apply):   [  ] Procedural support MT [  ] Music for relaxation [x] Patient preferred music  [x] Sabrina analysis  [x] Song choice  [  ] Music for validation  [  ] Entrainment  [  ] Movement to music [  ] Guided visualization  [  ] Ignacia Fees  [  ] Patient instrument playing [  ] Pawan Wall writing  [  ] Jimi Place along   [  ] Kreg Spire  [  ] Sensory stimulation  [x] Active Listening  [  ] Music for spiritual support [  ] Making of CDs as gifts    Session Observations:  F/up visit; Patient (pt) was alert lying in bed. He shared with this music therapist (MT) that his understanding is that he'll be discharged tomorrow to a rehab facility to regain his strength. Pt said he'll be able to see his family more once there, which he's looking forward to. In previous session, pt asked about a song. He couldn't remember the title or musician who performed it, but shared some of the lyrics he remembered. MT had researched this and the only song found that contained these lyrics was the [employer]. MT sang this for pt and he increased emotional expression in response to the music as evidenced by (AEB) becoming tearful. He said the lyrics carry much meaning for him and engaged in sabrina analysis with part of the song. He expressed concern over how divided the country seems. MT provided active listening, and then acknowledged 's Day. Knowing that the pt is a , the MT asked what this day means to him. Pt shared and then requested to hear a cover of The Sound of Silence by the band Disturbed. MT played a recording of this song for the pt and listened with him. Pt requested to watch the music video with the MT for the [employer]inger Death [employer]unch song I Apologize. MT played this for the pt on the rolling laptop in his room. Pt increased self-expression in response to this AEB sharing about wounds in his family from alcoholism. MT provided reflective listening. Pt thanked MT for the session.     LOPEZ Mirza (Music Therapist-Board Certified)  Spiritual Care Department  Referral-based service

## 2021-11-11 NOTE — PROGRESS NOTES
Transition of Care Plan   RUR- Low 14%   DISPOSITION: SNF - [facility] - pending medical stability    F/U with PCP/Specialist     Transport: [facility] - has been pre-paid    CM spoke with Efren Coleman [phone] in Admissions at [facility] regarding plan for drain removal. [place] suggested having MD reach out to general surgeon, Dr. Ray Hobson at [phone] to discuss case to see if he will follow progress after CT scan for drain removal. Dr. Maged Zheng informed. CM faxed updated PT/OT notes, along with general surgery notes on drain removal to [email or web address].     10:35am: Dr. Maged Zheng reaching out to general surgeon to arrange discussion with Dr. Lea Hunt. CM provided update to [place] in Admissions with [facility].     11:48am: CM received return call from Efren Coleman with Admissions at [facility], they requested a CT order for scheduling purposes. [facility] to handle scheduling of CT.    1:15pm: Per Dr. Maged Zheng, no follow up CT needed. Will keep scheduled drain removal appt for 12/1 with Dr. Lea Hunt. Dr. Maged Zheng to schedule HD tomorrow early, [facility] requested for 2pm. CM spoke with Eliana Huffman at [facility] to provide update, they are ready to accept patient tomorrow. Patient's spouse has been updated on DC plan, is in agreement. BETSY Cruz.

## 2021-11-11 NOTE — PROGRESS NOTES
Name: Nisreen Rios MRN: [id number]   : 1970 Hospital: . [address]   Date: 2021        IMPRESSION:   · SERGEY, HD dependent. Patient is on MWF schedule. · Hypervolemia with predominantly central congestion, improved. · Anemia of multifactorial origin  · Respiratory failure due to a complicated Covid pneumonia, recovered. Trache removed  · Hypokalemia pre HD      PLAN:   · Continue present care  · Complete a full HD Tx tomorrow. · Watch for renal function recovery. Patient needs urine output to be recorded. Will check pre HD renal panel. · Anemia management- start Retacrit. Check the iron profile. Subjective/Interval History:   I have reviewed the flowsheet and previous days notes. ROS:Review of systems not obtained due to patient factors. Objective:   Vital Signs:    Visit Vitals  /82   Pulse (!) 112   Temp 98.3 °F (36.8 °C)   Resp 16   Ht 5' 11.5\" (1.816 m)   Wt 127 kg (279 lb 15.8 oz)   SpO2 95%   BMI 38.51 kg/m²       O2 Device: None (Room air)   O2 Flow Rate (L/min): 0 l/min   Temp (24hrs), Av.3 °F (36.8 °C), Min:98 °F (36.7 °C), Max:98.9 °F (37.2 °C)       Intake/Output:   Last shift:      No intake/output data recorded. Last 3 shifts:  1901 -  0700  In: -   Out: 3000     Intake/Output Summary (Last 24 hours) at 2021 1013  Last data filed at 11/10/2021 1330  Gross per 24 hour   Intake --   Output 3000 ml   Net -3000 ml        Physical Exam:  General:    Awake, alert, answers all questions appropriately. Head:   Normocephalic, without obvious abnormality, atraumatic. Eyes:   Conjunctivae/corneas clear. Nose:  Nares normal. No drainage or sinus tenderness. Throat:    Lips, mucosa, and tongue normal.    Neck:  Supple. No trache  Lungs:   Clear to auscultation bilaterally. No Wheezing or Rhonchi. No rales. Chest wall:  No tenderness or deformity. No Accessory muscle use. Heart:   Regular rate and rhythm,  no murmur, rub or gallop.   Abdomen: Soft. Distended. Bowel sounds normal. PEG tube in place. Extremities: Extremities normal, atraumatic, No cyanosis. No edema. No clubbing  Skin:     Texture, turgor normal. No rashes or lesions. Not Jaundiced  Psych:  Calm. Neurologic: Non focal.      DATA:  Labs:  Recent Labs     11/11/21  0650 11/10/21  0945 11/09/21  0513   * 133* 135*   K 4.0 3.8 3.9    101 103   CO2 25 25 25   BUN 27* 39* 28*   CREA 3.07* 3.91* 3.16*   CA 10.1 9.8 9.9   ALB 2.5* 2.4* 2.6*     Recent Labs     11/11/21  0650 11/10/21  0945 11/09/21  0513   WBC 17.3* 15.1* 12.6*   HGB 8.7* 8.7* 9.0*   HCT 28.5* 27.7* 30.0*    215 208     No results for input(s): DOLORES, KU, CLU, CREAU in the last 72 hours.     No lab exists for component: PROU    Total time spent with patient:  35 minutes    [] Critical Care Provided    Care Plan discussed with:   Ama Renae MD

## 2021-11-11 NOTE — PROGRESS NOTES
Problem: Risk for Spread of Infection  Goal: Prevent transmission of infectious organism to others  Description: Prevent the transmission of infectious organisms to other patients, staff members, and visitors. Outcome: Progressing Towards Goal     Problem: Falls - Risk of  Goal: *Absence of Falls  Description: Document Harden Reason Fall Risk and appropriate interventions in the flowsheet. Outcome: Progressing Towards Goal  Note: Fall Risk Interventions:  Mobility Interventions: Communicate number of staff needed for ambulation/transfer    Mentation Interventions: Adequate sleep, hydration, pain control    Medication Interventions: Evaluate medications/consider consulting pharmacy    Elimination Interventions: Call light in reach    History of Falls Interventions: Door open when patient unattended         Problem: Pressure Injury - Risk of  Goal: *Prevention of pressure injury  Description: Document Keenan Scale and appropriate interventions in the flowsheet.   Outcome: Progressing Towards Goal  Note: Pressure Injury Interventions:  Sensory Interventions: Assess changes in LOC    Moisture Interventions: Minimize layers    Activity Interventions: Pressure redistribution bed/mattress(bed type)    Mobility Interventions: Pressure redistribution bed/mattress (bed type)    Nutrition Interventions: Document food/fluid/supplement intake    Friction and Shear Interventions: Apply protective barrier, creams and emollients, Foam dressings/transparent film/skin sealants, HOB 30 degrees or less

## 2021-11-11 NOTE — PROGRESS NOTES
Bedside shift change report given to CHRISTA BABCOCK [facility], RN (oncoming nurse) by Waleska Reynoso RN (offgoing nurse). Report included the following information SBAR, Kardex, Intake/Output, MAR, Med Rec Status, Cardiac Rhythm NSR, Quality Measures and Dual Neuro Assessment.

## 2021-11-11 NOTE — PROGRESS NOTES
Problem: Mobility Impaired (Adult and Pediatric)  Goal: *Acute Goals and Plan of Care (Insert Text)  Description: FUNCTIONAL STATUS PRIOR TO ADMISSION: Patient was independent and active without use of DME prior to August 2021. Hospitalized in [place], [place] from August to mid October with COVID 19, now with trach and on HD. Was admitted to [facility] for four days prior to admission and stated he had not begun therapy there. HOME SUPPORT PRIOR TO ADMISSION: The patient lived with his wife of 27 years. Lives in [place], [place] Admitted from [facility]. Physical Therapy Goals  Goals continued 11/4/21    Physical Therapy Goals  Goals re-assessed 10/28/2021. Goals re-assessed 11/11/2021 and appropriate for carryover. 1.  Patient will move from supine to sit and sit to supine, scoot up and down, and roll side to side in bed with maximal assistance x2 within 7 day(s). 2.  Patient will tolerate HOB elevated at 50 degrees 30 min BID within 7 days. 3.  Patient will be independent in supine HEP for LEs within 7 days. Initiated 10/22/2021  1. Patient will move from supine to sit and sit to supine, scoot up and down, and roll side to side in bed with maximal assistance x2 within 7 day(s). 2.  Patient will tolerate HOB elevated at 50 degrees 10 min BID within 7 days. 3.  Patient will be independent in supine HEP for LEs within 7 days. 4.  Patient will tolerate PRAFO boot (alternating feet every 2 hours) within 7 days. Goal met  Outcome: Not Met  PHYSICAL THERAPY TREATMENT: WEEKLY REASSESSMENT  Patient: Boby Bhakta (55 y.o. male)  Date: 11/11/2021  Primary Diagnosis: GIB (gastrointestinal bleeding) [K92.2]  Procedure(s) (LRB):  ESOPHAGOGASTRODUODENOSCOPY (EGD) at Bedside (N/A)  ESOPHAGOGASTRODUODENAL (EGD) BIOPSY (N/A) 23 Days Post-Op   Precautions: Fall, Skin, Contact      ASSESSMENT  Patient continues with skilled PT services and is progressing slowly towards goals.  He remains limited by global weakness (RUE and LLE most pronounced), pain, impaired balance, poor activity tolerance, and self limiting behavior. Performed AAROM in all extremities with VCs for slow controlled movement with poor carryover from previous education. Pt repots he is unable to tolerate prafo boot on LLE, therefore provided prevalon boot. Pt remains totalA for all functional mobility. Continuing to recommend SNF upon discharge. Current Level of Function Impacting Discharge (mobility/balance): totalA for all mobility     Other factors to consider for discharge: prolonged hospital stay          PLAN :  Goals have been updated based on progression since last assessment. Patient continues to benefit from skilled intervention to address the above impairments. Recommendations and Planned Interventions: bed mobility training, transfer training, gait training, therapeutic exercises, neuromuscular re-education, patient and family training/education, and therapeutic activities      Frequency/Duration: Patient will be followed by physical therapy:  3 times a week to address goals. Recommendation for discharge: (in order for the patient to meet his/her long term goals)  Therapy up to 5 days/week in SNF setting    This discharge recommendation:  Has not yet been discussed the attending provider and/or case management    IF patient discharges home will need the following DME: to be determined (TBD)         SUBJECTIVE:   Patient stated I am supposed to be getting out of here tomorrow.     OBJECTIVE DATA SUMMARY:   HISTORY:    Past Medical History:   Diagnosis Date    COVID     Dialysis patient [facility])     started around the end of september 2021    Polycystic kidney disease     S/P percutaneous endoscopic gastrostomy (PEG) tube placement [facility])      Past Surgical History:   Procedure Laterality Date    IR REPLACE CVC TUNNELED W/O PORT  11/5/2021     Home Situation  Home Environment:  (admitted from [facility])  Support Systems: Spouse/Significant Other    EXAMINATION/PRESENTATION/DECISION MAKING:   Critical Behavior:  Neurologic State: Alert, Eyes open spontaneously  Orientation Level: Oriented X4  Cognition: Follows commands  Safety/Judgement: Awareness of environment, Fall prevention    Activity Tolerance:   Poor    After treatment patient left in no apparent distress:   Supine in bed, Call bell within reach, and Side rails x 3    COMMUNICATION/EDUCATION:   The patients plan of care was discussed with: Registered nurse. Fall prevention education was provided and the patient/caregiver indicated understanding., Patient/family have participated as able in goal setting and plan of care. , and Patient/family agree to work toward stated goals and plan of care.     Thank you for this referral.  Magnus Velazquez, PT, DPT   Time Calculation: 16 mins

## 2021-11-11 NOTE — PROGRESS NOTES
Problem: Risk for Spread of Infection  Goal: Prevent transmission of infectious organism to others  Description: Prevent the transmission of infectious organisms to other patients, staff members, and visitors. Outcome: Progressing Towards Goal     Problem: Falls - Risk of  Goal: *Absence of Falls  Description: Document Brayan Hu Fall Risk and appropriate interventions in the flowsheet. Outcome: Progressing Towards Goal  Note: Fall Risk Interventions:  Mobility Interventions: Communicate number of staff needed for ambulation/transfer    Mentation Interventions: Adequate sleep, hydration, pain control    Medication Interventions: Evaluate medications/consider consulting pharmacy    Elimination Interventions: Call light in reach    History of Falls Interventions: Door open when patient unattended         Problem: Pressure Injury - Risk of  Goal: *Prevention of pressure injury  Description: Document Keenan Scale and appropriate interventions in the flowsheet.   Outcome: Progressing Towards Goal  Note: Pressure Injury Interventions:  Sensory Interventions: Assess changes in LOC, Assess need for specialty bed    Moisture Interventions: Minimize layers    Activity Interventions: Pressure redistribution bed/mattress(bed type)    Mobility Interventions: Pressure redistribution bed/mattress (bed type)    Nutrition Interventions: Document food/fluid/supplement intake    Friction and Shear Interventions: Apply protective barrier, creams and emollients

## 2021-11-11 NOTE — PROGRESS NOTES
At Dr. Margarita Sahu request reviewed case with Dr. Coco Mccain in [place]. He seemed to be comfortable with the rectus abscess and his ability to handle it in [place]. Informed patient.   OK to be discharged from surgical standpoint

## 2021-11-11 NOTE — PROGRESS NOTES
[facility]  Hospitalist Group                                                                                          Hospitalist Progress Note  Manish Hernandez MD  Answering service: [phone] from in house phone        Date of Service:  2021  NAME:  Jackson Duarte  :  1970  MRN:  [id number]      Admission Summary:   Pt is a 54 yo M with PMH ESRD on HD, takes coumadin secondary to hx of DVT RUE with a recent prolonged hospitalization secondary to COVID PNA in August of this year. He ultimately required trach and peg was sent to [facility] he has not been there too long when he was sent to our ED for hematemesis; he was admitted to [facility] on 10/15 and transferred to [facility] on 10/18. Per records reviewed, patient was intubated on , trached on , he was treated for VRE pneumonia. PEG dislodged on 10/11, treated with Unasyn until 10/17. Patient was on warfarin for right upper extremity DVT involving the subclavian, axillary and brachial veins. He started having coffee ground emesis 10/18/21 with elevated HR and hypotension. He was sent to the ER for further workup. He was found to be hypotensive and ICU was consulted for further management. \"          Interval history / Subjective:      F/u Hemorrhagic shock  Seen and examined  Doing well   Not getting much sleep   Assessment & Plan:     Rising WBC. Patient is afebrile. Tachycardia is not new, usually pronounced during and after dialysis  --CT abdomen on  without significant fluid collection  --Closely monitor, hold off on abx, no fevers      Hemorrhagic shock due to hematemesis due to severe esophagitis. Acute blood loss anemia.   --EGD on 10/19 showed LA grade D reflux esophagitis up to the mid esophagus, few sessile polyps in the stomach, mild patchy erythema in the distal bulb of the duodenum. - s/p EGD ()  - S/P 2 units of pRBCs, 1 unit of FFB and K centra.    - Continue PPI   - Monitor labs and transfuse for hgb <7.0. Hemoglobin has remained> 8.  - GI signed off. Left abdominal abscess. History of dislodged PEG tube. --CT A/P on 10/20 showed left anterior abdominal and pelvic wall hematoma or gas containing fluid collection. --He underwent ultrasound guided fluid drainage and percutaneous drain placement which revealed thick purulent fluid, 18 Danish drain was left in place. -- Wound culture on 10/23 grew apparent Candida albicans, no sensitivity available. Culture also showed coag negative staph which was deemed to be contaminant  -Completed 15 days of IV Diflucan. WBC normalized. -Follow up CT on 10/26 showed slightly diminished size of the abdominal wall collection.   - Repeat CT on 11/2 significant decrease in size of the left rectus sheath hematoma. The small collection suggestive of congealed hematoma and is unlikely to drain through cath. - surgery following, abdominal wall drain is attached to bag with purulent discharge  --CT abdomen and pelvis, sinogram performed 11/9: Stable enlargement of the left abdominal wall musculature, evaluation for abscess is limited without IV contrast, a small residual abscess is difficult to exclude however this is unchanged. Contrast injection through the catheter demonstrated pooling of contrast material dependently within the muscle with out evidence of fistula. --General surgery following, discussed with , no need for repeat CT abd/pel at this time. WIll need follow up with surgery which has been scheduled with Dr. Jason Steele on 12/1/21. They will evaluate patient's drain and decide on removal at that time. Acute on chronic hypoxic respiratory failure with tracheostomy Resolved,  --Patient was intubated in outside hospital on 9/7 and was trached on 9/16. He was successfully decannulated here on 11/3     Renal:  ESRD on HD MWF-Nephrology following        Right upper extremity acute DVT, POA.   Patient was on warfarin but came with hemorrhagic shock and supratherapeutic INR requiring reversal  --Anticoagulation on hold. --repeat Venous duplex negative for DVT in RUE. Will not resume warfarin at this time. Right upper and left lower extremities appeared weaker than the rest on 11/4  --Noncontrast head CT is negative. --patient states that RUE has been since previous admission. Discussed possibility of MRI with patient but he does not want to go through additional testing if it will not  and/or delay his discharge to rehab.      Morbid obesity   - BMI 41.69  - Weight loss management to be followed up outpatient    Code status: Full code  DVT prophylaxis: SCDs    Care Plan discussed with: Patient/Family  Anticipated Disposition: Accepted to [facility] and rehab SNF. Anticipated Discharge: Less than 24 hours discharge to SNF after HD in AM, I have requested nephrology to put patient on early schedule in AM      Hospital Problems  Date Reviewed: 11/6/2021          Codes Class Noted POA    Abdominal wall fluid collections ICD-10-CM: R18.8  ICD-9-CM: 789.59  10/25/2021 Unknown        Severe protein-calorie malnutrition ([facility] [address] ICD-10-CM: E43  ICD-9-CM: 262  10/21/2021 Unknown        * (Principal) GIB (gastrointestinal bleeding) ICD-10-CM: K92.2  ICD-9-CM: 578.9  10/18/2021 Yes                Review of Systems:   A comprehensive review of systems was negative except for that written in the HPI. Vital Signs:    Last 24hrs VS reviewed since prior progress note.  Most recent are:  Visit Vitals  /74 (BP 1 Location: Right lower arm, BP Patient Position: At rest)   Pulse (!) 110   Temp 98.2 °F (36.8 °C)   Resp 26   Ht 5' 11.5\" (1.816 m)   Wt 127 kg (279 lb 15.8 oz)   SpO2 95%   BMI 38.51 kg/m²       No intake or output data in the 24 hours ending 11/11/21 1331     Physical Examination:     I had a face to face encounter with this patient and independently examined them on 11/11/2021 as outlined below:          Constitutional:  No acute distress, cooperative, pleasant    ENT:  Oral mucosa moist, oropharynx benign. Resp:  CTA bilaterally. CV:  Regular rhythm, normal rat    GI:  Soft, non distended, non tender. normoactive bowel sounds,  abdominal wall drainage tube has minimal purulent discharge in the bag    Musculoskeletal:  No edema, warm, 2+ pulses throughout    Neurologic:  Right upper extremity and left lower extremity weak. AAOx3, CN II-XII reviewed            Data Review:    Review and/or order of clinical lab test      Labs:     Recent Labs     11/11/21 0650 11/10/21  0945   WBC 17.3* 15.1*   HGB 8.7* 8.7*   HCT 28.5* 27.7*    215     Recent Labs     11/11/21 0650 11/10/21  0945 11/09/21  0513   * 133* 135*   K 4.0 3.8 3.9    101 103   CO2 25 25 25   BUN 27* 39* 28*   CREA 3.07* 3.91* 3.16*   * 110* 106*   CA 10.1 9.8 9.9     Recent Labs     11/11/21 0650 11/10/21  0945 11/09/21  0513   ALT 14 13 15   * 147* 131*   TBILI 0.6 0.5 0.5   TP 6.5 6.1* 6.2*   ALB 2.5* 2.4* 2.6*   GLOB 4.0 3.7 3.6     No results for input(s): INR, PTP, APTT, INREXT, INREXT in the last 72 hours. No results for input(s): FE, TIBC, PSAT, FERR in the last 72 hours. No results found for: FOL, RBCF   No results for input(s): PH, PCO2, PO2 in the last 72 hours. No results for input(s): CPK, CKNDX, TROIQ in the last 72 hours.     No lab exists for component: CPKMB  No results found for: CHOL, CHOLX, CHLST, CHOLV, HDL, HDLP, LDL, LDLC, DLDLP, TGLX, TRIGL, TRIGP, CHHD, CHHDX  No results found for: GLUCPOC  No results found for: COLOR, APPRN, SPGRU, REFSG, ORIANA, PROTU, GLUCU, KETU, BILU, UROU, SPENCER, LEUKU, GLUKE, EPSU, BACTU, WBCU, RBCU, CASTS, UCRY      Medications Reviewed:     Current Facility-Administered Medications   Medication Dose Route Frequency    pantoprazole (PROTONIX) tablet 40 mg  40 mg Oral ACB&D    L.acidophilus-paracasei-S.thermophil-bifidobacter (RISAQUAD) 8 billion cell capsule  1 Capsule Oral DAILY    calcium carbonate (TUMS) chewable tablet 200 mg [elemental]  200 mg Oral TID WITH MEALS    albuterol-ipratropium (DUO-NEB) 2.5 MG-0.5 MG/3 ML  3 mL Nebulization Q6H PRN    LORazepam (ATIVAN) injection 1 mg  1 mg IntraVENous Q12H PRN    oxyCODONE IR (ROXICODONE) tablet 5 mg  5 mg Oral Q4H PRN    HYDROmorphone (DILAUDID) tablet 1 mg  1 mg Oral Q4H PRN    collagenase (SANTYL) 250 unit/gram ointment   Topical DAILY    heparin (porcine) 1,000 unit/mL injection 4,000 Units  4,000 Units Hemodialysis DIALYSIS PRN    alteplase (CATHFLO) 1.8 mg in sterile water (preservative free) 1.8 mL injection  1.8 mg InterCATHeter DIALYSIS ONCE    alteplase (CATHFLO) 1.8 mg in sterile water (preservative free) 1.8 mL injection  1.8 mg InterCATHeter DIALYSIS ONCE    LORazepam (ATIVAN) tablet 0.5 mg  0.5 mg Oral BID    albumin human 25% (BUMINATE) solution 25 g  25 g IntraVENous DIALYSIS PRN    epoetin kayley-epbx (RETACRIT) injection 10,000 Units  10,000 Units SubCUTAneous Q TUE, THU & SAT    B complex-vitaminC-folic acid (NEPHROCAP) cap  1 Capsule Oral DAILY    midodrine (PROAMATINE) tablet 10 mg  10 mg Oral Q8H    heparin (porcine) 1,000 unit/mL injection 1,800 Units  1,800 Units InterCATHeter DIALYSIS PRN    And    heparin (porcine) 1,000 unit/mL injection 1,800 Units  1,800 Units InterCATHeter DIALYSIS PRN    fentaNYL (DURAGESIC) 25 mcg/hr patch 1 Patch  1 Patch TransDERmal Q72H    melatonin tablet 9 mg  9 mg Oral QHS    sodium chloride (NS) flush 5-40 mL  5-40 mL IntraVENous Q8H    sodium chloride (NS) flush 5-40 mL  5-40 mL IntraVENous PRN    chlorhexidine (ORAL CARE KIT) 0.12 % mouthwash 15 mL  15 mL Oral Q12H    balsam peru-castor oiL (VENELEX) ointment   Topical BID    sodium chloride (NS) flush 5-10 mL  5-10 mL IntraVENous PRN    sodium chloride (NS) flush 5-40 mL  5-40 mL IntraVENous Q8H    sodium chloride (NS) flush 5-40 mL  5-40 mL IntraVENous PRN    acetaminophen (TYLENOL) tablet 650 mg  650 mg Oral Q6H PRN    Or    acetaminophen (TYLENOL) suppository 650 mg  650 mg Rectal Q6H PRN    polyethylene glycol (MIRALAX) packet 17 g  17 g Oral DAILY PRN    ondansetron (ZOFRAN ODT) tablet 4 mg  4 mg Oral Q8H PRN    Or    ondansetron (ZOFRAN) injection 4 mg  4 mg IntraVENous Q6H PRN    albuterol (PROVENTIL VENTOLIN) nebulizer solution 2.5 mg  2.5 mg Nebulization Q4H PRN     ______________________________________________________________________  EXPECTED LENGTH OF STAY: 4d 9h  ACTUAL LENGTH OF STAY:          24                 Boby Venegas MD

## 2021-11-12 ENCOUNTER — APPOINTMENT (OUTPATIENT)
Dept: GENERAL RADIOLOGY | Age: 51
DRG: 368 | End: 2021-11-12
Attending: NURSE PRACTITIONER
Payer: COMMERCIAL

## 2021-11-12 LAB
ALBUMIN SERPL-MCNC: 2.6 G/DL (ref 3.5–5)
ALBUMIN/GLOB SERPL: 0.7 {RATIO} (ref 1.1–2.2)
ALP SERPL-CCNC: 168 U/L (ref 45–117)
ALT SERPL-CCNC: 13 U/L (ref 12–78)
ANION GAP SERPL CALC-SCNC: 7 MMOL/L (ref 5–15)
AST SERPL-CCNC: 16 U/L (ref 15–37)
BASOPHILS # BLD: 0.2 K/UL (ref 0–0.1)
BASOPHILS NFR BLD: 1 % (ref 0–1)
BILIRUB SERPL-MCNC: 0.6 MG/DL (ref 0.2–1)
BUN SERPL-MCNC: 21 MG/DL (ref 6–20)
BUN/CREAT SERPL: 9 (ref 12–20)
CALCIUM SERPL-MCNC: 9.7 MG/DL (ref 8.5–10.1)
CHLORIDE SERPL-SCNC: 101 MMOL/L (ref 97–108)
CO2 SERPL-SCNC: 27 MMOL/L (ref 21–32)
CREAT SERPL-MCNC: 2.4 MG/DL (ref 0.7–1.3)
DIFFERENTIAL METHOD BLD: ABNORMAL
EOSINOPHIL # BLD: 0.2 K/UL (ref 0–0.4)
EOSINOPHIL NFR BLD: 1 % (ref 0–7)
ERYTHROCYTE [DISTWIDTH] IN BLOOD BY AUTOMATED COUNT: 19 % (ref 11.5–14.5)
GLOBULIN SER CALC-MCNC: 3.9 G/DL (ref 2–4)
GLUCOSE SERPL-MCNC: 101 MG/DL (ref 65–100)
HCT VFR BLD AUTO: 29.8 % (ref 36.6–50.3)
HGB BLD-MCNC: 9.2 G/DL (ref 12.1–17)
IMM GRANULOCYTES # BLD AUTO: 0 K/UL
IMM GRANULOCYTES NFR BLD AUTO: 0 %
LACTATE SERPL-SCNC: 1 MMOL/L (ref 0.4–2)
LYMPHOCYTES # BLD: 2.7 K/UL (ref 0.8–3.5)
LYMPHOCYTES NFR BLD: 12 % (ref 12–49)
MCH RBC QN AUTO: 30.8 PG (ref 26–34)
MCHC RBC AUTO-ENTMCNC: 30.9 G/DL (ref 30–36.5)
MCV RBC AUTO: 99.7 FL (ref 80–99)
METAMYELOCYTES NFR BLD MANUAL: 2 %
MONOCYTES # BLD: 2 K/UL (ref 0–1)
MONOCYTES NFR BLD: 9 % (ref 5–13)
NEUTS SEG # BLD: 16.7 K/UL (ref 1.8–8)
NEUTS SEG NFR BLD: 75 % (ref 32–75)
NRBC # BLD: 0.03 K/UL (ref 0–0.01)
NRBC BLD-RTO: 0.1 PER 100 WBC
PLATELET # BLD AUTO: 203 K/UL (ref 150–400)
PMV BLD AUTO: 9.9 FL (ref 8.9–12.9)
POTASSIUM SERPL-SCNC: 3.6 MMOL/L (ref 3.5–5.1)
PROCALCITONIN SERPL-MCNC: 1.72 NG/ML
PROT SERPL-MCNC: 6.5 G/DL (ref 6.4–8.2)
RBC # BLD AUTO: 2.99 M/UL (ref 4.1–5.7)
RBC MORPH BLD: ABNORMAL
SODIUM SERPL-SCNC: 135 MMOL/L (ref 136–145)
WBC # BLD AUTO: 22.3 K/UL (ref 4.1–11.1)

## 2021-11-12 PROCEDURE — 74011250636 HC RX REV CODE- 250/636: Performed by: NURSE PRACTITIONER

## 2021-11-12 PROCEDURE — 93005 ELECTROCARDIOGRAM TRACING: CPT

## 2021-11-12 PROCEDURE — 80053 COMPREHEN METABOLIC PANEL: CPT

## 2021-11-12 PROCEDURE — 87205 SMEAR GRAM STAIN: CPT

## 2021-11-12 PROCEDURE — 74011250636 HC RX REV CODE- 250/636: Performed by: HOSPITALIST

## 2021-11-12 PROCEDURE — 74011250637 HC RX REV CODE- 250/637: Performed by: NURSE PRACTITIONER

## 2021-11-12 PROCEDURE — 83605 ASSAY OF LACTIC ACID: CPT

## 2021-11-12 PROCEDURE — 74011250637 HC RX REV CODE- 250/637: Performed by: INTERNAL MEDICINE

## 2021-11-12 PROCEDURE — 87186 SC STD MICRODIL/AGAR DIL: CPT

## 2021-11-12 PROCEDURE — 74011250637 HC RX REV CODE- 250/637: Performed by: STUDENT IN AN ORGANIZED HEALTH CARE EDUCATION/TRAINING PROGRAM

## 2021-11-12 PROCEDURE — 84145 PROCALCITONIN (PCT): CPT

## 2021-11-12 PROCEDURE — 85025 COMPLETE CBC W/AUTO DIFF WBC: CPT

## 2021-11-12 PROCEDURE — 74011250636 HC RX REV CODE- 250/636: Performed by: INTERNAL MEDICINE

## 2021-11-12 PROCEDURE — 87040 BLOOD CULTURE FOR BACTERIA: CPT

## 2021-11-12 PROCEDURE — 74011000250 HC RX REV CODE- 250: Performed by: INTERNAL MEDICINE

## 2021-11-12 PROCEDURE — 65660000000 HC RM CCU STEPDOWN

## 2021-11-12 PROCEDURE — 90935 HEMODIALYSIS ONE EVALUATION: CPT

## 2021-11-12 PROCEDURE — 87077 CULTURE AEROBIC IDENTIFY: CPT

## 2021-11-12 PROCEDURE — 36415 COLL VENOUS BLD VENIPUNCTURE: CPT

## 2021-11-12 PROCEDURE — 74011250637 HC RX REV CODE- 250/637: Performed by: HOSPITALIST

## 2021-11-12 PROCEDURE — 71045 X-RAY EXAM CHEST 1 VIEW: CPT

## 2021-11-12 RX ORDER — LORAZEPAM 0.5 MG/1
0.5 TABLET ORAL
Status: DISCONTINUED | OUTPATIENT
Start: 2021-11-12 | End: 2021-11-18 | Stop reason: HOSPADM

## 2021-11-12 RX ORDER — FLUCONAZOLE 2 MG/ML
200 INJECTION, SOLUTION INTRAVENOUS EVERY 24 HOURS
Status: DISCONTINUED | OUTPATIENT
Start: 2021-11-12 | End: 2021-11-18 | Stop reason: HOSPADM

## 2021-11-12 RX ADMIN — ALTEPLASE 1.8 MG: 2.2 INJECTION, POWDER, LYOPHILIZED, FOR SOLUTION INTRAVENOUS at 09:12

## 2021-11-12 RX ADMIN — PANTOPRAZOLE SODIUM 40 MG: 40 TABLET, DELAYED RELEASE ORAL at 15:55

## 2021-11-12 RX ADMIN — PANTOPRAZOLE SODIUM 40 MG: 40 TABLET, DELAYED RELEASE ORAL at 14:38

## 2021-11-12 RX ADMIN — HYDROMORPHONE HYDROCHLORIDE 1 MG: 2 TABLET ORAL at 15:54

## 2021-11-12 RX ADMIN — Medication: at 10:54

## 2021-11-12 RX ADMIN — HEPARIN SODIUM 1800 UNITS: 1000 INJECTION INTRAVENOUS; SUBCUTANEOUS at 11:26

## 2021-11-12 RX ADMIN — Medication 9 MG: at 23:24

## 2021-11-12 RX ADMIN — COLLAGENASE SANTYL: 250 OINTMENT TOPICAL at 10:54

## 2021-11-12 RX ADMIN — CALCIUM CARBONATE (ANTACID) CHEW TAB 500 MG 200 MG: 500 CHEW TAB at 18:14

## 2021-11-12 RX ADMIN — OXYCODONE 5 MG: 5 TABLET ORAL at 23:45

## 2021-11-12 RX ADMIN — CHLORHEXIDINE GLUCONATE 15 ML: 0.12 RINSE ORAL at 21:00

## 2021-11-12 RX ADMIN — MIDODRINE HYDROCHLORIDE 10 MG: 5 TABLET ORAL at 23:24

## 2021-11-12 RX ADMIN — LORAZEPAM 0.5 MG: 0.5 TABLET ORAL at 11:56

## 2021-11-12 RX ADMIN — FLUCONAZOLE IN SODIUM CHLORIDE 200 MG: 2 INJECTION, SOLUTION INTRAVENOUS at 14:38

## 2021-11-12 RX ADMIN — Medication 10 ML: at 14:07

## 2021-11-12 RX ADMIN — Medication: at 18:16

## 2021-11-12 RX ADMIN — Medication 10 ML: at 23:26

## 2021-11-12 RX ADMIN — MIDODRINE HYDROCHLORIDE 10 MG: 5 TABLET ORAL at 14:07

## 2021-11-12 RX ADMIN — Medication 5 ML: at 06:00

## 2021-11-12 RX ADMIN — CALCIUM CARBONATE (ANTACID) CHEW TAB 500 MG 200 MG: 500 CHEW TAB at 11:47

## 2021-11-12 RX ADMIN — PANTOPRAZOLE SODIUM 40 MG: 40 TABLET, DELAYED RELEASE ORAL at 07:30

## 2021-11-12 RX ADMIN — Medication 1 CAPSULE: at 09:00

## 2021-11-12 RX ADMIN — OXYCODONE 5 MG: 5 TABLET ORAL at 11:56

## 2021-11-12 RX ADMIN — HYDROMORPHONE HYDROCHLORIDE 1 MG: 2 TABLET ORAL at 20:34

## 2021-11-12 RX ADMIN — MIDODRINE HYDROCHLORIDE 10 MG: 5 TABLET ORAL at 06:05

## 2021-11-12 RX ADMIN — Medication 10 ML: at 23:25

## 2021-11-12 RX ADMIN — LORAZEPAM 1 MG: 2 INJECTION INTRAMUSCULAR; INTRAVENOUS at 09:19

## 2021-11-12 RX ADMIN — HEPARIN SODIUM 1800 UNITS: 1000 INJECTION INTRAVENOUS; SUBCUTANEOUS at 11:27

## 2021-11-12 NOTE — PROGRESS NOTES
NUTRITION  Reason for Rescreen: LOS        RECOMMENDATIONS:   1. Continue present diet, encourage PO intakes. Interventions   - continue current diet  -continue daily pudding ONS       Information obtained from:   RN  Past Medical History:   Diagnosis Date   Shweta Barraza Dialysis patient [facility])     started around the end of september 2021    Polycystic kidney disease     S/P percutaneous endoscopic gastrostomy (PEG) tube placement ([facility] [address]      Pt anticipated to d/c today; however, WBC elevated and d/c will be held. No reports of nutrition concerns when discussed with RN, essentially just monitoring for ongoing LOS and will continue to follow per policy. Diet:  regular  Supplements: Boost Pudding 1x/day  Meal Intake:   No data found.       Weight Changes:   Stable  Wt Readings from Last 10 Encounters:   11/11/21 127 kg (279 lb 15.8 oz)       Nutrition-Related Concerns Identified:  None      PLAN:   - Continue current diet  - Rescreen per policy    Will revisit per policy    Edinson Noel RD    Contact via [employer]

## 2021-11-12 NOTE — PROGRESS NOTES
[facility]ist [facility]                                                                                          Hospitalist Progress Note  Alaina Dominguez MD  Answering service: [phone] OR [phone] from in house phone        Date of Service:  2021  NAME:  Lizett Pennington  :  1970  MRN:  [id number]      Admission Summary:   Pt is a 52 yo M with PMH ESRD on HD, takes coumadin secondary to hx of DVT RUE with a recent prolonged hospitalization secondary to COVID PNA in August of this year. He ultimately required trach and peg was sent to [facility] he has not been there too long when he was sent to our ED for hematemesis; he was admitted to [facility] on 10/15 and transferred to [facility] on 10/18. Per records reviewed, patient was intubated on , trached on , he was treated for VRE pneumonia. PEG dislodged on 10/11, treated with Unasyn until 10/17. Patient was on warfarin for right upper extremity DVT involving the subclavian, axillary and brachial veins. He started having coffee ground emesis 10/18/21 with elevated HR and hypotension. He was sent to the ER for further workup. He was found to be hypotensive and ICU was consulted for further management. \"          Interval history / Subjective:      F/u Hemorrhagic shock  Mr. Korey Hartley is receiving dialysis at present. He is on room air and sats are in the upper 90s. He is tachycardic in the 110s? A flutter, will obtain EKG. He is under the impression he is leaving this afternoon. Dialysis RN said that had issue with the permacath has to run at reduced flow about 300 instead of the prescribed 400, told me she has been for mid the nephrologist.  Besides that, I expressed to patient my concern that his white cell count is rising daily in order to search for the reason for that.   He has abdominal wall abscess, he has a permacath and he has been hospitalized for extended period of time therefore we need to really rule out the possibility of infection so I do not feel he is ready for discharge today. The dialysis catheter issue is a new problem which needs to be addressed. I have requested CBC, procalcitonin level. Assessment & Plan:     Rising WBC, it is concerning the white cell count continue to rise persistently. He is tachycardic in the low 110s although afebrile. WBC is even worse today, 22.3  --CT abdomen on 11/9 without significant fluid collection  --Abdominal wall abscess culture on 10/23 grew coag negative, felt to be contaminant. It also grew Candida albicans for which she completed 15 days of Diflucan therapy. --Check CBC, procalcitonin level, lactic acid, blood culture. Repeat wound culture. Continue to monitor and follow lab results. --I will ask ID for their opinion. Hemorrhagic shock due to hematemesis due to severe esophagitis. Acute blood loss anemia.   --EGD on 10/19 showed LA grade D reflux esophagitis up to the mid esophagus, few sessile polyps in the stomach, mild patchy erythema in the distal bulb of the duodenum. - s/p EGD (11/01)  - S/P 2 units of pRBCs, 1 unit of FFB and K centra. - Continue PPI   - Monitor labs and transfuse for hgb <7.0. Hemoglobin has remained> 8.  - GI signed off. Left abdominal abscess. History of dislodged PEG tube. --CT A/P on 10/20 showed left anterior abdominal and pelvic wall hematoma or gas containing fluid collection. --He underwent ultrasound guided fluid drainage and percutaneous drain placement which revealed thick purulent fluid, 18 Sami drain was left in place. -- Wound culture on 10/23 grew apparent Candida albicans, no sensitivity available. Culture also showed coag negative staph which was deemed to be contaminant  -Completed 15 days of IV Diflucan. WBC normalized. -Follow up CT on 10/26 showed slightly diminished size of the abdominal wall collection.   - Repeat CT on 11/2 significant decrease in size of the left rectus sheath hematoma.  The small collection suggestive of congealed hematoma and is unlikely to drain through cath. - surgery following, abdominal wall drain is attached to bag with purulent discharge  --CT abdomen and pelvis, sinogram performed 11/9: Stable enlargement of the left abdominal wall musculature, evaluation for abscess is limited without IV contrast, a small residual abscess is difficult to exclude however this is unchanged. Contrast injection through the catheter demonstrated pooling of contrast material dependently within the muscle with out evidence of fistula. --General surgery following, discussed with , no need for repeat CT abd/pel at this time. WIll need follow up with surgery which has been scheduled with Dr. Greg Lobato on 12/1/21. They will evaluate patient's drain and decide on removal at that time. Acute on chronic hypoxic respiratory failure with tracheostomy Resolved,  --Patient was intubated in outside hospital on 9/7 and was trached on 9/16. He was successfully decannulated here on 11/3     Renal:  ESRD on HD MWF-Nephrology following        Right upper extremity acute DVT, POA. Patient was on warfarin but came with hemorrhagic shock and supratherapeutic INR requiring reversal  --Anticoagulation on hold. --repeat Venous duplex negative for DVT in RUE. Will not resume warfarin at this time. Right upper and left lower extremities appeared weaker than the rest on 11/4  --Noncontrast head CT is negative. --patient states that RUE has been since previous admission. Discussed possibility of MRI with patient but he does not want to go through additional testing if it will not  and/or delay his discharge to rehab.      Morbid obesity   - BMI 41.69  - Weight loss management to be followed up outpatient    Code status: Full code  DVT prophylaxis: SCDs    Care Plan discussed with: Patient/Family  Anticipated Disposition: Accepted to [facility] and rehab SNF.     Anticipated Discharge: We need to sort out the cause for the worsening leukocytosis prior to discharge. Hospital Problems  Date Reviewed: 11/6/2021          Codes Class Noted POA    Abdominal wall fluid collections ICD-10-CM: R18.8  ICD-9-CM: 789.59  10/25/2021 Unknown        Severe protein-calorie malnutrition ([facility] [address] ICD-10-CM: E43  ICD-9-CM: 262  10/21/2021 Unknown        * (Principal) GIB (gastrointestinal bleeding) ICD-10-CM: K92.2  ICD-9-CM: 578.9  10/18/2021 Yes                Review of Systems:   A comprehensive review of systems was negative except for that written in the HPI. Vital Signs:    Last 24hrs VS reviewed since prior progress note. Most recent are:  Visit Vitals  BP 92/71   Pulse (!) 111   Temp 98.2 °F (36.8 °C) (Axillary)   Resp 19   Ht 5' 11.5\" (1.816 m)   Wt 127 kg (279 lb 15.8 oz)   SpO2 98%   BMI 38.51 kg/m²       No intake or output data in the 24 hours ending 11/12/21 1105     Physical Examination:     I had a face to face encounter with this patient and independently examined them on 11/12/2021 as outlined below:          Constitutional:  No acute distress, cooperative, pleasant    ENT:  Oral mucosa moist, oropharynx benign. Resp:  CTA bilaterally. CV:  Regular rhythm, normal rat    GI:  Soft, non distended, non tender. normoactive bowel sounds,  abdominal wall drainage tube has minimal purulent discharge in the bag    Musculoskeletal:  No edema, warm, 2+ pulses throughout    Neurologic:  Right upper extremity and left lower extremity weak.   AAOx3, CN II-XII reviewed            Data Review:    Review and/or order of clinical lab test      Labs:     Recent Labs     11/12/21  1020 11/11/21  0650   WBC 22.3* 17.3*   HGB 9.2* 8.7*   HCT 29.8* 28.5*    209     Recent Labs     11/11/21  0650 11/10/21  0945   * 133*   K 4.0 3.8    101   CO2 25 25   BUN 27* 39*   CREA 3.07* 3.91*   * 110*   CA 10.1 9.8     Recent Labs     11/11/21  0650 11/10/21  0945   ALT 14 13   AP 157* 147*   TBILI 0.6 0.5   TP 6.5 6.1*   ALB 2.5* 2.4*   GLOB 4.0 3.7     No results for input(s): INR, PTP, APTT, INREXT, INREXT in the last 72 hours. No results for input(s): FE, TIBC, PSAT, FERR in the last 72 hours. No results found for: FOL, RBCF   No results for input(s): PH, PCO2, PO2 in the last 72 hours. No results for input(s): CPK, CKNDX, TROIQ in the last 72 hours.     No lab exists for component: CPKMB  No results found for: CHOL, CHOLX, CHLST, CHOLV, HDL, HDLP, LDL, LDLC, DLDLP, TGLX, TRIGL, TRIGP, CHHD, CHHDX  No results found for: GLUCPOC  No results found for: COLOR, APPRN, SPGRU, REFSG, ORIANA, PROTU, GLUCU, KETU, BILU, UROU, SPENCER, LEUKU, GLUKE, EPSU, BACTU, WBCU, RBCU, CASTS, UCRY      Medications Reviewed:     Current Facility-Administered Medications   Medication Dose Route Frequency    LORazepam (ATIVAN) tablet 0.5 mg  0.5 mg Oral BID PRN    pantoprazole (PROTONIX) tablet 40 mg  40 mg Oral ACB&D    L.acidophilus-paracasei-S.thermophil-bifidobacter (RISAQUAD) 8 billion cell capsule  1 Capsule Oral DAILY    calcium carbonate (TUMS) chewable tablet 200 mg [elemental]  200 mg Oral TID WITH MEALS    albuterol-ipratropium (DUO-NEB) 2.5 MG-0.5 MG/3 ML  3 mL Nebulization Q6H PRN    LORazepam (ATIVAN) injection 1 mg  1 mg IntraVENous Q12H PRN    oxyCODONE IR (ROXICODONE) tablet 5 mg  5 mg Oral Q4H PRN    HYDROmorphone (DILAUDID) tablet 1 mg  1 mg Oral Q4H PRN    collagenase (SANTYL) 250 unit/gram ointment   Topical DAILY    heparin (porcine) 1,000 unit/mL injection 4,000 Units  4,000 Units Hemodialysis DIALYSIS PRN    alteplase (CATHFLO) 1.8 mg in sterile water (preservative free) 1.8 mL injection  1.8 mg InterCATHeter DIALYSIS ONCE    alteplase (CATHFLO) 1.8 mg in sterile water (preservative free) 1.8 mL injection  1.8 mg InterCATHeter DIALYSIS ONCE    albumin human 25% (BUMINATE) solution 25 g  25 g IntraVENous DIALYSIS PRN    epoetin kayley-epbx (RETACRIT) injection 10,000 Units  10,000 Units SubCUTAneous Q TUE, THU & SAT    B complex-vitaminC-folic acid (NEPHROCAP) cap  1 Capsule Oral DAILY    midodrine (PROAMATINE) tablet 10 mg  10 mg Oral Q8H    heparin (porcine) 1,000 unit/mL injection 1,800 Units  1,800 Units InterCATHeter DIALYSIS PRN    And    heparin (porcine) 1,000 unit/mL injection 1,800 Units  1,800 Units InterCATHeter DIALYSIS PRN    fentaNYL (DURAGESIC) 25 mcg/hr patch 1 Patch  1 Patch TransDERmal Q72H    melatonin tablet 9 mg  9 mg Oral QHS    sodium chloride (NS) flush 5-40 mL  5-40 mL IntraVENous Q8H    sodium chloride (NS) flush 5-40 mL  5-40 mL IntraVENous PRN    chlorhexidine (ORAL CARE KIT) 0.12 % mouthwash 15 mL  15 mL Oral Q12H    balsam peru-castor oiL (VENELEX) ointment   Topical BID    sodium chloride (NS) flush 5-10 mL  5-10 mL IntraVENous PRN    sodium chloride (NS) flush 5-40 mL  5-40 mL IntraVENous Q8H    sodium chloride (NS) flush 5-40 mL  5-40 mL IntraVENous PRN    acetaminophen (TYLENOL) tablet 650 mg  650 mg Oral Q6H PRN    Or    acetaminophen (TYLENOL) suppository 650 mg  650 mg Rectal Q6H PRN    polyethylene glycol (MIRALAX) packet 17 g  17 g Oral DAILY PRN    ondansetron (ZOFRAN ODT) tablet 4 mg  4 mg Oral Q8H PRN    Or    ondansetron (ZOFRAN) injection 4 mg  4 mg IntraVENous Q6H PRN    albuterol (PROVENTIL VENTOLIN) nebulizer solution 2.5 mg  2.5 mg Nebulization Q4H PRN     ______________________________________________________________________  EXPECTED LENGTH OF STAY: 4d 9h  ACTUAL LENGTH OF STAY:          25                 Aleksandr Lackey MD

## 2021-11-12 NOTE — CONSULTS
Infectious Disease Consult    Today's Date: 11/12/2021   Admit Date: 10/18/2021    Impression:   Leukocytosis (gradual trending up since11/9)  Left abdominal abscess  hx dislodged PEG tube  - WBC 22.3, afebrile    abd Wound cx (10/23) staph coagulase negative, candida albicans    CT of abd/pel (11/11) Stable enlargement of the left abdominal wall musculature status post percutaneous drainage. Evaluation for abscess is limited without IV contrast. A small residual abscess is difficult to exclude however this is unchanged. Contrast injection through the catheter demonstrates pooling of contrast material dependently within the muscle without evidence of fistula. S/p replacement of right sided tunneled dialysis catheter (11/5)  Plan:   · IV fluconazole was resumed on 11/12. Recommend to complete two additional weeks, repeat image in 10 days into the therapy with IV contrast. May complete the ABX therapy with PO fluconazole upon discharge. · Pt should follow up with surgery team upon discharge  · Will continue to follow outstanding blood & wound cx   · Fever work up if temp >= 100.4    Above plan of care discussed and agreed with Dr. Jessie Kasper:   · IV ancef 11/5  · IV levofloxacin, zosyn, vancomycin 10/18  · IV fluconazole 10/21-11/3    Subjective:   Date of Consultation:  November 12, 2021  Referring Physician: Dr. Maty Duarte    Patient is a 49 y.o. male with medical hx of ESRD on HD, takes coumadin secondary to hx of DVT RUE with a recent prolonged hospitalization secondary to COVID PNA in August of this year. He ultimately required trach (9/16) and peg was sent to [facility] he has not been there too long when he was sent to [facility] for hematemesis. Patient was on warfarin for right upper extremity DVT involving the subclavian, axillary and brachial veins. He started having coffee ground emesis 10/18/21 with elevated HR and hypotension. Pt had hx VRE pneumonia which was treated with Unasyn until 10/17. Pt was evaluated by GI specialist, found to have hematemesis secondary to severe esophagitis, G-tube was dislodged and not in the stomach. G-tube was removed. CT A/P on 10/20 showed left anterior abdominal and pelvic wall hematoma or gas containing fluid collection. He underwent ultrasound guided fluid drainage and percutaneous drain placement on 10/23. Fluid cx grew candida albicans which was treated with IV fluconazole between 10/21 to 11/3. CT A/P on 11/9 revealed Stable enlargement of the left abdominal wall musculature status post percutaneous drainage. Evaluation for abscess is limited without IV contrast. A small residual abscess is difficult to exclude however this is unchanged. Contrast injection through the catheter  demonstrates pooling of contrast material dependently within the muscle without evidence of fistula. Tracheostomy tube was de cannulated on 11/3. During visit, pt c/o diffuse abd discomfort. Denies fever, chills, nausea, vomiting, diarrhea, chest pain, or lower extremity pain. Pt has no medication allergies. ID team was consulted for leukocytosis. Patient Active Problem List   Diagnosis Code    GIB (gastrointestinal bleeding) K92.2    Severe protein-calorie malnutrition ([address] E43    Abdominal wall fluid collections R18.8     Past Medical History:   Diagnosis Date    COVID     [facility]ialysis patient [facility])     started around the end of september 2021    Polycystic kidney disease     S/P percutaneous endoscopic gastrostomy (PEG) tube placement ([facility] [address]       No family history on file. Social History     Tobacco Use    Smoking status: Not on file    Smokeless tobacco: Not on file   Substance Use Topics    Alcohol use: Not on file     Past Surgical History:   Procedure Laterality Date    IR REPLACE CVC TUNNELED W/O PORT  11/5/2021      Prior to Admission medications    Medication Sig Start Date End Date Taking?  Authorizing Provider   oxyCODONE (OXYIR) 5 mg capsule Take 5 mg by mouth every four (4) hours as needed for Pain. Yes Provider, Historical   HYDROmorphone (DILAUDID) 1 mg/mL injection 0.5 mg by IntraVENous route every six (6) hours as needed. Yes Provider, Historical   LORazepam (ATIVAN) 2 mg/mL injection 0.5 mg by IntraVENous route every twelve (12) hours. Yes Provider, Historical   albuterol-ipratropium (DUO-NEB) 2.5 mg-0.5 mg/3 ml nebu 3 mL by Nebulization route two (2) times a day. Yes Provider, Historical   insulin regular (NOVOLIN R, HUMULIN R) 100 unit/mL injection 1 Units by SubCUTAneous route Before breakfast, lunch, dinner and at bedtime. Yes Provider, Historical   fentaNYL (Duragesic) 25 mcg/hr PATCH 1 Patch by TransDERmal route every seventy-two (72) hours. Yes Provider, Historical   nystatin (MYCOSTATIN) powder Apply  to affected area two (2) times a day. Yes Provider, Historical   piperacillin-tazobactam 2.25 g IVPB 2.25 g by IntraVENous route every six (6) hours. Yes Provider, Historical   acetaminophen (TYLENOL) 160 mg/5 mL (5 mL) solution Take 650 mg by mouth every six (6) hours as needed for Fever. Yes Provider, Historical   albuterol (PROVENTIL VENTOLIN) 2.5 mg /3 mL (0.083 %) nebu 2.5 mg by Nebulization route every eight (8) hours as needed for Wheezing. Yes Provider, Historical   LORazepam (Ativan) 2 mg/mL injection 1 mg by IntraVENous route every twelve (12) hours as needed. Yes Provider, Historical   melatonin 3 mg tablet Take 9 mg by mouth nightly. Yes Provider, Historical   pantoprazole 4 mg/mL in 0.9% sodium chloride injection 40 mg by IntraVENous route every twelve (12) hours. Yes Provider, Historical   cholecalciferol (VITAMIN D3) (1000 Units /25 mcg) tablet Take 1,000 Units by mouth daily. Yes Provider, Historical   sevelamer carbonate (RENVELA) 800 mg tab tab Take 800 mg by mouth three (3) times daily (with meals).    Yes Provider, Historical   chlorhexidine (PERIDEX) 0.12 % solution 15 mL by Swish and Spit route every twelve (12) hours as needed. Yes Provider, Historical   senna leaf extract (Senna) 176 mg/5 mL syrp syrup Take 528 mg by mouth two (2) times daily as needed. Yes Provider, Historical   OLANZapine (ZyPREXA) 10 mg tablet Take 10 mg by mouth two (2) times a day. Yes Provider, Historical   metoprolol tartrate (LOPRESSOR) 25 mg tablet Take 12.5 mg by mouth two (2) times a day. Yes Provider, Historical   pramoxine-calamine (Calamine Medicated) 1-8 % lotion Apply  to affected area three (3) times daily. Yes Provider, Historical   OTHER 3 mL DIALYSIS PRN. Sodium citrate 3 mL (for HD cath) 2 each PRN HD   Yes Provider, Historical   naloxone (NARCAN) 1 mg/mL injection 0.4 mg by SubCUTAneous route once as needed. Yes Provider, Historical     a  No Known Allergies     REVIEW OF SYSTEMS:     Total of 12 systems reviewed as follows:   I am not able to complete the review of systems because:    The patient is intubated and sedated    The patient has altered mental status due to his acute medical problems    The patient has baseline aphasia from prior stroke(s)    The patient has baseline dementia and is not reliable historian                 POSITIVE= underlined text  Negative = text not underlined  General:  fever, chills, sweats, generalized weakness, weight loss/gain,      loss of appetite   Eyes:    blurred vision, eye pain, loss of vision, double vision  ENT:    rhinorrhea, pharyngitis   Respiratory:   cough, sputum production, SOB, wheezing, SCHAFFER, pleuritic pain   Cardiology:   chest pain, palpitations, orthopnea, PND, edema, syncope   Gastrointestinal:  abdominal pain , N/V, dysphagia, diarrhea, constipation, bleeding   Genitourinary:  frequency, urgency, dysuria, hematuria, incontinence   Muskuloskeletal :  arthralgia, myalgia   Hematology:  easy bruising, nose or gum bleeding, lymphadenopathy   Dermatological: rash, ulceration, pruritis   Endocrine:   hot flashes or polydipsia   Neurological:  headache, dizziness, confusion, focal weakness, paresthesia,     Speech difficulties, memory loss, gait disturbance  Psychological: Feelings of anxiety, depression, agitation    Objective:     Visit Vitals  /77   Pulse (!) 124   Temp 98 °F (36.7 °C) (Axillary)   Resp 20   Ht 5' 11.5\" (1.816 m)   Wt 127 kg (279 lb 15.8 oz)   SpO2 99%   BMI 38.51 kg/m²     Temp (24hrs), Av.2 °F (36.8 °C), Min:97.7 °F (36.5 °C), Max:99.2 °F (37.3 °C)       Lines: peripheral line    PHYSICAL EXAM:   General:    Obese, Alert, cooperative, no distress, appears stated age. HEENT: Atraumatic, anicteric sclerae, pink conjunctivae     No oral ulcers, mucosa moist, throat clear  Neck:  Supple, symmetrical,  thyroid: non tender  Lungs:   Clear in apex with decreased breath sounds at bases. No Wheezing or Rhonchi. No rales. Chest wall:  No tenderness  No Accessory muscle use. Heart:   Regular  rhythm,  No  murmur   Diffuse upper and lower extremity edema  Abdomen:   Soft, diffuse tenderness, drainage catheter in place  Bowel sounds normal  Extremities: No cyanosis. No clubbing  Skin:     Not pale. Not Jaundiced  No rashes   Psych:  Fair insight. Not depressed. Not anxious or agitated. Neurologic: EOMs intact. No facial asymmetry. No aphasia or slurred speech. Alert and oriented X 4. Data Review:     CBC:  Recent Labs     21  1020 21  0650 11/10/21  0945 11/10/21  0945   WBC 22.3* 17.3*  --  15.1*   GRANS 75 78*   < > 72   MONOS 9 8   < > 8   EOS 1 0   < > 2   ANEU 16.7* 13.5*   < > 11.1*   ABL 2.7 2.2   < > 2.2   HGB 9.2* 8.7*  --  8.7*   HCT 29.8* 28.5*  --  27.7*    209  --  215    < > = values in this interval not displayed.        BMP:  Recent Labs     21  1020 21  0650 11/10/21  0945   CREA 2.40* 3.07* 3.91*   BUN 21* 27* 39*   * 133* 133*   K 3.6 4.0 3.8    101 101   CO2 27 25 25   AGAP 7 7 7   * 107* 110*       LFTS:  Recent Labs     21  1020 21  0650 11/10/21  0945 TBILI 0.6 0.6 0.5   ALT 13 14 13   * 157* 147*   TP 6.5 6.5 6.1*   ALB 2.6* 2.5* 2.4*       Microbiology:     All Micro Results     Procedure Component Value Units Date/Time    CULTURE, Sourav Clarke [[id number]] Collected: 11/12/21 1123    Order Status: Completed Specimen: Wound from Abdomen Updated: 11/12/21 1141    COVID-19 RAPID TEST [[id number]]     Order Status: Sent     CULTURE, BLOOD [[id number]]     Order Status: Canceled Specimen: Blood     COVID-19 RAPID TEST [[id number]] Collected: 11/11/21 1917    Order Status: Completed Specimen: Nasopharyngeal Updated: 11/11/21 1946     Specimen source Nasopharyngeal        COVID-19 rapid test Not detected        Comment: Rapid Abbott ID Now       Rapid NAAT:  The specimen is NEGATIVE for SARS-CoV-2, the novel coronavirus associated with COVID-19. Negative results should be treated as presumptive and, if inconsistent with clinical signs and symptoms or necessary for patient management, should be tested with an alternative molecular assay. Negative results do not preclude SARS-CoV-2 infection and should not be used as the sole basis for patient management decisions. This test has been authorized by the FDA under an Emergency Use Authorization (EUA) for use by authorized laboratories.    Fact sheet for Healthcare Providers: ConventionUpdate.co.nz  Fact sheet for Patients: ConventionUpdate.co.nz       Methodology: Isothermal Nucleic Acid Amplification         CULTURE, BLOOD, PAIRED [[id number]] Collected: 10/23/21 1238    Order Status: Completed Specimen: Blood Updated: 10/28/21 0430     Special Requests: NO SPECIAL REQUESTS        Culture result: NO GROWTH 5 DAYS       CULTURE, WOUND Lorrayne Reveal STAIN [[id number]]  (Abnormal) Collected: 10/23/21 1523    Order Status: Completed Specimen: Wound from Abdomen Updated: 10/26/21 1109     Special Requests: NO SPECIAL REQUESTS        GRAM STAIN OCCASIONAL WBCS SEEN 1+ APPARENT GRAM POSITIVE COCCI IN PAIRS           Culture result:       MODERATE STAPHYLOCOCCUS SPECIES, COAGULASE NEGATIVE                  MODERATE YEAST, (APPARENT CANDIDA ALBICANS)          CULTURE, RESPIRATORY/SPUTUM/BRONCH Singh Block STAIN [[id number]]  (Abnormal) Collected: 10/23/21 1238    Order Status: Completed Specimen: Sputum Updated: 10/25/21 0930     Special Requests: NO SPECIAL REQUESTS        GRAM STAIN OCCASIONAL WBCS SEEN               RARE EPITHELIAL CELLS SEEN            RARE BUDDING YEAST        Culture result:       LIGHT NORMAL RESPIRATORY JOSEMANUEL                  LIGHT YEAST, (APPARENT CANDIDA ALBICANS)          CULTURE, BLOOD [[id number]] Collected: 10/18/21 1220    Order Status: Completed Specimen: Blood Updated: 10/23/21 1716     Special Requests: NO SPECIAL REQUESTS        Culture result: NO GROWTH 5 DAYS       CULTURE, URINE [[id number]] Collected: 10/23/21 1045    Order Status: Canceled Specimen: Urine from Clean catch     CULTURE, BODY FLUID Singh Block STAIN [[id number]]  (Abnormal) Collected: 10/19/21 1701    Order Status: Completed Specimen:  Body Fluid from Drainage Updated: 10/22/21 1525     Special Requests: NO SPECIAL REQUESTS        GRAM STAIN OCCASIONAL WBCS SEEN         NO ORGANISMS SEEN        Culture result: LIGHT KIMMY ALBICANS               MODERATE MIXED SKIN JOSEMANUEL ISOLATED          CULTURE, RESPIRATORY/SPUTUM/BRONCH Singh Block STAIN [[id number]]  (Abnormal) Collected: 10/19/21 1043    Order Status: Completed Specimen: Sputum from Tracheal Aspirate Updated: 10/21/21 1244     Special Requests: NO SPECIAL REQUESTS        GRAM STAIN OCCASIONAL WBCS SEEN         NO ORGANISMS SEEN        Culture result:       FEW YEAST, (APPARENT CANDIDA ALBICANS)                  FEW NORMAL RESPIRATORY JOSEMANUEL          CULTURE, MRSA [[id number]] Collected: 10/19/21 1043    Order Status: Completed Specimen: Nasal from Nares Updated: 10/20/21 1519     Special Requests: NO SPECIAL REQUESTS        Culture result: MRSA NOT PRESENT               Screening of patient nares for MRSA is for surveillance purposes and, if positive, to facilitate isolation considerations in high risk settings. It is not intended for automatic decolonization interventions per se as regimens are not sufficiently effective to warrant routine use. COVID-19 RAPID TEST [[id number]] Collected: 10/18/21 1709    Order Status: Completed Specimen: Nasopharyngeal Updated: 10/18/21 1739     Specimen source Nasopharyngeal        COVID-19 rapid test Not detected        Comment: Rapid Abbott ID Now       Rapid NAAT:  The specimen is NEGATIVE for SARS-CoV-2, the novel coronavirus associated with COVID-19. Negative results should be treated as presumptive and, if inconsistent with clinical signs and symptoms or necessary for patient management, should be tested with an alternative molecular assay. Negative results do not preclude SARS-CoV-2 infection and should not be used as the sole basis for patient management decisions. This test has been authorized by the FDA under an Emergency Use Authorization (EUA) for use by authorized laboratories.    Fact sheet for Healthcare Providers: ConventionUpdate.co.nz  Fact sheet for Patients: ConventionUpdate.co.nz       Methodology: Isothermal Nucleic Acid Amplification         CULTURE, BLOOD [[id number]] Collected: 10/18/21 1215    Order Status: Canceled Specimen: Blood             Signed By: Alma Rosa Lowery NP     November 12, 2021

## 2021-11-12 NOTE — PROGRESS NOTES
Physical Therapy: Defer    Chart reviewed. Attempted to see for PT treatment however patient on bedside HD, will follow up as able & appropriate.      Estela Hardy, PT, DPT

## 2021-11-12 NOTE — PROGRESS NOTES
Bedside shift change report given to Serena Chatterjee (oncoming nurse) by María Gardner (offgoing nurse).  Report included the following information SBAR, Kardex, ED Summary, Procedure Summary, MAR, Recent Results and Cardiac Rhythm ST.

## 2021-11-12 NOTE — PROGRESS NOTES
Occupational Therapy  11/12/21    Chart reviewed. Attempted to see for OT treatment however patient on bedside HD, will follow up as able & appropriate.       Thank you,   Yoly Tejada, OTD, OTR/L

## 2021-11-12 NOTE — PROGRESS NOTES
Name: Mary Ivy MRN: [id number]   : 1970 Hospital: . [address]   Date: 2021        IMPRESSION:   · SERGEY, HD dependent. Patient is on MWF schedule. Having HD now. · Hypervolemia with predominantly central congestion, improved. · Anemia of multifactorial origin  · Respiratory failure due to a complicated Covid pneumonia, recovered. Trache removed  · Hypokalemia pre HD      PLAN:   · Continue present care  · Complete a full HD Tx today  · Next HD on Monday. Patient's discharge has been delayed. · Watch for renal function recovery. Patient needs urine output to be recorded. Will check pre HD renal panel. · Anemia management- start Retacrit. Check the iron profile. Subjective/Interval History:   I have reviewed the flowsheet and previous days notes. ROS:Review of systems not obtained due to patient factors. Objective:   Vital Signs:    Visit Vitals  /77   Pulse (!) 124   Temp 98 °F (36.7 °C) (Axillary)   Resp 20   Ht 5' 11.5\" (1.816 m)   Wt 127 kg (279 lb 15.8 oz)   SpO2 99%   BMI 38.51 kg/m²       O2 Device: None (Room air)   O2 Flow Rate (L/min): 0 l/min   Temp (24hrs), Av.2 °F (36.8 °C), Min:97.7 °F (36.5 °C), Max:99.2 °F (37.3 °C)       Intake/Output:   Last shift:       07 - 1900  In: -   Out:    Last 3 shifts: No intake/output data recorded. Intake/Output Summary (Last 24 hours) at 2021 1251  Last data filed at 2021 1134  Gross per 24 hour   Intake --   Output 2058 ml   Net -2058 ml        Physical Exam:  General:    Awake, alert, answers all questions appropriately. HD Tx is in progress. Head:   Normocephalic, without obvious abnormality, atraumatic. Eyes:   Conjunctivae/corneas clear. Nose:  Nares normal. No drainage or sinus tenderness. Throat:    Lips, mucosa, and tongue normal.    Neck:  Supple. No trache  Lungs:   Clear to auscultation bilaterally. No Wheezing or Rhonchi. No rales.   Chest wall:  No tenderness or deformity. No Accessory muscle use. Heart:   Regular rate and rhythm,  no murmur, rub or gallop. Abdomen:   Soft. Distended. Bowel sounds normal. PEG tube in place. Extremities: Extremities normal, atraumatic, No cyanosis. No edema. No clubbing  Skin:     Texture, turgor normal. No rashes or lesions. Not Jaundiced  Psych:  Calm. Neurologic: Non focal.      DATA:  Labs:  Recent Labs     11/12/21  1020 11/11/21  0650 11/10/21  0945   * 133* 133*   K 3.6 4.0 3.8    101 101   CO2 27 25 25   BUN 21* 27* 39*   CREA 2.40* 3.07* 3.91*   CA 9.7 10.1 9.8   ALB 2.6* 2.5* 2.4*     Recent Labs     11/12/21  1020 11/11/21  0650 11/10/21  0945   WBC 22.3* 17.3* 15.1*   HGB 9.2* 8.7* 8.7*   HCT 29.8* 28.5* 27.7*    209 215     No results for input(s): DOLORES, KU, CLU, CREAU in the last 72 hours.     No lab exists for component: PROU    Total time spent with patient:  35 minutes    [] Critical Care Provided    Care Plan discussed with:   Susan Rios MD

## 2021-11-12 NOTE — PROGRESS NOTES
Bedside and Verbal shift change report given to ELIAS Jacinto (oncoming nurse) by Cyndee Hatch RN (offgoing nurse). Report included the following information SBAR, Kardex, Procedure Summary, Intake/Output, MAR, Recent Results, Cardiac Rhythm NS, Alarm Parameters , Quality Measures and Dual Neuro Assessment.

## 2021-11-12 NOTE — PROCEDURES
Hemodialysis / [phone]    Vitals Pre Post Assessment Pre Post   BP BP: (!) 112/92 (Hd started ) (11/12/21 0734) 121/77 LOC Pt AXOX4 Pt AXOX4   HR Pulse (Heart Rate): 99 (11/12/21 0734) 125 Lungs Diminished Diminished   Resp Resp Rate: 22 (11/12/21 0723) 20   Cardiac HR Regular HR Tach   Temp Temp: 98 °F (36.7 °C) (11/12/21 0606) 98.0 axillary  Skin Warm and dry Warm and dry   Weight Pre-Dialysis Weight: 128.9 kg (284 lb 2.8 oz) (11/12/21 0734) 126.9   Edema Generalized 2+ Generalized 2+   Tele status Monitor Monitor  Pain Pain Intensity 1: 2 (11/11/21 1750) 10-pt medicated for pain      Orders   Duration: Start: 1020 End: 1132 Total: 3 hours    Dialyzer: Dialyzer/Set Up Inspection: Revaclear (11/12/21 0734)   K Bath: Dialysate K (mEq/L): 3 (11/12/21 0734)   Ca Bath: Dialysate CA (mEq/L): 2.5 (11/12/21 0734)   Na: Dialysate NA (mEq/L): 138 (11/12/21 0734)   Bicarb: Dialysate HCO3 (mEq/L): 35 (11/12/21 0734)   Target Fluid Removal: Goal/Amount of Fluid to Remove (mL): 3000 mL (11/12/21 0734)     Access   Type & Location: Right SC   Comments:  Dressing intact with CHG biopatch. Bruising above biopatch Dated 11/10/21. Ports cleansed with Alcohol. Prevantics used on the Hub for 15 seconds and allowed to dry.                                           Labs   HBsAg (Antigen) / date:  Negative 10/20/21                                             HBsAb (Antibody) / date: Immune 10/20/21   Source: [facility]   Obtained/Reviewed  Critical Results Called HGB   Date Value Ref Range Status   11/11/2021 8.7 (L) 12.1 - 17.0 g/dL Final     Potassium   Date Value Ref Range Status   11/11/2021 4.0 3.5 - 5.1 mmol/L Final     Calcium   Date Value Ref Range Status   11/11/2021 10.1 8.5 - 10.1 MG/DL Final     BUN   Date Value Ref Range Status   11/11/2021 27 (H) 6 - 20 MG/DL Final     Creatinine   Date Value Ref Range Status   11/11/2021 3.07 (H) 0.70 - 1.30 MG/DL Final     Comment:     INVESTIGATED PER DELTA CHECK PROTOCOL Meds Given   Name Dose Route   Cathflo (0912) 3.6 mg  IV    Heparin (1137) 3800 units Iv           Adequacy / Fluid    Total Liters Process: 48.7   Net Fluid Removed: 2058 ml       Comments   Time Out Done:   (Time) 0715   Admitting Diagnosis: GIB   Consent obtained/signed: Informed Consent Verified: Yes (11/12/21 5120)   Machine / Korene Section # Machine Number: [id number] (11/12/21 0253)   Primary Nurse Rpt Pre: Melyssa Suarez RN   Primary Nurse Rpt Post: Miguel Sanchez RN    Pt Education: Pt educated about HD, access care, and diet. Care Plan: MWF HD. Pts outpatient clinic: New to HD     Tx Summary   Comments:     0734-Hemodialysis started. 0745-AP spiked to -320. Lines reversed but pressures continue to spike. BFR decreased to 350. Notified MD.   Alma Lucaser- BFR decreased to 250. Treatment still going but the BFR is not optimal. Orders received to Cathflo lines. Cathflo requested. 0845- Pt running at 250.    0915- Treatment stopped. All possible blood returned. Both lines instilled with cathflo. 0945-Vitals stable. 1000-Arterial line aspirated well. Venous line sluggish but flushed well. Treatment resumed with 300 BFR. Unable to obtain 400 BFR. Md notified. 1030-Hospitalist asked for labs with HD. This nurse mentioned labs may not be accurate because pt is on HD.    1057: Pt stated he feels funny. BP 96/77. UF decreased. 1134: HD complete. All possible blood returned. A/V lines packed with Heparin. Q-sytes and curos applied. Report given to primary RN. Pt in bed with call light in reach.

## 2021-11-12 NOTE — PROGRESS NOTES
Problem: Ventilator Management  Goal: *Patient maintains clear airway/free of aspiration  Outcome: Progressing Towards Goal  Goal: *Absence of infection signs and symptoms  Outcome: Progressing Towards Goal     Problem: Patient Education: Go to Patient Education Activity  Goal: Patient/Family Education  Outcome: Progressing Towards Goal     Problem: Risk for Spread of Infection  Goal: Prevent transmission of infectious organism to others  Description: Prevent the transmission of infectious organisms to other patients, staff members, and visitors. Outcome: Progressing Towards Goal     Problem: Patient Education:  Go to Education Activity  Goal: Patient/Family Education  Outcome: Progressing Towards Goal     Problem: Falls - Risk of  Goal: *Absence of Falls  Description: Document Jojo Lynch Fall Risk and appropriate interventions in the flowsheet. Outcome: Progressing Towards Goal  Note: Fall Risk Interventions:  Mobility Interventions: Bed/chair exit alarm, Patient to call before getting OOB    Mentation Interventions: Bed/chair exit alarm, Door open when patient unattended    Medication Interventions: Bed/chair exit alarm, Patient to call before getting OOB    Elimination Interventions: Bed/chair exit alarm, Call light in reach, Patient to call for help with toileting needs    History of Falls Interventions: Bed/chair exit alarm, Door open when patient unattended         Problem: Patient Education: Go to Patient Education Activity  Goal: Patient/Family Education  Outcome: Progressing Towards Goal     Problem: Pressure Injury - Risk of  Goal: *Prevention of pressure injury  Description: Document Keenan Scale and appropriate interventions in the flowsheet.   Outcome: Progressing Towards Goal  Note: Pressure Injury Interventions:  Sensory Interventions: Assess changes in LOC, Discuss PT/OT consult with provider    Moisture Interventions: Absorbent underpads, Check for incontinence Q2 hours and as needed    Activity Interventions: Pressure redistribution bed/mattress(bed type), PT/OT evaluation    Mobility Interventions: HOB 30 degrees or less, Pressure redistribution bed/mattress (bed type), PT/OT evaluation    Nutrition Interventions: Document food/fluid/supplement intake    Friction and Shear Interventions: HOB 30 degrees or less                Problem: Patient Education: Go to Patient Education Activity  Goal: Patient/Family Education  Outcome: Progressing Towards Goal     Problem: Nutrition Deficit  Goal: *Optimize nutritional status  Outcome: Progressing Towards Goal     Problem: Patient Education: Go to Patient Education Activity  Goal: Patient/Family Education  Outcome: Progressing Towards Goal     Problem: Patient Education: Go to Patient Education Activity  Goal: Patient/Family Education  Outcome: Progressing Towards Goal     Problem: Breathing Pattern - Ineffective  Goal: *Absence of hypoxia  Outcome: Progressing Towards Goal  Goal: *Use of effective breathing techniques  Outcome: Progressing Towards Goal  Goal: *PALLIATIVE CARE:  Alleviation of Dyspnea  Outcome: Progressing Towards Goal     Problem: Patient Education: Go to Patient Education Activity  Goal: Patient/Family Education  Outcome: Progressing Towards Goal     Problem: Patient Education: Go to Patient Education Activity  Goal: Patient/Family Education  Outcome: Progressing Towards Goal     Problem: Patient Education: Go to Patient Education Activity  Goal: Patient/Family Education  Outcome: Progressing Towards Goal     Problem: Nutrition Deficit  Goal: *Optimize nutritional status  Outcome: Progressing Towards Goal

## 2021-11-12 NOTE — PROGRESS NOTES
Transition of Care Plan   RUR- Low 14%   DISPOSITION: [facility] - pending medical stability   F/U with PCP/Specialist     [facility]: [facility]     Patient receiving HD at the bedside, will be finished before noon. [facility] scheduled for 2pm.    CM spoke with Josefina Howard [phone] in Admissions at [facility] regarding plan for discharge. Call report#[phone] D wing. 10:35pm: Per Dr. Jenae Gonzalez, patient no longer medically stable for DC today. WBC count increasing. Patient likely to be here through weekend. Message left for Tara at [address]. Quang Hernandez) BETSY Lee.

## 2021-11-13 LAB
ALBUMIN SERPL-MCNC: 2.4 G/DL (ref 3.5–5)
ALBUMIN/GLOB SERPL: 0.6 {RATIO} (ref 1.1–2.2)
ALP SERPL-CCNC: 154 U/L (ref 45–117)
ALT SERPL-CCNC: 15 U/L (ref 12–78)
ANION GAP SERPL CALC-SCNC: 8 MMOL/L (ref 5–15)
AST SERPL-CCNC: 17 U/L (ref 15–37)
ATRIAL RATE: 119 BPM
BASOPHILS # BLD: 0 K/UL (ref 0–0.1)
BASOPHILS NFR BLD: 0 % (ref 0–1)
BILIRUB SERPL-MCNC: 0.6 MG/DL (ref 0.2–1)
BUN SERPL-MCNC: 27 MG/DL (ref 6–20)
BUN/CREAT SERPL: 9 (ref 12–20)
CALCIUM SERPL-MCNC: 9.9 MG/DL (ref 8.5–10.1)
CALCULATED P AXIS, ECG09: 38 DEGREES
CALCULATED R AXIS, ECG10: -64 DEGREES
CALCULATED T AXIS, ECG11: 16 DEGREES
CHLORIDE SERPL-SCNC: 100 MMOL/L (ref 97–108)
CO2 SERPL-SCNC: 26 MMOL/L (ref 21–32)
CREAT SERPL-MCNC: 3.11 MG/DL (ref 0.7–1.3)
DIAGNOSIS, 93000: NORMAL
DIFFERENTIAL METHOD BLD: ABNORMAL
EOSINOPHIL # BLD: 0 K/UL (ref 0–0.4)
EOSINOPHIL NFR BLD: 0 % (ref 0–7)
ERYTHROCYTE [DISTWIDTH] IN BLOOD BY AUTOMATED COUNT: 18.9 % (ref 11.5–14.5)
GLOBULIN SER CALC-MCNC: 3.9 G/DL (ref 2–4)
GLUCOSE SERPL-MCNC: 104 MG/DL (ref 65–100)
HCT VFR BLD AUTO: 29 % (ref 36.6–50.3)
HGB BLD-MCNC: 8.8 G/DL (ref 12.1–17)
IMM GRANULOCYTES # BLD AUTO: 0 K/UL
IMM GRANULOCYTES NFR BLD AUTO: 0 %
LYMPHOCYTES # BLD: 3.2 K/UL (ref 0.8–3.5)
LYMPHOCYTES NFR BLD: 20 % (ref 12–49)
MCH RBC QN AUTO: 30.6 PG (ref 26–34)
MCHC RBC AUTO-ENTMCNC: 30.3 G/DL (ref 30–36.5)
MCV RBC AUTO: 100.7 FL (ref 80–99)
MONOCYTES # BLD: 1.1 K/UL (ref 0–1)
MONOCYTES NFR BLD: 7 % (ref 5–13)
NEUTS BAND NFR BLD MANUAL: 1 % (ref 0–6)
NEUTS SEG # BLD: 11.5 K/UL (ref 1.8–8)
NEUTS SEG NFR BLD: 72 % (ref 32–75)
NRBC # BLD: 0.02 K/UL (ref 0–0.01)
NRBC BLD-RTO: 0.1 PER 100 WBC
P-R INTERVAL, ECG05: 160 MS
PLATELET # BLD AUTO: 222 K/UL (ref 150–400)
PMV BLD AUTO: 10 FL (ref 8.9–12.9)
POTASSIUM SERPL-SCNC: 3.8 MMOL/L (ref 3.5–5.1)
PROT SERPL-MCNC: 6.3 G/DL (ref 6.4–8.2)
Q-T INTERVAL, ECG07: 326 MS
QRS DURATION, ECG06: 80 MS
QTC CALCULATION (BEZET), ECG08: 458 MS
RBC # BLD AUTO: 2.88 M/UL (ref 4.1–5.7)
RBC MORPH BLD: ABNORMAL
RBC MORPH BLD: ABNORMAL
SODIUM SERPL-SCNC: 134 MMOL/L (ref 136–145)
VENTRICULAR RATE, ECG03: 119 BPM
WBC # BLD AUTO: 15.8 K/UL (ref 4.1–11.1)
WBC MORPH BLD: ABNORMAL

## 2021-11-13 PROCEDURE — 74011250636 HC RX REV CODE- 250/636: Performed by: NURSE PRACTITIONER

## 2021-11-13 PROCEDURE — 74011250637 HC RX REV CODE- 250/637: Performed by: INTERNAL MEDICINE

## 2021-11-13 PROCEDURE — 74011250637 HC RX REV CODE- 250/637: Performed by: HOSPITALIST

## 2021-11-13 PROCEDURE — 74011250637 HC RX REV CODE- 250/637: Performed by: STUDENT IN AN ORGANIZED HEALTH CARE EDUCATION/TRAINING PROGRAM

## 2021-11-13 PROCEDURE — 85025 COMPLETE CBC W/AUTO DIFF WBC: CPT

## 2021-11-13 PROCEDURE — 74011250637 HC RX REV CODE- 250/637: Performed by: NURSE PRACTITIONER

## 2021-11-13 PROCEDURE — 74011250636 HC RX REV CODE- 250/636: Performed by: INTERNAL MEDICINE

## 2021-11-13 PROCEDURE — 74011250636 HC RX REV CODE- 250/636: Performed by: HOSPITALIST

## 2021-11-13 PROCEDURE — 36415 COLL VENOUS BLD VENIPUNCTURE: CPT

## 2021-11-13 PROCEDURE — 65660000000 HC RM CCU STEPDOWN

## 2021-11-13 PROCEDURE — 80053 COMPREHEN METABOLIC PANEL: CPT

## 2021-11-13 RX ORDER — FUROSEMIDE 40 MG/1
80 TABLET ORAL DAILY
Status: DISCONTINUED | OUTPATIENT
Start: 2021-11-13 | End: 2021-11-14

## 2021-11-13 RX ADMIN — Medication 1 CAPSULE: at 08:08

## 2021-11-13 RX ADMIN — FLUCONAZOLE IN SODIUM CHLORIDE 200 MG: 2 INJECTION, SOLUTION INTRAVENOUS at 14:08

## 2021-11-13 RX ADMIN — CALCIUM CARBONATE (ANTACID) CHEW TAB 500 MG 200 MG: 500 CHEW TAB at 16:21

## 2021-11-13 RX ADMIN — Medication 10 ML: at 06:26

## 2021-11-13 RX ADMIN — LORAZEPAM 1 MG: 2 INJECTION INTRAMUSCULAR; INTRAVENOUS at 14:08

## 2021-11-13 RX ADMIN — PANTOPRAZOLE SODIUM 40 MG: 40 TABLET, DELAYED RELEASE ORAL at 08:08

## 2021-11-13 RX ADMIN — CHLORHEXIDINE GLUCONATE 15 ML: 0.12 RINSE ORAL at 09:00

## 2021-11-13 RX ADMIN — Medication 10 ML: at 14:09

## 2021-11-13 RX ADMIN — EPOETIN ALFA-EPBX 10000 UNITS: 10000 INJECTION, SOLUTION INTRAVENOUS; SUBCUTANEOUS at 22:41

## 2021-11-13 RX ADMIN — PANTOPRAZOLE SODIUM 40 MG: 40 TABLET, DELAYED RELEASE ORAL at 15:56

## 2021-11-13 RX ADMIN — COLLAGENASE SANTYL: 250 OINTMENT TOPICAL at 08:09

## 2021-11-13 RX ADMIN — FUROSEMIDE 80 MG: 40 TABLET ORAL at 15:56

## 2021-11-13 RX ADMIN — Medication: at 08:09

## 2021-11-13 RX ADMIN — PANTOPRAZOLE SODIUM 40 MG: 40 TABLET, DELAYED RELEASE ORAL at 06:25

## 2021-11-13 RX ADMIN — MIDODRINE HYDROCHLORIDE 10 MG: 5 TABLET ORAL at 22:30

## 2021-11-13 RX ADMIN — CALCIUM CARBONATE (ANTACID) CHEW TAB 500 MG 200 MG: 500 CHEW TAB at 11:29

## 2021-11-13 RX ADMIN — Medication 10 ML: at 06:25

## 2021-11-13 RX ADMIN — MIDODRINE HYDROCHLORIDE 10 MG: 5 TABLET ORAL at 14:08

## 2021-11-13 RX ADMIN — Medication 10 ML: at 22:33

## 2021-11-13 RX ADMIN — MIDODRINE HYDROCHLORIDE 10 MG: 5 TABLET ORAL at 06:25

## 2021-11-13 RX ADMIN — Medication: at 18:10

## 2021-11-13 RX ADMIN — HYDROMORPHONE HYDROCHLORIDE 1 MG: 2 TABLET ORAL at 11:29

## 2021-11-13 RX ADMIN — HYDROMORPHONE HYDROCHLORIDE 1 MG: 2 TABLET ORAL at 02:48

## 2021-11-13 RX ADMIN — CHLORHEXIDINE GLUCONATE 15 ML: 0.12 RINSE ORAL at 21:00

## 2021-11-13 RX ADMIN — CALCIUM CARBONATE (ANTACID) CHEW TAB 500 MG 200 MG: 500 CHEW TAB at 08:08

## 2021-11-13 RX ADMIN — Medication 9 MG: at 22:30

## 2021-11-13 RX ADMIN — OXYCODONE 5 MG: 5 TABLET ORAL at 22:30

## 2021-11-13 NOTE — PROGRESS NOTES
Problem: Ventilator Management  Goal: *Patient maintains clear airway/free of aspiration  Outcome: Progressing Towards Goal  Goal: *Absence of infection signs and symptoms  Outcome: Progressing Towards Goal     Problem: Patient Education: Go to Patient Education Activity  Goal: Patient/Family Education  Outcome: Progressing Towards Goal     Problem: Risk for Spread of Infection  Goal: Prevent transmission of infectious organism to others  Description: Prevent the transmission of infectious organisms to other patients, staff members, and visitors. Outcome: Progressing Towards Goal     Problem: Patient Education:  Go to Education Activity  Goal: Patient/Family Education  Outcome: Progressing Towards Goal     Problem: Falls - Risk of  Goal: *Absence of Falls  Description: Document Brian Espinoza Fall Risk and appropriate interventions in the flowsheet. Outcome: Progressing Towards Goal  Note: Fall Risk Interventions:  Mobility Interventions: Bed/chair exit alarm, Patient to call before getting OOB    Mentation Interventions: Bed/chair exit alarm, Door open when patient unattended    Medication Interventions: Bed/chair exit alarm, Patient to call before getting OOB    Elimination Interventions: Call light in reach, Bed/chair exit alarm, Patient to call for help with toileting needs    History of Falls Interventions: Bed/chair exit alarm, Door open when patient unattended         Problem: Patient Education: Go to Patient Education Activity  Goal: Patient/Family Education  Outcome: Progressing Towards Goal     Problem: Pressure Injury - Risk of  Goal: *Prevention of pressure injury  Description: Document Keenan Scale and appropriate interventions in the flowsheet.   Outcome: Progressing Towards Goal  Note: Pressure Injury Interventions:  Sensory Interventions: Discuss PT/OT consult with provider, Assess changes in LOC    Moisture Interventions: Absorbent underpads, Check for incontinence Q2 hours and as needed    Activity Interventions: PT/OT evaluation, Pressure redistribution bed/mattress(bed type)    Mobility Interventions: HOB 30 degrees or less, Pressure redistribution bed/mattress (bed type), PT/OT evaluation    Nutrition Interventions: Document food/fluid/supplement intake    Friction and Shear Interventions: HOB 30 degrees or less                Problem: Patient Education: Go to Patient Education Activity  Goal: Patient/Family Education  Outcome: Progressing Towards Goal     Problem: Nutrition Deficit  Goal: *Optimize nutritional status  Outcome: Progressing Towards Goal     Problem: Patient Education: Go to Patient Education Activity  Goal: Patient/Family Education  Outcome: Progressing Towards Goal     Problem: Patient Education: Go to Patient Education Activity  Goal: Patient/Family Education  Outcome: Progressing Towards Goal     Problem: Breathing Pattern - Ineffective  Goal: *Absence of hypoxia  Outcome: Progressing Towards Goal  Goal: *Use of effective breathing techniques  Outcome: Progressing Towards Goal  Goal: *PALLIATIVE CARE:  Alleviation of Dyspnea  Outcome: Progressing Towards Goal     Problem: Patient Education: Go to Patient Education Activity  Goal: Patient/Family Education  Outcome: Progressing Towards Goal     Problem: Patient Education: Go to Patient Education Activity  Goal: Patient/Family Education  Outcome: Progressing Towards Goal     Problem: Patient Education: Go to Patient Education Activity  Goal: Patient/Family Education  Outcome: Progressing Towards Goal     Problem: Nutrition Deficit  Goal: *Optimize nutritional status  Outcome: Progressing Towards Goal

## 2021-11-13 NOTE — PROGRESS NOTES
Bedside shift change report given to [address] [place] [address] (oncoming nurse) by Deisy Santos RN (offgoing nurse). Report included the following information SBAR, ED Summary, OR Summary, Procedure Summary, Intake/Output, MAR, Cardiac Rhythm NSR, Alarm Parameters  and Dual Neuro Assessment.

## 2021-11-13 NOTE — PROGRESS NOTES
[facility]                                                                                          Hospitalist Progress Note  Nicole Garay MD  Answering service: [phone] OR [phone] from in house phone        Date of Service:  2021  NAME:  Jackson Duarte  :  1970  MRN:  [id number]      Admission Summary:   Pt is a 54 yo M with PMH ESRD on HD, takes coumadin secondary to hx of DVT RUE with a recent prolonged hospitalization secondary to COVID PNA in August of this year. He ultimately required trach and peg was sent to [facility] he has not been there too long when he was sent to our ED for hematemesis; he was admitted to [facility] on 10/15 and transferred to [facility] on 10/18. Per records reviewed, patient was intubated on , trached on , he was treated for VRE pneumonia. PEG dislodged on 10/11, treated with Unasyn until 10/17. Patient was on warfarin for right upper extremity DVT involving the subclavian, axillary and brachial veins. He started having coffee ground emesis 10/18/21 with elevated HR and hypotension. He was sent to the ER for further workup. He was found to be hypotensive and ICU was consulted for further management. \"          Interval history / Subjective:      F/u Hemorrhagic shock  He is feeling well today. He said he had a good night rest last night. Assessment & Plan:     Rising WBC, it is concerning the white cell count continue to rise persistently. He is tachycardic in the low 110s although afebrile. WBC is even worse today, 22.3  --CT abdomen on  without significant fluid collection  --Abdominal wall abscess culture on 10/23 grew coag negative, felt to be contaminant. It also grew Candida albicans for which she completed 15 days of Diflucan therapy. --Procalcitonin elevated at 1.72. WBC trending down, blood culture from  negative thus far.  --ID consulted, restarted Diflucan.       Hemorrhagic shock due to hematemesis due to severe esophagitis. Acute blood loss anemia.   --EGD on 10/19 showed LA grade D reflux esophagitis up to the mid esophagus, few sessile polyps in the stomach, mild patchy erythema in the distal bulb of the duodenum. - s/p EGD (11/01)  - S/P 2 units of pRBCs, 1 unit of FFB and K centra. - Continue PPI   - Monitor labs and transfuse for hgb <7.0. Hemoglobin has remained> 8.  - GI signed off. Left abdominal abscess. History of dislodged PEG tube. --CT A/P on 10/20 showed left anterior abdominal and pelvic wall hematoma or gas containing fluid collection. --He underwent ultrasound guided fluid drainage and percutaneous drain placement which revealed thick purulent fluid, 18 Vietnamese drain was left in place. -- Wound culture on 10/23 grew apparent Candida albicans, no sensitivity available. Culture also showed coag negative staph which was deemed to be contaminant  -Completed 15 days of IV Diflucan. WBC normalized. -Follow up CT on 10/26 showed slightly diminished size of the abdominal wall collection.   - Repeat CT on 11/2 significant decrease in size of the left rectus sheath hematoma. The small collection suggestive of congealed hematoma and is unlikely to drain through cath. - surgery following, abdominal wall drain is attached to bag with purulent discharge  --CT abdomen and pelvis, sinogram performed 11/9: Stable enlargement of the left abdominal wall musculature, evaluation for abscess is limited without IV contrast, a small residual abscess is difficult to exclude however this is unchanged. Contrast injection through the catheter demonstrated pooling of contrast material dependently within the muscle with out evidence of fistula. --General surgery following, discussed with , no need for repeat CT abd/pel at this time. WIll need follow up with surgery which has been scheduled with Dr. Arlen Hayden on 12/1/21. They will evaluate patient's drain and decide on removal at that time. Acute on chronic hypoxic respiratory failure with tracheostomy Resolved,  --Patient was intubated in outside hospital on 9/7 and was trached on 9/16. He was successfully decannulated here on 11/3     Renal:  ESRD on HD MWF-Nephrology following        Right upper extremity acute DVT, POA. Patient was on warfarin but came with hemorrhagic shock and supratherapeutic INR requiring reversal  --Anticoagulation on hold. --repeat Venous duplex negative for DVT in RUE. Will not resume warfarin at this time. Right upper and left lower extremities appeared weaker than the rest on 11/4  --Noncontrast head CT is negative. --patient states that RUE has been since previous admission. Discussed possibility of MRI with patient but he does not want to go through additional testing if it will not  and/or delay his discharge to rehab.      Morbid obesity   - BMI 41.69  - Weight loss management to be followed up outpatient    Code status: Full code  DVT prophylaxis: SCDs    Care Plan discussed with: Patient/Family  Anticipated Disposition: Accepted to [facility] and rehab SNF. Anticipated Discharge: Anticipate discharge early next week. Hospital Problems  Date Reviewed: 11/6/2021          Codes Class Noted POA    Abdominal wall fluid collections ICD-10-CM: R18.8  ICD-9-CM: 789.59  10/25/2021 Unknown        Severe protein-calorie malnutrition ([facility] [address] ICD-10-CM: E43  ICD-9-CM: 262  10/21/2021 Unknown        * (Principal) GIB (gastrointestinal bleeding) ICD-10-CM: K92.2  ICD-9-CM: 578.9  10/18/2021 Yes                Review of Systems:   A comprehensive review of systems was negative except for that written in the HPI. Vital Signs:    Last 24hrs VS reviewed since prior progress note.  Most recent are:  Visit Vitals  /65   Pulse (!) 113   Temp 97.6 °F (36.4 °C)   Resp 18   Ht 5' 11.5\" (1.816 m)   Wt 126.6 kg (279 lb 1.6 oz)   SpO2 99%   BMI 38.38 kg/m²       No intake or output data in the 24 hours ending 11/13/21 1416     Physical Examination:     I had a face to face encounter with this patient and independently examined them on 11/13/2021 as outlined below:          Constitutional:  No acute distress, cooperative, pleasant    ENT:  Oral mucosa moist, oropharynx benign. Resp:  CTA bilaterally. CV:  Regular rhythm, normal rat    GI:  Soft, non distended, non tender. normoactive bowel sounds,  abdominal wall drainage tube has minimal purulent discharge in the bag    Musculoskeletal:  No edema, warm, 2+ pulses throughout    Neurologic:  Right upper extremity and left lower extremity weak. AAOx3, CN II-XII reviewed            Data Review:    Review and/or order of clinical lab test      Labs:     Recent Labs     11/13/21  0256 11/12/21  1020   WBC 15.8* 22.3*   HGB 8.8* 9.2*   HCT 29.0* 29.8*    203     Recent Labs     11/13/21  0254 11/12/21  1020 11/11/21  0650   * 135* 133*   K 3.8 3.6 4.0    101 101   CO2 26 27 25   BUN 27* 21* 27*   CREA 3.11* 2.40* 3.07*   * 101* 107*   CA 9.9 9.7 10.1     Recent Labs     11/13/21  0254 11/12/21  1020 11/11/21  0650   ALT 15 13 14   * 168* 157*   TBILI 0.6 0.6 0.6   TP 6.3* 6.5 6.5   ALB 2.4* 2.6* 2.5*   GLOB 3.9 3.9 4.0     No results for input(s): INR, PTP, APTT, INREXT, INREXT in the last 72 hours. No results for input(s): FE, TIBC, PSAT, FERR in the last 72 hours. No results found for: FOL, RBCF   No results for input(s): PH, PCO2, PO2 in the last 72 hours. No results for input(s): CPK, CKNDX, TROIQ in the last 72 hours.     No lab exists for component: CPKMB  No results found for: CHOL, CHOLX, CHLST, CHOLV, HDL, HDLP, LDL, LDLC, DLDLP, TGLX, TRIGL, TRIGP, CHHD, CHHDX  No results found for: GLUCPOC  No results found for: COLOR, APPRN, SPGRU, REFSG, ORIANA, PROTU, GLUCU, KETU, BILU, UROU, SPENCER, LEUKU, GLUKE, EPSU, BACTU, WBCU, RBCU, CASTS, UCRY      Medications Reviewed:     Current Facility-Administered Medications Medication Dose Route Frequency    LORazepam (ATIVAN) tablet 0.5 mg  0.5 mg Oral BID PRN    fluconazole (DIFLUCAN) 200mg/100 mL IVPB (premix)  200 mg IntraVENous Q24H    pantoprazole (PROTONIX) tablet 40 mg  40 mg Oral ACB&D    L.acidophilus-paracasei-S.thermophil-bifidobacter (RISAQUAD) 8 billion cell capsule  1 Capsule Oral DAILY    calcium carbonate (TUMS) chewable tablet 200 mg [elemental]  200 mg Oral TID WITH MEALS    albuterol-ipratropium (DUO-NEB) 2.5 MG-0.5 MG/3 ML  3 mL Nebulization Q6H PRN    LORazepam (ATIVAN) injection 1 mg  1 mg IntraVENous Q12H PRN    oxyCODONE IR (ROXICODONE) tablet 5 mg  5 mg Oral Q4H PRN    HYDROmorphone (DILAUDID) tablet 1 mg  1 mg Oral Q4H PRN    collagenase (SANTYL) 250 unit/gram ointment   Topical DAILY    heparin (porcine) 1,000 unit/mL injection 4,000 Units  4,000 Units Hemodialysis DIALYSIS PRN    alteplase (CATHFLO) 1.8 mg in sterile water (preservative free) 1.8 mL injection  1.8 mg InterCATHeter DIALYSIS ONCE    alteplase (CATHFLO) 1.8 mg in sterile water (preservative free) 1.8 mL injection  1.8 mg InterCATHeter DIALYSIS ONCE    albumin human 25% (BUMINATE) solution 25 g  25 g IntraVENous DIALYSIS PRN    epoetin kayley-epbx (RETACRIT) injection 10,000 Units  10,000 Units SubCUTAneous Q TUE, THU & SAT    B complex-vitaminC-folic acid (NEPHROCAP) cap  1 Capsule Oral DAILY    midodrine (PROAMATINE) tablet 10 mg  10 mg Oral Q8H    heparin (porcine) 1,000 unit/mL injection 1,800 Units  1,800 Units InterCATHeter DIALYSIS PRN    And    heparin (porcine) 1,000 unit/mL injection 1,800 Units  1,800 Units InterCATHeter DIALYSIS PRN    fentaNYL (DURAGESIC) 25 mcg/hr patch 1 Patch  1 Patch TransDERmal Q72H    melatonin tablet 9 mg  9 mg Oral QHS    sodium chloride (NS) flush 5-40 mL  5-40 mL IntraVENous Q8H    sodium chloride (NS) flush 5-40 mL  5-40 mL IntraVENous PRN    chlorhexidine (ORAL CARE KIT) 0.12 % mouthwash 15 mL  15 mL Oral Q12H    balsam peru-castor oiL (VENELEX) ointment   Topical BID    sodium chloride (NS) flush 5-10 mL  5-10 mL IntraVENous PRN    sodium chloride (NS) flush 5-40 mL  5-40 mL IntraVENous Q8H    sodium chloride (NS) flush 5-40 mL  5-40 mL IntraVENous PRN    acetaminophen (TYLENOL) tablet 650 mg  650 mg Oral Q6H PRN    Or    acetaminophen (TYLENOL) suppository 650 mg  650 mg Rectal Q6H PRN    polyethylene glycol (MIRALAX) packet 17 g  17 g Oral DAILY PRN    ondansetron (ZOFRAN ODT) tablet 4 mg  4 mg Oral Q8H PRN    Or    ondansetron (ZOFRAN) injection 4 mg  4 mg IntraVENous Q6H PRN    albuterol (PROVENTIL VENTOLIN) nebulizer solution 2.5 mg  2.5 mg Nebulization Q4H PRN     ______________________________________________________________________  EXPECTED LENGTH OF STAY: 4d 9h  ACTUAL LENGTH OF STAY:          26                 Lonie Sandifer, MD

## 2021-11-13 NOTE — PROGRESS NOTES
[facility]    Name: Waqar Benz MRN: [id number]   : 1970 [facility]   Date: 2021        IMPRESSION:   · SERGEY, HD dependent. Patient is on MWF schedule. Had dialysis yesterday  · Hypervolemia with predominantly central congestion, improved. · Anemia of multifactorial origin  · Respiratory failure due to a complicated Covid pneumonia, recovered. Trache removed  · Hypokalemia pre HD      PLAN:   · Continue present care  · Complete a full HD Tx Monday  · Next HD on Monday. Patient's discharge has been delayed. · Watch for renal function recovery. Patient needs urine output to be recorded. Will check pre HD renal panel. · Lasix oral  · Anemia management- start Retacrit. Check the iron profile. Subjective/Interval History:   I have reviewed the flowsheet and previous days notes. ROS:  Patient said he urinates daily, there is no I/O record except for dialysis related fluid removal. He has a low serum creatinine, ? Pre HD creatinines dropping. Eating better, appetite good. Albumin, K and PO4 remain low    Objective:   Vital Signs:    Visit Vitals  /65   Pulse (!) 113   Temp 97.6 °F (36.4 °C)   Resp 18   Ht 5' 11.5\" (1.816 m)   Wt 126.6 kg (279 lb 1.6 oz)   SpO2 99%   BMI 38.38 kg/m²       O2 Device: None (Room air)   O2 Flow Rate (L/min): 0 l/min   Temp (24hrs), Av.1 °F (36.7 °C), Min:97.6 °F (36.4 °C), Max:98.6 °F (37 °C)       Intake/Output:   Last shift:      No intake/output data recorded. Last 3 shifts:  1901 -  0700  In: -   Out:    No intake or output data in the 24 hours ending 21 1503     Physical Exam:  General:    Awake, alert, answers all questions appropriately. HD Tx is in progress. Head:   Normocephalic, without obvious abnormality, atraumatic. Eyes:   Conjunctivae/corneas clear. Nose:  Nares normal. No drainage or sinus tenderness. Throat:    Lips, mucosa, and tongue normal.    Neck:  Supple.  No trache  Lungs: Clear to auscultation bilaterally. No Wheezing or Rhonchi. No rales. Chest wall:  No tenderness or deformity. No Accessory muscle use. Heart:   Regular rate and rhythm,  no murmur, rub or gallop. Abdomen:   Soft. Distended. Bowel sounds normal. PEG tube in place. Extremities: Extremities normal, atraumatic, No cyanosis. No edema. No clubbing  Skin:     Texture, turgor normal. No rashes or lesions. Not Jaundiced  Psych:  Calm. Neurologic: Non focal.      DATA:  Labs:  Recent Labs     11/13/21  0254 11/12/21  1020 11/11/21  0650   * 135* 133*   K 3.8 3.6 4.0    101 101   CO2 26 27 25   BUN 27* 21* 27*   CREA 3.11* 2.40* 3.07*   CA 9.9 9.7 10.1   ALB 2.4* 2.6* 2.5*     Recent Labs     11/13/21  0256 11/12/21  1020 11/11/21  0650   WBC 15.8* 22.3* 17.3*   HGB 8.8* 9.2* 8.7*   HCT 29.0* 29.8* 28.5*    203 209     No results for input(s): DOLORES, KU, CLU, CREAU in the last 72 hours.     No lab exists for component: PROU    Total time spent with patient:  35 minutes    [] Critical Care Provided    Care Plan discussed with:   Nursing, Medical Team    Sarina Rivera MD

## 2021-11-14 LAB
ALBUMIN SERPL-MCNC: 2.3 G/DL (ref 3.5–5)
ALBUMIN/GLOB SERPL: 0.6 {RATIO} (ref 1.1–2.2)
ALP SERPL-CCNC: 147 U/L (ref 45–117)
ALT SERPL-CCNC: 14 U/L (ref 12–78)
ANION GAP SERPL CALC-SCNC: 8 MMOL/L (ref 5–15)
AST SERPL-CCNC: 14 U/L (ref 15–37)
BASOPHILS # BLD: 0 K/UL (ref 0–0.1)
BASOPHILS NFR BLD: 0 % (ref 0–1)
BILIRUB SERPL-MCNC: 0.5 MG/DL (ref 0.2–1)
BUN SERPL-MCNC: 38 MG/DL (ref 6–20)
BUN/CREAT SERPL: 10 (ref 12–20)
CALCIUM SERPL-MCNC: 9.8 MG/DL (ref 8.5–10.1)
CHLORIDE SERPL-SCNC: 101 MMOL/L (ref 97–108)
CO2 SERPL-SCNC: 26 MMOL/L (ref 21–32)
CREAT SERPL-MCNC: 3.81 MG/DL (ref 0.7–1.3)
DIFFERENTIAL METHOD BLD: ABNORMAL
EOSINOPHIL # BLD: 0.6 K/UL (ref 0–0.4)
EOSINOPHIL NFR BLD: 3 % (ref 0–7)
ERYTHROCYTE [DISTWIDTH] IN BLOOD BY AUTOMATED COUNT: 18.7 % (ref 11.5–14.5)
GLOBULIN SER CALC-MCNC: 3.8 G/DL (ref 2–4)
GLUCOSE SERPL-MCNC: 104 MG/DL (ref 65–100)
HCT VFR BLD AUTO: 27.9 % (ref 36.6–50.3)
HGB BLD-MCNC: 8.6 G/DL (ref 12.1–17)
IMM GRANULOCYTES # BLD AUTO: 0 K/UL
IMM GRANULOCYTES NFR BLD AUTO: 0 %
LYMPHOCYTES # BLD: 2.7 K/UL (ref 0.8–3.5)
LYMPHOCYTES NFR BLD: 14 % (ref 12–49)
MCH RBC QN AUTO: 31 PG (ref 26–34)
MCHC RBC AUTO-ENTMCNC: 30.8 G/DL (ref 30–36.5)
MCV RBC AUTO: 100.7 FL (ref 80–99)
MONOCYTES # BLD: 0.8 K/UL (ref 0–1)
MONOCYTES NFR BLD: 4 % (ref 5–13)
NEUTS SEG # BLD: 15.5 K/UL (ref 1.8–8)
NEUTS SEG NFR BLD: 79 % (ref 32–75)
NRBC # BLD: 0.02 K/UL (ref 0–0.01)
NRBC BLD-RTO: 0.1 PER 100 WBC
PLATELET # BLD AUTO: 228 K/UL (ref 150–400)
PMV BLD AUTO: 9.8 FL (ref 8.9–12.9)
POTASSIUM SERPL-SCNC: 3.9 MMOL/L (ref 3.5–5.1)
PROT SERPL-MCNC: 6.1 G/DL (ref 6.4–8.2)
RBC # BLD AUTO: 2.77 M/UL (ref 4.1–5.7)
RBC MORPH BLD: ABNORMAL
SODIUM SERPL-SCNC: 135 MMOL/L (ref 136–145)
WBC # BLD AUTO: 19.6 K/UL (ref 4.1–11.1)

## 2021-11-14 PROCEDURE — 74011250637 HC RX REV CODE- 250/637: Performed by: HOSPITALIST

## 2021-11-14 PROCEDURE — 74011250637 HC RX REV CODE- 250/637: Performed by: STUDENT IN AN ORGANIZED HEALTH CARE EDUCATION/TRAINING PROGRAM

## 2021-11-14 PROCEDURE — 65660000000 HC RM CCU STEPDOWN

## 2021-11-14 PROCEDURE — 85025 COMPLETE CBC W/AUTO DIFF WBC: CPT

## 2021-11-14 PROCEDURE — 74011250637 HC RX REV CODE- 250/637: Performed by: NURSE PRACTITIONER

## 2021-11-14 PROCEDURE — 80053 COMPREHEN METABOLIC PANEL: CPT

## 2021-11-14 PROCEDURE — 36415 COLL VENOUS BLD VENIPUNCTURE: CPT

## 2021-11-14 PROCEDURE — 74011250637 HC RX REV CODE- 250/637: Performed by: INTERNAL MEDICINE

## 2021-11-14 PROCEDURE — 74011250636 HC RX REV CODE- 250/636: Performed by: NURSE PRACTITIONER

## 2021-11-14 RX ADMIN — Medication 1 CAPSULE: at 08:42

## 2021-11-14 RX ADMIN — Medication 10 ML: at 14:15

## 2021-11-14 RX ADMIN — Medication 10 ML: at 14:16

## 2021-11-14 RX ADMIN — MIDODRINE HYDROCHLORIDE 10 MG: 5 TABLET ORAL at 22:23

## 2021-11-14 RX ADMIN — FLUCONAZOLE IN SODIUM CHLORIDE 200 MG: 2 INJECTION, SOLUTION INTRAVENOUS at 14:15

## 2021-11-14 RX ADMIN — MIDODRINE HYDROCHLORIDE 10 MG: 5 TABLET ORAL at 05:53

## 2021-11-14 RX ADMIN — Medication 10 ML: at 05:56

## 2021-11-14 RX ADMIN — Medication: at 08:44

## 2021-11-14 RX ADMIN — CALCIUM CARBONATE (ANTACID) CHEW TAB 500 MG 200 MG: 500 CHEW TAB at 08:41

## 2021-11-14 RX ADMIN — Medication 9 MG: at 22:23

## 2021-11-14 RX ADMIN — CALCIUM CARBONATE (ANTACID) CHEW TAB 500 MG 200 MG: 500 CHEW TAB at 11:50

## 2021-11-14 RX ADMIN — MIDODRINE HYDROCHLORIDE 10 MG: 5 TABLET ORAL at 14:15

## 2021-11-14 RX ADMIN — HYDROMORPHONE HYDROCHLORIDE 1 MG: 2 TABLET ORAL at 16:36

## 2021-11-14 RX ADMIN — Medication 10 ML: at 22:23

## 2021-11-14 RX ADMIN — PANTOPRAZOLE SODIUM 40 MG: 40 TABLET, DELAYED RELEASE ORAL at 16:36

## 2021-11-14 RX ADMIN — Medication: at 16:36

## 2021-11-14 RX ADMIN — FUROSEMIDE 80 MG: 40 TABLET ORAL at 08:42

## 2021-11-14 RX ADMIN — CHLORHEXIDINE GLUCONATE 15 ML: 0.12 RINSE ORAL at 09:00

## 2021-11-14 RX ADMIN — OXYCODONE 5 MG: 5 TABLET ORAL at 05:53

## 2021-11-14 RX ADMIN — COLLAGENASE SANTYL: 250 OINTMENT TOPICAL at 08:44

## 2021-11-14 RX ADMIN — PANTOPRAZOLE SODIUM 40 MG: 40 TABLET, DELAYED RELEASE ORAL at 05:53

## 2021-11-14 RX ADMIN — CALCIUM CARBONATE (ANTACID) CHEW TAB 500 MG 200 MG: 500 CHEW TAB at 16:36

## 2021-11-14 RX ADMIN — PANTOPRAZOLE SODIUM 40 MG: 40 TABLET, DELAYED RELEASE ORAL at 07:30

## 2021-11-14 NOTE — PROGRESS NOTES
[facility]    Name: Alexa Perez MRN: [id number]   : 1970 Hospital: [address]   Date: 2021        IMPRESSION:   · SERGEY, HD dependent. Patient is on MWF schedule. · Hypervolemia with predominantly central congestion, improved. · Anemia of multifactorial origin  · Respiratory failure due to a complicated Covid pneumonia, recovered. Trache removed  · Hypokalemia pre HD      PLAN:   · Continue present care  · Next HD on Monday with possible discharge to follow  · Watch for renal function recovery but no response to lasix  · Lasix oral  · Anemia management- start Retacrit. Check the iron profile. Subjective/Interval History:   I have reviewed the flowsheet and previous days notes. ROS:  Patient said he urinates daily, there is no I/O record except for dialysis related fluid removal. Spoke with his RN, he has had no significant urine output    Eating better, appetite good. Albumin, K and PO4 remain low    Objective:   Vital Signs:    Visit Vitals  /81   Pulse (!) 113   Temp 98.2 °F (36.8 °C)   Resp 20   Ht 5' 11.5\" (1.816 m)   Wt 128.3 kg (282 lb 13.6 oz)   SpO2 96%   BMI 38.90 kg/m²       O2 Device: None (Room air)   O2 Flow Rate (L/min): 0 l/min   Temp (24hrs), Av °F (36.7 °C), Min:97.8 °F (36.6 °C), Max:98.2 °F (36.8 °C)       Intake/Output:   Last shift:      No intake/output data recorded. Last 3 shifts: No intake/output data recorded. No intake or output data in the 24 hours ending 21 1408     Physical Exam:  General:    Awake, alert, answers all questions appropriately. HD Tx is in progress. Head:   Normocephalic, without obvious abnormality, atraumatic. Eyes:   Conjunctivae/corneas clear. Nose:  Nares normal. No drainage or sinus tenderness. Throat:    Lips, mucosa, and tongue normal.    Neck:  Supple. No trache  Lungs:   Clear to auscultation bilaterally. No Wheezing or Rhonchi. No rales. Chest wall:  No tenderness or deformity.  No Accessory muscle use. Heart:   Regular rate and rhythm,  no murmur, rub or gallop. Abdomen:   Soft. Distended. Bowel sounds normal. PEG tube in place. Extremities: Extremities normal, atraumatic, No cyanosis. No edema. No clubbing  Skin:     Texture, turgor normal. No rashes or lesions. Not Jaundiced  Psych:  Calm. Neurologic: Non focal.      DATA:  Labs:  Recent Labs     11/14/21 0243 11/13/21 0254 11/12/21  1020   * 134* 135*   K 3.9 3.8 3.6    100 101   CO2 26 26 27   BUN 38* 27* 21*   CREA 3.81* 3.11* 2.40*   CA 9.8 9.9 9.7   ALB 2.3* 2.4* 2.6*     Recent Labs     11/14/21 0243 11/13/21 0256 11/12/21  1020   WBC 19.6* 15.8* 22.3*   HGB 8.6* 8.8* 9.2*   HCT 27.9* 29.0* 29.8*    222 203     No results for input(s): DOLORES, KU, CLU, CREAU in the last 72 hours.     No lab exists for component: PROU    Total time spent with patient:  35 minutes    [] Critical Care Provided    Care Plan discussed with:   Nursing, Medical Team    Nadira Parnell MD

## 2021-11-14 NOTE — PROGRESS NOTES
Problem: Ventilator Management  Goal: *Patient maintains clear airway/free of aspiration  Outcome: Progressing Towards Goal  Goal: *Absence of infection signs and symptoms  Outcome: Progressing Towards Goal     Problem: Patient Education: Go to Patient Education Activity  Goal: Patient/Family Education  Outcome: Progressing Towards Goal     Problem: Risk for Spread of Infection  Goal: Prevent transmission of infectious organism to others  Description: Prevent the transmission of infectious organisms to other patients, staff members, and visitors. Outcome: Progressing Towards Goal     Problem: Patient Education:  Go to Education Activity  Goal: Patient/Family Education  Outcome: Progressing Towards Goal     Problem: Falls - Risk of  Goal: *Absence of Falls  Description: Document Harden Reason Fall Risk and appropriate interventions in the flowsheet. Outcome: Progressing Towards Goal  Note: Fall Risk Interventions:  Mobility Interventions: Bed/chair exit alarm, Patient to call before getting OOB    Mentation Interventions: Bed/chair exit alarm, Door open when patient unattended    Medication Interventions: Bed/chair exit alarm, Patient to call before getting OOB    Elimination Interventions: Bed/chair exit alarm, Call light in reach, Patient to call for help with toileting needs    History of Falls Interventions: Bed/chair exit alarm, Door open when patient unattended         Problem: Patient Education: Go to Patient Education Activity  Goal: Patient/Family Education  Outcome: Progressing Towards Goal     Problem: Pressure Injury - Risk of  Goal: *Prevention of pressure injury  Description: Document Keenan Scale and appropriate interventions in the flowsheet.   Outcome: Progressing Towards Goal  Note: Pressure Injury Interventions:  Sensory Interventions: Assess changes in LOC, Discuss PT/OT consult with provider    Moisture Interventions: Absorbent underpads, Check for incontinence Q2 hours and as needed    Activity Interventions: PT/OT evaluation, Pressure redistribution bed/mattress(bed type)    Mobility Interventions: PT/OT evaluation, Pressure redistribution bed/mattress (bed type), HOB 30 degrees or less    Nutrition Interventions: Document food/fluid/supplement intake    Friction and Shear Interventions: HOB 30 degrees or less                Problem: Patient Education: Go to Patient Education Activity  Goal: Patient/Family Education  Outcome: Progressing Towards Goal     Problem: Nutrition Deficit  Goal: *Optimize nutritional status  Outcome: Progressing Towards Goal     Problem: Patient Education: Go to Patient Education Activity  Goal: Patient/Family Education  Outcome: Progressing Towards Goal     Problem: Patient Education: Go to Patient Education Activity  Goal: Patient/Family Education  Outcome: Progressing Towards Goal     Problem: Breathing Pattern - Ineffective  Goal: *Absence of hypoxia  Outcome: Progressing Towards Goal  Goal: *Use of effective breathing techniques  Outcome: Progressing Towards Goal  Goal: *PALLIATIVE CARE:  Alleviation of Dyspnea  Outcome: Progressing Towards Goal     Problem: Patient Education: Go to Patient Education Activity  Goal: Patient/Family Education  Outcome: Progressing Towards Goal     Problem: Patient Education: Go to Patient Education Activity  Goal: Patient/Family Education  Outcome: Progressing Towards Goal     Problem: Patient Education: Go to Patient Education Activity  Goal: Patient/Family Education  Outcome: Progressing Towards Goal     Problem: Nutrition Deficit  Goal: *Optimize nutritional status  Outcome: Progressing Towards Goal

## 2021-11-14 NOTE — PROGRESS NOTES
Bedside shift change report given to [address] [place] [address] (oncoming nurse) by Sarah Beth Kovacs RN (offgoing nurse). Report included the following information SBAR, Kardex, ED Summary, OR Summary, Intake/Output, Accordion, Cardiac Rhythm NSR, Alarm Parameters  and Dual Neuro Assessment.

## 2021-11-14 NOTE — PROGRESS NOTES
[facility]                                                                                          Hospitalist Progress Note  Teddy Painter MD  Answering service: [phone] OR [phone] from in house phone        Date of Service:  2021  NAME:  Stephy Johnson  :  1970  MRN:  [id number]      Admission Summary:   Pt is a 52 yo M with PMH ESRD on HD, takes coumadin secondary to hx of DVT RUE with a recent prolonged hospitalization secondary to COVID PNA in August of this year. He ultimately required trach and peg was sent to [facility] he has not been there too long when he was sent to our ED for hematemesis; he was admitted to [facility] on 10/15 and transferred to [facility] on 10/18. Per records reviewed, patient was intubated on , trached on , he was treated for VRE pneumonia. PEG dislodged on 10/11, treated with Unasyn until 10/17. Patient was on warfarin for right upper extremity DVT involving the subclavian, axillary and brachial veins. He started having coffee ground emesis 10/18/21 with elevated HR and hypotension. He was sent to the ER for further workup. He was found to be hypotensive and ICU was consulted for further management. \"          Interval history / Subjective:      F/u Hemorrhagic shock    Patient has no complaints, he is insisting on going to his skilled rehab closer to home, he wants to be discharged tomorrow. He is aware nephrologist is considering weaning dialysis if the predialysis kidney numbers and urine output supports that but he prefers this to be taking care of at the rehab facility as he is really tired of being here and wants so much to get out of here. Assessment & Plan:     Rising WBC, WBC fluctuating. He remained afebrile  --CT abdomen on  without significant fluid collection  --Abdominal wall abscess culture on 10/23 grew coag negative, felt to be contaminant.   It also grew Candida albicans for which she completed 15 days of Diflucan therapy. --Procalcitonin elevated at 1.72. WBC trending down, blood culture from 11/12 negative thus far.  --ID consulted, restarted Diflucan. Hemorrhagic shock due to hematemesis due to severe esophagitis. Acute blood loss anemia.   --EGD on 10/19 showed LA grade D reflux esophagitis up to the mid esophagus, few sessile polyps in the stomach, mild patchy erythema in the distal bulb of the duodenum. - s/p EGD (11/01)  - S/P 2 units of pRBCs, 1 unit of FFB and K centra. - Continue PPI   - Monitor labs and transfuse for hgb <7.0. Hemoglobin has remained> 8.  - GI signed off. Left abdominal abscess. History of dislodged PEG tube. --CT A/P on 10/20 showed left anterior abdominal and pelvic wall hematoma or gas containing fluid collection. --He underwent ultrasound guided fluid drainage and percutaneous drain placement which revealed thick purulent fluid, 18 Kazakh drain was left in place. -- Wound culture on 10/23 grew apparent Candida albicans, no sensitivity available. Culture also showed coag negative staph which was deemed to be contaminant  -Completed 15 days of IV Diflucan. WBC normalized. -Follow up CT on 10/26 showed slightly diminished size of the abdominal wall collection.   - Repeat CT on 11/2 significant decrease in size of the left rectus sheath hematoma. The small collection suggestive of congealed hematoma and is unlikely to drain through cath. - surgery following, abdominal wall drain is attached to bag with purulent discharge  --CT abdomen and pelvis, sinogram performed 11/9: Stable enlargement of the left abdominal wall musculature, evaluation for abscess is limited without IV contrast, a small residual abscess is difficult to exclude however this is unchanged. Contrast injection through the catheter demonstrated pooling of contrast material dependently within the muscle with out evidence of fistula.   --General surgery following, discussed with , no need for repeat CT abd/pel at this time. WIll need follow up with surgery which has been scheduled with Dr. Sophia Montejo on 12/1/21. They will evaluate patient's drain and decide on removal at that time. Acute on chronic hypoxic respiratory failure with tracheostomy Resolved,  --Patient was intubated in outside hospital on 9/7 and was trached on 9/16. He was successfully decannulated here on 11/3     Renal:  ESRD on HD MWF-Nephrology following        Right upper extremity acute DVT, POA. Patient was on warfarin but came with hemorrhagic shock and supratherapeutic INR requiring reversal  --Anticoagulation on hold. --repeat Venous duplex negative for DVT in RUE. Will not resume warfarin at this time. Right upper and left lower extremities appeared weaker than the rest on 11/4  --Noncontrast head CT is negative. --patient states that RUE has been since previous admission. Discussed possibility of MRI with patient but he does not want to go through additional testing if it will not  and/or delay his discharge to rehab.      Morbid obesity   - BMI 41.69  - Weight loss management to be followed up outpatient      Vascular access/drainage  --Right IJ Navneet catheter  --Left arm midline  --Left upper abdomen drainage catheter. PEG and tracheostomy removed. Wound management as noted by wound care specialist nurse    1. POA left heel looks the same, maroon/brown crusted area, no drainage, wound edges are closed, jeana-wound without erythema. Offloaded. Venelex ointment applied.     2. POA left buttock wound measures 1 cm x 3 cm x 1.5 cm with undermining at 6 o'clock being 2.6 cm, wound bed covered with slough, appears thinner today, moderate amount of tan drainage, wound edges are open, jeana-wound intact without erythema.   Santyl ointment, 2 x 2 and Optifoam Gentle applied.     3.  Bilateral lower buttocks wounds are improving, left buttock wound is smaller and measures 2.8 cm x  1.5 cm x 0.1 cm, right buttock wound is also smaller and measures 0.3 cm x 0.8 cm x 0.1 cm, wound beds are pink, moist, scant serous drainage, wound edges are open, jeana-wound intact. Calazime lotion applied.     4. Sacrum/top of gluteal cleft with small fissure measuring 0.8 cm x 0.2 cm x 0.1 cm, wound bed is pink, blanches, wound edges are open, jeana-wound intact. Calazime lotion applied. Recommendations:    Continue with current wound care and also apply Calazime lotion to sacrum and bilateral lower buttocks      Bilateral heels- Every 12 hours liberally apply Venelex ointment     Left buttock- Daily cleanse with normal saline, apply nickel thickness of Santyl ointment, loosely fill undermining at 6 o'clock with 2 x 2 (remaining gauze can be placed over wound) and cover with Optifoam Gentle.     Lower buttocks and sacrum- Every 12 hours apply Calazime lotion or Z guard paste (orange tube). Code status: Full code  DVT prophylaxis: SCDs    Care Plan discussed with: Patient/Family  Anticipated Disposition: Accepted to [facility] and rehab SNF. Anticipated Discharge: Anticipate discharge early next week. Hospital Problems  Date Reviewed: 11/6/2021          Codes Class Noted POA    Abdominal wall fluid collections ICD-10-CM: R18.8  ICD-9-CM: 789.59  10/25/2021 Unknown        Severe protein-calorie malnutrition ([facility] [address] ICD-10-CM: E43  ICD-9-CM: 262  10/21/2021 Unknown        * (Principal) GIB (gastrointestinal bleeding) ICD-10-CM: K92.2  ICD-9-CM: 578.9  10/18/2021 Yes                Review of Systems:   A comprehensive review of systems was negative except for that written in the HPI. Vital Signs:    Last 24hrs VS reviewed since prior progress note.  Most recent are:  Visit Vitals  /81   Pulse (!) 113   Temp 98.2 °F (36.8 °C)   Resp 20   Ht 5' 11.5\" (1.816 m)   Wt 128.3 kg (282 lb 13.6 oz)   SpO2 96%   BMI 38.90 kg/m²       No intake or output data in the 24 hours ending 11/14/21 1349 Physical Examination:     I had a face to face encounter with this patient and independently examined them on 11/14/2021 as outlined below:          Constitutional:  No acute distress, cooperative, pleasant    ENT:  Oral mucosa moist, oropharynx benign. Resp:  CTA bilaterally. CV:  Regular rhythm, normal rat    GI:  Soft, non distended, non tender. normoactive bowel sounds,  abdominal wall drainage tube has minimal purulent discharge in the bag    Musculoskeletal:  No edema, warm, 2+ pulses throughout    Neurologic:  Generalized weakness ,right upper extremity and left lower extremity appear more weaker . AAOx3, CN II-XII reviewed            Data Review:    Review and/or order of clinical lab test      Labs:     Recent Labs     11/14/21 0243 11/13/21 0256   WBC 19.6* 15.8*   HGB 8.6* 8.8*   HCT 27.9* 29.0*    222     Recent Labs     11/14/21 0243 11/13/21 0254 11/12/21  1020   * 134* 135*   K 3.9 3.8 3.6    100 101   CO2 26 26 27   BUN 38* 27* 21*   CREA 3.81* 3.11* 2.40*   * 104* 101*   CA 9.8 9.9 9.7     Recent Labs     11/14/21 0243 11/13/21 0254 11/12/21  1020   ALT 14 15 13   * 154* 168*   TBILI 0.5 0.6 0.6   TP 6.1* 6.3* 6.5   ALB 2.3* 2.4* 2.6*   GLOB 3.8 3.9 3.9     No results for input(s): INR, PTP, APTT, INREXT, INREXT in the last 72 hours. No results for input(s): FE, TIBC, PSAT, FERR in the last 72 hours. No results found for: FOL, RBCF   No results for input(s): PH, PCO2, PO2 in the last 72 hours. No results for input(s): CPK, CKNDX, TROIQ in the last 72 hours.     No lab exists for component: CPKMB  No results found for: CHOL, CHOLX, CHLST, CHOLV, HDL, HDLP, LDL, LDLC, DLDLP, TGLX, TRIGL, TRIGP, CHHD, CHHDX  No results found for: GLUCPOC  No results found for: COLOR, APPRN, SPGRU, REFSG, ORIANA, PROTU, GLUCU, KETU, BILU, UROU, SPENCER, LEUKU, GLUKE, EPSU, BACTU, WBCU, RBCU, CASTS, UCRY      Medications Reviewed:     Current Facility-Administered Medications Medication Dose Route Frequency    furosemide (LASIX) tablet 80 mg  80 mg Oral DAILY    LORazepam (ATIVAN) tablet 0.5 mg  0.5 mg Oral BID PRN    fluconazole (DIFLUCAN) 200mg/100 mL IVPB (premix)  200 mg IntraVENous Q24H    pantoprazole (PROTONIX) tablet 40 mg  40 mg Oral ACB&D    L.acidophilus-paracasei-S.thermophil-bifidobacter (RISAQUAD) 8 billion cell capsule  1 Capsule Oral DAILY    calcium carbonate (TUMS) chewable tablet 200 mg [elemental]  200 mg Oral TID WITH MEALS    albuterol-ipratropium (DUO-NEB) 2.5 MG-0.5 MG/3 ML  3 mL Nebulization Q6H PRN    LORazepam (ATIVAN) injection 1 mg  1 mg IntraVENous Q12H PRN    oxyCODONE IR (ROXICODONE) tablet 5 mg  5 mg Oral Q4H PRN    HYDROmorphone (DILAUDID) tablet 1 mg  1 mg Oral Q4H PRN    collagenase (SANTYL) 250 unit/gram ointment   Topical DAILY    heparin (porcine) 1,000 unit/mL injection 4,000 Units  4,000 Units Hemodialysis DIALYSIS PRN    alteplase (CATHFLO) 1.8 mg in sterile water (preservative free) 1.8 mL injection  1.8 mg InterCATHeter DIALYSIS ONCE    alteplase (CATHFLO) 1.8 mg in sterile water (preservative free) 1.8 mL injection  1.8 mg InterCATHeter DIALYSIS ONCE    albumin human 25% (BUMINATE) solution 25 g  25 g IntraVENous DIALYSIS PRN    epoetin kayley-epbx (RETACRIT) injection 10,000 Units  10,000 Units SubCUTAneous Q TUE, THU & SAT    B complex-vitaminC-folic acid (NEPHROCAP) cap  1 Capsule Oral DAILY    midodrine (PROAMATINE) tablet 10 mg  10 mg Oral Q8H    heparin (porcine) 1,000 unit/mL injection 1,800 Units  1,800 Units InterCATHeter DIALYSIS PRN    And    heparin (porcine) 1,000 unit/mL injection 1,800 Units  1,800 Units InterCATHeter DIALYSIS PRN    fentaNYL (DURAGESIC) 25 mcg/hr patch 1 Patch  1 Patch TransDERmal Q72H    melatonin tablet 9 mg  9 mg Oral QHS    sodium chloride (NS) flush 5-40 mL  5-40 mL IntraVENous Q8H    sodium chloride (NS) flush 5-40 mL  5-40 mL IntraVENous PRN    chlorhexidine (ORAL CARE KIT) 0.12 % mouthwash 15 mL  15 mL Oral Q12H    balsam peru-castor oiL (VENELEX) ointment   Topical BID    sodium chloride (NS) flush 5-10 mL  5-10 mL IntraVENous PRN    sodium chloride (NS) flush 5-40 mL  5-40 mL IntraVENous Q8H    sodium chloride (NS) flush 5-40 mL  5-40 mL IntraVENous PRN    acetaminophen (TYLENOL) tablet 650 mg  650 mg Oral Q6H PRN    Or    acetaminophen (TYLENOL) suppository 650 mg  650 mg Rectal Q6H PRN    polyethylene glycol (MIRALAX) packet 17 g  17 g Oral DAILY PRN    ondansetron (ZOFRAN ODT) tablet 4 mg  4 mg Oral Q8H PRN    Or    ondansetron (ZOFRAN) injection 4 mg  4 mg IntraVENous Q6H PRN    albuterol (PROVENTIL VENTOLIN) nebulizer solution 2.5 mg  2.5 mg Nebulization Q4H PRN     ______________________________________________________________________  EXPECTED LENGTH OF STAY: 4d 9h  ACTUAL LENGTH OF STAY:          27                 Maggie Harrison MD

## 2021-11-15 LAB
ALBUMIN SERPL-MCNC: 2.1 G/DL (ref 3.5–5)
ALBUMIN/GLOB SERPL: 0.5 {RATIO} (ref 1.1–2.2)
ALP SERPL-CCNC: 159 U/L (ref 45–117)
ALT SERPL-CCNC: 17 U/L (ref 12–78)
ANION GAP SERPL CALC-SCNC: 12 MMOL/L (ref 5–15)
AST SERPL-CCNC: 18 U/L (ref 15–37)
BACTERIA SPEC CULT: ABNORMAL
BASOPHILS # BLD: 0.3 K/UL (ref 0–0.1)
BASOPHILS NFR BLD: 1 % (ref 0–1)
BILIRUB SERPL-MCNC: 0.5 MG/DL (ref 0.2–1)
BUN SERPL-MCNC: 48 MG/DL (ref 6–20)
BUN/CREAT SERPL: 11 (ref 12–20)
CALCIUM SERPL-MCNC: 9.9 MG/DL (ref 8.5–10.1)
CHLORIDE SERPL-SCNC: 100 MMOL/L (ref 97–108)
CO2 SERPL-SCNC: 20 MMOL/L (ref 21–32)
COMMENT, HOLDF: NORMAL
CREAT SERPL-MCNC: 4.34 MG/DL (ref 0.7–1.3)
DIFFERENTIAL METHOD BLD: ABNORMAL
EOSINOPHIL # BLD: 0 K/UL (ref 0–0.4)
EOSINOPHIL NFR BLD: 0 % (ref 0–7)
ERYTHROCYTE [DISTWIDTH] IN BLOOD BY AUTOMATED COUNT: 18.4 % (ref 11.5–14.5)
GLOBULIN SER CALC-MCNC: 3.9 G/DL (ref 2–4)
GLUCOSE SERPL-MCNC: 112 MG/DL (ref 65–100)
GRAM STN SPEC: ABNORMAL
GRAM STN SPEC: ABNORMAL
HCT VFR BLD AUTO: 24.9 % (ref 36.6–50.3)
HGB BLD-MCNC: 7.5 G/DL (ref 12.1–17)
IMM GRANULOCYTES # BLD AUTO: 0 K/UL
IMM GRANULOCYTES NFR BLD AUTO: 0 %
LYMPHOCYTES # BLD: 2.8 K/UL (ref 0.8–3.5)
LYMPHOCYTES NFR BLD: 11 % (ref 12–49)
MCH RBC QN AUTO: 30.9 PG (ref 26–34)
MCHC RBC AUTO-ENTMCNC: 30.1 G/DL (ref 30–36.5)
MCV RBC AUTO: 102.5 FL (ref 80–99)
MONOCYTES # BLD: 0.5 K/UL (ref 0–1)
MONOCYTES NFR BLD: 2 % (ref 5–13)
NEUTS SEG # BLD: 21.6 K/UL (ref 1.8–8)
NEUTS SEG NFR BLD: 86 % (ref 32–75)
NRBC # BLD: 0.02 K/UL (ref 0–0.01)
NRBC BLD-RTO: 0.1 PER 100 WBC
PLATELET # BLD AUTO: 253 K/UL (ref 150–400)
PMV BLD AUTO: 9.8 FL (ref 8.9–12.9)
POTASSIUM SERPL-SCNC: 4.1 MMOL/L (ref 3.5–5.1)
PROCALCITONIN SERPL-MCNC: 0.73 NG/ML
PROT SERPL-MCNC: 6 G/DL (ref 6.4–8.2)
RBC # BLD AUTO: 2.43 M/UL (ref 4.1–5.7)
RBC MORPH BLD: ABNORMAL
RBC MORPH BLD: ABNORMAL
SAMPLES BEING HELD,HOLD: NORMAL
SERVICE CMNT-IMP: ABNORMAL
SODIUM SERPL-SCNC: 132 MMOL/L (ref 136–145)
WBC # BLD AUTO: 25.2 K/UL (ref 4.1–11.1)

## 2021-11-15 PROCEDURE — 74011250637 HC RX REV CODE- 250/637: Performed by: HEALTH CARE PROVIDER

## 2021-11-15 PROCEDURE — P9047 ALBUMIN (HUMAN), 25%, 50ML: HCPCS | Performed by: INTERNAL MEDICINE

## 2021-11-15 PROCEDURE — 74011250636 HC RX REV CODE- 250/636: Performed by: NURSE PRACTITIONER

## 2021-11-15 PROCEDURE — 74011250636 HC RX REV CODE- 250/636: Performed by: INTERNAL MEDICINE

## 2021-11-15 PROCEDURE — 36415 COLL VENOUS BLD VENIPUNCTURE: CPT

## 2021-11-15 PROCEDURE — 85025 COMPLETE CBC W/AUTO DIFF WBC: CPT

## 2021-11-15 PROCEDURE — 84145 PROCALCITONIN (PCT): CPT

## 2021-11-15 PROCEDURE — 74011250637 HC RX REV CODE- 250/637: Performed by: NURSE PRACTITIONER

## 2021-11-15 PROCEDURE — 80053 COMPREHEN METABOLIC PANEL: CPT

## 2021-11-15 PROCEDURE — 65660000000 HC RM CCU STEPDOWN

## 2021-11-15 PROCEDURE — 90935 HEMODIALYSIS ONE EVALUATION: CPT

## 2021-11-15 PROCEDURE — 74011250636 HC RX REV CODE- 250/636: Performed by: HOSPITALIST

## 2021-11-15 PROCEDURE — 74011250637 HC RX REV CODE- 250/637: Performed by: INTERNAL MEDICINE

## 2021-11-15 PROCEDURE — 74011250637 HC RX REV CODE- 250/637: Performed by: HOSPITALIST

## 2021-11-15 PROCEDURE — 74011250637 HC RX REV CODE- 250/637: Performed by: STUDENT IN AN ORGANIZED HEALTH CARE EDUCATION/TRAINING PROGRAM

## 2021-11-15 RX ORDER — POLYETHYLENE GLYCOL 3350 17 G/17G
17 POWDER, FOR SOLUTION ORAL
Qty: 30 PACKET | Refills: 0 | Status: SHIPPED
Start: 2021-11-15

## 2021-11-15 RX ORDER — OXYCODONE HYDROCHLORIDE 5 MG/1
5 CAPSULE ORAL
Qty: 20 CAPSULE | Refills: 0 | Status: SHIPPED | OUTPATIENT
Start: 2021-11-15 | End: 2021-11-17 | Stop reason: SDUPTHER

## 2021-11-15 RX ORDER — ONDANSETRON 4 MG/1
4 TABLET, ORALLY DISINTEGRATING ORAL
Qty: 20 TABLET | Refills: 0 | Status: SHIPPED
Start: 2021-11-15

## 2021-11-15 RX ORDER — CALCIUM CARBONATE 200(500)MG
200 TABLET,CHEWABLE ORAL
Qty: 90 TABLET | Refills: 0 | Status: SHIPPED
Start: 2021-11-15 | End: 2021-12-15

## 2021-11-15 RX ORDER — PANTOPRAZOLE SODIUM 40 MG/1
40 TABLET, DELAYED RELEASE ORAL
Qty: 60 TABLET | Refills: 0 | Status: SHIPPED
Start: 2021-11-15 | End: 2021-12-15

## 2021-11-15 RX ORDER — MIDODRINE HYDROCHLORIDE 10 MG/1
10 TABLET ORAL EVERY 8 HOURS
Qty: 90 TABLET | Refills: 0 | Status: SHIPPED | OUTPATIENT
Start: 2021-11-15 | End: 2021-12-15

## 2021-11-15 RX ADMIN — CALCIUM CARBONATE (ANTACID) CHEW TAB 500 MG 200 MG: 500 CHEW TAB at 16:42

## 2021-11-15 RX ADMIN — OXYCODONE 5 MG: 5 TABLET ORAL at 02:57

## 2021-11-15 RX ADMIN — LORAZEPAM 0.5 MG: 0.5 TABLET ORAL at 21:50

## 2021-11-15 RX ADMIN — Medication: at 17:28

## 2021-11-15 RX ADMIN — Medication 9 MG: at 21:48

## 2021-11-15 RX ADMIN — HEPARIN SODIUM 1800 UNITS: 1000 INJECTION INTRAVENOUS; SUBCUTANEOUS at 07:27

## 2021-11-15 RX ADMIN — CALCIUM CARBONATE (ANTACID) CHEW TAB 500 MG 200 MG: 500 CHEW TAB at 09:08

## 2021-11-15 RX ADMIN — ACETAMINOPHEN 650 MG: 325 TABLET ORAL at 09:08

## 2021-11-15 RX ADMIN — MIDODRINE HYDROCHLORIDE 10 MG: 5 TABLET ORAL at 13:21

## 2021-11-15 RX ADMIN — OXYCODONE 5 MG: 5 TABLET ORAL at 21:50

## 2021-11-15 RX ADMIN — CHLORHEXIDINE GLUCONATE 15 ML: 0.12 RINSE ORAL at 09:00

## 2021-11-15 RX ADMIN — Medication 1 CAPSULE: at 09:08

## 2021-11-15 RX ADMIN — ALBUMIN (HUMAN) 25 G: 0.25 INJECTION, SOLUTION INTRAVENOUS at 05:27

## 2021-11-15 RX ADMIN — FLUCONAZOLE IN SODIUM CHLORIDE 200 MG: 2 INJECTION, SOLUTION INTRAVENOUS at 14:48

## 2021-11-15 RX ADMIN — CALCIUM CARBONATE (ANTACID) CHEW TAB 500 MG 200 MG: 500 CHEW TAB at 12:14

## 2021-11-15 RX ADMIN — MIDODRINE HYDROCHLORIDE 10 MG: 5 TABLET ORAL at 07:12

## 2021-11-15 RX ADMIN — PANTOPRAZOLE SODIUM 40 MG: 40 TABLET, DELAYED RELEASE ORAL at 07:12

## 2021-11-15 RX ADMIN — Medication 10 ML: at 13:21

## 2021-11-15 RX ADMIN — LORAZEPAM 1 MG: 2 INJECTION INTRAMUSCULAR; INTRAVENOUS at 13:20

## 2021-11-15 RX ADMIN — HEPARIN SODIUM 1800 UNITS: 1000 INJECTION INTRAVENOUS; SUBCUTANEOUS at 07:28

## 2021-11-15 RX ADMIN — COLLAGENASE SANTYL: 250 OINTMENT TOPICAL at 09:09

## 2021-11-15 RX ADMIN — HEPARIN SODIUM 4000 UNITS: 1000 INJECTION INTRAVENOUS; SUBCUTANEOUS at 07:28

## 2021-11-15 RX ADMIN — PANTOPRAZOLE SODIUM 40 MG: 40 TABLET, DELAYED RELEASE ORAL at 16:42

## 2021-11-15 RX ADMIN — Medication: at 09:00

## 2021-11-15 RX ADMIN — Medication 10 ML: at 21:48

## 2021-11-15 RX ADMIN — OXYCODONE 5 MG: 5 TABLET ORAL at 17:28

## 2021-11-15 RX ADMIN — Medication 10 ML: at 21:49

## 2021-11-15 RX ADMIN — MIDODRINE HYDROCHLORIDE 10 MG: 5 TABLET ORAL at 21:47

## 2021-11-15 NOTE — PROGRESS NOTES
Name: Lizett Pennington MRN: [id number]   : 1970 [facility]   Date: 11/15/2021        IMPRESSION:   · SERGEY, HD dependent. Patient is on MWF schedule. Having HD now. · Hypervolemia with predominantly central congestion, improved. · Anemia of multifactorial origin  · Respiratory failure due to a complicated Covid pneumonia, recovered. Trache removed      PLAN:   · Continue present care  · Complete a full HD Tx today  · Next HD on Wednesday. · Anemia management-  Retacrit. Subjective/Interval History:   I have reviewed the flowsheet and previous days notes. ROS:Review of systems not obtained due to patient factors. Objective:   Vital Signs:    Visit Vitals  /68 (BP 1 Location: Left arm, BP Patient Position: At rest)   Pulse (!) 111   Temp 97.6 °F (36.4 °C)   Resp 22   Ht 5' 11.5\" (1.816 m)   Wt 127.9 kg (281 lb 15.5 oz)   SpO2 95%   BMI 38.78 kg/m²       O2 Device: None (Room air)   O2 Flow Rate (L/min): 0 l/min   Temp (24hrs), Av.1 °F (36.7 °C), Min:97.6 °F (36.4 °C), Max:98.6 °F (37 °C)       Intake/Output:   Last shift:      11/15 0701 - 11/15 1900  In: -   Out: 2500   Last 3 shifts: 1901 - 11/15 0700  In: -   Out: 1 [Urine:1]    Intake/Output Summary (Last 24 hours) at 11/15/2021 1302  Last data filed at 11/15/2021 0815  Gross per 24 hour   Intake --   Output 2501 ml   Net -2501 ml        Physical Exam:  General:    Awake, alert, answers all questions appropriately. HD Tx is in progress. Head:   Normocephalic, without obvious abnormality, atraumatic. Eyes:   Conjunctivae/corneas clear. Nose:  Nares normal. No drainage or sinus tenderness. Throat:    Lips, mucosa, and tongue normal.    Neck:  Supple. No trache  Lungs:   Clear to auscultation bilaterally. No Wheezing or Rhonchi. No rales. Chest wall:  No tenderness or deformity. No Accessory muscle use. Heart:   Regular rate and rhythm,  no murmur, rub or gallop. Abdomen:   Soft. Distended.   Bowel sounds normal. PEG tube in place. Extremities: Extremities normal, atraumatic, No cyanosis. No edema. No clubbing  Skin:     Texture, turgor normal. No rashes or lesions. Not Jaundiced  Psych:  Calm. Neurologic: Non focal.      DATA:  Labs:  Recent Labs     11/15/21  0304 11/14/21  0243 11/13/21  0254   * 135* 134*   K 4.1 3.9 3.8    101 100   CO2 20* 26 26   BUN 48* 38* 27*   CREA 4.34* 3.81* 3.11*   CA 9.9 9.8 9.9   ALB 2.1* 2.3* 2.4*     Recent Labs     11/15/21  0304 11/14/21  0243 11/13/21  0256   WBC 25.2* 19.6* 15.8*   HGB 7.5* 8.6* 8.8*   HCT 24.9* 27.9* 29.0*    228 222     No results for input(s): DOLORES, KU, CLU, CREAU in the last 72 hours.     No lab exists for component: PROU    Total time spent with patient:  35 minutes    [] Critical Care Provided    Care Plan discussed with:   Jeanna Petersen MD

## 2021-11-15 NOTE — PROGRESS NOTES
[address] [facility] Adult  Hospitalist Group                                                                                          Hospitalist Progress Note  Carol Sanchez MD  Answering service: [phone] OR [phone] from in house phone        Date of Service:  11/15/2021  NAME:  Aleisha Hurley  :  1970  MRN:  [id number]      Admission Summary:   Pt is a 54 yo M with PMH ESRD on HD, takes coumadin secondary to hx of DVT RUE with a recent prolonged hospitalization secondary to COVID PNA in August of this year. He ultimately required trach and peg was sent to [facility] he has not been there too long when he was sent to our ED for hematemesis; he was admitted to [facility] on 10/15 and transferred to [facility] on 10/18. Per records reviewed, patient was intubated on , trached on , he was treated for VRE pneumonia. PEG dislodged on 10/11, treated with Unasyn until 10/17. Patient was on warfarin for right upper extremity DVT involving the subclavian, axillary and brachial veins. He started having coffee ground emesis 10/18/21 with elevated HR and hypotension. He was sent to the ER for further workup. He was found to be hypotensive and ICU was consulted for further management. \"          Interval history / Subjective:      F/u Hemorrhagic shock    Patient has no complaints, completed dialysis this morning. His white cell count continues to fluctuate, 20 5K today. I spoke with ID who already saw him. I recommended CT A/P with contrast.  Patient worried about contrast not wanting to get the CT. Assessment & Plan:     Rising WBC, WBC fluctuating. He remained afebrile. WBC up to 25.2 today. --CT abdomen on  without significant fluid collection. But this was done without contrast.  --Abdominal wall abscess culture on 10/23 grew coag negative, felt to be contaminant. It also grew Candida albicans for which she completed 15 days of Diflucan therapy.   --Procalcitonin elevated at 1.72, trending down;blood culture from 11/12 negative thus far.  --ID consulted, restarted Diflucan.  --ID recommended CT abdomen with contrast to assess for collection, the catheter might not be draining and may need to be repositioned depending on what the CT shows. Patient is eager to get out of here. ID do not feel it safe to discharge patient with this rising and high white cell count. I agree. Asking for nephrology opinion to see if we can get the CT with contrast today if patient agrees and may be short out of dialysis tomorrow. Administration since admission  · Zosyn from 10/18-10/29  · Vancomycin x1 dose on 10/18  · Zyvox from 10/8-10/21  · Levaquin x1 dose on 10/18  · Cefazolin x1 dose on 11/5  · Diflucan from 10/21-11/5; restarted on 11/12-present      Hemorrhagic shock due to hematemesis due to severe esophagitis. Acute blood loss anemia.   --EGD on 10/19 showed LA grade D reflux esophagitis up to the mid esophagus, few sessile polyps in the stomach, mild patchy erythema in the distal bulb of the duodenum. - s/p EGD (11/01)  - S/P 2 units of pRBCs, 1 unit of FFB and K centra. - Continue PPI   - Monitor labs and transfuse for hgb <7.0. Hemoglobin has remained> 8.  - GI signed off. Left abdominal abscess. History of dislodged PEG tube. --CT A/P on 10/20 showed left anterior abdominal and pelvic wall hematoma or gas containing fluid collection. --He underwent ultrasound guided fluid drainage and percutaneous drain placement which revealed thick purulent fluid, 18 [place] drain was left in place. -- Wound culture on 10/23 grew apparent Candida albicans, no sensitivity available. Culture also showed coag negative staph which was deemed to be contaminant  -Completed 15 days of IV Diflucan. WBC normalized. -Follow up CT on 10/26 showed slightly diminished size of the abdominal wall collection.   - Repeat CT on 11/2 significant decrease in size of the left rectus sheath hematoma.  The small collection suggestive of congealed hematoma and is unlikely to drain through cath. - surgery following, abdominal wall drain is attached to bag with purulent discharge  --CT abdomen and pelvis, sinogram performed 11/9: Stable enlargement of the left abdominal wall musculature, evaluation for abscess is limited without IV contrast, a small residual abscess is difficult to exclude however this is unchanged. Contrast injection through the catheter demonstrated pooling of contrast material dependently within the muscle with out evidence of fistula. --General surgery following, discussed with , no need for repeat CT abd/pel at this time. WIll need follow up with surgery which has been scheduled with Dr. Ravi Stallworth on 12/1/21. They will evaluate patient's drain and decide on removal at that time. Acute on chronic hypoxic respiratory failure with tracheostomy Resolved,  --Patient was intubated in outside hospital on 9/7 and was trached on 9/16. He was successfully decannulated here on 11/3     Renal:  ESRD on HD MWF-Nephrology following        Right upper extremity acute DVT, POA. Patient was on warfarin but came with hemorrhagic shock and supratherapeutic INR requiring reversal  --Anticoagulation on hold. --repeat Venous duplex negative for DVT in RUE. Will not resume warfarin at this time. Right upper and left lower extremities appeared weaker than the rest on 11/4  --Noncontrast head CT is negative. --patient states that RUE has been since previous admission. Discussed possibility of MRI with patient but he does not want to go through additional testing if it will not  and/or delay his discharge to rehab.      Morbid obesity   - BMI 41.69  - Weight loss management to be followed up outpatient      Vascular access/drainage  --Right IJ Navneet catheter  --Left arm midline  --Left upper abdomen drainage catheter. PEG and tracheostomy removed.     Wound management as noted by wound care specialist nurse    1. POA left heel looks the same, maroon/brown crusted area, no drainage, wound edges are closed, jeana-wound without erythema. Offloaded. Venelex ointment applied.     2. POA left buttock wound measures 1 cm x 3 cm x 1.5 cm with undermining at 6 o'clock being 2.6 cm, wound bed covered with slough, appears thinner today, moderate amount of tan drainage, wound edges are open, jeana-wound intact without erythema. Santyl ointment, 2 x 2 and Optifoam Gentle applied.     3.  Bilateral lower buttocks wounds are improving, left buttock wound is smaller and measures 2.8 cm x  1.5 cm x 0.1 cm, right buttock wound is also smaller and measures 0.3 cm x 0.8 cm x 0.1 cm, wound beds are pink, moist, scant serous drainage, wound edges are open, jeana-wound intact. Calazime lotion applied.     4. Sacrum/top of gluteal cleft with small fissure measuring 0.8 cm x 0.2 cm x 0.1 cm, wound bed is pink, blanches, wound edges are open, jeana-wound intact. Calazime lotion applied. Recommendations:    Continue with current wound care and also apply Calazime lotion to sacrum and bilateral lower buttocks      Bilateral heels- Every 12 hours liberally apply Venelex ointment     Left buttock- Daily cleanse with normal saline, apply nickel thickness of Santyl ointment, loosely fill undermining at 6 o'clock with 2 x 2 (remaining gauze can be placed over wound) and cover with Optifoam Gentle.     Lower buttocks and sacrum- Every 12 hours apply Calazime lotion or Z guard paste (orange tube). Code status: Full code  DVT prophylaxis: SCDs    Care Plan discussed with: Patient/Family  Anticipated Disposition: Accepted to [facility] and rehab SNF. Anticipated Discharge: Patient needed further work-up and monitoring for the rising WBC prior to discharge.      Hospital Problems  Date Reviewed: 11/6/2021          Codes Class Noted POA    Abdominal wall fluid collections ICD-10-CM: R18.8  ICD-9-CM: 789.59  10/25/2021 Unknown        Severe protein-calorie malnutrition ([facility] [address] ICD-10-CM: E43  ICD-9-CM: 262  10/21/2021 Unknown        * (Principal) GIB (gastrointestinal bleeding) ICD-10-CM: K92.2  ICD-9-CM: 578.9  10/18/2021 Yes                Review of Systems:   A comprehensive review of systems was negative except for that written in the HPI. Vital Signs:    Last 24hrs VS reviewed since prior progress note. Most recent are:  Visit Vitals  /68 (BP 1 Location: Left arm, BP Patient Position: At rest)   Pulse (!) 111   Temp 97.6 °F (36.4 °C)   Resp 22   Ht 5' 11.5\" (1.816 m)   Wt 127.9 kg (281 lb 15.5 oz)   SpO2 95%   BMI 38.78 kg/m²         Intake/Output Summary (Last 24 hours) at 11/15/2021 1320  Last data filed at 11/15/2021 0815  Gross per 24 hour   Intake --   Output 2501 ml   Net -2501 ml        Physical Examination:     I had a face to face encounter with this patient and independently examined them on 11/15/2021 as outlined below:          Constitutional:  No acute distress, cooperative, pleasant    ENT:  Oral mucosa moist, oropharynx benign. Resp:  CTA bilaterally. CV:  Regular rhythm, normal rat    GI:  Soft, non distended, non tender. normoactive bowel sounds,  abdominal wall drainage tube has minimal purulent discharge in the bag    Musculoskeletal:  No edema, warm, 2+ pulses throughout    Neurologic:  Generalized weakness ,right upper extremity and left lower extremity appear more weaker .   AAOx3, CN II-XII reviewed            Data Review:    Review and/or order of clinical lab test      Labs:     Recent Labs     11/15/21  0304 11/14/21  0243   WBC 25.2* 19.6*   HGB 7.5* 8.6*   HCT 24.9* 27.9*    228     Recent Labs     11/15/21  0304 11/14/21 0243 11/13/21  0254   * 135* 134*   K 4.1 3.9 3.8    101 100   CO2 20* 26 26   BUN 48* 38* 27*   CREA 4.34* 3.81* 3.11*   * 104* 104*   CA 9.9 9.8 9.9     Recent Labs     11/15/21  0304 11/14/21  0243 11/13/21  0254   ALT 17 14 15   * 147* 154*   TBILI 0.5 0.5 0.6   TP 6.0* 6.1* 6.3*   ALB 2.1* 2.3* 2.4*   GLOB 3.9 3.8 3.9     No results for input(s): INR, PTP, APTT, INREXT, INREXT in the last 72 hours. No results for input(s): FE, TIBC, PSAT, FERR in the last 72 hours. No results found for: FOL, RBCF   No results for input(s): PH, PCO2, PO2 in the last 72 hours. No results for input(s): CPK, CKNDX, TROIQ in the last 72 hours.     No lab exists for component: CPKMB  No results found for: CHOL, CHOLX, CHLST, CHOLV, HDL, HDLP, LDL, LDLC, DLDLP, TGLX, TRIGL, TRIGP, CHHD, CHHDX  No results found for: GLUCPOC  No results found for: COLOR, APPRN, SPGRU, REFSG, ORIANA, PROTU, GLUCU, KETU, BILU, UROU, SPENCER, LEUKU, GLUKE, EPSU, BACTU, WBCU, RBCU, CASTS, UCRY      Medications Reviewed:     Current Facility-Administered Medications   Medication Dose Route Frequency    LORazepam (ATIVAN) tablet 0.5 mg  0.5 mg Oral BID PRN    fluconazole (DIFLUCAN) 200mg/100 mL IVPB (premix)  200 mg IntraVENous Q24H    pantoprazole (PROTONIX) tablet 40 mg  40 mg Oral ACB&D    L.acidophilus-paracasei-S.thermophil-bifidobacter (RISAQUAD) 8 billion cell capsule  1 Capsule Oral DAILY    calcium carbonate (TUMS) chewable tablet 200 mg [elemental]  200 mg Oral TID WITH MEALS    albuterol-ipratropium (DUO-NEB) 2.5 MG-0.5 MG/3 ML  3 mL Nebulization Q6H PRN    LORazepam (ATIVAN) injection 1 mg  1 mg IntraVENous Q12H PRN    oxyCODONE IR (ROXICODONE) tablet 5 mg  5 mg Oral Q4H PRN    HYDROmorphone (DILAUDID) tablet 1 mg  1 mg Oral Q4H PRN    collagenase (SANTYL) 250 unit/gram ointment   Topical DAILY    heparin (porcine) 1,000 unit/mL injection 4,000 Units  4,000 Units Hemodialysis DIALYSIS PRN    alteplase (CATHFLO) 1.8 mg in sterile water (preservative free) 1.8 mL injection  1.8 mg InterCATHeter DIALYSIS ONCE    alteplase (CATHFLO) 1.8 mg in sterile water (preservative free) 1.8 mL injection  1.8 mg InterCATHeter DIALYSIS ONCE    albumin human 25% (BUMINATE) solution 25 g  25 g IntraVENous DIALYSIS PRN    epoetin kayley-epbx (RETACRIT) injection 10,000 Units  10,000 Units SubCUTAneous Q TUE, THU & SAT    B complex-vitaminC-folic acid (NEPHROCAP) cap  1 Capsule Oral DAILY    midodrine (PROAMATINE) tablet 10 mg  10 mg Oral Q8H    heparin (porcine) 1,000 unit/mL injection 1,800 Units  1,800 Units InterCATHeter DIALYSIS PRN    And    heparin (porcine) 1,000 unit/mL injection 1,800 Units  1,800 Units InterCATHeter DIALYSIS PRN    fentaNYL (DURAGESIC) 25 mcg/hr patch 1 Patch  1 Patch TransDERmal Q72H    melatonin tablet 9 mg  9 mg Oral QHS    sodium chloride (NS) flush 5-40 mL  5-40 mL IntraVENous Q8H    sodium chloride (NS) flush 5-40 mL  5-40 mL IntraVENous PRN    chlorhexidine (ORAL CARE KIT) 0.12 % mouthwash 15 mL  15 mL Oral Q12H    balsam peru-castor oiL (VENELEX) ointment   Topical BID    sodium chloride (NS) flush 5-10 mL  5-10 mL IntraVENous PRN    sodium chloride (NS) flush 5-40 mL  5-40 mL IntraVENous Q8H    sodium chloride (NS) flush 5-40 mL  5-40 mL IntraVENous PRN    acetaminophen (TYLENOL) tablet 650 mg  650 mg Oral Q6H PRN    Or    acetaminophen (TYLENOL) suppository 650 mg  650 mg Rectal Q6H PRN    polyethylene glycol (MIRALAX) packet 17 g  17 g Oral DAILY PRN    ondansetron (ZOFRAN ODT) tablet 4 mg  4 mg Oral Q8H PRN    Or    ondansetron (ZOFRAN) injection 4 mg  4 mg IntraVENous Q6H PRN    albuterol (PROVENTIL VENTOLIN) nebulizer solution 2.5 mg  2.5 mg Nebulization Q4H PRN     ______________________________________________________________________  EXPECTED LENGTH OF STAY: 4d 9h  ACTUAL LENGTH OF STAY:          28                 Kathleen Ralph MD

## 2021-11-15 NOTE — PROGRESS NOTES
ID Progress Note  11/15/2021    Subjective:     \"I need to get back on my feet. \"  Review of Systems:            Symptom Y/N Comments   Symptom Y/N Comments   Fever/Chills n      Chest Pain  n      Poor Appetite       Edema        Cough       Abdominal Pain *   uncomfortable around the catheter site, not pain    Sputum       Joint Pain        SOB/SCHAFFER       Pruritis/Rash        Nausea/vomit n      Tolerating PT/OT        Diarrhea n      Tolerating Diet        Constipation n      Other           Could NOT obtain due to:       Objective:     Vitals:   Visit Vitals  /73   Pulse (!) 107   Temp 97.6 °F (36.4 °C) (Oral)   Resp 17   Ht 5' 11.5\" (1.816 m)   Wt 127.9 kg (281 lb 15.5 oz)   SpO2 96%   BMI 38.78 kg/m²        Tmax:  Temp (24hrs), Av.2 °F (36.8 °C), Min:97.6 °F (36.4 °C), Max:98.6 °F (37 °C)      PHYSICAL EXAM:  General: Obese, WD, WN. Alert, cooperative, no acute distress    EENT:  EOMI. Anicteric sclerae. MMM  Resp:  Clear in apex with decrease of breath sounds at bases. no wheezing or rales. No accessory muscle use  CV:  Regular  rhythm,  diffuse edema all four extremities  GI:  Soft, obese,  Tender around the catheter. +Bowel sounds, drainage catheter in place    Neurologic:  Alert and oriented X 3, normal speech,   Psych:   Good insight. Not anxious nor agitated  Skin:  No rashes.   No jaundice    Labs:   Lab Results   Component Value Date/Time    WBC 25.2 (H) 11/15/2021 03:04 AM    HGB 7.5 (L) 11/15/2021 03:04 AM    HCT 24.9 (L) 11/15/2021 03:04 AM    PLATELET 964  03:04 AM    .5 (H) 11/15/2021 03:04 AM     Lab Results   Component Value Date/Time    Sodium 132 (L) 11/15/2021 03:04 AM    Potassium 4.1 11/15/2021 03:04 AM    Chloride 100 11/15/2021 03:04 AM    CO2 20 (L) 11/15/2021 03:04 AM    Anion gap 12 11/15/2021 03:04 AM    Glucose 112 (H) 11/15/2021 03:04 AM    BUN 48 (H) 11/15/2021 03:04 AM    Creatinine 4.34 (H) 11/15/2021 03:04 AM    BUN/Creatinine ratio 11 (L) 11/15/2021 03:04 AM    GFR est AA 18 (L) 11/15/2021 03:04 AM    GFR est non-AA 15 (L) 11/15/2021 03:04 AM    Calcium 9.9 11/15/2021 03:04 AM    Bilirubin, total 0.5 11/15/2021 03:04 AM    Alk. phosphatase 159 (H) 11/15/2021 03:04 AM    Protein, total 6.0 (L) 11/15/2021 03:04 AM    Albumin 2.1 (L) 11/15/2021 03:04 AM    Globulin 3.9 11/15/2021 03:04 AM    A-G Ratio 0.5 (L) 11/15/2021 03:04 AM    ALT (SGPT) 17 11/15/2021 03:04 AM      CT of abd/pel (11/11) Stable enlargement of the left abdominal wall musculature status post percutaneous drainage. Evaluation for abscess is limited without IV contrast. A small residual abscess is difficult to exclude however this is unchanged. Contrast injection through the catheter demonstrates pooling of contrast material dependently within the muscle without evidence of fistula. Assessment and Plan   Leukocytosis (gradual trending up since11/9)  Left abdominal abscess  hx dislodged PEG tube   S/p replacement of right sided tunneled dialysis catheter (11/5)  - WBC 22.3, afebrile    Pt claims that he has been told that he usually have 'high WBC' by his pcp. However, his WBC was 8-11k between 11/4 to 11/8. Body fluid cx (10/19) candida albicans    Wound cx (10/23 & 11/15) staph coagulase negative    blood cx (11/12) no growth so far       Recommend to repeat CT of abd/pel with contrast to assess abdominal abscess. However, will defer the final decision to surgery and primary team.    Pt declined the repeat image at this time. Discussed about importance and rational behind repeat image. Pt would like to think about the option. Continue with IV fluconazole (resumed on 11/12). Recommend to complete at least additional 2 weeks of fluconazole therapy. Pt should follow up with surgery team upon discharge.         Fever work up if temp >= 100.4      Above plan of care discussed and agreed with Dr. Angeli Cortes, NP

## 2021-11-15 NOTE — PROGRESS NOTES
Transition of Care Plan   RUR- Med 16%   DISPOSITION: SNF - [facility] - pending medical stability   F/U with PCP/Specialist     Transport: [facility]     CM provided clinical update to Priscilla Brady, Admissions with [facility] [phone]. Patient's WBC count is still high, ID following. They recommend repeat CT, patient would like to think about option of repeat CT. CM to continue to follow progress and assist with LARRY to [facility] via [facility] when patient is stable. BETSY Murdock.

## 2021-11-15 NOTE — ADVANCED PRACTICE NURSE
Hemodialysis / [phone]    Vitals Pre Post Assessment Pre Post   BP BP: 119/73 (11/15/21 0730) 112/87 LOC A&O x 3 A&O x 3   HR Pulse (Heart Rate): (!) 103 (11/15/21 0730) 108 Lungs clear clear   Resp Resp Rate: 16 (11/15/21 0730) 17 Cardiac regular regular   Temp Temp: 97.7 °F (36.5 °C) (11/15/21 0510) 97.6 Skin warm warm   Weight Pre-Dialysis Weight: 127.9 kg (281 lb 15.5 oz) (11/15/21 0510) 125 kg Edema generalized generalized   Tele status remote remote Pain Pain Intensity 1: 0 (11/14/21 1003) No Pain     Orders   Duration: Start: 0515 End: 0815 Total: 3 hr   Dialyzer: Dialyzer/Set Up Inspection: Amanuel Landry ([id number]) (11/15/21 0510)   K Bath: Dialysate K (mEq/L): 3 (11/15/21 0510)   Ca Bath: Dialysate CA (mEq/L): 2.5 (11/15/21 0510)   Na: Dialysate NA (mEq/L): 138 (11/15/21 0510)   Bicarb: Dialysate HCO3 (mEq/L): 35 (11/15/21 0510)   Target Fluid Removal: Goal/Amount of Fluid to Remove (mL): 3000 mL (11/15/21 0510)     Access   Type & Location: [facility] cath   Comments:                        In place, patent, dressing dry and intact, no S/S of infection                 Labs   HBsAg (Antigen) / date:       10/20/21 Negative                                        HBsAb (Antibody) / date: 10/20/21 Immune   Source: Connect care   Obtained/Reviewed  Critical Results Called HGB   Date Value Ref Range Status   11/15/2021 7.5 (L) 12.1 - 17.0 g/dL Final     Potassium   Date Value Ref Range Status   11/15/2021 4.1 3.5 - 5.1 mmol/L Final     Comment:     SPECIMEN HEMOLYZED, RESULTS MAY BE AFFECTED     Calcium   Date Value Ref Range Status   11/15/2021 9.9 8.5 - 10.1 MG/DL Final     BUN   Date Value Ref Range Status   11/15/2021 48 (H) 6 - 20 MG/DL Final     Creatinine   Date Value Ref Range Status   11/15/2021 4.34 (H) 0.70 - 1.30 MG/DL Final        Meds Given   Name Dose Route                    Adequacy / Fluid    Total Liters Process: 61 L   Net Fluid Removed: 2500 ml      Comments   Time Out Done:   (Time) Yes, 0510   Admitting Diagnosis: Renal failure   Consent obtained/signed: Informed Consent Verified: Yes (11/12/21 0734)   Machine / RO # Machine Number: [id number] (11/15/21 0510)   Primary Nurse Rpt Pre: Alejandrina Mars RN   Primary Nurse Rpt Post: Erik Gomez RN   Pt Education: Yes, r/t dialysis process   Care Plan: Continue HD as ordered   Pts outpatient clinic:      Tx Summary   Comments: Tolerated tx well, at end, all blood in circuit returned with 300 ml NS, [facility] cath in place, patent, dressing dry and intact, no S/S of infection.

## 2021-11-15 NOTE — PROGRESS NOTES
Bedside shift change report given to [address] [facility],[address] (oncoming nurse) by Noe Brian RN (offgoing nurse). Report included the following information SBAR, Kardex, ED Summary, OR Summary, Intake/Output, MAR, Cardiac Rhythm NSR, Alarm Parameters  and Dual Neuro Assessment.

## 2021-11-16 LAB
ALBUMIN SERPL-MCNC: 2.5 G/DL (ref 3.5–5)
ALBUMIN/GLOB SERPL: 0.6 {RATIO} (ref 1.1–2.2)
ALP SERPL-CCNC: 162 U/L (ref 45–117)
ALT SERPL-CCNC: 17 U/L (ref 12–78)
ANION GAP SERPL CALC-SCNC: 8 MMOL/L (ref 5–15)
AST SERPL-CCNC: 19 U/L (ref 15–37)
BASOPHILS # BLD: 0.2 K/UL (ref 0–0.1)
BASOPHILS NFR BLD: 1 % (ref 0–1)
BILIRUB SERPL-MCNC: 0.4 MG/DL (ref 0.2–1)
BUN SERPL-MCNC: 31 MG/DL (ref 6–20)
BUN/CREAT SERPL: 10 (ref 12–20)
CALCIUM SERPL-MCNC: 9.8 MG/DL (ref 8.5–10.1)
CHLORIDE SERPL-SCNC: 101 MMOL/L (ref 97–108)
CO2 SERPL-SCNC: 26 MMOL/L (ref 21–32)
CREAT SERPL-MCNC: 3.06 MG/DL (ref 0.7–1.3)
DIFFERENTIAL METHOD BLD: ABNORMAL
EOSINOPHIL # BLD: 0.5 K/UL (ref 0–0.4)
EOSINOPHIL NFR BLD: 3 % (ref 0–7)
ERYTHROCYTE [DISTWIDTH] IN BLOOD BY AUTOMATED COUNT: 18.2 % (ref 11.5–14.5)
GLOBULIN SER CALC-MCNC: 3.9 G/DL (ref 2–4)
GLUCOSE SERPL-MCNC: 97 MG/DL (ref 65–100)
HCT VFR BLD AUTO: 27.4 % (ref 36.6–50.3)
HGB BLD-MCNC: 8.1 G/DL (ref 12.1–17)
IMM GRANULOCYTES # BLD AUTO: 0.5 K/UL (ref 0–0.04)
IMM GRANULOCYTES NFR BLD AUTO: 3 % (ref 0–0.5)
LYMPHOCYTES # BLD: 3.3 K/UL (ref 0.8–3.5)
LYMPHOCYTES NFR BLD: 18 % (ref 12–49)
MCH RBC QN AUTO: 30.7 PG (ref 26–34)
MCHC RBC AUTO-ENTMCNC: 29.6 G/DL (ref 30–36.5)
MCV RBC AUTO: 103.8 FL (ref 80–99)
MONOCYTES # BLD: 1.5 K/UL (ref 0–1)
MONOCYTES NFR BLD: 8 % (ref 5–13)
NEUTS SEG # BLD: 12.2 K/UL (ref 1.8–8)
NEUTS SEG NFR BLD: 67 % (ref 32–75)
NRBC # BLD: 0 K/UL (ref 0–0.01)
NRBC BLD-RTO: 0 PER 100 WBC
PLATELET # BLD AUTO: 288 K/UL (ref 150–400)
PMV BLD AUTO: 9.7 FL (ref 8.9–12.9)
POTASSIUM SERPL-SCNC: 4 MMOL/L (ref 3.5–5.1)
PROT SERPL-MCNC: 6.4 G/DL (ref 6.4–8.2)
RBC # BLD AUTO: 2.64 M/UL (ref 4.1–5.7)
RBC MORPH BLD: ABNORMAL
RBC MORPH BLD: ABNORMAL
SODIUM SERPL-SCNC: 135 MMOL/L (ref 136–145)
WBC # BLD AUTO: 18.2 K/UL (ref 4.1–11.1)

## 2021-11-16 PROCEDURE — 74011250637 HC RX REV CODE- 250/637: Performed by: NURSE PRACTITIONER

## 2021-11-16 PROCEDURE — 97530 THERAPEUTIC ACTIVITIES: CPT

## 2021-11-16 PROCEDURE — 85025 COMPLETE CBC W/AUTO DIFF WBC: CPT

## 2021-11-16 PROCEDURE — 74011250637 HC RX REV CODE- 250/637: Performed by: HEALTH CARE PROVIDER

## 2021-11-16 PROCEDURE — 74011250636 HC RX REV CODE- 250/636: Performed by: HOSPITALIST

## 2021-11-16 PROCEDURE — 65660000000 HC RM CCU STEPDOWN

## 2021-11-16 PROCEDURE — 74011250636 HC RX REV CODE- 250/636: Performed by: NURSE PRACTITIONER

## 2021-11-16 PROCEDURE — 97110 THERAPEUTIC EXERCISES: CPT

## 2021-11-16 PROCEDURE — 74011250637 HC RX REV CODE- 250/637: Performed by: STUDENT IN AN ORGANIZED HEALTH CARE EDUCATION/TRAINING PROGRAM

## 2021-11-16 PROCEDURE — 74011250637 HC RX REV CODE- 250/637: Performed by: INTERNAL MEDICINE

## 2021-11-16 PROCEDURE — 74011250636 HC RX REV CODE- 250/636: Performed by: INTERNAL MEDICINE

## 2021-11-16 PROCEDURE — 80053 COMPREHEN METABOLIC PANEL: CPT

## 2021-11-16 PROCEDURE — 36415 COLL VENOUS BLD VENIPUNCTURE: CPT

## 2021-11-16 PROCEDURE — 74011250637 HC RX REV CODE- 250/637: Performed by: HOSPITALIST

## 2021-11-16 RX ADMIN — Medication 1 CAPSULE: at 08:09

## 2021-11-16 RX ADMIN — Medication 10 ML: at 06:33

## 2021-11-16 RX ADMIN — CHLORHEXIDINE GLUCONATE 15 ML: 0.12 RINSE ORAL at 21:00

## 2021-11-16 RX ADMIN — Medication 9 MG: at 22:46

## 2021-11-16 RX ADMIN — COLLAGENASE SANTYL: 250 OINTMENT TOPICAL at 08:09

## 2021-11-16 RX ADMIN — Medication: at 08:10

## 2021-11-16 RX ADMIN — ACETAMINOPHEN 650 MG: 325 TABLET ORAL at 19:20

## 2021-11-16 RX ADMIN — EPOETIN ALFA-EPBX 10000 UNITS: 10000 INJECTION, SOLUTION INTRAVENOUS; SUBCUTANEOUS at 22:54

## 2021-11-16 RX ADMIN — HYDROMORPHONE HYDROCHLORIDE 1 MG: 2 TABLET ORAL at 22:46

## 2021-11-16 RX ADMIN — FLUCONAZOLE IN SODIUM CHLORIDE 200 MG: 2 INJECTION, SOLUTION INTRAVENOUS at 14:46

## 2021-11-16 RX ADMIN — Medication 10 ML: at 14:46

## 2021-11-16 RX ADMIN — CALCIUM CARBONATE (ANTACID) CHEW TAB 500 MG 200 MG: 500 CHEW TAB at 11:50

## 2021-11-16 RX ADMIN — Medication: at 17:06

## 2021-11-16 RX ADMIN — LORAZEPAM 1 MG: 2 INJECTION INTRAMUSCULAR; INTRAVENOUS at 14:46

## 2021-11-16 RX ADMIN — MIDODRINE HYDROCHLORIDE 10 MG: 5 TABLET ORAL at 22:46

## 2021-11-16 RX ADMIN — CALCIUM CARBONATE (ANTACID) CHEW TAB 500 MG 200 MG: 500 CHEW TAB at 17:06

## 2021-11-16 RX ADMIN — ACETAMINOPHEN 650 MG: 325 TABLET ORAL at 14:46

## 2021-11-16 RX ADMIN — PANTOPRAZOLE SODIUM 40 MG: 40 TABLET, DELAYED RELEASE ORAL at 17:06

## 2021-11-16 RX ADMIN — PANTOPRAZOLE SODIUM 40 MG: 40 TABLET, DELAYED RELEASE ORAL at 07:15

## 2021-11-16 RX ADMIN — MIDODRINE HYDROCHLORIDE 10 MG: 5 TABLET ORAL at 14:47

## 2021-11-16 RX ADMIN — Medication 10 ML: at 22:00

## 2021-11-16 RX ADMIN — OXYCODONE 5 MG: 5 TABLET ORAL at 11:57

## 2021-11-16 RX ADMIN — MIDODRINE HYDROCHLORIDE 10 MG: 5 TABLET ORAL at 06:32

## 2021-11-16 RX ADMIN — CALCIUM CARBONATE (ANTACID) CHEW TAB 500 MG 200 MG: 500 CHEW TAB at 08:09

## 2021-11-16 NOTE — PROGRESS NOTES
Per Dr Jude Ann, plan to continue IV antibiotics up until discharge then change to PO Diflucan. Midline access not needed at this time.

## 2021-11-16 NOTE — PROGRESS NOTES
Name: Humberto Jalloh MRN: [id number]   : 1970 Hospital: [address]   Date: 2021        IMPRESSION:   · SERGEY, HD dependent. Patient is on MWF schedule. · Hypervolemia with predominantly central congestion, improved. · Anemia of multifactorial origin  · Respiratory failure due to a complicated Covid pneumonia, recovered. Trache removed      PLAN:   · Continue present care  · Complete a full HD Tx tomorrow morning before discharge. geraldine was notified. · Next HD on Wednesday. · Anemia management-  Retacrit. Subjective/Interval History:   I have reviewed the flowsheet and previous days notes. ROS:Review of systems not obtained due to patient factors. Objective:   Vital Signs:    Visit Vitals  /74   Pulse (!) 106   Temp 98.3 °F (36.8 °C)   Resp 20   Ht 5' 11.5\" (1.816 m)   Wt 126.6 kg (279 lb 1.6 oz)   SpO2 93%   BMI 38.38 kg/m²       O2 Device: None (Room air)   O2 Flow Rate (L/min): 0 l/min   Temp (24hrs), Av.4 °F (36.9 °C), Min:98.2 °F (36.8 °C), Max:98.7 °F (37.1 °C)       Intake/Output:   Last shift:      701 - 1900  In: 240 [P.O.:240]  Out: -   Last 3 shifts: 1901 - 700  In: -   Out: 2500     Intake/Output Summary (Last 24 hours) at 2021 1110  Last data filed at 2021 0721  Gross per 24 hour   Intake 240 ml   Output --   Net 240 ml        Physical Exam:  General:    Awake, alert, answers all questions appropriately. Head:   Normocephalic, without obvious abnormality, atraumatic. Eyes:   Conjunctivae/corneas clear. Nose:  Nares normal. No drainage or sinus tenderness. Throat:    Lips, mucosa, and tongue normal.    Neck:  Supple. No trache  Lungs:   Clear to auscultation bilaterally. No Wheezing or Rhonchi. No rales. Chest wall:  No tenderness or deformity. No Accessory muscle use. Heart:   Regular rate and rhythm,  no murmur, rub or gallop. Abdomen:   Soft. Distended.   Bowel sounds normal. PEG tube in place.  Extremities: Extremities normal, atraumatic, No cyanosis. No edema. No clubbing  Skin:     Texture, turgor normal. No rashes or lesions. Not Jaundiced  Psych:  Calm. Neurologic: Non focal.      DATA:  Labs:  Recent Labs     11/16/21  0210 11/15/21  0304 11/14/21  0243   * 132* 135*   K 4.0 4.1 3.9    100 101   CO2 26 20* 26   BUN 31* 48* 38*   CREA 3.06* 4.34* 3.81*   CA 9.8 9.9 9.8   ALB 2.5* 2.1* 2.3*     Recent Labs     11/16/21  0210 11/15/21  0304 11/14/21  0243   WBC 18.2* 25.2* 19.6*   HGB 8.1* 7.5* 8.6*   HCT 27.4* 24.9* 27.9*    253 228     No results for input(s): DOLORES, KU, CLU, CREAU in the last 72 hours.     No lab exists for component: PROU    Total time spent with patient:  35 minutes    [] Critical Care Provided    Care Plan discussed with:   Susan Rios MD

## 2021-11-16 NOTE — PROGRESS NOTES
[facility] Adult  Hospitalist Group                                                                                          Hospitalist Progress Note  Elias Reid MD  Answering service: [phone] OR [phone] from in house phone        Date of Service:  2021  NAME:  Boby Bhakta  :  1970  MRN:  [id number]      Admission Summary:   Pt is a 52 yo M with PMH ESRD on HD, takes coumadin secondary to hx of DVT RUE with a recent prolonged hospitalization secondary to COVID PNA in August of this year. He ultimately required trach and peg was sent to [facility] he has not been there too long when he was sent to our ED for hematemesis; he was admitted to [facility] on 10/15 and transferred to [facility] on 10/18. Per records reviewed, patient was intubated on , trached on , he was treated for VRE pneumonia. PEG dislodged on 10/11, treated with Unasyn until 10/17. Patient was on warfarin for right upper extremity DVT involving the subclavian, axillary and brachial veins. He started having coffee ground emesis 10/18/21 with elevated HR and hypotension. He was sent to the ER for further workup. He was found to be hypotensive and ICU was consulted for further management. \"          Interval history / Subjective:      F/u Hemorrhagic shock  States he needs to get out of here  Assessment & Plan:     Rising WBC, WBC fluctuating. He remained afebrile. WBC up to 25.2 today. --CT abdomen on  without significant fluid collection. But this was done without contrast.  --Abdominal wall abscess culture on 10/23 grew coag negative, felt to be contaminant. It also grew Candida albicans for which she completed 15 days of Diflucan therapy. --Procalcitonin elevated at 1.72, trending down;blood culture from  negative thus far.  --ID consulted, restarted Diflucan.  Plan to continue until   --ID recommended CT abdomen with contrast to assess for collection, the catheter might not be draining and may need to be repositioned depending on what the CT shows. Patient is eager to get out of here. Administration since admission  · Zosyn from 10/18-10/29  · Vancomycin x1 dose on 10/18  · Zyvox from 10/8-10/21  · Levaquin x1 dose on 10/18  · Cefazolin x1 dose on 11/5  · Diflucan from 10/21-11/5; restarted on 11/12-present    --WBC better today, monitor       Hemorrhagic shock due to hematemesis due to severe esophagitis. Acute blood loss anemia.   --EGD on 10/19 showed LA grade D reflux esophagitis up to the mid esophagus, few sessile polyps in the stomach, mild patchy erythema in the distal bulb of the duodenum. - s/p EGD (11/01)  - S/P 2 units of pRBCs, 1 unit of FFB and K centra. - Continue PPI   - Monitor labs and transfuse for hgb <7.0. Hemoglobin has remained> 8.  - GI signed off. Left abdominal abscess. History of dislodged PEG tube. --CT A/P on 10/20 showed left anterior abdominal and pelvic wall hematoma or gas containing fluid collection. --He underwent ultrasound guided fluid drainage and percutaneous drain placement which revealed thick purulent fluid, 18 Urdu drain was left in place. -- Wound culture on 10/23 grew apparent Candida albicans, no sensitivity available. Culture also showed coag negative staph which was deemed to be contaminant  -Completed 15 days of IV Diflucan. WBC normalized. -Follow up CT on 10/26 showed slightly diminished size of the abdominal wall collection.   - Repeat CT on 11/2 significant decrease in size of the left rectus sheath hematoma. The small collection suggestive of congealed hematoma and is unlikely to drain through cath. - surgery following, abdominal wall drain is attached to bag with purulent discharge  --CT abdomen and pelvis, sinogram performed 11/9: Stable enlargement of the left abdominal wall musculature, evaluation for abscess is limited without IV contrast, a small residual abscess is difficult to exclude however this is unchanged. Contrast injection through the catheter demonstrated pooling of contrast material dependently within the muscle with out evidence of fistula. --General surgery following, discussed with , no need for repeat CT abd/pel at this time. WIll need follow up with surgery which has been scheduled with Dr. Abelino Henry on 12/1/21. They will evaluate patient's drain and decide on removal at that time. Acute on chronic hypoxic respiratory failure with tracheostomy Resolved,  --Patient was intubated in outside hospital on 9/7 and was trached on 9/16. He was successfully decannulated here on 11/3     Renal:  ESRD on HD MWF-Nephrology following     Right upper extremity acute DVT, POA. Patient was on warfarin but came with hemorrhagic shock and supratherapeutic INR requiring reversal  --Anticoagulation on hold. --repeat Venous duplex negative for DVT in RUE. Will not resume warfarin at this time. Right upper and left lower extremities appeared weaker than the rest on 11/4  --Noncontrast head CT is negative. --patient states that RUE has been since previous admission. Discussed possibility of MRI with patient but he does not want to go through additional testing if it will not  and/or delay his discharge to rehab.      Morbid obesity   - BMI 41.69  - Weight loss management to be followed up outpatient      Vascular access/drainage  --Right IJ Navneet catheter  --Left arm midline  --Left upper abdomen drainage catheter. PEG and tracheostomy removed. Wound management as noted by wound care specialist nurse    1. POA left heel looks the same, maroon/brown crusted area, no drainage, wound edges are closed, jeana-wound without erythema. Offloaded. Venelex ointment applied.     2.   POA left buttock wound measures 1 cm x 3 cm x 1.5 cm with undermining at 6 o'clock being 2.6 cm, wound bed covered with slough, appears thinner today, moderate amount of tan drainage, wound edges are open, jeana-wound intact without erythema. Santyl ointment, 2 x 2 and Optifoam Gentle applied.     3.  Bilateral lower buttocks wounds are improving, left buttock wound is smaller and measures 2.8 cm x  1.5 cm x 0.1 cm, right buttock wound is also smaller and measures 0.3 cm x 0.8 cm x 0.1 cm, wound beds are pink, moist, scant serous drainage, wound edges are open, jeana-wound intact. Calazime lotion applied.     4. Sacrum/top of gluteal cleft with small fissure measuring 0.8 cm x 0.2 cm x 0.1 cm, wound bed is pink, blanches, wound edges are open, jeana-wound intact. Calazime lotion applied. Recommendations:    Continue with current wound care and also apply Calazime lotion to sacrum and bilateral lower buttocks      Bilateral heels- Every 12 hours liberally apply Venelex ointment     Left buttock- Daily cleanse with normal saline, apply nickel thickness of Santyl ointment, loosely fill undermining at 6 o'clock with 2 x 2 (remaining gauze can be placed over wound) and cover with Optifoam Gentle.     Lower buttocks and sacrum- Every 12 hours apply Calazime lotion or Z guard paste (orange tube). Code status: Full code  DVT prophylaxis: SCDs    Care Plan discussed with: Patient/Family  Anticipated Disposition: Accepted to [facility] and rehab SNF. Anticipated Discharge: Patient needed further work-up and monitoring for the rising WBC prior to discharge. Hospital Problems  Date Reviewed: 11/6/2021          Codes Class Noted POA    Abdominal wall fluid collections ICD-10-CM: R18.8  ICD-9-CM: 789.59  10/25/2021 Unknown        Severe protein-calorie malnutrition ([facility] [address] ICD-10-CM: E43  ICD-9-CM: 262  10/21/2021 Unknown        * (Principal) GIB (gastrointestinal bleeding) ICD-10-CM: K92.2  ICD-9-CM: 578.9  10/18/2021 Yes                Review of Systems:   A comprehensive review of systems was negative except for that written in the HPI.        Vital Signs:    Last 24hrs VS reviewed since prior progress note. Most recent are:  Visit Vitals  /74   Pulse (!) 108   Temp 98.3 °F (36.8 °C)   Resp 20   Ht 5' 11.5\" (1.816 m)   Wt 126.6 kg (279 lb 1.6 oz)   SpO2 93%   BMI 38.38 kg/m²         Intake/Output Summary (Last 24 hours) at 11/16/2021 1224  Last data filed at 11/16/2021 0087  Gross per 24 hour   Intake 240 ml   Output --   Net 240 ml        Physical Examination:     I had a face to face encounter with this patient and independently examined them on 11/16/2021 as outlined below:          Constitutional:  No acute distress, cooperative, pleasant    ENT:  Oral mucosa moist, oropharynx benign. Resp:  CTA bilaterally. CV:  Regular rhythm, normal rat    GI:  Soft, non distended, non tender. normoactive bowel sounds,  abdominal wall drainage tube has minimal purulent discharge in the bag    Musculoskeletal:  No edema, warm, 2+ pulses throughout    Neurologic:  Generalized weakness ,right upper extremity and left lower extremity appear more weaker . AAOx3, CN II-XII reviewed            Data Review:    Review and/or order of clinical lab test      Labs:     Recent Labs     11/16/21  0210 11/15/21  0304   WBC 18.2* 25.2*   HGB 8.1* 7.5*   HCT 27.4* 24.9*    253     Recent Labs     11/16/21  0210 11/15/21  0304 11/14/21  0243   * 132* 135*   K 4.0 4.1 3.9    100 101   CO2 26 20* 26   BUN 31* 48* 38*   CREA 3.06* 4.34* 3.81*   GLU 97 112* 104*   CA 9.8 9.9 9.8     Recent Labs     11/16/21  0210 11/15/21  0304 11/14/21  0243   ALT 17 17 14   * 159* 147*   TBILI 0.4 0.5 0.5   TP 6.4 6.0* 6.1*   ALB 2.5* 2.1* 2.3*   GLOB 3.9 3.9 3.8     No results for input(s): INR, PTP, APTT, INREXT, INREXT in the last 72 hours. No results for input(s): FE, TIBC, PSAT, FERR in the last 72 hours. No results found for: FOL, RBCF   No results for input(s): PH, PCO2, PO2 in the last 72 hours. No results for input(s): CPK, CKNDX, TROIQ in the last 72 hours.     No lab exists for component: CPKMB  No results found for: CHOL, CHOLX, CHLST, CHOLV, HDL, HDLP, LDL, LDLC, DLDLP, TGLX, TRIGL, TRIGP, CHHD, CHHDX  No results found for: GLUCPOC  No results found for: COLOR, APPRN, SPGRU, REFSG, ORIANA, PROTU, GLUCU, KETU, BILU, UROU, SPENCER, LEUKU, GLUKE, EPSU, BACTU, WBCU, RBCU, CASTS, UCRY      Medications Reviewed:     Current Facility-Administered Medications   Medication Dose Route Frequency    LORazepam (ATIVAN) tablet 0.5 mg  0.5 mg Oral BID PRN    fluconazole (DIFLUCAN) 200mg/100 mL IVPB (premix)  200 mg IntraVENous Q24H    pantoprazole (PROTONIX) tablet 40 mg  40 mg Oral ACB&D    L.acidophilus-paracasei-S.thermophil-bifidobacter (RISAQUAD) 8 billion cell capsule  1 Capsule Oral DAILY    calcium carbonate (TUMS) chewable tablet 200 mg [elemental]  200 mg Oral TID WITH MEALS    albuterol-ipratropium (DUO-NEB) 2.5 MG-0.5 MG/3 ML  3 mL Nebulization Q6H PRN    LORazepam (ATIVAN) injection 1 mg  1 mg IntraVENous Q12H PRN    oxyCODONE IR (ROXICODONE) tablet 5 mg  5 mg Oral Q4H PRN    HYDROmorphone (DILAUDID) tablet 1 mg  1 mg Oral Q4H PRN    collagenase (SANTYL) 250 unit/gram ointment   Topical DAILY    heparin (porcine) 1,000 unit/mL injection 4,000 Units  4,000 Units Hemodialysis DIALYSIS PRN    alteplase (CATHFLO) 1.8 mg in sterile water (preservative free) 1.8 mL injection  1.8 mg InterCATHeter DIALYSIS ONCE    alteplase (CATHFLO) 1.8 mg in sterile water (preservative free) 1.8 mL injection  1.8 mg InterCATHeter DIALYSIS ONCE    albumin human 25% (BUMINATE) solution 25 g  25 g IntraVENous DIALYSIS PRN    epoetin kayley-epbx (RETACRIT) injection 10,000 Units  10,000 Units SubCUTAneous Q TUE, THU & SAT    B complex-vitaminC-folic acid (NEPHROCAP) cap  1 Capsule Oral DAILY    midodrine (PROAMATINE) tablet 10 mg  10 mg Oral Q8H    heparin (porcine) 1,000 unit/mL injection 1,800 Units  1,800 Units InterCATHeter DIALYSIS PRN    And    heparin (porcine) 1,000 unit/mL injection 1,800 Units  1,800 Units InterCATHeter DIALYSIS PRN    fentaNYL (DURAGESIC) 25 mcg/hr patch 1 Patch  1 Patch TransDERmal Q72H    melatonin tablet 9 mg  9 mg Oral QHS    sodium chloride (NS) flush 5-40 mL  5-40 mL IntraVENous Q8H    sodium chloride (NS) flush 5-40 mL  5-40 mL IntraVENous PRN    chlorhexidine (ORAL CARE KIT) 0.12 % mouthwash 15 mL  15 mL Oral Q12H    balsam peru-castor oiL (VENELEX) ointment   Topical BID    sodium chloride (NS) flush 5-10 mL  5-10 mL IntraVENous PRN    sodium chloride (NS) flush 5-40 mL  5-40 mL IntraVENous Q8H    sodium chloride (NS) flush 5-40 mL  5-40 mL IntraVENous PRN    acetaminophen (TYLENOL) tablet 650 mg  650 mg Oral Q6H PRN    Or    acetaminophen (TYLENOL) suppository 650 mg  650 mg Rectal Q6H PRN    polyethylene glycol (MIRALAX) packet 17 g  17 g Oral DAILY PRN    ondansetron (ZOFRAN ODT) tablet 4 mg  4 mg Oral Q8H PRN    Or    ondansetron (ZOFRAN) injection 4 mg  4 mg IntraVENous Q6H PRN    albuterol (PROVENTIL VENTOLIN) nebulizer solution 2.5 mg  2.5 mg Nebulization Q4H PRN     ______________________________________________________________________  EXPECTED LENGTH OF STAY: 4d 9h  ACTUAL LENGTH OF STAY:          29                 Luis Miguel Galvan MD

## 2021-11-16 NOTE — PROGRESS NOTES
Bedside and Verbal shift change report given to [address] (oncoming nurse) by Almaz Neumann RN (offgoing nurse). Report included the following information SBAR, Kardex, Intake/Output, MAR, Recent Results, Cardiac Rhythm NSR-ST and Dual Neuro Assessment.

## 2021-11-16 NOTE — PROGRESS NOTES
Problem: Mobility Impaired (Adult and Pediatric)  Goal: *Acute Goals and Plan of Care (Insert Text)  Description: FUNCTIONAL STATUS PRIOR TO ADMISSION: Patient was independent and active without use of DME prior to August 2021. Hospitalized in [place], [place] from August to mid October with COVID 19, now with trach and on HD. Was admitted to [facility] for four days prior to admission and stated he had not begun therapy there. HOME SUPPORT PRIOR TO ADMISSION: The patient lived with his wife of 27 years. Lives in [place], [place] Admitted from [facility]. Physical Therapy Goals  Goals continued 11/4/21    Physical Therapy Goals  Goals re-assessed 10/28/2021. Goals re-assessed 11/11/2021 and appropriate for carryover. 1.  Patient will move from supine to sit and sit to supine, scoot up and down, and roll side to side in bed with maximal assistance x2 within 7 day(s). 2.  Patient will tolerate HOB elevated at 50 degrees 30 min BID within 7 days. 3.  Patient will be independent in supine HEP for LEs within 7 days. Initiated 10/22/2021  1. Patient will move from supine to sit and sit to supine, scoot up and down, and roll side to side in bed with maximal assistance x2 within 7 day(s). 2.  Patient will tolerate HOB elevated at 50 degrees 10 min BID within 7 days. 3.  Patient will be independent in supine HEP for LEs within 7 days. 4.  Patient will tolerate PRAFO boot (alternating feet every 2 hours) within 7 days. Goal met      Outcome: Progressing Towards Goal   PHYSICAL THERAPY TREATMENT  Patient: Waqar Benz (48 y.o. male)  Date: 11/16/2021  Diagnosis: GIB (gastrointestinal bleeding) [K92.2]   GIB (gastrointestinal bleeding)  Procedure(s) (LRB):  ESOPHAGOGASTRODUODENOSCOPY (EGD) at Bedside (N/A)  ESOPHAGOGASTRODUODENAL (EGD) BIOPSY (N/A) 28 Days Post-Op  Precautions: Fall, Skin, Contact  Chart, physical therapy assessment, plan of care and goals were reviewed.     ASSESSMENT  Patient continues with skilled PT services and is progressing towards goals gradually. Continues to perform AAROM in BLE's. Continue to note impaired sitting,strength, and activity tolerance but did note an improvement in all of the above since last session. Pt continues to require Max Ax2 to complete transfer to sit EOB, however pt able to tolerate sitting for approx 5-10 minutes. Did require occasional VC to maintain static sitting upon initial transfer  Performed Active (B) LAQ's (x5 each) and able to perform ant/posterior rocking w/ SBA. Pt returned to bed w/ Max Ax2-3 for return to bed (Ax2 at shoulders and Ax1 for BLEs). Current Level of Function Impacting Discharge (mobility/balance): Max Ax2 for bed mobility             PLAN :  Patient continues to benefit from skilled intervention to address the above impairments. Continue treatment per established plan of care. to address goals. Recommendation for discharge: (in order for the patient to meet his/her long term goals)  Therapy up to 5 days/week in SNF setting    This discharge recommendation:  Has been made in collaboration with the attending provider and/or case management    IF patient discharges home will need the following DME: to be determined (TBD)       SUBJECTIVE:   Patient stated Feels good to sit up.     OBJECTIVE DATA SUMMARY:   Critical Behavior:  Neurologic State: Alert  Orientation Level: Oriented X4  Cognition: Appropriate decision making, Appropriate for age attention/concentration, Appropriate safety awareness, Follows commands  Safety/Judgement: Awareness of environment, Fall prevention  Functional Mobility Training:  Bed Mobility:     Supine to Sit: Maximum assistance; Assist x2  Sit to Supine: Maximum assistance; Assist x2              Balance:  Sitting: Impaired  Sitting - Static: Fair (occasional)  Sitting - Dynamic: Fair (occasional)      Therapeutic Exercises:   AAROM: Heel slides (x5-7 each)  AROM: LAQ's (seated EOB): x5 each  Pain Rating:  Reports cramping of left abdominal side d/t positioning in bed (HOB elevated) prior to sit EOB. Also reports pain in BLE's and Left foot. Activity Tolerance:   Fair    After treatment patient left in no apparent distress:   Supine in bed, Heels elevated for pressure relief, Call bell within reach, and Side rails x 3    COMMUNICATION/COLLABORATION:   The patients plan of care was discussed with: Registered nurse.      Tiffanie Gore PTA   Time Calculation: 27 mins

## 2021-11-16 NOTE — PROGRESS NOTES
Assessed for new midline. Has existing left midline and right arm not appropriate for line at this time. Previously reviewed doppler study report regarding resolved right arm DVT but pt still has very limited movement in this arm. Pt states arm is \"sore\" and still has periodic swelling. Discussed with pt's nurse and PIV started in left wrist. Non-functioning midline removed. Vascular access team to follow up tomorrow.     Richard Garibay, [email or web address], RN, VA-BC   Vascular Access Team

## 2021-11-16 NOTE — PROGRESS NOTES
ID Progress Note  2021    Subjective:     \"I need to get back on my feet. \"  Review of Systems:            Symptom Y/N Comments   Symptom Y/N Comments   Fever/Chills n      Chest Pain  n      Poor Appetite       Edema        Cough       Abdominal Pain *   uncomfortable around the catheter site, not pain    Sputum       Joint Pain        SOB/SCHAFFER       Pruritis/Rash        Nausea/vomit n      Tolerating PT/OT        Diarrhea n      Tolerating Diet        Constipation n      Other           Could NOT obtain due to:       Objective:     Vitals:   Visit Vitals  /75   Pulse 100   Temp 98.4 °F (36.9 °C)   Resp 17   Ht 5' 11.5\" (1.816 m)   Wt 126.6 kg (279 lb 1.6 oz)   SpO2 94%   BMI 38.38 kg/m²        Tmax:  Temp (24hrs), Av.3 °F (36.8 °C), Min:97.6 °F (36.4 °C), Max:98.7 °F (37.1 °C)      PHYSICAL EXAM:  General: Obese, WD, WN. Alert, cooperative, no acute distress    EENT:  EOMI. Anicteric sclerae. MMM  Resp:  Clear in apex with decrease of breath sounds at bases. no wheezing or rales. No accessory muscle use  CV:  Regular  rhythm,  diffuse edema all four extremities  GI:  Soft, obese,  Tender around the catheter. +Bowel sounds, drainage catheter in place    Neurologic:  Alert and oriented X 3, normal speech,   Psych:   Good insight. Not anxious nor agitated  Skin:  No rashes.   No jaundice    Labs:   Lab Results   Component Value Date/Time    WBC 18.2 (H) 2021 02:10 AM    HGB 8.1 (L) 2021 02:10 AM    HCT 27.4 (L) 2021 02:10 AM    PLATELET 881  02:10 AM    .8 (H) 2021 02:10 AM     Lab Results   Component Value Date/Time    Sodium 135 (L) 2021 02:10 AM    Potassium 4.0 2021 02:10 AM    Chloride 101 2021 02:10 AM    CO2 26 2021 02:10 AM    Anion gap 8 2021 02:10 AM    Glucose 97 2021 02:10 AM    BUN 31 (H) 2021 02:10 AM    Creatinine 3.06 (H) 2021 02:10 AM    BUN/Creatinine ratio 10 (L) 2021 02:10 AM    GFR est AA 26 (L) 11/16/2021 02:10 AM    GFR est non-AA 22 (L) 11/16/2021 02:10 AM    Calcium 9.8 11/16/2021 02:10 AM    Bilirubin, total 0.4 11/16/2021 02:10 AM    Alk. phosphatase 162 (H) 11/16/2021 02:10 AM    Protein, total 6.4 11/16/2021 02:10 AM    Albumin 2.5 (L) 11/16/2021 02:10 AM    Globulin 3.9 11/16/2021 02:10 AM    A-G Ratio 0.6 (L) 11/16/2021 02:10 AM    ALT (SGPT) 17 11/16/2021 02:10 AM      CT of abd/pel (11/11) Stable enlargement of the left abdominal wall musculature status post percutaneous drainage. Evaluation for abscess is limited without IV contrast. A small residual abscess is difficult to exclude however this is unchanged. Contrast injection through the catheter demonstrates pooling of contrast material dependently within the muscle without evidence of fistula. Assessment and Plan   Leukocytosis (gradual trending up since11/9)  Left abdominal abscess  hx dislodged PEG tube   S/p replacement of right sided tunneled dialysis catheter (11/5)   s/p pigtail catheter placement 10/23  - WBC 22.3->18.2, afebrile    Pt claims that he has been told that he usually have 'high WBC' by his pcp. However, his WBC was 8-11k between 11/4 to 11/8. Body fluid cx (10/19) candida albicans    Wound cx (10/23 & 11/15) staph coagulase negative    blood cx (11/12) no growth so far       Recommend to repeat CT of abd/pel with contrast to assess abdominal abscess; pig tail drainage catheter was placed on 10/23, variation of WBC is concerning. It would be beneficial to evaluate progression of abscess. Pt has been informed that the image is necessarily to determine when would be appropriate to remove catheter. Pt continues to decline the repeat image at this time. However, will defer the final decision to surgery and primary team.       Continue with IV fluconazole (resumed on 11/12). Recommend to complete total  4 weeks, last dose 12/9.          Fever work up if temp >= 100.4      Above plan of care discussed and agreed with Dr. Kaleb Ivy, NP

## 2021-11-16 NOTE — PROGRESS NOTES
Transition of Care Plan   RUR- Med 16%   DISPOSITION: SNF - [facility] - [facility] of hospitals in [place] and Rehab - pending medical stability   F/U with PCP/Specialist     Transport: [facility] - requested for 2pm 11/17    CM left message for Ning French, Admissions with [facility] [phone]. Per Dr. Jesu Coffey, if patient's WBC count continues to trend down tomorrow, OK to discharge to [facility]. CM plan to fax clinical update and PT/OT notes to [email or web address] when available. 12:10pm: CM received return call from Ning French at [facility], they have restarted insurance auth today and will plan for DC tomorrow afternoon. CM faxed recent labs per request. [facility] requested for 2pm tomorrow. BETSY Etienne.

## 2021-11-16 NOTE — PROGRESS NOTES
Problem: Self Care Deficits Care Plan (Adult)  Goal: *Acute Goals and Plan of Care (Insert Text)  Description:   FUNCTIONAL STATUS PRIOR TO ADMISSION: at baseline, pt was independent, living with wife, working at [employer] Pt was admitted to [facility] in August and discharged to [facility] 10/14. Pt had not yet started therapy     HOME SUPPORT: only at [facility] for 4 days prior to this admission    Occupational Therapy Goals  Initiated 10/22/2021; weekly reassessment 10/29/2021 - continue all goals, Re-assessed 11/9/21 - Goals updated below, continued 11/16/2021  1. Patient will perform AAROM R UE using L UE as motor assist with minimal assistance/contact guard assist within 7 day(s). NOT MET and continued. 2.  Patient will perform grooming in supported sitting using RUE as motor assist with minimal assistance/contact guard assist within 7 day(s). NOT MET and continued. 3.  Patient will perform upper body dressing with moderate assistance within 7 day(s). NOT MET and continued. 4.  Patient will perform rolling in bed for bed pan placement with moderate A x 2 within 7 days. NOT MET and continued. 5.  Patient will perform supported ADL task in modified bed to chair position x 5 minutes within 7 days. MET and upgraded to x15 minutes. Outcome: Progressing Towards Goal     OCCUPATIONAL THERAPY TREATMENT/WEEKLY RE-ASSESSMENT  Patient: Nisreen Rios (49 y.o. male)  Date: 11/16/2021  Diagnosis: GIB (gastrointestinal bleeding) [K92.2]   GIB (gastrointestinal bleeding)  Procedure(s) (LRB):  ESOPHAGOGASTRODUODENOSCOPY (EGD) at Bedside (N/A)  ESOPHAGOGASTRODUODENAL (EGD) BIOPSY (N/A) 28 Days Post-Op  Precautions: Fall, Skin, Contact  Chart, occupational therapy assessment, plan of care, and goals were reviewed. ASSESSMENT  Patient continues with skilled OT services and is progressing towards goals (continued).   Patient remains limited by global weakness + severe focal RUE weakness, b/l foot drop contracture, impaired functional endurance, SCHAFFER (although SPO2 stable on RA), and impaired sitting balance. Note RUE weakness previously documented and patient reports RUE is improving but has been weak for weeks. Today RUE MMT: 3-/5 scapular elevation, 1/5 shoulder flexion, 0/5 elbow, 0/5 wrist, and 3-/5 finger abduction/ adduction with very limited finger flexion. RUE nonfunctional.  LUE MMT overall 3- to 4-/5. Patient progressed mobility today to EOB for first time since admission with maximum assistance x2 for supine<>sit and scooting to EOB. Once sitting, maintained sitting balance with contact guard/ intermittent minimum assistance. Practiced UE and LE AROM exercises as well as controlled anterior/ posterior leaning. Continue to recommend SNF at d/c. Current Level of Function Impacting Discharge (ADLs): maximum assistance x2 supine<>sit, contact guard- minimum assistance to maintain sitting balance, total assistance to doff/ don socks. Infer overall minimum to maximum assistance UB ADLs and total assistance LB ADLs. PLAN :  Goals have been updated based on progression since last assessment. Patient continues to benefit from skilled intervention to address the above impairments. Continue to follow patient 3 times a week to address goals. Recommendation for discharge: (in order for the patient to meet his/her long term goals)  Therapy up to 5 days/week in SNF setting    This discharge recommendation:  Has been made in collaboration with the attending provider and/or case management         SUBJECTIVE:   Patient stated It felt good to get some air on my back.     OBJECTIVE DATA SUMMARY:   Cognitive/Behavioral Status:  Neurologic State: Alert  Orientation Level: Oriented X4  Cognition: Appropriate decision making; Appropriate for age attention/concentration; Appropriate safety awareness;  Follows commands             Functional Mobility and Transfers for ADLs:  Bed Mobility:  Supine to Sit: Maximum assistance; Assist x2  Sit to Supine: Maximum assistance; Assist x2        Balance:  Sitting: Impaired  Sitting - Static: Fair (occasional)  Sitting - Dynamic: Fair (occasional)    ADL Intervention:        Lower Body Dressing Assistance  Socks: Total assistance (dependent) (to jasmina and robbie)       Pain:  Patient reported BLE pain with activity, improving at rest with repositioning/ pillow support    Activity Tolerance:   Fair, HR 110s with activity    After treatment patient left in no apparent distress:   Supine in bed, Call bell within reach, and Bed / chair alarm activated    COMMUNICATION/COLLABORATION:   The patients plan of care was discussed with: Physical therapist and Registered nurse.      Myke West OT  Time Calculation: 38 mins

## 2021-11-16 NOTE — PROGRESS NOTES
Additional PIV access ordered per Dr. Maria Fernanda Benoit. Approached patient to place IV, pt refusing at this time. Pt made aware that vascular access team not available overnight if he looses access, voiced understanding.

## 2021-11-17 VITALS
OXYGEN SATURATION: 95 % | SYSTOLIC BLOOD PRESSURE: 121 MMHG | BODY MASS INDEX: 37.8 KG/M2 | DIASTOLIC BLOOD PRESSURE: 83 MMHG | RESPIRATION RATE: 22 BRPM | TEMPERATURE: 97.7 F | HEIGHT: 72 IN | WEIGHT: 279.1 LBS | HEART RATE: 112 BPM

## 2021-11-17 LAB
ALBUMIN SERPL-MCNC: 2.5 G/DL (ref 3.5–5)
ALBUMIN/GLOB SERPL: 0.7 {RATIO} (ref 1.1–2.2)
ALP SERPL-CCNC: 177 U/L (ref 45–117)
ALT SERPL-CCNC: 21 U/L (ref 12–78)
ANION GAP SERPL CALC-SCNC: 9 MMOL/L (ref 5–15)
AST SERPL-CCNC: 17 U/L (ref 15–37)
BACTERIA SPEC CULT: NORMAL
BASOPHILS # BLD: 0 K/UL (ref 0–0.1)
BASOPHILS NFR BLD: 0 % (ref 0–1)
BILIRUB SERPL-MCNC: 0.4 MG/DL (ref 0.2–1)
BUN SERPL-MCNC: 47 MG/DL (ref 6–20)
BUN/CREAT SERPL: 12 (ref 12–20)
CALCIUM SERPL-MCNC: 10 MG/DL (ref 8.5–10.1)
CHLORIDE SERPL-SCNC: 98 MMOL/L (ref 97–108)
CO2 SERPL-SCNC: 25 MMOL/L (ref 21–32)
CREAT SERPL-MCNC: 4 MG/DL (ref 0.7–1.3)
DIFFERENTIAL METHOD BLD: ABNORMAL
EOSINOPHIL # BLD: 0.2 K/UL (ref 0–0.4)
EOSINOPHIL NFR BLD: 1 % (ref 0–7)
ERYTHROCYTE [DISTWIDTH] IN BLOOD BY AUTOMATED COUNT: 18.1 % (ref 11.5–14.5)
GLOBULIN SER CALC-MCNC: 3.4 G/DL (ref 2–4)
GLUCOSE SERPL-MCNC: 100 MG/DL (ref 65–100)
HCT VFR BLD AUTO: 29.1 % (ref 36.6–50.3)
HGB BLD-MCNC: 8.8 G/DL (ref 12.1–17)
IMM GRANULOCYTES # BLD AUTO: 0 K/UL
IMM GRANULOCYTES NFR BLD AUTO: 0 %
LYMPHOCYTES # BLD: 1.5 K/UL (ref 0.8–3.5)
LYMPHOCYTES NFR BLD: 8 % (ref 12–49)
MCH RBC QN AUTO: 30.4 PG (ref 26–34)
MCHC RBC AUTO-ENTMCNC: 30.2 G/DL (ref 30–36.5)
MCV RBC AUTO: 100.7 FL (ref 80–99)
METAMYELOCYTES NFR BLD MANUAL: 1 %
MONOCYTES # BLD: 0.6 K/UL (ref 0–1)
MONOCYTES NFR BLD: 3 % (ref 5–13)
NEUTS SEG # BLD: 16.6 K/UL (ref 1.8–8)
NEUTS SEG NFR BLD: 87 % (ref 32–75)
NRBC # BLD: 0 K/UL (ref 0–0.01)
NRBC BLD-RTO: 0 PER 100 WBC
PLATELET # BLD AUTO: 296 K/UL (ref 150–400)
PMV BLD AUTO: 9.4 FL (ref 8.9–12.9)
POTASSIUM SERPL-SCNC: 4.4 MMOL/L (ref 3.5–5.1)
PROCALCITONIN SERPL-MCNC: 1.47 NG/ML
PROT SERPL-MCNC: 5.9 G/DL (ref 6.4–8.2)
RBC # BLD AUTO: 2.89 M/UL (ref 4.1–5.7)
RBC MORPH BLD: ABNORMAL
SERVICE CMNT-IMP: NORMAL
SODIUM SERPL-SCNC: 132 MMOL/L (ref 136–145)
WBC # BLD AUTO: 19.1 K/UL (ref 4.1–11.1)

## 2021-11-17 PROCEDURE — 74011250636 HC RX REV CODE- 250/636: Performed by: INTERNAL MEDICINE

## 2021-11-17 PROCEDURE — 74011250636 HC RX REV CODE- 250/636: Performed by: HOSPITALIST

## 2021-11-17 PROCEDURE — 85025 COMPLETE CBC W/AUTO DIFF WBC: CPT

## 2021-11-17 PROCEDURE — 36415 COLL VENOUS BLD VENIPUNCTURE: CPT

## 2021-11-17 PROCEDURE — 74011250637 HC RX REV CODE- 250/637: Performed by: NURSE PRACTITIONER

## 2021-11-17 PROCEDURE — 84145 PROCALCITONIN (PCT): CPT

## 2021-11-17 PROCEDURE — 90935 HEMODIALYSIS ONE EVALUATION: CPT

## 2021-11-17 PROCEDURE — 80053 COMPREHEN METABOLIC PANEL: CPT

## 2021-11-17 PROCEDURE — 74011250637 HC RX REV CODE- 250/637: Performed by: HOSPITALIST

## 2021-11-17 PROCEDURE — 74011250636 HC RX REV CODE- 250/636: Performed by: HEALTH CARE PROVIDER

## 2021-11-17 PROCEDURE — 74011250636 HC RX REV CODE- 250/636: Performed by: NURSE PRACTITIONER

## 2021-11-17 PROCEDURE — 65660000000 HC RM CCU STEPDOWN

## 2021-11-17 PROCEDURE — P9047 ALBUMIN (HUMAN), 25%, 50ML: HCPCS | Performed by: INTERNAL MEDICINE

## 2021-11-17 PROCEDURE — 74011000250 HC RX REV CODE- 250: Performed by: INTERNAL MEDICINE

## 2021-11-17 PROCEDURE — 74011250637 HC RX REV CODE- 250/637: Performed by: STUDENT IN AN ORGANIZED HEALTH CARE EDUCATION/TRAINING PROGRAM

## 2021-11-17 RX ORDER — OXYCODONE HYDROCHLORIDE 5 MG/1
5 CAPSULE ORAL
Qty: 12 CAPSULE | Refills: 0 | Status: SHIPPED | OUTPATIENT
Start: 2021-11-17 | End: 2021-11-20

## 2021-11-17 RX ORDER — FLUCONAZOLE 200 MG/1
200 TABLET ORAL DAILY
Qty: 22 TABLET | Refills: 0 | Status: SHIPPED
Start: 2021-11-17 | End: 2021-12-09

## 2021-11-17 RX ORDER — FENTANYL 25 UG/1
1 PATCH TRANSDERMAL
Qty: 1 PATCH | Refills: 0 | Status: SHIPPED | OUTPATIENT
Start: 2021-11-17 | End: 2021-11-18

## 2021-11-17 RX ORDER — HEPARIN SODIUM 1000 [USP'U]/ML
1000 INJECTION, SOLUTION INTRAVENOUS; SUBCUTANEOUS
Status: DISCONTINUED | OUTPATIENT
Start: 2021-11-17 | End: 2021-11-18 | Stop reason: HOSPADM

## 2021-11-17 RX ADMIN — Medication: at 18:44

## 2021-11-17 RX ADMIN — MIDODRINE HYDROCHLORIDE 10 MG: 5 TABLET ORAL at 21:27

## 2021-11-17 RX ADMIN — Medication 9 MG: at 21:27

## 2021-11-17 RX ADMIN — ONDANSETRON 4 MG: 2 INJECTION INTRAMUSCULAR; INTRAVENOUS at 12:18

## 2021-11-17 RX ADMIN — Medication 10 ML: at 16:06

## 2021-11-17 RX ADMIN — HYDROMORPHONE HYDROCHLORIDE 1 MG: 2 TABLET ORAL at 21:27

## 2021-11-17 RX ADMIN — LORAZEPAM 0.5 MG: 0.5 TABLET ORAL at 23:19

## 2021-11-17 RX ADMIN — COLLAGENASE SANTYL: 250 OINTMENT TOPICAL at 09:00

## 2021-11-17 RX ADMIN — HEPARIN SODIUM 1000 UNITS/HR: 1000 INJECTION INTRAVENOUS; SUBCUTANEOUS at 14:37

## 2021-11-17 RX ADMIN — MIDODRINE HYDROCHLORIDE 10 MG: 5 TABLET ORAL at 11:32

## 2021-11-17 RX ADMIN — ALTEPLASE 4 MG: 2.2 INJECTION, POWDER, LYOPHILIZED, FOR SOLUTION INTRAVENOUS at 15:07

## 2021-11-17 RX ADMIN — LORAZEPAM 0.5 MG: 0.5 TABLET ORAL at 12:18

## 2021-11-17 RX ADMIN — LORAZEPAM 1 MG: 2 INJECTION INTRAMUSCULAR; INTRAVENOUS at 08:18

## 2021-11-17 RX ADMIN — OXYCODONE 5 MG: 5 TABLET ORAL at 23:19

## 2021-11-17 RX ADMIN — PANTOPRAZOLE SODIUM 40 MG: 40 TABLET, DELAYED RELEASE ORAL at 16:05

## 2021-11-17 RX ADMIN — Medication 10 ML: at 06:50

## 2021-11-17 RX ADMIN — FLUCONAZOLE IN SODIUM CHLORIDE 200 MG: 2 INJECTION, SOLUTION INTRAVENOUS at 16:06

## 2021-11-17 RX ADMIN — Medication: at 09:00

## 2021-11-17 RX ADMIN — ALBUMIN (HUMAN) 12.5 G: 0.25 INJECTION, SOLUTION INTRAVENOUS at 12:30

## 2021-11-17 RX ADMIN — CALCIUM CARBONATE (ANTACID) CHEW TAB 500 MG 200 MG: 500 CHEW TAB at 16:05

## 2021-11-17 RX ADMIN — OXYCODONE 5 MG: 5 TABLET ORAL at 18:49

## 2021-11-17 RX ADMIN — OXYCODONE 5 MG: 5 TABLET ORAL at 04:21

## 2021-11-17 RX ADMIN — PANTOPRAZOLE SODIUM 40 MG: 40 TABLET, DELAYED RELEASE ORAL at 06:49

## 2021-11-17 RX ADMIN — ALTEPLASE 4 MG: 2.2 INJECTION, POWDER, LYOPHILIZED, FOR SOLUTION INTRAVENOUS at 11:05

## 2021-11-17 RX ADMIN — MIDODRINE HYDROCHLORIDE 10 MG: 5 TABLET ORAL at 06:49

## 2021-11-17 NOTE — PROGRESS NOTES
Transition of Care Plan to SNF/Rehab    SNF/Rehab Transition:  Patient has been accepted to [facility] and meets criteria for admission. Patient will transported by [facility] and expected to leave at 11:30pm.    Communication to Patient/Family:  Met with patient and Lui Coreas, and they are agreeable to the transition plan. Communication to SNF/Rehab:  Bedside RN, [address], has been notified to update the transition plan to the facility and call report (phone number Call report#[phone] ). Discharge information has been updated on the AVS.     Discharge instructions to be fax'd to facility at [facility] # [email or web address]). Nursing Please include all hard scripts for controlled substances, med rec and dc summary, and AVS in packet. Reviewed and confirmed with facility, [facility], can manage the patient care needs for the following:     Rebekah Samples with (X) only those applicable:    Medication:  [x]  Medications will be available at the facility  []  IV Antibiotics   []  Controlled Substance - hard copy to be sent with patient   [x]  Weekly Labs   Documents:  [x] Hard RX  [x] MAR  [x] Kardex  [x] AVS  [x]Transfer Summary  [x]Discharge   Equipment:  []  CPAP/BiPAP  []  Wound Vacuum  []  Gaytan or Urinary Device  []  PICC/Central Line  []  Nebulizer  []  Ventilator   Treatment:  []Isolation (for MRSA, VRE, etc.)  []Surgical Drain Management  []Tracheostomy Care  []Dressing Changes  [x]Dialysis with transportation and chair time - Arranged by [facility], onsite HD MTTF  []PEG Care  []Oxygen  []Daily Weights for Heart Failure   Dietary:  []Any diet limitations  []Tube Feedings   []Total Parenteral Management (TPN)   Eligible for Medicaid Long Term Services and Supports  Yes:  [] Eligible for medical assistance or will become eligible within 180 days and UAI completed. [] Provider/Patient and/or support system has requested screening.   [] UAI copy provided to patient or responsible party,   [] UAI unavailable at discharge will send once processed to SNF provider. [] UAI unavailable at discharged mailed to patient  No:   [x] Private pay and is not financially eligible for Medicaid within the next 180 days. [] Reside out-of-state. [] A residents of a state owned/operated facility that is licensed  by [address] and [facility] or [facility]  [] Enrollment in [facility] hospice services  []  Medical [facility]  [] Patient /Family declines to have screening completed or provide financial information for screening     Financial Resources:  Medicaid    [] Initiated and application pending   [] Full coverage     Advanced Care Plan:  []Surrogate Decision Maker of Care  []POA  [x]Communicated Code Status FULL    Other     Celi Velazquez M.S.SKIP.

## 2021-11-17 NOTE — DISCHARGE SUMMARY
Discharge Summary     Patient:  Lizett Pennington       MRN: [id number]       YOB: 1970       Age: 49 y.o. Date of admission:  10/18/2021    Date of discharge:  11/17/2021    Primary care provider: Dr. Bogdan Wheeler MD    Admitting provider:  Floridalma Byrd DO    Discharging provider:  Libby Snowden MD - [phone]  If unavailable, call [phone] and ask the  to page the triage hospitalist.    Consultations  · IP CONSULT TO GASTROENTEROLOGY  · IP CONSULT TO NEPHROLOGY  · IP CONSULT TO INTERVENTIONAL RADIOLOGY  · IP CONSULT TO GASTROENTEROLOGY  · IP CONSULT TO GASTROENTEROLOGY  · IP CONSULT TO OTOLARYNGOLOGY  · IP CONSULT TO GASTROENTEROLOGY  · IP CONSULT TO INFECTIOUS DISEASES  · IP CONSULT TO GENERAL SURGERY    Procedures  · Procedure(s):  · ESOPHAGOGASTRODUODENOSCOPY (EGD) at Bedside  · ESOPHAGOGASTRODUODENAL (EGD) BIOPSY    Discharge destination: SNF  The patient is stable for discharge. Admission diagnosis  · GIB (gastrointestinal bleeding) [K92.2]    Current Discharge Medication List      START taking these medications    Details   fluconazole (Diflucan) 200 mg tablet Take 1 Tablet by mouth daily for 22 days. FDA advises cautious prescribing of oral fluconazole in pregnancy. Qty: 22 Tablet, Refills: 0  Start date: 11/17/2021, End date: 12/9/2021      b complex-vitamin c-folic acid (NEPHROCAPS) 1 mg capsule Take 1 Capsule by mouth daily for 30 days. Qty: 30 Capsule, Refills: 0  Start date: 11/16/2021, End date: 12/16/2021      calcium carbonate (TUMS) 200 mg calcium (500 mg) chew Take 1 Tablet by mouth three (3) times daily (with meals) for 30 days. Qty: 90 Tablet, Refills: 0  Start date: 11/15/2021, End date: 12/15/2021      collagenase (SANTYL) 250 unit/gram ointment Apply  to affected area daily. Qty: 15 g, Refills: 0  Start date: 11/16/2021      L.acid,para-B. bifidum-S.therm (RISAQUAD) 8 billion cell cap cap Take 1 Capsule by mouth daily for 30 days. Qty: 30 Capsule, Refills: 0  Start date: 11/16/2021, End date: 12/16/2021      midodrine (PROAMATINE) 10 mg tablet Take 1 Tablet by mouth every eight (8) hours for 30 days. Qty: 90 Tablet, Refills: 0  Start date: 11/15/2021, End date: 12/15/2021      ondansetron (ZOFRAN ODT) 4 mg disintegrating tablet Take 1 Tablet by mouth every eight (8) hours as needed for Nausea or Vomiting. Qty: 20 Tablet, Refills: 0  Start date: 11/15/2021      pantoprazole (PROTONIX) 40 mg tablet Take 1 Tablet by mouth Before breakfast and dinner for 30 days. Qty: 60 Tablet, Refills: 0  Start date: 11/15/2021, End date: 12/15/2021      polyethylene glycol (MIRALAX) 17 gram packet Take 1 Packet by mouth daily as needed for Constipation. Qty: 30 Packet, Refills: 0  Start date: 11/15/2021         CONTINUE these medications which have CHANGED    Details   oxyCODONE (OXYIR) 5 mg capsule Take 1 Capsule by mouth every six (6) hours as needed for Pain for up to 5 days. Max Daily Amount: 20 mg.  Qty: 20 Capsule, Refills: 0  Start date: 11/15/2021, End date: 11/20/2021    Associated Diagnoses: Abdominal wall fluid collections         CONTINUE these medications which have NOT CHANGED    Details   albuterol-ipratropium (DUO-NEB) 2.5 mg-0.5 mg/3 ml nebu 3 mL by Nebulization route two (2) times a day. fentaNYL (Duragesic) 25 mcg/hr PATCH 1 Patch by TransDERmal route every seventy-two (72) hours. acetaminophen (TYLENOL) 160 mg/5 mL (5 mL) solution Take 650 mg by mouth every six (6) hours as needed for Fever. albuterol (PROVENTIL VENTOLIN) 2.5 mg /3 mL (0.083 %) nebu 2.5 mg by Nebulization route every eight (8) hours as needed for Wheezing.      melatonin 3 mg tablet Take 9 mg by mouth nightly. cholecalciferol (VITAMIN D3) (1000 Units /25 mcg) tablet Take 1,000 Units by mouth daily.       sevelamer carbonate (RENVELA) 800 mg tab tab Take 800 mg by mouth three (3) times daily (with meals). STOP taking these medications       HYDROmorphone (DILAUDID) 1 mg/mL injection Comments:   Reason for Stopping:         LORazepam (ATIVAN) 2 mg/mL injection Comments:   Reason for Stopping:         insulin regular (NOVOLIN R, HUMULIN R) 100 unit/mL injection Comments:   Reason for Stopping:         nystatin (MYCOSTATIN) powder Comments:   Reason for Stopping:         piperacillin-tazobactam 2.25 g IVPB Comments:   Reason for Stopping:         LORazepam (Ativan) 2 mg/mL injection Comments:   Reason for Stopping:         pantoprazole 4 mg/mL in 0.9% sodium chloride injection Comments:   Reason for Stopping:         chlorhexidine (PERIDEX) 0.12 % solution Comments:   Reason for Stopping:         senna leaf extract (Senna) 176 mg/5 mL syrp syrup Comments:   Reason for Stopping:         OLANZapine (ZyPREXA) 10 mg tablet Comments:   Reason for Stopping:         metoprolol tartrate (LOPRESSOR) 25 mg tablet Comments:   Reason for Stopping:         pramoxine-calamine (Calamine Medicated) 1-8 % lotion Comments:   Reason for Stopping:         OTHER Comments:   Reason for Stopping:         naloxone (NARCAN) 1 mg/mL injection Comments:   Reason for Stopping: Follow-up Information     Follow up With Specialties Details Why Contact Info    Your PCP   In 2 weeks Discharge follow up       -Surgery    your appointment is scheduled for 12/1/2021, please take most recent labs to your appointment     Princess Graham MD    Patient can only remember the practice name and not the physician            Final discharge diagnoses and brief hospital course  Please also refer to the admission H&P for details on the presenting problem. 54 yo M with PMH ESRD on HD, who was on coumadin for recent diagnosis of DVT RUE with a recent prolonged hospitalization secondary to COVID PNA in August of this year.  He ultimately required trach and peg, was sent to [facility] for ongoing medical care and rehab. However he has not been there too long when he was sent to our ED for hematemesis; he was admitted to [facility] on 10/15 and transferred to [facility] on 10/18. Per records reviewed, patient was intubated on 9/7, trached on 9/16, he was treated for VRE pneumonia. PEG dislodged on 10/11, treated with Unasyn until 10/17. Patient was on warfarin for right upper extremity DVT involving the subclavian, axillary and brachial veins. He started having coffee ground emesis 10/18/21 with elevated HR and hypotension. He was sent to [facility] ER for further workup. He was found to be hypotensive and was admitted to the ICU. DISCHARGE DIAGNOSES / PLAN:      Rising WBC, WBC fluctuating. He remained afebrile. WBC up to 25.2 today. --CT abdomen on 11/9 without significant fluid collection. But this was done without contrast.  --Abdominal wall abscess culture on 10/23 grew coag negative, felt to be contaminant. It also grew Candida albicans for which she completed 15 days of Diflucan therapy. --Procalcitonin elevated at 1.72, trending down;blood culture from 11/12 negative thus far.  --ID consulted, restarted Diflucan. Plan to continue until 12/9  --ID recommended CT abdomen with contrast to assess for collection, the catheter might not be draining and may need to be repositioned depending on what the CT shows.   --11/17: had discussion with patient and his wife over phone. Discussed need for repeat CT abd/pel due to elevated WBC and procalcitonin level to evaluate his abscess. Patient did not have to have a repeat CT abd/pel at this time and wants antibiotics to allow working. They both understand that his abscess may get worse which could require rehospitalization.    Administration since admission  · Zosyn from 10/18-10/29  · Vancomycin x1 dose on 10/18  · Zyvox from 10/8-10/21  · Levaquin x1 dose on 10/18  · Cefazolin x1 dose on 11/5  · Diflucan from 10/21-11/5; restarted on 11/12-, per ID, will continue until 12/9  --WBC is flat at 18-19        Hemorrhagic shock due to hematemesis due to severe esophagitis. Acute blood loss anemia.   --EGD on 10/19 showed LA grade D reflux esophagitis up to the mid esophagus, few sessile polyps in the stomach, mild patchy erythema in the distal bulb of the duodenum. - s/p EGD (11/01)  - S/P 2 units of pRBCs, 1 unit of FFB and K centra. - Continue PPI   - Monitor labs and transfuse for hgb <7.0. Hemoglobin has remained> 8.  - GI signed off.       Left abdominal abscess. History of dislodged PEG tube. --CT A/P on 10/20 showed left anterior abdominal and pelvic wall hematoma or gas containing fluid collection. --He underwent ultrasound guided fluid drainage and percutaneous drain placement which revealed thick purulent fluid, 18 [place] drain was left in place. -- Wound culture on 10/23 grew apparent Candida albicans, no sensitivity available. Culture also showed coag negative staph which was deemed to be contaminant  -Completed 15 days of IV Diflucan. WBC normalized. -Follow up CT on 10/26 showed slightly diminished size of the abdominal wall collection.   - Repeat CT on 11/2 significant decrease in size of the left rectus sheath hematoma. The small collection suggestive of congealed hematoma and is unlikely to drain through cath. - surgery following, abdominal wall drain is attached to bag with purulent discharge  --CT abdomen and pelvis, sinogram performed 11/9: Stable enlargement of the left abdominal wall musculature, evaluation for abscess is limited without IV contrast, a small residual abscess is difficult to exclude however this is unchanged. Contrast injection through the catheter demonstrated pooling of contrast material dependently within the muscle with out evidence of fistula. --General surgery following, discussed with , no need for repeat CT abd/pel at this time.  WIll need follow up with surgery which has been scheduled with Dr. Paul Counter Fawad on 12/1/21. They will evaluate patient's drain and decide on removal at that time.   --REPEAT CBC on 11/27/21 and TAKE LABS TO SURGERY FOLLOW UP along with this discharge summary.      Acute on chronic hypoxic respiratory failure with tracheostomy Resolved,  --Patient was intubated in outside hospital on 9/7 and was trached on 9/16. He was successfully decannulated here on 11/3     Renal:  ESRD on HD MWF-Nephrology following, last HD on 11/17/21     Right upper extremity acute DVT, POA. Patient was on warfarin but came with hemorrhagic shock and supratherapeutic INR requiring reversal  --Anticoagulation on hold. --repeat Venous duplex negative for DVT in RUE. Will not resume warfarin at this time.      Right upper and left lower extremities appeared weaker than the rest on 11/4  --Noncontrast head CT is negative. --patient states that RUE has been since previous admission. Discussed possibility of MRI with patient but he does not want to go through additional testing if it will not  and/or delay his discharge to rehab.      Morbid obesity   - BMI 41.69  - Weight loss management to be followed up outpatient       PEG and tracheostomy removed. Wound management as noted by wound care specialist nurse     1. POA left heel looks the same, maroon/brown crusted area, no drainage, wound edges are closed, jeana-wound without erythema. Offloaded. Venelex ointment applied. 2.  POA left buttock wound measures 1 cm x 3 cm x 1.5 cm with undermining at 6 o'clock being 2.6 cm, wound bed covered with slough, appears thinner today, moderate amount of tan drainage, wound edges are open, jeana-wound intact without erythema. Santyl ointment, 2 x 2 and Optifoam Gentle applied.      3.  Bilateral lower buttocks wounds are improving, left buttock wound is smaller and measures 2.8 cm x  1.5 cm x 0.1 cm, right buttock wound is also smaller and measures 0.3 cm x 0.8 cm x 0.1 cm, wound beds are pink, moist, scant serous drainage, wound edges are open, jeana-wound intact. Calazime lotion applied. 4.  Sacrum/top of gluteal cleft with small fissure measuring 0.8 cm x 0.2 cm x 0.1 cm, wound bed is pink, blanches, wound edges are open, jeana-wound intact. Calazime lotion applied. Recommendations:    Continue with current wound care and also apply Calazime lotion to sacrum and bilateral lower buttocks      Bilateral heels- Every 12 hours liberally apply Venelex ointment     Left buttock- Daily cleanse with normal saline, apply nickel thickness of Santyl ointment, loosely fill undermining at 6 o'clock with 2 x 2 (remaining gauze can be placed over wound) and cover with Optifoam Gentle. Lower buttocks and sacrum- Every 12 hours apply Calazime lotion or Z guard paste (orange tube). PENDING TEST RESULTS:   At the time of discharge the following test results are still pending: NONE    FOLLOW UP APPOINTMENTS:    Follow-up Information     Follow up With Specialties Details Why Contact Info    Your PCP   In 2 weeks Discharge follow up       -Surgery    your appointment is scheduled for 12/1/2021, please take most recent labs to your appointment     Other, MD Princess    Patient can only remember the practice name and not the physician             ADDITIONAL CARE RECOMMENDATIONS: NONE    DIET: Regular Diet    TUBE FEEDING INSTRUCTIONS: NONE    OXYGEN / BiPAP SETTINGS: NONE    ACTIVITY: Activity as tolerated    WOUND CARE: as above     EQUIPMENT needed: NONE      DISCHARGE MEDICATIONS:   See Medication Reconciliation Form      NOTIFY YOUR PHYSICIAN FOR ANY OF THE FOLLOWING:   Fever over 101 degrees for 24 hours. Chest pain, shortness of breath, fever, chills, nausea, vomiting, diarrhea, change in mentation, falling, weakness, bleeding. Severe pain or pain not relieved by medications. Or, any other signs or symptoms that you may have questions about.     DISPOSITION:    Home With:   OT  PT  MALLIKA  RN      X SNF/Inpatient Rehab/LTAC    Independent/assisted living    Hospice    Other:       PATIENT CONDITION AT DISCHARGE:     Functional status   X Poor     Deconditioned     Independent      Cognition   X  Lucid     Forgetful     Dementia      Catheters/lines (plus indication)    Gaytan     PICC     PEG    X None      Code status   X  Full code     DNR        Physical examination at discharge  Visit Vitals  BP 91/66   Pulse (!) 121   Temp 97.7 °F (36.5 °C) (Axillary)   Resp 21   Ht 5' 11.5\" (1.816 m)   Wt 126.6 kg (279 lb 1.6 oz)   SpO2 100%   BMI 38.38 kg/m²     Constitutional:  No acute distress, cooperative, pleasant    ENT:  Oral mucosa moist, oropharynx benign. Resp:  CTA bilaterally. CV:  Regular rhythm, normal rat    GI:  Soft, non distended, non tender. normoactive bowel sounds,  abdominal wall drainage tube has minimal purulent discharge in the bag    Musculoskeletal:  No edema, warm, 2+ pulses throughout    Neurologic:  Generalized weakness ,right upper extremity and left lower extremity appear more weaker . AAOx3, CN II-XII reviewed                          Pertinent imaging studies:        Recent Labs     11/17/21  0417 11/16/21  0210 11/15/21  0304   WBC 19.1* 18.2* 25.2*   HGB 8.8* 8.1* 7.5*   HCT 29.1* 27.4* 24.9*    288 253     Recent Labs     11/17/21 0417 11/16/21 0210 11/15/21  0304   * 135* 132*   K 4.4 4.0 4.1   CL 98 101 100   CO2 25 26 20*   BUN 47* 31* 48*   CREA 4.00* 3.06* 4.34*    97 112*   CA 10.0 9.8 9.9     Recent Labs     11/17/21 0417 11/16/21 0210 11/15/21  0304   * 162* 159*   TP 5.9* 6.4 6.0*   ALB 2.5* 2.5* 2.1*   GLOB 3.4 3.9 3.9     No results for input(s): INR, PTP, APTT, INREXT in the last 72 hours. No results for input(s): FE, TIBC, PSAT, FERR in the last 72 hours. No results for input(s): PH, PCO2, PO2 in the last 72 hours. No results for input(s): CPK, CKMB in the last 72 hours.     No lab exists for component: TROPONINI  No components found for: [place]    Chronic Diagnoses:    Problem List as of 11/17/2021 Date Reviewed: 11/6/2021          Codes Class Noted - Resolved    Abdominal wall fluid collections ICD-10-CM: R18.8  ICD-9-CM: 789.59  10/25/2021 - Present        Severe protein-calorie malnutrition ([facility] [address] ICD-10-CM: E43  ICD-9-CM: 262  10/21/2021 - Present        * (Principal) GIB (gastrointestinal bleeding) ICD-10-CM: K92.2  ICD-9-CM: 578.9  10/18/2021 - Present              Time spent on discharge related activities today greater than 30 minutes. Signed:  Ladarius Castro MD                 Hospitalist, Internal Medicine      Cc:  Other, MD Princess

## 2021-11-17 NOTE — PROGRESS NOTES
Transition of Care Plan   RUR- Med 16%   DISPOSITION: SNF - [facility], operated by [facility] and [address] F/U with PCP/Specialist     Transport: [facility] -  1130pm     CM left message for Laura Shannon, Admissions with [facility] [phone]. They are able to accept patient today as planned. 10:30am: Awaiting physician clearance for discharge. 11:04am: Patient will be discharged today following HD. HD needs to be restarted, [facility] pushed to 4pm. CM spoke with Norman Echeverria at [facility] Admissions, they plan to do CBC on 11/30 and will send labs with patient to appointment with Dr. Kathryn Phillips on 12/1. [facility] requested DC summary, H&P and supplies for pigtail drain, will scan to [email or web address] when available. CM spoke with patient at the bedside to inform him of discharge plan, he is in agreement. 12:18pm: CM spoke with [facility], they are not able to accommodate 4pm as requested. Earliest available is 11:30pm. Patient is amenable to this time. [facility]R is scheduled, barring any further complications with HD cath.     1:29pm: CM spoke with Laura Shannon in Admissions at [facility], they are able to accept patient at any time in the evening. If [facility] needs to be cancelled or rescheduled for any reason after 4:30pm, please contact on call CM at [phone]. Dub Held) BETSY Moreno.

## 2021-11-17 NOTE — PROGRESS NOTES
Bedside and Verbal shift change report given to Jalyn (oncoming nurse) by Jose Cruz Rodriguez (offgoing nurse). Report included the following information SBAR, Kardex, Intake/Output, MAR, Recent Results, Cardiac Rhythm Sinus Tach, Quality Measures and Dual Neuro Assessment.

## 2021-11-17 NOTE — DISCHARGE INSTRUCTIONS
Discharge SNF/Rehab Instructions/LTAC       PATIENT ID: Cherelle Giang  MRN: [id number]   YOB: 1970    DATE OF ADMISSION: 10/18/2021 11:51 AM    DATE OF DISCHARGE: 11/17/2021    PRIMARY CARE PROVIDER: Miguelina Pringle MD       ATTENDING PHYSICIAN: Viviana Hutchins MD  DISCHARGING PROVIDER: Tiffany Gaines MD     To contact this individual call [phone] and ask the  to page. If unavailable ask to be transferred the Adult Hospitalist Department. CONSULTATIONS: IP CONSULT TO GASTROENTEROLOGY  IP CONSULT TO NEPHROLOGY  IP CONSULT TO INTERVENTIONAL RADIOLOGY  IP CONSULT TO GASTROENTEROLOGY  IP CONSULT TO GASTROENTEROLOGY  IP CONSULT TO OTOLARYNGOLOGY  IP CONSULT TO GASTROENTEROLOGY  IP CONSULT TO INFECTIOUS DISEASES  IP CONSULT TO GENERAL SURGERY    PROCEDURES/SURGERIES: Procedure(s):  ESOPHAGOGASTRODUODENOSCOPY (EGD) at Bedside  ESOPHAGOGASTRODUODENAL (EGD) BIOPSY    ADMITTING [address] [place] [address] COURSE:   Cherelle Giang is a  52 yo M with PMH ESRD on HD, who was on coumadin for recent diagnosis of DVT RUE with a recent prolonged hospitalization secondary to COVID PNA in August of this year. He ultimately required trach and peg, was sent to [facility] for ongoing medical care and rehab. However he has not been there too long when he was sent to our ED for hematemesis; he was admitted to [facility] on 10/15 and transferred to [facility] on 10/18. Per records reviewed, patient was intubated on 9/7, trached on 9/16, he was treated for VRE pneumonia. PEG dislodged on 10/11, treated with Unasyn until 10/17. Patient was on warfarin for right upper extremity DVT involving the subclavian, axillary and brachial veins. He started having coffee ground emesis 10/18/21 with elevated HR and hypotension. He was sent to [facility] ER for further workup. He was found to be hypotensive and was admitted to the ICU. DISCHARGE DIAGNOSES / PLAN:      Rising WBC, WBC fluctuating. He remained afebrile. WBC up to 25.2 today. --CT abdomen on 11/9 without significant fluid collection. But this was done without contrast.  --Abdominal wall abscess culture on 10/23 grew coag negative, felt to be contaminant. It also grew Candida albicans for which she completed 15 days of Diflucan therapy. --Procalcitonin elevated at 1.72, trending down;blood culture from 11/12 negative thus far.  --ID consulted, restarted Diflucan. Plan to continue until 12/9  --ID recommended CT abdomen with contrast to assess for collection, the catheter might not be draining and may need to be repositioned depending on what the CT shows.   --11/17: had discussion with patient and his wife over phone. Discussed need for repeat CT abd/pel due to elevated WBC and procalcitonin level to evaluate his abscess. Patient did not have to have a repeat CT abd/pel at this time and wants antibiotics to allow working. They both understand that his abscess may get worse which could require rehospitalization. Administration since admission  · Zosyn from 10/18-10/29  · Vancomycin x1 dose on 10/18  · Zyvox from 10/8-10/21  · Levaquin x1 dose on 10/18  · Cefazolin x1 dose on 11/5  · Diflucan from 10/21-11/5; restarted on 11/12-, per ID, will continue until 12/9  --WBC is flat at 18-19        Hemorrhagic shock due to hematemesis due to severe esophagitis. Acute blood loss anemia.   --EGD on 10/19 showed LA grade D reflux esophagitis up to the mid esophagus, few sessile polyps in the stomach, mild patchy erythema in the distal bulb of the duodenum. - s/p EGD (11/01)  - S/P 2 units of pRBCs, 1 unit of FFB and K centra. - Continue PPI   - Monitor labs and transfuse for hgb <7.0. Hemoglobin has remained> 8.  - GI signed off.       Left abdominal abscess. History of dislodged PEG tube. --CT A/P on 10/20 showed left anterior abdominal and pelvic wall hematoma or gas containing fluid collection.   --He underwent ultrasound guided fluid drainage and percutaneous drain placement which revealed thick purulent fluid, 18 Citizen of [place] drain was left in place. -- Wound culture on 10/23 grew apparent Candida albicans, no sensitivity available. Culture also showed coag negative staph which was deemed to be contaminant  -Completed 15 days of IV Diflucan. WBC normalized. -Follow up CT on 10/26 showed slightly diminished size of the abdominal wall collection.   - Repeat CT on 11/2 significant decrease in size of the left rectus sheath hematoma. The small collection suggestive of congealed hematoma and is unlikely to drain through cath. - surgery following, abdominal wall drain is attached to bag with purulent discharge  --CT abdomen and pelvis, sinogram performed 11/9: Stable enlargement of the left abdominal wall musculature, evaluation for abscess is limited without IV contrast, a small residual abscess is difficult to exclude however this is unchanged. Contrast injection through the catheter demonstrated pooling of contrast material dependently within the muscle with out evidence of fistula. --General surgery following, discussed with , no need for repeat CT abd/pel at this time. WIll need follow up with surgery which has been scheduled with Dr. Jessica Garcia on 12/1/21. They will evaluate patient's drain and decide on removal at that time.   --REPEAT CBC on 11/27/21 and TAKE LABS TO SURGERY FOLLOW UP along with this discharge summary.      Acute on chronic hypoxic respiratory failure with tracheostomy Resolved,  --Patient was intubated in outside hospital on 9/7 and was trached on 9/16. He was successfully decannulated here on 11/3     Renal:  ESRD on HD MWF-Nephrology following, last HD on 11/17/21     Right upper extremity acute DVT, POA. Patient was on warfarin but came with hemorrhagic shock and supratherapeutic INR requiring reversal  --Anticoagulation on hold. --repeat Venous duplex negative for DVT in RUE.  Will not resume warfarin at this time.      Right upper and left lower extremities appeared weaker than the rest on 11/4  --Noncontrast head CT is negative. --patient states that RUE has been since previous admission. Discussed possibility of MRI with patient but he does not want to go through additional testing if it will not  and/or delay his discharge to rehab.      Morbid obesity   - BMI 41.69  - Weight loss management to be followed up outpatient       PEG and tracheostomy removed. Wound management as noted by wound care specialist nurse     1. POA left heel looks the same, maroon/brown crusted area, no drainage, wound edges are closed, jeana-wound without erythema. Offloaded. Venelex ointment applied. 2.  POA left buttock wound measures 1 cm x 3 cm x 1.5 cm with undermining at 6 o'clock being 2.6 cm, wound bed covered with slough, appears thinner today, moderate amount of tan drainage, wound edges are open, jeana-wound intact without erythema. Santyl ointment, 2 x 2 and Optifoam Gentle applied. 3.  Bilateral lower buttocks wounds are improving, left buttock wound is smaller and measures 2.8 cm x  1.5 cm x 0.1 cm, right buttock wound is also smaller and measures 0.3 cm x 0.8 cm x 0.1 cm, wound beds are pink, moist, scant serous drainage, wound edges are open, jeana-wound intact. Calazime lotion applied. 4.  Sacrum/top of gluteal cleft with small fissure measuring 0.8 cm x 0.2 cm x 0.1 cm, wound bed is pink, blanches, wound edges are open, jeana-wound intact. Calazime lotion applied. Recommendations:    Continue with current wound care and also apply Calazime lotion to sacrum and bilateral lower buttocks      Bilateral heels- Every 12 hours liberally apply Venelex ointment     Left buttock- Daily cleanse with normal saline, apply nickel thickness of Santyl ointment, loosely fill undermining at 6 o'clock with 2 x 2 (remaining gauze can be placed over wound) and cover with Optifoam Gentle.      Lower buttocks and sacrum- Every 12 hours apply Calazime lotion or Z guard paste (orange tube). PENDING TEST RESULTS:   At the time of discharge the following test results are still pending: NONE    FOLLOW UP APPOINTMENTS:    Follow-up Information     Follow up With Specialties Details Why Contact Info    Your PCP   In 2 weeks Discharge follow up       -Surgery    your appointment is scheduled for 12/1/2021, please take most recent labs to your appointment     Other, MD Princess    Patient can only remember the practice name and not the physician             ADDITIONAL CARE RECOMMENDATIONS: NONE    DIET: Regular Diet    TUBE FEEDING INSTRUCTIONS: NONE    OXYGEN / BiPAP SETTINGS: NONE    ACTIVITY: Activity as tolerated    WOUND CARE: as above     EQUIPMENT needed: NONE      DISCHARGE MEDICATIONS:   See Medication Reconciliation Form      NOTIFY YOUR PHYSICIAN FOR ANY OF THE FOLLOWING:   Fever over 101 degrees for 24 hours. Chest pain, shortness of breath, fever, chills, nausea, vomiting, diarrhea, change in mentation, falling, weakness, bleeding. Severe pain or pain not relieved by medications. Or, any other signs or symptoms that you may have questions about.     DISPOSITION:    Home With:   OT  PT  HH  RN      X SNF/Inpatient Rehab/LTAC    Independent/assisted living    Hospice    Other:       PATIENT CONDITION AT DISCHARGE:     Functional status   X Poor     Deconditioned     Independent      Cognition   X  Lucid     Forgetful     Dementia      Catheters/lines (plus indication)    Gaytan     PICC     PEG    X None      Code status   X  Full code     DNR      PHYSICAL EXAMINATION AT DISCHARGE:   Refer to Progress Note***      CHRONIC MEDICAL DIAGNOSES:  Problem List as of 11/15/2021 Date Reviewed: 11/6/2021          Codes Class Noted - Resolved    Abdominal wall fluid collections ICD-10-CM: R18.8  ICD-9-CM: 789.59  10/25/2021 - Present        Severe protein-calorie malnutrition ([facility] [address] ICD-10-CM: H64  ICD-9-CM: 262  10/21/2021 - Present        * (Principal) GIB (gastrointestinal bleeding) ICD-10-CM: K92.2  ICD-9-CM: 578.9  10/18/2021 - Present                CDMP Checked:   Yes ***     PROBLEM LIST Updated:  Yes ***         Signed:   Manish Hernandez MD  11/17/2021

## 2021-11-17 NOTE — PROGRESS NOTES
Name: Dustin Andre MRN: [id number]   : 1970 Hospital: [address]   Date: 2021        IMPRESSION:   · SERGEY, HD dependent. Patient is on MWF schedule. Seen on HD in his room. HD catheter is poorly functioning. Patient is supposed to leave the hospital after HD Tx  · Hypervolemia with predominantly central congestion, improved. · Anemia of multifactorial origin  · Respiratory failure due to a complicated Covid pneumonia, recovered. Trache removed      PLAN:   · Use Activase for an attempt to resume blood flow. If successful - complete a full HD Tx. If not - patient will need to have a new catheter placed before his discharge. That may delay his transfer to the rehab facility. · Anemia management-  Retacrit. Subjective/Interval History:   I have reviewed the flowsheet and previous days notes. ROS:Review of systems not obtained due to patient factors. Objective:   Vital Signs:    Visit Vitals  BP 98/70   Pulse (!) 114   Temp 98.3 °F (36.8 °C) (Axillary)   Resp 23   Ht 5' 11.5\" (1.816 m)   Wt 126.6 kg (279 lb 1.6 oz)   SpO2 96%   BMI 38.38 kg/m²       O2 Device: None (Room air)   O2 Flow Rate (L/min): 0 l/min   Temp (24hrs), Av.2 °F (36.8 °C), Min:97.8 °F (36.6 °C), Max:98.6 °F (37 °C)       Intake/Output:   Last shift:      701 - 1900  In: -   Out: 500   Last 3 shifts: 11/15 1901 - 700  In: 240 [P.O.:240]  Out: -     Intake/Output Summary (Last 24 hours) at 2021 1105  Last data filed at 2021 1030  Gross per 24 hour   Intake --   Output 500 ml   Net -500 ml        Physical Exam:  General:    Asleep, NAD. HD Tx is in progress. Head:   Normocephalic, without obvious abnormality, atraumatic. Eyes:   Conjunctivae/corneas clear. Nose:  Nares normal. No drainage or sinus tenderness. Throat:    Lips, mucosa, and tongue normal.    Neck:  Supple. No trache  Lungs:   Clear to auscultation bilaterally. No Wheezing or Rhonchi. No rales.   Chest wall:  No tenderness or deformity. No Accessory muscle use. Heart:   Regular rate and rhythm,  no murmur, rub or gallop. Abdomen:   Soft. Distended. Bowel sounds normal. PEG tube in place. Extremities: Extremities normal, atraumatic, No cyanosis. No edema. No clubbing  Skin:     Texture, turgor normal. No rashes or lesions. Not Jaundiced  Psych:  Calm. Neurologic: Non focal.      DATA:  Labs:  Recent Labs     11/17/21  0417 11/16/21  0210 11/15/21  0304   * 135* 132*   K 4.4 4.0 4.1   CL 98 101 100   CO2 25 26 20*   BUN 47* 31* 48*   CREA 4.00* 3.06* 4.34*   CA 10.0 9.8 9.9   ALB 2.5* 2.5* 2.1*     Recent Labs     11/17/21  0417 11/16/21  0210 11/15/21  0304   WBC 19.1* 18.2* 25.2*   HGB 8.8* 8.1* 7.5*   HCT 29.1* 27.4* 24.9*    288 253     No results for input(s): DOLORES, KU, CLU, CREAU in the last 72 hours.     No lab exists for component: PROU    Total time spent with patient:  35 minutes    [] Critical Care Provided    Care Plan discussed with:   Rodrick Joseph MD

## 2021-11-17 NOTE — PROGRESS NOTES
Occupational Therapy: defer    Chart reviewed in preparation for OT treatment session. Patient is receiving HD at bedside. Will defer OT at this time and will f/u as able and appropriate.     Johnny Torres, OTR/L

## 2021-11-17 NOTE — PROCEDURES
Hemodialysis / [phone]    Vitals Pre Post Assessment Pre Post   BP BP: 99/73 (11/17/21 0845) 109 LOC AxOx4 AxOx4   HR Pulse (Heart Rate): 70 (11/17/21 0845) 117 Lungs CTA / tachypneic but not labored Even and unlabored   Resp Resp Rate: 18 (11/17/21 0845) 21 Cardiac Tachycardic Tachycardic   Temp Temp: 98.3 °F (36.8 °C) (11/17/21 0845) 97.7 Skin CDI CDI   Weight Pre-Dialysis Weight: 127.9 kg (281 lb 15.5 oz) (11/15/21 0510) n/a Edema generalized generalized   Tele status remote remote Pain Pain Intensity 1: 4 (11/16/21 1808) 3     Orders   Duration: Start: 8748 0815 End: 7973 2675 Total: 3 hrs   Dialyzer: Dialyzer/Set Up Inspection: Revaclear (11/17/21 0845)   K Bath: Dialysate K (mEq/L): 3 (11/17/21 0845)   Ca Bath: Dialysate CA (mEq/L): 2.5 (11/17/21 0845)   Na: Dialysate NA (mEq/L): 140 (11/17/21 0845)   Bicarb: Dialysate HCO3 (mEq/L): 30 (11/17/21 0845)   Target Fluid Removal: Goal/Amount of Fluid to Remove (mL): 3000 mL (11/17/21 0845)     Access   Type & Location: Right chest CVC   Comments:                                     Right tunneled CVC, dressing dated 11/15/21, CDI. Each catheter limb disinfected for 60 seconds per limb with alcohol swabs. Caps removed, dialysis CVC hub scrubbed with Prevantics for 5 seconds, followed by a 5 second dry time per Hospital P&P.   +aspirated/+flushed    Sluggish aspiration on venous limb, unable to get 400 BFR on either limb. Have attempted to reverse lines several times, reposition, cough, line is very positional. Requires power flushing of limbs frequently to keep running on machine.     Stopped to cathflo x 30 minutes - some improvement      Labs   HBsAg (Antigen) / date: 10/20/21 negative                                            HBsAb (Antibody) / date: 10/20/21 immune   Source: Epic   Obtained/Reviewed  Critical Results Called HGB   Date Value Ref Range Status   11/17/2021 8.8 (L) 12.1 - 17.0 g/dL Final     Potassium   Date Value Ref Range Status 11/17/2021 4.4 3.5 - 5.1 mmol/L Final     Calcium   Date Value Ref Range Status   11/17/2021 10.0 8.5 - 10.1 MG/DL Final     BUN   Date Value Ref Range Status   11/17/2021 47 (H) 6 - 20 MG/DL Final     Comment:     INVESTIGATED PER DELTA CHECK PROTOCOL     Creatinine   Date Value Ref Range Status   11/17/2021 4.00 (H) 0.70 - 1.30 MG/DL Final     Comment:     INVESTIGATED PER DELTA CHECK PROTOCOL        Meds Given   Name Dose Route   Heparin 1000 units/hr R CVC   Cathflo x2 2 mg / 2 mL A 1.9 mL / V 1.9 mL   Albumin 25% 12.5 mg / 50 mL R CVC     Adequacy / Fluid    Total Liters Process: 53.4 L   Net Fluid Removed: 2500 mL      Comments   Time Out Done:   (Time) 0840   Admitting Diagnosis: GI bleed   Consent obtained/signed: Informed Consent Verified: Yes (11/17/21 0845)   Machine / RO # Machine Number: [id number] (11/17/21 0845)   Primary Nurse Rpt Pre: Leena Giron RN   Primary Nurse Rpt Post: Leena Giron RN   Pt Education: Procedural / infection control   Care Plan: Continue current HD plan of care   Pts outpatient clinic: TBD     Tx Summary   Comments:        0845 HD treatment initiated per physicians order. 0900 NS 50 mL flush given to keep lines patent due to low BFR.  0905 CVC dysfunction. Dr Vianey Barr notified. Orders to run as long as possible. UF goal increased to 3.5 kg to account for amount of NS flushes to keep line patent. 4906 Telephone orders from Dr Vianey Barr - give 1000 units per hour during treatment to keep circuit patent. NS 50 mL flush given to keep lines patent due to low BFR. Heparin bolus 1000 units given. 0930  mL flush given to keep lines patent due to low BFR.  0945 NS 50 mL flush given to keep lines patent due to low BFR. 1015 Heparin bolus 1000 units given. 1020 Dr Vianey Barr at bedside. Verbal order to stop treatment and cathflo each limb x 30 minutes and retry. 1030 HD tx stopped. Unable to rinse back blood due to clotting in circuit - MD notified.   [address], [address] instilled. 1145 Cathflo aspirated and tx restarted. 1200 Still only able to run . Requested a second order for heparin 1000 units/hr during treatment. Telephone order given with readback from Dr Annalise Rivera. 1230 Albumin initiated. 1300 Patient update given to Dr Annalise Rivera. Not tolerating second attempt as well, -125, SBP dropped to 70s so Albumin started. BP better, but HR still 115 - -325  1420 CVC has been running  - 300 relatively smoothly. However even with heparin infusing during HD, blood is clotting in the dialyzer causing the blood pump to stop due to high TMP. Due to 200 mL bloodloss at the end of first attempt and patient feeling a little \"drained\", reached out to Dr Annalise Rivera to notify of clotting in circuit - would she prefer I restring for a 3rd time, or stop treatment for today - telephone order with readback to stop for the day. 1425 HD treatment completed per physician order. All possible blood returned to patient. Each catheter limb disinfected for 60 seconds per limb with alcohol swabs. Dialysis CVC hub scrubbed with Prevantics for 15 seconds, followed by a 5 second dry time per Hospital P&P.   +flushed/+cathflo-lock/+capped.

## 2021-11-17 NOTE — PROGRESS NOTES
ID Progress Note  2021    Subjective:     Seen during HD, appear sleepy  Review of Systems:            Symptom Y/N Comments   Symptom Y/N Comments   Fever/Chills n      Chest Pain  n      Poor Appetite       Edema        Cough       Abdominal Pain n      Sputum       Joint Pain        SOB/SCHAFFER       Pruritis/Rash        Nausea/vomit n      Tolerating PT/OT        Diarrhea n      Tolerating Diet        Constipation n      Other           Could NOT obtain due to:       Objective:     Vitals:   Visit Vitals  /67   Pulse 99   Temp 98.3 °F (36.8 °C)   Resp 19   Ht 5' 11.5\" (1.816 m)   Wt 126.6 kg (279 lb 1.6 oz)   SpO2 98%   BMI 38.38 kg/m²        Tmax:  Temp (24hrs), Av.2 °F (36.8 °C), Min:97.8 °F (36.6 °C), Max:98.6 °F (37 °C)      PHYSICAL EXAM:  General: Obese, WD, WN. Alert, cooperative, no acute distress    EENT:  EOMI. Anicteric sclerae. MMM  Resp:  Clear in apex with decrease of breath sounds at bases. no wheezing or rales. No accessory muscle use  CV:  Regular  rhythm,  diffuse edema all four extremities  GI:  Soft, obese,  Crusted old drainage around catheter. +Bowel sounds, drainage catheter in place    Neurologic:  Alert and oriented X 3, normal speech,   Psych:   Good insight. Not anxious nor agitated  Skin:  No rashes.   No jaundice    Labs:   Lab Results   Component Value Date/Time    WBC 19.1 (H) 2021 04:17 AM    HGB 8.8 (L) 2021 04:17 AM    HCT 29.1 (L) 2021 04:17 AM    PLATELET 069  04:17 AM    .7 (H) 2021 04:17 AM     Lab Results   Component Value Date/Time    Sodium 132 (L) 2021 04:17 AM    Potassium 4.4 2021 04:17 AM    Chloride 98 2021 04:17 AM    CO2 25 2021 04:17 AM    Anion gap 9 2021 04:17 AM    Glucose 100 2021 04:17 AM    BUN 47 (H) 2021 04:17 AM    Creatinine 4.00 (H) 2021 04:17 AM    BUN/Creatinine ratio 12 2021 04:17 AM    GFR est AA 19 (L) 2021 04:17 AM    GFR est non-AA 16 (L) 11/17/2021 04:17 AM    Calcium 10.0 11/17/2021 04:17 AM    Bilirubin, total 0.4 11/17/2021 04:17 AM    Alk. phosphatase 177 (H) 11/17/2021 04:17 AM    Protein, total 5.9 (L) 11/17/2021 04:17 AM    Albumin 2.5 (L) 11/17/2021 04:17 AM    Globulin 3.4 11/17/2021 04:17 AM    A-G Ratio 0.7 (L) 11/17/2021 04:17 AM    ALT (SGPT) 21 11/17/2021 04:17 AM      CT of abd/pel (11/11) Stable enlargement of the left abdominal wall musculature status post percutaneous drainage. Evaluation for abscess is limited without IV contrast. A small residual abscess is difficult to exclude however this is unchanged. Contrast injection through the catheter demonstrates pooling of contrast material dependently within the muscle without evidence of fistula. Assessment and Plan   Leukocytosis (gradual trending up since11/9)  Left abdominal abscess  hx dislodged PEG tube   S/p replacement of right sided tunneled dialysis catheter (11/5)   s/p pigtail catheter placement 10/23  - WBC 22.3->18.2->19.1    Pt claims that he has been told that he usually have 'high WBC' by his pcp. However, his WBC was 8-11k between 11/4 to 11/8. Body fluid cx (10/19) candida albicans    Wound cx (10/23 & 11/15) staph coagulase negative    blood cx (11/12) no growth so far       Recommend to repeat CT of abd/pel with contrast to assess abdominal abscess;    pig tail drainage catheter was placed on 10/23, variation of WBC is concerning. It would be beneficial to evaluate progression of abscess. Pt has been informed that the image is necessarily to determine when would be appropriate to remove catheter. Pt continues to decline the repeat image at this time. However, will defer the final decision to surgery and primary team.       Continue with IV fluconazole (resumed on 11/12). Recommend to complete total  4 weeks, last dose 12/9.          Fever work up if temp >= 100.4      Above plan of care discussed and agreed with Dr. Macie Griggs, NP

## 2021-11-17 NOTE — WOUND CARE
WOCN Note:     Follow-up visit for heel, buttocks, and lower left back fold.      Chart shows:  Admitted for GIB post Covid-19+ in August  History of HD, trach, peg  Being discharged today to facility     Assessment:   Verbal and bemoaning turning. Requires full assists in repositioning; RN at bedside to help turn from supine to left  Surface: yandy ISAEL mattress     1. POA left medial heel deep tissue injury  2.5 x 4.5 x 0 cm  100% non-blanching faded burgundy  No fluctuance and thick callous beginning to lift  Offloaded with pillows; venelex in use     2. POA widespread MASD to bilateral buttocks with dry desquamation and lacy skin border  Erosion to left medial buttock  2 x 2 x 0.1 cm  100% moist red base  Tx: venelex applied     3. POA necrotic tissue in skin fold of left flank  1 x 4 x 1 cm  100% yellow  No exudate  Santyl applied, packed with saline gauze and foam cover dressing.      Recommendations:  Venelex to heels and buttocks twice daily  Left flank: clean with saline, apply Santyl and cover with foam.  Change daily. Turn/reposition approximately every 2 hours  Offload heels with heels hanging off end of pillow at all times while in bed. Air mattress: Use only flat sheet and one incontinence pad.  Please call [facility] @ [phone] for bed to be picked up at discharge.      Transition of Care: Plan to follow weekly and as needed while admitted to hospital.     NELDA Sanford, RN, [facility]  Certified Wound, Ostomy, Continence Nurse  office [phone]  Available via [facility]

## 2021-11-17 NOTE — PROGRESS NOTES
Physical Therapy: defer     Chart reviewed in preparation for PT treatment session. Patient is receiving HD at bedside. Will defer PT at this time. Noted scheduled discharge. Will continue to follow until discharge.

## 2021-11-17 NOTE — PROGRESS NOTES
Problem: Ventilator Management  Goal: *Patient maintains clear airway/free of aspiration  Outcome: Progressing Towards Goal  Goal: *Absence of infection signs and symptoms  Outcome: Progressing Towards Goal     Problem: Patient Education: Go to Patient Education Activity  Goal: Patient/Family Education  Outcome: Progressing Towards Goal     Problem: Risk for Spread of Infection  Goal: Prevent transmission of infectious organism to others  Description: Prevent the transmission of infectious organisms to other patients, staff members, and visitors.   Outcome: Progressing Towards Goal     Problem: Patient Education:  Go to Education Activity  Goal: Patient/Family Education  Outcome: Progressing Towards Goal

## 2021-11-18 NOTE — PROGRESS NOTES
Patient discharged and report given to [place]. Nurse receiving report was Whit Sanchez (ETHEL). Report given was in SBAR form. Receiving nurse had no questions.  All belongings left with patient via [facility]

## 2021-11-18 NOTE — PROGRESS NOTES
Bedside and Verbal shift change report given to wero (oncoming nurse) by Stefanie Barron (offgoing nurse). Report included the following information SBAR, Kardex, Procedure Summary, Intake/Output, MAR, Recent Results, Cardiac Rhythm Sinus Tach, Quality Measures and Dual Neuro Assessment.

## 2021-11-22 NOTE — PROGRESS NOTES
CM accessed chart per wife's request regarding FMLA paperwork.  Abigail Santamaria RN,Care Management

## 2022-03-18 PROBLEM — K92.2 GIB (GASTROINTESTINAL BLEEDING): Status: ACTIVE | Noted: 2021-10-18

## 2022-03-18 PROBLEM — E43 SEVERE PROTEIN-CALORIE MALNUTRITION (HCC): Status: ACTIVE | Noted: 2021-10-21

## 2022-03-19 PROBLEM — R18.8 ABDOMINAL WALL FLUID COLLECTIONS: Status: ACTIVE | Noted: 2021-10-25

## 2023-05-15 RX ORDER — ALBUTEROL SULFATE 2.5 MG/3ML
2.5 SOLUTION RESPIRATORY (INHALATION) EVERY 8 HOURS PRN
COMMUNITY

## 2023-05-15 RX ORDER — LANOLIN ALCOHOL/MO/W.PET/CERES
9 CREAM (GRAM) TOPICAL
COMMUNITY

## 2023-05-15 RX ORDER — ONDANSETRON 4 MG/1
4 TABLET, ORALLY DISINTEGRATING ORAL EVERY 8 HOURS PRN
COMMUNITY
Start: 2021-11-15

## 2023-05-15 RX ORDER — POLYETHYLENE GLYCOL 3350 17 G/17G
17 POWDER, FOR SOLUTION ORAL DAILY PRN
COMMUNITY
Start: 2021-11-15

## 2023-05-15 RX ORDER — SEVELAMER CARBONATE 800 MG/1
800 TABLET, FILM COATED ORAL
COMMUNITY

## 2023-05-15 RX ORDER — IPRATROPIUM BROMIDE AND ALBUTEROL SULFATE 2.5; .5 MG/3ML; MG/3ML
3 SOLUTION RESPIRATORY (INHALATION) 2 TIMES DAILY
COMMUNITY

## 2023-05-15 RX ORDER — ACETAMINOPHEN 160 MG/5ML
650 SOLUTION ORAL EVERY 6 HOURS PRN
COMMUNITY

## (undated) DEVICE — FORCEPS BX L240CM JAW DIA2.8MM L CAP W/ NDL MIC MESH TOOTH

## (undated) DEVICE — TUBING HYDR IRR --